# Patient Record
Sex: FEMALE | Race: WHITE | NOT HISPANIC OR LATINO | URBAN - METROPOLITAN AREA
[De-identification: names, ages, dates, MRNs, and addresses within clinical notes are randomized per-mention and may not be internally consistent; named-entity substitution may affect disease eponyms.]

---

## 2017-04-03 ENCOUNTER — OFFICE (AMBULATORY)
Dept: URBAN - METROPOLITAN AREA CLINIC 64 | Facility: CLINIC | Age: 57
End: 2017-04-03

## 2017-04-03 VITALS
WEIGHT: 195 LBS | HEIGHT: 66 IN | DIASTOLIC BLOOD PRESSURE: 91 MMHG | HEART RATE: 98 BPM | SYSTOLIC BLOOD PRESSURE: 152 MMHG

## 2017-04-03 DIAGNOSIS — R19.7 DIARRHEA, UNSPECIFIED: ICD-10-CM

## 2017-04-03 DIAGNOSIS — R15.2 FECAL URGENCY: ICD-10-CM

## 2017-04-03 PROCEDURE — 99213 OFFICE O/P EST LOW 20 MIN: CPT | Performed by: INTERNAL MEDICINE

## 2017-04-03 RX ORDER — COLESEVELAM HYDROCHLORIDE 625 MG/1
TABLET, FILM COATED ORAL
Qty: 180 | Refills: 4 | Status: ACTIVE

## 2017-09-21 ENCOUNTER — OFFICE (AMBULATORY)
Dept: URBAN - METROPOLITAN AREA CLINIC 64 | Facility: CLINIC | Age: 57
End: 2017-09-21

## 2017-09-21 VITALS
HEIGHT: 66 IN | WEIGHT: 194 LBS | SYSTOLIC BLOOD PRESSURE: 178 MMHG | HEART RATE: 84 BPM | DIASTOLIC BLOOD PRESSURE: 107 MMHG

## 2017-09-21 DIAGNOSIS — R15.2 FECAL URGENCY: ICD-10-CM

## 2017-09-21 DIAGNOSIS — R19.7 DIARRHEA, UNSPECIFIED: ICD-10-CM

## 2017-09-21 PROCEDURE — 99213 OFFICE O/P EST LOW 20 MIN: CPT | Performed by: INTERNAL MEDICINE

## 2017-09-21 RX ORDER — COLESEVELAM HYDROCHLORIDE 625 MG/1
TABLET, FILM COATED ORAL
Qty: 180 | Refills: 4 | Status: ACTIVE

## 2018-01-10 ENCOUNTER — HOSPITAL ENCOUNTER (OUTPATIENT)
Dept: GENERAL RADIOLOGY | Facility: HOSPITAL | Age: 58
Discharge: HOME | End: 2018-01-10

## 2018-09-07 ENCOUNTER — OFFICE (AMBULATORY)
Dept: URBAN - METROPOLITAN AREA CLINIC 64 | Facility: CLINIC | Age: 58
End: 2018-09-07

## 2018-09-07 VITALS
HEART RATE: 91 BPM | HEIGHT: 66 IN | DIASTOLIC BLOOD PRESSURE: 104 MMHG | SYSTOLIC BLOOD PRESSURE: 158 MMHG | WEIGHT: 186 LBS

## 2018-09-07 DIAGNOSIS — R19.7 DIARRHEA, UNSPECIFIED: ICD-10-CM

## 2018-09-07 DIAGNOSIS — R15.2 FECAL URGENCY: ICD-10-CM

## 2018-09-07 PROCEDURE — 99213 OFFICE O/P EST LOW 20 MIN: CPT | Performed by: INTERNAL MEDICINE

## 2018-09-07 RX ORDER — OMEPRAZOLE 20 MG/1
20 CAPSULE, DELAYED RELEASE ORAL
Qty: 30 | Refills: 11 | Status: ACTIVE
Start: 2018-09-07

## 2018-10-01 ENCOUNTER — CONVERSION ENCOUNTER (OUTPATIENT)
Dept: FAMILY MEDICINE CLINIC | Facility: CLINIC | Age: 58
End: 2018-10-01

## 2018-10-05 ENCOUNTER — HOSPITAL ENCOUNTER (OUTPATIENT)
Dept: FAMILY MEDICINE CLINIC | Facility: CLINIC | Age: 58
Setting detail: SPECIMEN
Discharge: HOME | End: 2018-10-05
Attending: FAMILY MEDICINE | Admitting: FAMILY MEDICINE

## 2018-10-05 LAB
ALBUMIN SERPL-MCNC: 3.5 G/DL (ref 3.5–4.8)
ALBUMIN/GLOB SERPL: 1.1 {RATIO} (ref 1–1.7)
ALP SERPL-CCNC: 66 IU/L (ref 32–91)
ALT SERPL-CCNC: 23 IU/L (ref 14–54)
ANION GAP SERPL CALC-SCNC: 15.4 MMOL/L (ref 10–20)
AST SERPL-CCNC: 22 IU/L (ref 15–41)
BASOPHILS # BLD AUTO: 0 10*3/UL (ref 0–0.2)
BASOPHILS NFR BLD AUTO: 1 % (ref 0–2)
BILIRUB SERPL-MCNC: 1.5 MG/DL (ref 0.3–1.2)
BUN SERPL-MCNC: 11 MG/DL (ref 8–20)
BUN/CREAT SERPL: 15.7 (ref 5.4–26.2)
CALCIUM SERPL-MCNC: 9.1 MG/DL (ref 8.9–10.3)
CHLORIDE SERPL-SCNC: 102 MMOL/L (ref 101–111)
CHOLEST SERPL-MCNC: 253 MG/DL
CHOLEST/HDLC SERPL: 6.5 {RATIO}
CONV CO2: 24 MMOL/L (ref 22–32)
CONV LDL CHOLESTEROL DIRECT: 102 MG/DL (ref 0–100)
CONV TOTAL PROTEIN: 6.8 G/DL (ref 6.1–7.9)
CREAT UR-MCNC: 0.7 MG/DL (ref 0.4–1)
DIFFERENTIAL METHOD BLD: (no result)
EOSINOPHIL # BLD AUTO: 0.1 10*3/UL (ref 0–0.3)
EOSINOPHIL # BLD AUTO: 2 % (ref 0–3)
ERYTHROCYTE [DISTWIDTH] IN BLOOD BY AUTOMATED COUNT: 14.1 % (ref 11.5–14.5)
GLOBULIN UR ELPH-MCNC: 3.3 G/DL (ref 2.5–3.8)
GLUCOSE SERPL-MCNC: 196 MG/DL (ref 65–99)
HBA1C MFR BLD: 8.2 % (ref 0–5.6)
HCT VFR BLD AUTO: 41.4 % (ref 35–49)
HDLC SERPL-MCNC: 39 MG/DL
HGB BLD-MCNC: 13.8 G/DL (ref 12–15)
LDLC/HDLC SERPL: 2.6 {RATIO}
LIPID INTERPRETATION: ABNORMAL
LYMPHOCYTES # BLD AUTO: 1.5 10*3/UL (ref 0.8–4.8)
LYMPHOCYTES NFR BLD AUTO: 22 % (ref 18–42)
MCH RBC QN AUTO: 28.4 PG (ref 26–32)
MCHC RBC AUTO-ENTMCNC: 33.5 G/DL (ref 32–36)
MCV RBC AUTO: 85 FL (ref 80–94)
MONOCYTES # BLD AUTO: 0.6 10*3/UL (ref 0.1–1.3)
MONOCYTES NFR BLD AUTO: 8 % (ref 2–11)
NEUTROPHILS # BLD AUTO: 4.5 10*3/UL (ref 2.3–8.6)
NEUTROPHILS NFR BLD AUTO: 67 % (ref 50–75)
NRBC BLD AUTO-RTO: 0 /100{WBCS}
NRBC/RBC NFR BLD MANUAL: 0 10*3/UL
PLATELET # BLD AUTO: 223 10*3/UL (ref 150–450)
PMV BLD AUTO: 8.5 FL (ref 7.4–10.4)
POTASSIUM SERPL-SCNC: 3.4 MMOL/L (ref 3.6–5.1)
RBC # BLD AUTO: 4.87 10*6/UL (ref 4–5.4)
SODIUM SERPL-SCNC: 138 MMOL/L (ref 136–144)
TRIGL SERPL-MCNC: 607 MG/DL
TSH SERPL-ACNC: 1.16 UIU/ML (ref 0.34–5.6)
VLDLC SERPL CALC-MCNC: 111.6 MG/DL
WBC # BLD AUTO: 6.7 10*3/UL (ref 4.5–11.5)

## 2018-10-23 ENCOUNTER — CONVERSION ENCOUNTER (OUTPATIENT)
Dept: FAMILY MEDICINE CLINIC | Facility: CLINIC | Age: 58
End: 2018-10-23

## 2018-11-29 ENCOUNTER — CONVERSION ENCOUNTER (OUTPATIENT)
Dept: FAMILY MEDICINE CLINIC | Facility: CLINIC | Age: 58
End: 2018-11-29

## 2019-01-07 ENCOUNTER — HOSPITAL ENCOUNTER (OUTPATIENT)
Dept: GENERAL RADIOLOGY | Facility: HOSPITAL | Age: 59
Discharge: HOME | End: 2019-01-07
Attending: PREVENTIVE MEDICINE | Admitting: PREVENTIVE MEDICINE

## 2019-01-07 ENCOUNTER — CONVERSION ENCOUNTER (OUTPATIENT)
Dept: FAMILY MEDICINE CLINIC | Facility: CLINIC | Age: 59
End: 2019-01-07

## 2019-02-06 ENCOUNTER — CONVERSION ENCOUNTER (OUTPATIENT)
Dept: FAMILY MEDICINE CLINIC | Facility: CLINIC | Age: 59
End: 2019-02-06

## 2019-02-15 ENCOUNTER — CONVERSION ENCOUNTER (OUTPATIENT)
Dept: FAMILY MEDICINE CLINIC | Facility: CLINIC | Age: 59
End: 2019-02-15

## 2019-04-01 ENCOUNTER — CONVERSION ENCOUNTER (OUTPATIENT)
Dept: FAMILY MEDICINE CLINIC | Facility: CLINIC | Age: 59
End: 2019-04-01

## 2019-04-05 ENCOUNTER — CONVERSION ENCOUNTER (OUTPATIENT)
Dept: FAMILY MEDICINE CLINIC | Facility: CLINIC | Age: 59
End: 2019-04-05

## 2019-04-11 ENCOUNTER — CONVERSION ENCOUNTER (OUTPATIENT)
Dept: FAMILY MEDICINE CLINIC | Facility: CLINIC | Age: 59
End: 2019-04-11

## 2019-04-12 ENCOUNTER — HOSPITAL ENCOUNTER (OUTPATIENT)
Dept: LAB | Facility: HOSPITAL | Age: 59
Discharge: HOME | End: 2019-04-12
Attending: PREVENTIVE MEDICINE | Admitting: PREVENTIVE MEDICINE

## 2019-04-12 LAB
ALBUMIN SERPL-MCNC: 3.9 G/DL (ref 3.5–4.8)
ALBUMIN/GLOB SERPL: 1.1 {RATIO} (ref 1–1.7)
ALP SERPL-CCNC: 82 IU/L (ref 32–91)
ALT SERPL-CCNC: 22 IU/L (ref 14–54)
ANION GAP SERPL CALC-SCNC: 17.8 MMOL/L (ref 10–20)
AST SERPL-CCNC: 27 IU/L (ref 15–41)
BASOPHILS # BLD AUTO: 0.1 10*3/UL (ref 0–0.2)
BASOPHILS NFR BLD AUTO: 1 % (ref 0–2)
BILIRUB SERPL-MCNC: 1.5 MG/DL (ref 0.3–1.2)
BUN SERPL-MCNC: 10 MG/DL (ref 8–20)
BUN/CREAT SERPL: 12.5 (ref 5.4–26.2)
CALCIUM SERPL-MCNC: 9.6 MG/DL (ref 8.9–10.3)
CHLORIDE SERPL-SCNC: 102 MMOL/L (ref 101–111)
CHOLEST SERPL-MCNC: 267 MG/DL
CHOLEST/HDLC SERPL: 6.1 {RATIO}
CONV CO2: 23 MMOL/L (ref 22–32)
CONV LDL CHOLESTEROL DIRECT: 135 MG/DL (ref 0–100)
CONV TOTAL PROTEIN: 7.5 G/DL (ref 6.1–7.9)
CREAT UR-MCNC: 0.8 MG/DL (ref 0.4–1)
DIFFERENTIAL METHOD BLD: (no result)
EOSINOPHIL # BLD AUTO: 0.3 10*3/UL (ref 0–0.3)
EOSINOPHIL # BLD AUTO: 3 % (ref 0–3)
ERYTHROCYTE [DISTWIDTH] IN BLOOD BY AUTOMATED COUNT: 13.6 % (ref 11.5–14.5)
ERYTHROCYTE [SEDIMENTATION RATE] IN BLOOD BY WESTERGREN METHOD: 31 MM/HR (ref 0–30)
GLOBULIN UR ELPH-MCNC: 3.6 G/DL (ref 2.5–3.8)
GLUCOSE SERPL-MCNC: 167 MG/DL (ref 65–99)
HCT VFR BLD AUTO: 42.5 % (ref 35–49)
HDLC SERPL-MCNC: 44 MG/DL
HGB BLD-MCNC: 14.4 G/DL (ref 12–15)
LDLC/HDLC SERPL: 3.1 {RATIO}
LIPID INTERPRETATION: ABNORMAL
LYMPHOCYTES # BLD AUTO: 2 10*3/UL (ref 0.8–4.8)
LYMPHOCYTES NFR BLD AUTO: 23 % (ref 18–42)
MAGNESIUM SERPL-MCNC: 1.5 MG/DL (ref 1.8–2.5)
MCH RBC QN AUTO: 28.8 PG (ref 26–32)
MCHC RBC AUTO-ENTMCNC: 33.9 G/DL (ref 32–36)
MCV RBC AUTO: 84.9 FL (ref 80–94)
MONOCYTES # BLD AUTO: 0.6 10*3/UL (ref 0.1–1.3)
MONOCYTES NFR BLD AUTO: 7 % (ref 2–11)
NEUTROPHILS # BLD AUTO: 5.5 10*3/UL (ref 2.3–8.6)
NEUTROPHILS NFR BLD AUTO: 66 % (ref 50–75)
NRBC BLD AUTO-RTO: 0 /100{WBCS}
NRBC/RBC NFR BLD MANUAL: 0 10*3/UL
PLATELET # BLD AUTO: 288 10*3/UL (ref 150–450)
PMV BLD AUTO: 7.5 FL (ref 7.4–10.4)
POTASSIUM SERPL-SCNC: 3.8 MMOL/L (ref 3.6–5.1)
RBC # BLD AUTO: 5.01 10*6/UL (ref 4–5.4)
SODIUM SERPL-SCNC: 139 MMOL/L (ref 136–144)
TRIGL SERPL-MCNC: 453 MG/DL
VLDLC SERPL CALC-MCNC: 88.4 MG/DL
WBC # BLD AUTO: 8.5 10*3/UL (ref 4.5–11.5)

## 2019-06-03 ENCOUNTER — CONVERSION ENCOUNTER (OUTPATIENT)
Dept: OTHER | Facility: HOSPITAL | Age: 59
End: 2019-06-03

## 2019-06-04 VITALS
DIASTOLIC BLOOD PRESSURE: 96 MMHG | HEIGHT: 66 IN | OXYGEN SATURATION: 96 % | WEIGHT: 187.4 LBS | HEART RATE: 94 BPM | BODY MASS INDEX: 30.57 KG/M2 | HEIGHT: 66 IN | HEART RATE: 80 BPM | BODY MASS INDEX: 30.05 KG/M2 | BODY MASS INDEX: 29.21 KG/M2 | DIASTOLIC BLOOD PRESSURE: 101 MMHG | BODY MASS INDEX: 30.31 KG/M2 | WEIGHT: 189 LBS | OXYGEN SATURATION: 95 % | SYSTOLIC BLOOD PRESSURE: 199 MMHG | OXYGEN SATURATION: 96 % | HEART RATE: 88 BPM | OXYGEN SATURATION: 95 % | HEART RATE: 99 BPM | WEIGHT: 188.6 LBS | BODY MASS INDEX: 30.05 KG/M2 | DIASTOLIC BLOOD PRESSURE: 112 MMHG | SYSTOLIC BLOOD PRESSURE: 116 MMHG | WEIGHT: 181 LBS | HEART RATE: 97 BPM | SYSTOLIC BLOOD PRESSURE: 193 MMHG | DIASTOLIC BLOOD PRESSURE: 129 MMHG | HEIGHT: 66 IN | HEART RATE: 94 BPM | HEIGHT: 66 IN | HEIGHT: 66 IN | OXYGEN SATURATION: 100 % | SYSTOLIC BLOOD PRESSURE: 162 MMHG | OXYGEN SATURATION: 95 % | BODY MASS INDEX: 30.53 KG/M2 | WEIGHT: 187 LBS | DIASTOLIC BLOOD PRESSURE: 92 MMHG | OXYGEN SATURATION: 95 % | OXYGEN SATURATION: 98 % | HEART RATE: 90 BPM | BODY MASS INDEX: 29.89 KG/M2 | HEART RATE: 86 BPM | DIASTOLIC BLOOD PRESSURE: 117 MMHG | SYSTOLIC BLOOD PRESSURE: 160 MMHG | WEIGHT: 187 LBS | HEIGHT: 66 IN | SYSTOLIC BLOOD PRESSURE: 170 MMHG | BODY MASS INDEX: 30.25 KG/M2 | DIASTOLIC BLOOD PRESSURE: 99 MMHG | SYSTOLIC BLOOD PRESSURE: 164 MMHG | WEIGHT: 190 LBS | SYSTOLIC BLOOD PRESSURE: 136 MMHG | SYSTOLIC BLOOD PRESSURE: 167 MMHG | WEIGHT: 190.2 LBS | HEART RATE: 93 BPM | DIASTOLIC BLOOD PRESSURE: 102 MMHG | HEIGHT: 66 IN | WEIGHT: 186 LBS | DIASTOLIC BLOOD PRESSURE: 93 MMHG | BODY MASS INDEX: 30.37 KG/M2

## 2019-06-04 VITALS
HEIGHT: 66 IN | WEIGHT: 193.38 LBS | SYSTOLIC BLOOD PRESSURE: 197 MMHG | DIASTOLIC BLOOD PRESSURE: 124 MMHG | HEART RATE: 102 BPM | BODY MASS INDEX: 31.08 KG/M2

## 2019-06-06 NOTE — PROGRESS NOTES
Referring Provider:  Lizette Sigala MD  Primary Provider:  Zakiya RUSS MD MPH    CC:  peripheral neuropathy and neuropathy an initial evaluation.    History of Present Illness:  This is a 59 years old female who presents for neuropathy an initial evaluation.   New pt. referred by pcp, pt. having neuropathy both feet going up her legs currently taking gabapentin since 2004. First started. with a spot in left foot, Saw neurologst in 2007 was abnormal.     numbness associated with tingling and pain, relieved with gabapentin.  stepped on glass and did not know it, now looking at feet daily.    progressed up to mid calf over 15 years,    At onset there there was no diabetes.other test all normal.  Jjust recently found to have elevated BS,     Pt. has hx of leg cramps since age 13yrs will get them while at sleep and has to walk them out.   Pt. sleeps with heating pads and ice packs.  This helps with the leg cramps          Past Medical History:     Reviewed history from 02/06/2019 and no changes required:        Hypertension        Hyperlipidemia        Anxiety        Asthma        C O P D        Diabetes, Type 2        G E R D    Past Surgical History:     Reviewed history from 10/01/2018 and no changes required:        bilateral TKR        multiple surgeries on both knees        cholecystectomy        T&A        both ears        laser surgery on both eyes    Family History Summary:      Reviewed history Last on 02/06/2019 and no changes required:06/03/2019  Mother - Has Family History of Other Medical Problems - Dementia - Entered On: 1/7/2019    General Comments - FH:  aunt - dementia; dm; htn  does not know her birth parents history    Social History:     Reviewed history from 04/11/2019 and no changes required:        Alcohol Use: Y                 at [facility]                Smoking History:        Patient is a former smoker.        Risk Factors:     Smoked Tobacco Use:  Former smoker      Cigarettes:  Yes  Smokeless Tobacco Use:  Never  Passive smoke exposure:  no  Drug use:  no  HIV high-risk behavior:  no  Caffeine use:  0 drinks per day  Alcohol use:  yes     Drinks per day:  occ     Has patient --        Felt need to cut down:  no        Been annoyed by complaints:  no        Felt guilty about drinking:  no        Needed eye opener in the morning:  no     Counseled to quit/cut down alcohol use:  no  Exercise:  no  Seatbelt use:  100 %  Sun Exposure:  occasionally    Family History Risk Factors:     Family History of MI in females < 65 years old:  no     Family History of MI in males < 55 years old:  no    Previous Tobacco Use: Signed On 2019  Smoked Tobacco Use:  Former smoker     Cigarettes:  Yes  Smokeless Tobacco Use:  Never  Passive smoke exposure:  no  Drug use:  no  HIV high-risk behavior:  no  Caffeine use:  0 drinks per day    Previous Alcohol Use: Signed On 2019  Alcohol use:  yes     Drinks per day:  occ     Has patient --        Felt need to cut down:  no        Been annoyed by complaints:  no        Felt guilty about drinking:  no        Needed eye opener in the morning:  no     Counseled to quit/cut down alcohol use:  no  Exercise:  no  Seatbelt use:  100 %  Sun Exposure:  occasionally    Family History Risk Factors:     Family History of MI in females < 65 years old:  no     Family History of MI in males < 55 years old:  no      Vital Signs:    Patient Profile:    59 Years Old Female  Height:     66 inches  Weight:     193.38 pounds  BMI:        31.21     Pulse rate: 102 / minute  BP Sittin / 124  (left arm)    Cuff size:  regular      Problems: Active problems were reviewed with the patient during this visit.  Medications: Medications were reviewed with the patient during this visit.  Allergies: Allergies were reviewed with the patient during this visit.        Vitals Entered By: Odalys Dalton CMA (Ирина  3, 2019 9:20 AM)    Active Medications  (reviewed today):  AMARYL 4 MG ORAL TABLET (GLIMEPIRIDE) bid  TRIBENZOR 40-10-12.5 MG ORAL TABLET (OLMESARTAN-AMLODIPINE-HCTZ) take one tablet by mouth daily  SYMBICORT 160-4.5 MCG/ACT INHALATION AEROSOL (BUDESONIDE-FORMOTEROL FUMARATE) 2 puffs by mounth twice a day  PROVENTIL  (90 BASE) MCG/ACT INHALATION AEROSOL SOLUTION (ALBUTEROL SULFATE) as needed  WELCHOL 625 MG ORAL TABLET (COLESEVELAM HCL) take 1  tablet twice daily  PRAVASTATIN SODIUM 40 MG ORAL TABLET (PRAVASTATIN SODIUM) take one tablet daily  GABAPENTIN 800 MG ORAL TABLET (GABAPENTIN) take 1 tablet in the morning and two tablets in the evening  ZETIA 10 MG ORAL TABLET (EZETIMIBE) take one tablet daily  EPIPEN 2-MELLISSA 0.3 MG/0.3ML INJECTION SOLUTION AUTO-INJECTOR (EPINEPHRINE) use as advised  FREESTYLE LANCETS (LANCETS) TEST BS ONCE DAILY  FREESTYLE LITE TEST IN VITRO STRIP (GLUCOSE BLOOD) test bs twice daily  BLOOD GLUCOSE MONITOR SYSTEM W/DEVICE KIT (BLOOD GLUCOSE MONITORING SUPPL) moniter and supplies to check twice daily    Current Allergies (reviewed today):  * PCN (Critical)  * OXYCODONE (Critical)    Current Medications (including medications started today):   AMARYL 4 MG ORAL TABLET (GLIMEPIRIDE) bid  TRIBENZOR 40-10-12.5 MG ORAL TABLET (OLMESARTAN-AMLODIPINE-HCTZ) take one tablet by mouth daily  SYMBICORT 160-4.5 MCG/ACT INHALATION AEROSOL (BUDESONIDE-FORMOTEROL FUMARATE) 2 puffs by mounth twice a day  PROVENTIL  (90 BASE) MCG/ACT INHALATION AEROSOL SOLUTION (ALBUTEROL SULFATE) as needed  WELCHOL 625 MG ORAL TABLET (COLESEVELAM HCL) take 1  tablet twice daily  PRAVASTATIN SODIUM 40 MG ORAL TABLET (PRAVASTATIN SODIUM) take one tablet daily  GABAPENTIN 800 MG ORAL TABLET (GABAPENTIN) take 1 tablet in the morning and two tablets in the evening  ZETIA 10 MG ORAL TABLET (EZETIMIBE) take one tablet daily  EPIPEN 2-MELLISSA 0.3 MG/0.3ML INJECTION SOLUTION AUTO-INJECTOR (EPINEPHRINE) use as advised  FREESTYLE LANCETS (LANCETS) TEST BS ONCE  DAILY  FREESTYLE LITE TEST IN VITRO STRIP (GLUCOSE BLOOD) test bs twice daily  BLOOD GLUCOSE MONITOR SYSTEM W/DEVICE KIT (BLOOD GLUCOSE MONITORING SUPPL) moniter and supplies to check twice daily    Review of Systems   Neurologic: Denies Headache.   General: Complains of fatigue.   Eyes: Denies blurring.   Ears/Nose/Throat: Denies nasal congestion.   Cardiovascular: Complains of SOB on exertion.   Respiratory: Denies cough.   Gastrointestinal: Complains of heart burn.   Genitourinary: Complains of urinary frequency.   Musculoskeletal: Complains of back pain.   Skin: Denies rash.   Psychiatric: Complains of anxiety.   Endocrine: Denies cold intolerance.   Heme/Lymphatic: Denies abnormal bruising.   Allergic/Immunologic: Denies itchy eyes.       Physical Exam    General:      well developed, well nourished, in no acute distress  Eyes:      PERRLA/EOM intact;  conjunctiva and sclera clear  Nose:      no deformity, discharge,   Mouth:      no deformity or lesions  Lungs:      clear bilaterally to A  Heart:      regular rate and rhythm, S1, S2 without murmurs,  Msk:      no deformity or scoliosis noted with normal posture   Psych:      alert and cooperative; normal mood and affect; normal attention span and concentration      Blood Pressure:  Today's BP: 197/124 mm Hg    Labwork:   Most Recent Lab Results:   LDL: 135 mg/dL 04/12/2019  HbA1c: : 7.3 % 04/12/2019      Detailed Neurologic Exam    General Neurologic Exam:    Speech:     Speech is fluent.   Cognition:     Cognition is intact.   CN 2-12:     cn 2-12 normal  Cerebellar:     Finger to nose.  Tandem gait is normal.      Motor Exam:    Gait:     Gait is normal.    Strength:     Strength in the upper and lower extremities is normal.      Sensory Exam:    Vibratory Sensation:     position sense at toes fair  Light Touch:     decreased in feet    Reflex Exam:    Deep Tendon Reflexes:     trace left ankle reflex     Right Biceps:  1+     Left Biceps:  1+     Right  Knee:  0     Left Knee:  0     Right Ankle:  0      Impression & Recommendations:    Problem # 1:  Diabetic peripheral neuropathy (ICD-250.60) ([id number])  Assessment: Unchanged  15 year history of neuropaty, idiopathic in etiology  Now diabetic, discussed, control of blood sugar.  will obtain additional labs, defer emg as will not provide additional information  Orders:  SERUM PROTEIN ELECTROPHORESIS (SPE)      Medications Added to Medication List This Visit:  1)  Magnesium  .... 1 po qd  2)  B-12  .... 1 po qd  3)  Fish Oil  .... 1 po qd      Patient Instructions:  1)  Please schedule a follow-up appointment as needed.      ]      Electronically signed by Joseph F Seipel MD on 06/03/2019 at 10:10 AM  ________________________________________________________________________       Disclaimer: Converted Note message may not contain all data elements that existed in the legacy source system. Please see [employer] for the original note details.

## 2019-07-08 ENCOUNTER — OFFICE VISIT (OUTPATIENT)
Dept: FAMILY MEDICINE CLINIC | Facility: CLINIC | Age: 59
End: 2019-07-08

## 2019-07-08 VITALS
HEIGHT: 55 IN | BODY MASS INDEX: 45.36 KG/M2 | HEART RATE: 96 BPM | SYSTOLIC BLOOD PRESSURE: 172 MMHG | RESPIRATION RATE: 16 BRPM | DIASTOLIC BLOOD PRESSURE: 102 MMHG | OXYGEN SATURATION: 94 % | WEIGHT: 196 LBS | TEMPERATURE: 98.1 F

## 2019-07-08 DIAGNOSIS — K21.9 GASTROESOPHAGEAL REFLUX DISEASE, ESOPHAGITIS PRESENCE NOT SPECIFIED: ICD-10-CM

## 2019-07-08 DIAGNOSIS — I10 ESSENTIAL HYPERTENSION: ICD-10-CM

## 2019-07-08 DIAGNOSIS — F41.9 ANXIETY: Primary | ICD-10-CM

## 2019-07-08 DIAGNOSIS — Z11.4 SCREENING FOR HIV (HUMAN IMMUNODEFICIENCY VIRUS): ICD-10-CM

## 2019-07-08 DIAGNOSIS — J45.909 UNCOMPLICATED ASTHMA, UNSPECIFIED ASTHMA SEVERITY, UNSPECIFIED WHETHER PERSISTENT: ICD-10-CM

## 2019-07-08 DIAGNOSIS — E11.8 DISORDER ASSOCIATED WITH TYPE 2 DIABETES MELLITUS (HCC): ICD-10-CM

## 2019-07-08 DIAGNOSIS — Z00.01 ENCOUNTER FOR ANNUAL GENERAL MEDICAL EXAMINATION WITH ABNORMAL FINDINGS IN ADULT: ICD-10-CM

## 2019-07-08 DIAGNOSIS — E55.9 VITAMIN D DEFICIENCY: ICD-10-CM

## 2019-07-08 DIAGNOSIS — E78.5 HYPERLIPIDEMIA, UNSPECIFIED HYPERLIPIDEMIA TYPE: ICD-10-CM

## 2019-07-08 DIAGNOSIS — Z91.89 ENCOUNTER FOR HEPATITIS C VIRUS SCREENING TEST FOR HIGH RISK PATIENT: ICD-10-CM

## 2019-07-08 DIAGNOSIS — Z11.59 ENCOUNTER FOR HEPATITIS C VIRUS SCREENING TEST FOR HIGH RISK PATIENT: ICD-10-CM

## 2019-07-08 PROBLEM — J30.9 ALLERGIC RHINITIS: Status: ACTIVE | Noted: 2018-10-01

## 2019-07-08 PROBLEM — E11.9 TYPE 2 DIABETES MELLITUS WITHOUT COMPLICATIONS: Status: RESOLVED | Noted: 2019-06-03 | Resolved: 2019-07-08

## 2019-07-08 PROBLEM — E11.9 TYPE 2 DIABETES MELLITUS WITHOUT COMPLICATIONS (HCC): Status: ACTIVE | Noted: 2019-06-03

## 2019-07-08 PROBLEM — B35.1 ONYCHOMYCOSIS OF TOENAIL: Status: ACTIVE | Noted: 2018-10-01

## 2019-07-08 PROBLEM — R00.2 PALPITATIONS: Status: ACTIVE | Noted: 2018-11-29

## 2019-07-08 PROBLEM — E11.42 DIABETIC PERIPHERAL NEUROPATHY: Status: ACTIVE | Noted: 2019-02-06

## 2019-07-08 PROBLEM — R32 URINARY INCONTINENCE: Status: ACTIVE | Noted: 2018-10-23

## 2019-07-08 PROBLEM — J44.9 CHRONIC OBSTRUCTIVE PULMONARY DISEASE (HCC): Status: ACTIVE | Noted: 2019-06-03

## 2019-07-08 PROBLEM — E83.42 HYPOMAGNESEMIA: Status: ACTIVE | Noted: 2018-11-29

## 2019-07-08 PROCEDURE — 99214 OFFICE O/P EST MOD 30 MIN: CPT | Performed by: PREVENTIVE MEDICINE

## 2019-07-08 PROCEDURE — 99386 PREV VISIT NEW AGE 40-64: CPT | Performed by: PREVENTIVE MEDICINE

## 2019-07-08 RX ORDER — BUDESONIDE AND FORMOTEROL FUMARATE DIHYDRATE 160; 4.5 UG/1; UG/1
2 AEROSOL RESPIRATORY (INHALATION) 2 TIMES DAILY
COMMUNITY
Start: 2019-07-02 | End: 2020-04-13 | Stop reason: SDUPTHER

## 2019-07-08 RX ORDER — CYANOCOBALAMIN (VITAMIN B-12) 1000 MCG
1 TABLET ORAL DAILY
COMMUNITY
Start: 2019-06-03 | End: 2023-01-09

## 2019-07-08 RX ORDER — COLESEVELAM 180 1/1
1 TABLET ORAL 2 TIMES DAILY WITH MEALS
COMMUNITY
Start: 2019-05-04 | End: 2020-07-16 | Stop reason: SDUPTHER

## 2019-07-08 RX ORDER — EZETIMIBE 10 MG/1
1 TABLET ORAL EVERY 24 HOURS
COMMUNITY
Start: 2018-10-01 | End: 2019-09-30 | Stop reason: SDUPTHER

## 2019-07-08 RX ORDER — OLMESARTAN MEDOXOMIL / AMLODIPINE BESYLATE / HYDROCHLOROTHIAZIDE 40; 10; 12.5 MG/1; MG/1; MG/1
1 TABLET, FILM COATED ORAL DAILY
COMMUNITY
Start: 2019-05-28 | End: 2019-11-24 | Stop reason: SDUPTHER

## 2019-07-08 RX ORDER — LANCETS 28 GAUGE
EACH MISCELLANEOUS
COMMUNITY
Start: 2019-06-25

## 2019-07-08 RX ORDER — GLIMEPIRIDE 4 MG/1
1 TABLET ORAL 2 TIMES DAILY
COMMUNITY
Start: 2019-06-21 | End: 2019-09-19 | Stop reason: SDUPTHER

## 2019-07-08 RX ORDER — HYDRALAZINE HYDROCHLORIDE 25 MG/1
25 TABLET, FILM COATED ORAL 3 TIMES DAILY
Qty: 90 TABLET | Refills: 3 | Status: SHIPPED | OUTPATIENT
Start: 2019-07-08 | End: 2019-08-01

## 2019-07-08 RX ORDER — PRAVASTATIN SODIUM 40 MG
1 TABLET ORAL NIGHTLY
COMMUNITY
Start: 2018-10-01 | End: 2019-08-05

## 2019-07-08 RX ORDER — EPINEPHRINE 0.3 MG/.3ML
INJECTION SUBCUTANEOUS
COMMUNITY
Start: 2018-11-29 | End: 2020-04-13 | Stop reason: SDUPTHER

## 2019-07-08 RX ORDER — ALBUTEROL SULFATE 90 MCG
2 HFA AEROSOL WITH ADAPTER (GRAM) INHALATION AS NEEDED
COMMUNITY
Start: 2019-04-11 | End: 2020-04-13 | Stop reason: SDUPTHER

## 2019-07-08 RX ORDER — GABAPENTIN 800 MG/1
800 TABLET ORAL
COMMUNITY
Start: 2017-01-10 | End: 2019-09-13 | Stop reason: SDUPTHER

## 2019-07-08 RX ORDER — CALCIUM CARBONATE 300MG(750)
1 TABLET,CHEWABLE ORAL DAILY
COMMUNITY
Start: 2019-06-03 | End: 2023-01-09

## 2019-07-08 RX ORDER — DIPHENHYDRAMINE HYDROCHLORIDE 25 MG/1
CAPSULE, LIQUID FILLED ORAL
COMMUNITY
Start: 2018-11-29

## 2019-07-08 NOTE — PROGRESS NOTES
"Subjective   Pearl Elizalde is a 59 y.o. female presents for Wellness exam.  Needs colonoscopy, Pneumonia, Td and ShingRix.  Also having blurring vision and headaches from BP being over 200  Chief Complaint   Patient presents with   • Hypertension       Health Maintenance Due   Topic Date Due   • URINE MICROALBUMIN  1958   • ANNUAL PHYSICAL  05/11/1961   • PNEUMOCOCCAL VACCINE (19-64 MEDIUM RISK) (1 of 1 - PPSV23) 05/11/1977   • TDAP/TD VACCINES (1 - Tdap) 05/11/1977   • ZOSTER VACCINE (1 of 2) 05/11/2008   • DIABETIC EYE EXAM  07/08/2019   • COLONOSCOPY  07/08/2019       History of Present Illness     Vitals:    07/08/19 1545 07/08/19 1547   BP: 164/96 (!) 172/102   BP Location: Right arm Left arm   Patient Position: Sitting Sitting   Cuff Size: Large Adult Large Adult   Pulse: 96    Resp: 16    Temp: 98.1 °F (36.7 °C)    TempSrc: Oral    SpO2: 94%    Weight: 88.9 kg (196 lb)    Height: 140.3 cm (55.25\")        Current Outpatient Medications on File Prior to Visit   Medication Sig Dispense Refill   • Blood Glucose Monitoring Suppl (BLOOD GLUCOSE MONITOR SYSTEM) w/Device kit BLOOD GLUCOSE MONITOR SYSTEM w/Device KIT     • Cholecalciferol (VITAMIN D) 1000 units tablet Take 1,000 Units by mouth Daily.     • colesevelam (WELCHOL) 625 MG tablet Take 1 tablet by mouth Daily.     • Cyanocobalamin (B-12) 1000 MCG tablet Take 1 tablet by mouth Daily.     • EPINEPHrine (EPIPEN 2-MELLISSA) 0.3 MG/0.3ML solution auto-injector injection EPIPEN 2-MELLISSA 0.3 MG/0.3ML SOAJ     • ezetimibe (ZETIA) 10 MG tablet Take 1 tablet by mouth Daily.     • gabapentin (NEURONTIN) 800 MG tablet Take 800 mg by mouth. TAKE ONE TABLET IN THE AM AND 2 PM     • glimepiride (AMARYL) 4 MG tablet Take 1 tablet by mouth 2 (Two) Times a Day.     • glucose blood (FREESTYLE LITE) test strip FREESTYLE LITE TEST STRP     • Lancets (FREESTYLE) lancets      • Magnesium 400 MG tablet Take 1 tablet by mouth Daily.     • Olmesartan-amLODIPine-HCTZ 40-10-12.5 MG " tablet Take 1 tablet by mouth Daily.     • pravastatin (PRAVACHOL) 40 MG tablet Take 1 tablet by mouth Every Night.     • PROVENTIL  (90 Base) MCG/ACT inhaler Inhale 2 puffs As Needed.     • SYMBICORT 160-4.5 MCG/ACT inhaler Inhale 2 puffs 2 (Two) Times a Day.       No current facility-administered medications on file prior to visit.        The following portions of the patient's history were reviewed and updated as appropriate: allergies, current medications, past family history, past medical history, past social history, past surgical history and problem list.    Review of Systems   Constitutional: Negative.    HENT: Negative.  Negative for sinus pressure and sore throat.    Eyes: Negative.    Respiratory: Negative for cough.    Cardiovascular: Negative.    Endocrine: Negative.    Skin: Negative.    Allergic/Immunologic: Positive for environmental allergies.   Neurological: Negative.    Psychiatric/Behavioral: Negative.        Objective   Physical Exam   Constitutional: She is oriented to person, place, and time. She appears well-developed.   HENT:   Head: Normocephalic and atraumatic.   Eyes: Conjunctivae and EOM are normal. Pupils are equal, round, and reactive to light.   Neck: Normal range of motion.   Cardiovascular: Normal rate and regular rhythm.   Pulmonary/Chest: Effort normal and breath sounds normal.   Abdominal: Soft. Bowel sounds are normal.     Skin Integrity  -  Her right foot skin is not intact.  Her left foot skin is not intact..  Lymphadenopathy:     She has no cervical adenopathy.   Neurological: She is alert and oriented to person, place, and time.   Skin: Skin is warm and dry.   Psychiatric: She has a normal mood and affect.   Nursing note and vitals reviewed.    PHQ-9 Total Score: 0    Assessment/Plan   Pearl was seen today for hypertension.    Diagnoses and all orders for this visit:    Anxiety    Uncomplicated asthma, unspecified asthma severity, unspecified whether  persistent    Disorder associated with type 2 diabetes mellitus (CMS/HCC)  -     Comprehensive Metabolic Panel  -     Hemoglobin A1c  -     Microalbumin / Creatinine Urine Ratio - Urine, Clean Catch  -     Vitamin B12    Gastroesophageal reflux disease, esophagitis presence not specified  -     CBC Auto Differential  -     Magnesium    Hyperlipidemia, unspecified hyperlipidemia type  -     Lipid Panel    Essential hypertension    Vitamin D deficiency  -     Vitamin D 25 Hydroxy    Encounter for annual general medical examination with abnormal findings in adult    Screening for HIV (human immunodeficiency virus)  -     HIV-1 & HIV-2 Antibodies    Encounter for hepatitis C virus screening test for high risk patient  -     Hepatitis C Antibody    Other orders  -     hydrALAZINE (APRESOLINE) 25 MG tablet; Take 1 tablet by mouth 3 (Three) Times a Day.        Patient Instructions   aDVISE EYE EXAM DUE TO BLURRY VISION.   After one week, call 10 blood pressures over next 2 weeks.  Watch sodium, alcohol and weight.  Advise colonoscopy.    Get pneumovax, Td, and ShingRix. Twelve hour fast for labs within 5 days

## 2019-07-08 NOTE — PATIENT INSTRUCTIONS
aDVISE EYE EXAM DUE TO BLURRY VISION.   After one week, call 10 blood pressures over next 2 weeks.  Watch sodium, alcohol and weight.  Advise colonoscopy.    Get pneumovax, Td, and ShingRix. Twelve hour fast for labs within 5 days

## 2019-07-10 DIAGNOSIS — Z01.00 EYE EXAM, ROUTINE: ICD-10-CM

## 2019-07-10 DIAGNOSIS — E11.8 DISORDER ASSOCIATED WITH TYPE 2 DIABETES MELLITUS (HCC): Primary | ICD-10-CM

## 2019-07-17 ENCOUNTER — TELEPHONE (OUTPATIENT)
Dept: ENDOCRINOLOGY | Facility: CLINIC | Age: 59
End: 2019-07-17

## 2019-07-17 NOTE — TELEPHONE ENCOUNTER
LEFT A MESSAGE IN REGARDS TO THE NEW PATIENT REFERRAL FOR DR. ANDRE AND TO CALL OUR OFFICE TO SCHEDULE IT

## 2019-08-01 ENCOUNTER — TELEPHONE (OUTPATIENT)
Dept: FAMILY MEDICINE CLINIC | Facility: CLINIC | Age: 59
End: 2019-08-01

## 2019-08-01 ENCOUNTER — CLINICAL SUPPORT (OUTPATIENT)
Dept: FAMILY MEDICINE CLINIC | Facility: CLINIC | Age: 59
End: 2019-08-01

## 2019-08-01 DIAGNOSIS — E55.9 VITAMIN D DEFICIENCY: ICD-10-CM

## 2019-08-01 DIAGNOSIS — I10 ESSENTIAL HYPERTENSION: ICD-10-CM

## 2019-08-01 DIAGNOSIS — E78.5 HYPERLIPIDEMIA, UNSPECIFIED HYPERLIPIDEMIA TYPE: ICD-10-CM

## 2019-08-01 DIAGNOSIS — K21.9 GASTROESOPHAGEAL REFLUX DISEASE, ESOPHAGITIS PRESENCE NOT SPECIFIED: ICD-10-CM

## 2019-08-01 DIAGNOSIS — E11.8 DISORDER ASSOCIATED WITH TYPE 2 DIABETES MELLITUS (HCC): ICD-10-CM

## 2019-08-01 LAB
ALBUMIN SERPL-MCNC: 3.8 G/DL (ref 3.5–4.8)
ALBUMIN/GLOB SERPL: 1.4 G/DL (ref 1–1.7)
ALP SERPL-CCNC: 71 U/L (ref 32–91)
ALT SERPL W P-5'-P-CCNC: 24 U/L (ref 14–54)
ANION GAP SERPL CALCULATED.3IONS-SCNC: 15.1 MMOL/L (ref 5–15)
ARTICHOKE IGE QN: 105 MG/DL (ref 0–100)
AST SERPL-CCNC: 26 U/L (ref 15–41)
BASOPHILS # BLD AUTO: 0.1 10*3/MM3 (ref 0–0.2)
BASOPHILS NFR BLD AUTO: 1 % (ref 0–1.5)
BILIRUB SERPL-MCNC: 1.1 MG/DL (ref 0.3–1.2)
BUN BLD-MCNC: 12 MG/DL (ref 8–20)
BUN/CREAT SERPL: 15 (ref 5.4–26.2)
CALCIUM SPEC-SCNC: 9.3 MG/DL (ref 8.9–10.3)
CHLORIDE SERPL-SCNC: 104 MMOL/L (ref 101–111)
CHOLEST SERPL-MCNC: 203 MG/DL
CO2 SERPL-SCNC: 23 MMOL/L (ref 22–32)
CREAT BLD-MCNC: 0.8 MG/DL (ref 0.4–1)
DEPRECATED RDW RBC AUTO: 41.6 FL (ref 37–54)
EOSINOPHIL # BLD AUTO: 0.2 10*3/MM3 (ref 0–0.4)
EOSINOPHIL NFR BLD AUTO: 2.5 % (ref 0.3–6.2)
ERYTHROCYTE [DISTWIDTH] IN BLOOD BY AUTOMATED COUNT: 13.8 % (ref 12.3–15.4)
GFR SERPL CREATININE-BSD FRML MDRD: 73 ML/MIN/1.73
GLOBULIN UR ELPH-MCNC: 2.8 GM/DL (ref 2.5–3.8)
GLUCOSE BLD-MCNC: 131 MG/DL (ref 65–99)
HBA1C MFR BLD: 7.6 % (ref 3.5–5.6)
HCT VFR BLD AUTO: 40.9 % (ref 34–46.6)
HDLC SERPL QL: 4.83
HDLC SERPL-MCNC: 42 MG/DL
HGB BLD-MCNC: 13.8 G/DL (ref 12–15.9)
LDLC/HDLC SERPL: 1.82 {RATIO}
LYMPHOCYTES # BLD AUTO: 1.7 10*3/MM3 (ref 0.7–3.1)
LYMPHOCYTES NFR BLD AUTO: 17.5 % (ref 19.6–45.3)
MAGNESIUM SERPL-MCNC: 1.8 MG/DL (ref 1.8–2.5)
MCH RBC QN AUTO: 28.8 PG (ref 26.6–33)
MCHC RBC AUTO-ENTMCNC: 33.6 G/DL (ref 31.5–35.7)
MCV RBC AUTO: 85.5 FL (ref 79–97)
MONOCYTES # BLD AUTO: 0.6 10*3/MM3 (ref 0.1–0.9)
MONOCYTES NFR BLD AUTO: 6.6 % (ref 5–12)
NEUTROPHILS # BLD AUTO: 6.9 10*3/MM3 (ref 1.7–7)
NEUTROPHILS NFR BLD AUTO: 72.4 % (ref 42.7–76)
NRBC BLD AUTO-RTO: 0.1 /100 WBC (ref 0–0.2)
PLATELET # BLD AUTO: 282 10*3/MM3 (ref 140–450)
PMV BLD AUTO: 8.3 FL (ref 6–12)
POTASSIUM BLD-SCNC: 4.1 MMOL/L (ref 3.6–5.1)
PROT SERPL-MCNC: 6.6 G/DL (ref 6.1–7.9)
RBC # BLD AUTO: 4.79 10*6/MM3 (ref 3.77–5.28)
SODIUM BLD-SCNC: 138 MMOL/L (ref 136–144)
TRIGL SERPL-MCNC: 423 MG/DL
VIT B12 BLD-MCNC: 700 PG/ML (ref 180–914)
VLDLC SERPL-MCNC: 84.6 MG/DL
WBC NRBC COR # BLD: 9.6 10*3/MM3 (ref 3.4–10.8)

## 2019-08-01 PROCEDURE — 82306 VITAMIN D 25 HYDROXY: CPT | Performed by: PREVENTIVE MEDICINE

## 2019-08-01 PROCEDURE — 82607 VITAMIN B-12: CPT | Performed by: PREVENTIVE MEDICINE

## 2019-08-01 PROCEDURE — 83735 ASSAY OF MAGNESIUM: CPT | Performed by: PREVENTIVE MEDICINE

## 2019-08-01 PROCEDURE — 86803 HEPATITIS C AB TEST: CPT | Performed by: PREVENTIVE MEDICINE

## 2019-08-01 PROCEDURE — 85025 COMPLETE CBC W/AUTO DIFF WBC: CPT | Performed by: PREVENTIVE MEDICINE

## 2019-08-01 PROCEDURE — 80053 COMPREHEN METABOLIC PANEL: CPT | Performed by: PREVENTIVE MEDICINE

## 2019-08-01 PROCEDURE — 83036 HEMOGLOBIN GLYCOSYLATED A1C: CPT | Performed by: PREVENTIVE MEDICINE

## 2019-08-01 PROCEDURE — 36415 COLL VENOUS BLD VENIPUNCTURE: CPT | Performed by: PREVENTIVE MEDICINE

## 2019-08-01 PROCEDURE — 80061 LIPID PANEL: CPT | Performed by: PREVENTIVE MEDICINE

## 2019-08-01 PROCEDURE — G0432 EIA HIV-1/HIV-2 SCREEN: HCPCS | Performed by: PREVENTIVE MEDICINE

## 2019-08-01 RX ORDER — HYDRALAZINE HYDROCHLORIDE 25 MG/1
50 TABLET, FILM COATED ORAL 3 TIMES DAILY
Qty: 90 TABLET | Refills: 3 | Status: SHIPPED | OUTPATIENT
Start: 2019-08-01 | End: 2019-08-02

## 2019-08-01 NOTE — TELEPHONE ENCOUNTER
Let's increase Hydralazine to 50 mg three times daily and advise 10 more blood pressures over next 2 weeks.  Watch sodium, alcohol and weight.

## 2019-08-02 ENCOUNTER — OFFICE VISIT (OUTPATIENT)
Dept: FAMILY MEDICINE CLINIC | Facility: CLINIC | Age: 59
End: 2019-08-02

## 2019-08-02 VITALS
RESPIRATION RATE: 16 BRPM | WEIGHT: 197.4 LBS | HEIGHT: 65 IN | DIASTOLIC BLOOD PRESSURE: 83 MMHG | OXYGEN SATURATION: 97 % | TEMPERATURE: 98.1 F | BODY MASS INDEX: 32.89 KG/M2 | HEART RATE: 80 BPM | SYSTOLIC BLOOD PRESSURE: 136 MMHG

## 2019-08-02 DIAGNOSIS — I10 ESSENTIAL HYPERTENSION: Primary | ICD-10-CM

## 2019-08-02 LAB
25(OH)D3 SERPL-MCNC: 11.1 NG/ML (ref 30–100)
HCV AB SER DONR QL: NORMAL
HIV1+2 AB SER QL: NEGATIVE

## 2019-08-02 PROCEDURE — 99214 OFFICE O/P EST MOD 30 MIN: CPT | Performed by: PREVENTIVE MEDICINE

## 2019-08-02 RX ORDER — AMOXICILLIN 500 MG
1200 CAPSULE ORAL DAILY
COMMUNITY
End: 2022-06-08

## 2019-08-02 RX ORDER — HYDRALAZINE HYDROCHLORIDE 50 MG/1
50 TABLET, FILM COATED ORAL 3 TIMES DAILY
Qty: 180 TABLET | Refills: 3
Start: 2019-08-02 | End: 2019-08-30 | Stop reason: SDUPTHER

## 2019-08-02 NOTE — PROGRESS NOTES
"Subjective   Pearl Elizalde is a 59 y.o. female presents for   Chief Complaint   Patient presents with   • Hypertension     having trouble with new dose of meds   Palpitations,dizzy, with ankle swelling before started increased dose.    Health Maintenance Due   Topic Date Due   • URINE MICROALBUMIN  1958   • ANNUAL PHYSICAL  05/11/1961   • PNEUMOCOCCAL VACCINE (19-64 MEDIUM RISK) (1 of 1 - PPSV23) 05/11/1977   • TDAP/TD VACCINES (1 - Tdap) 05/11/1977   • ZOSTER VACCINE (1 of 2) 05/11/2008   • DIABETIC EYE EXAM  07/08/2019   • COLONOSCOPY  07/08/2019   • INFLUENZA VACCINE  08/01/2019       History of Present Illness     Vitals:    08/02/19 1035 08/02/19 1037   BP: 146/82 136/83   BP Location: Right arm Left arm   Patient Position: Sitting Sitting   Cuff Size: Adult Adult   Pulse: 80    Resp: 16    Temp: 98.1 °F (36.7 °C)    SpO2: 97%    Weight: 89.5 kg (197 lb 6.4 oz)    Height: 165.1 cm (65\")        Current Outpatient Medications on File Prior to Visit   Medication Sig Dispense Refill   • Blood Glucose Monitoring Suppl (BLOOD GLUCOSE MONITOR SYSTEM) w/Device kit BLOOD GLUCOSE MONITOR SYSTEM w/Device KIT     • Cholecalciferol (VITAMIN D) 1000 units tablet Take 1,000 Units by mouth Daily.     • colesevelam (WELCHOL) 625 MG tablet Take 1 tablet by mouth 2 (Two) Times a Day With Meals.     • Cyanocobalamin (B-12) 1000 MCG tablet Take 1 tablet by mouth Daily.     • EPINEPHrine (EPIPEN 2-MELLISSA) 0.3 MG/0.3ML solution auto-injector injection EPIPEN 2-MELLISSA 0.3 MG/0.3ML SOAJ     • ezetimibe (ZETIA) 10 MG tablet Take 1 tablet by mouth Daily.     • gabapentin (NEURONTIN) 800 MG tablet Take 800 mg by mouth. TAKE ONE TABLET IN THE AM AND 2 PM     • glimepiride (AMARYL) 4 MG tablet Take 1 tablet by mouth 2 (Two) Times a Day.     • glucose blood (FREESTYLE LITE) test strip FREESTYLE LITE TEST STRP     • Lancets (FREESTYLE) lancets      • Magnesium 400 MG tablet Take 1 tablet by mouth Daily.     • Olmesartan-amLODIPine-HCTZ " 40-10-12.5 MG tablet Take 1 tablet by mouth Daily.     • Omega-3 Fatty Acids (FISH OIL) 1200 MG capsule capsule Take 1,200 mg by mouth Daily.     • PROVENTIL  (90 Base) MCG/ACT inhaler Inhale 2 puffs As Needed.     • SYMBICORT 160-4.5 MCG/ACT inhaler Inhale 2 puffs 2 (Two) Times a Day.       No current facility-administered medications on file prior to visit.        The following portions of the patient's history were reviewed and updated as appropriate: allergies, current medications, past family history, past medical history, past social history, past surgical history and problem list.    Review of Systems   Constitutional: Negative.    HENT: Negative.  Negative for sinus pressure and sore throat.    Eyes: Negative.    Respiratory: Negative.  Negative for cough.    Cardiovascular: Positive for palpitations and leg swelling.   Gastrointestinal: Negative.    Endocrine: Negative.    Genitourinary: Negative.    Musculoskeletal: Negative.    Skin: Negative.    Allergic/Immunologic: Positive for environmental allergies.   Neurological: Positive for light-headedness.   Hematological: Negative.    Psychiatric/Behavioral: Negative.        Objective   Physical Exam   Constitutional: She is oriented to person, place, and time. She appears well-developed.   HENT:   Head: Normocephalic and atraumatic.   Eyes: Conjunctivae and EOM are normal. Pupils are equal, round, and reactive to light.   Neck: Normal range of motion.   Cardiovascular: Normal rate and regular rhythm.   Pulmonary/Chest: Effort normal and breath sounds normal.   Abdominal: Soft. Bowel sounds are normal.   Musculoskeletal: Normal range of motion.   Lymphadenopathy:     She has no cervical adenopathy.   Neurological: She is alert and oriented to person, place, and time.   Skin: Skin is warm and dry.   Psychiatric: She has a normal mood and affect.   Nursing note and vitals reviewed.    PHQ-9 Total Score:      Assessment/Plan   Pearl was seen today for  hypertension.    Diagnoses and all orders for this visit:    Essential hypertension    Other orders  -     hydrALAZINE (APRESOLINE) 50 MG tablet; Take 1 tablet by mouth 3 (Three) Times a Day. 1/2 tablet amand noon and whole tablet at night        Patient Instructions   1/2 table am and noon and whole tablet pm of 50 mg size.  Call 10 blood pressures over 2 weeks starting next week.

## 2019-08-02 NOTE — PATIENT INSTRUCTIONS
1/2 table am and noon and whole tablet pm of 50 mg size.  Call 10 blood pressures over 2 weeks starting next week.

## 2019-08-05 RX ORDER — PRAVASTATIN SODIUM 80 MG/1
80 TABLET ORAL NIGHTLY
Qty: 90 TABLET | Refills: 3 | Status: SHIPPED | OUTPATIENT
Start: 2019-08-05 | End: 2020-07-16 | Stop reason: SDUPTHER

## 2019-08-19 PROBLEM — Z78.0 POST-MENOPAUSAL: Status: ACTIVE | Noted: 2019-08-19

## 2019-08-30 ENCOUNTER — HOSPITAL ENCOUNTER (OUTPATIENT)
Dept: CARDIOLOGY | Facility: HOSPITAL | Age: 59
Discharge: HOME | End: 2019-08-30
Admitting: PREVENTIVE MEDICINE

## 2019-08-30 ENCOUNTER — OFFICE VISIT (OUTPATIENT)
Dept: FAMILY MEDICINE CLINIC | Facility: CLINIC | Age: 59
End: 2019-08-30

## 2019-08-30 VITALS
BODY MASS INDEX: 33.02 KG/M2 | DIASTOLIC BLOOD PRESSURE: 65 MMHG | TEMPERATURE: 98.2 F | OXYGEN SATURATION: 95 % | WEIGHT: 198.4 LBS | SYSTOLIC BLOOD PRESSURE: 114 MMHG | RESPIRATION RATE: 16 BRPM | HEART RATE: 93 BPM

## 2019-08-30 DIAGNOSIS — Z00.01 ENCOUNTER FOR ANNUAL GENERAL MEDICAL EXAMINATION WITH ABNORMAL FINDINGS IN ADULT: Primary | ICD-10-CM

## 2019-08-30 DIAGNOSIS — L03.818 CELLULITIS OF OTHER SPECIFIED SITE: ICD-10-CM

## 2019-08-30 DIAGNOSIS — R25.2 LEG CRAMPS: ICD-10-CM

## 2019-08-30 DIAGNOSIS — M79.604 LEG PAIN, DIFFUSE, RIGHT: ICD-10-CM

## 2019-08-30 DIAGNOSIS — Z12.31 BREAST CANCER SCREENING BY MAMMOGRAM: ICD-10-CM

## 2019-08-30 DIAGNOSIS — Z78.0 POSTMENOPAUSAL: ICD-10-CM

## 2019-08-30 DIAGNOSIS — Z12.11 SCREENING FOR COLON CANCER: ICD-10-CM

## 2019-08-30 PROBLEM — E66.9 OBESITY: Status: ACTIVE | Noted: 2019-08-30

## 2019-08-30 LAB
BH CV LOWER VASCULAR LEFT COMMON FEMORAL AUGMENT: NORMAL
BH CV LOWER VASCULAR LEFT COMMON FEMORAL COMPETENT: NORMAL
BH CV LOWER VASCULAR LEFT COMMON FEMORAL COMPRESS: NORMAL
BH CV LOWER VASCULAR LEFT COMMON FEMORAL PHASIC: NORMAL
BH CV LOWER VASCULAR LEFT COMMON FEMORAL SPONT: NORMAL
BH CV LOWER VASCULAR RIGHT COMMON FEMORAL AUGMENT: NORMAL
BH CV LOWER VASCULAR RIGHT COMMON FEMORAL COMPETENT: NORMAL
BH CV LOWER VASCULAR RIGHT COMMON FEMORAL COMPRESS: NORMAL
BH CV LOWER VASCULAR RIGHT COMMON FEMORAL PHASIC: NORMAL
BH CV LOWER VASCULAR RIGHT COMMON FEMORAL SPONT: NORMAL
BH CV LOWER VASCULAR RIGHT DISTAL FEMORAL COMPRESS: NORMAL
BH CV LOWER VASCULAR RIGHT GASTRONEMIUS COMPRESS: NORMAL
BH CV LOWER VASCULAR RIGHT GREATER SAPH AK COMPRESS: NORMAL
BH CV LOWER VASCULAR RIGHT GREATER SAPH BK COMPRESS: NORMAL
BH CV LOWER VASCULAR RIGHT LESSER SAPH COMPRESS: NORMAL
BH CV LOWER VASCULAR RIGHT MID FEMORAL AUGMENT: NORMAL
BH CV LOWER VASCULAR RIGHT MID FEMORAL COMPETENT: NORMAL
BH CV LOWER VASCULAR RIGHT MID FEMORAL COMPRESS: NORMAL
BH CV LOWER VASCULAR RIGHT MID FEMORAL PHASIC: NORMAL
BH CV LOWER VASCULAR RIGHT MID FEMORAL SPONT: NORMAL
BH CV LOWER VASCULAR RIGHT PERONEAL COMPRESS: NORMAL
BH CV LOWER VASCULAR RIGHT POPLITEAL AUGMENT: NORMAL
BH CV LOWER VASCULAR RIGHT POPLITEAL COMPETENT: NORMAL
BH CV LOWER VASCULAR RIGHT POPLITEAL COMPRESS: NORMAL
BH CV LOWER VASCULAR RIGHT POPLITEAL PHASIC: NORMAL
BH CV LOWER VASCULAR RIGHT POPLITEAL SPONT: NORMAL
BH CV LOWER VASCULAR RIGHT POSTERIOR TIBIAL COMPRESS: NORMAL
BH CV LOWER VASCULAR RIGHT PROXIMAL FEMORAL COMPRESS: NORMAL
BH CV LOWER VASCULAR RIGHT SAPHENOFEMORAL JUNCTION COMPRESS: NORMAL

## 2019-08-30 PROCEDURE — 93971 EXTREMITY STUDY: CPT

## 2019-08-30 PROCEDURE — 99214 OFFICE O/P EST MOD 30 MIN: CPT | Performed by: PREVENTIVE MEDICINE

## 2019-08-30 RX ORDER — CLINDAMYCIN HYDROCHLORIDE 300 MG/1
300 CAPSULE ORAL 3 TIMES DAILY
Qty: 20 CAPSULE | Refills: 0 | Status: SHIPPED | OUTPATIENT
Start: 2019-08-30 | End: 2019-09-06

## 2019-08-30 RX ORDER — HYDRALAZINE HYDROCHLORIDE 50 MG/1
50 TABLET, FILM COATED ORAL 3 TIMES DAILY
Qty: 90 TABLET | Refills: 3 | Status: SHIPPED | OUTPATIENT
Start: 2019-08-30 | End: 2020-02-21

## 2019-08-30 NOTE — PROGRESS NOTES
Subjective   Pearl Elizalde is a 59 y.o. female presents for   Chief Complaint   Patient presents with   • Edema     right leg and ankle swelling    • Muscle Pain   • Insomnia   • URI   Chills but no fever.  R lower leg red, tender swollen. Concern by patient for blood clot.  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  Tetanus not UtD  Not checking blood sugar.  BP improved.  No chest pain or SOA    Health Maintenance Due   Topic Date Due   • ANNUAL PHYSICAL  05/11/1961   • PNEUMOCOCCAL VACCINE (19-64 MEDIUM RISK) (1 of 1 - PPSV23) 05/11/1977   • TDAP/TD VACCINES (1 - Tdap) 05/11/1977   • ZOSTER VACCINE (1 of 2) 05/11/2008   • DIABETIC EYE EXAM  07/08/2019   • COLONOSCOPY  07/08/2019   • MAMMOGRAM  08/19/2019   • DXA SCAN  08/29/2019   • INFLUENZA VACCINE  08/01/2019       History of Present Illness     Vitals:    08/30/19 0822 08/30/19 0825   BP: 121/72 114/65   BP Location: Right arm Left arm   Patient Position: Sitting Sitting   Cuff Size: Large Adult Adult   Pulse: 93    Resp: 16    Temp: 98.2 °F (36.8 °C)    TempSrc: Oral    SpO2: 95%    Weight: 90 kg (198 lb 6.4 oz)        Current Outpatient Medications on File Prior to Visit   Medication Sig Dispense Refill   • Blood Glucose Monitoring Suppl (BLOOD GLUCOSE MONITOR SYSTEM) w/Device kit BLOOD GLUCOSE MONITOR SYSTEM w/Device KIT     • Cholecalciferol (VITAMIN D) 1000 units tablet Take 1,000 Units by mouth Daily.     • colesevelam (WELCHOL) 625 MG tablet Take 1 tablet by mouth 2 (Two) Times a Day With Meals.     • Cyanocobalamin (B-12) 1000 MCG tablet Take 1 tablet by mouth Daily.     • EPINEPHrine (EPIPEN 2-MELLISSA) 0.3 MG/0.3ML solution auto-injector injection EPIPEN 2-MELLISSA 0.3 MG/0.3ML SOAJ     • ezetimibe (ZETIA) 10 MG tablet Take 1 tablet by mouth Daily.     • gabapentin (NEURONTIN) 800 MG tablet Take 800 mg by mouth. TAKE ONE TABLET IN THE AM AND 2 PM     • glimepiride (AMARYL) 4 MG tablet Take 1 tablet by mouth 2 (Two) Times a Day.     • glucose blood (FREESTYLE LITE)  test strip FREESTYLE LITE TEST STRP     • hydrALAZINE (APRESOLINE) 50 MG tablet Take 1 tablet by mouth 3 (Three) Times a Day. 1/2 tablet amand noon and whole tablet at night 180 tablet 3   • Lancets (FREESTYLE) lancets      • Magnesium 400 MG tablet Take 1 tablet by mouth Daily.     • Olmesartan-amLODIPine-HCTZ 40-10-12.5 MG tablet Take 1 tablet by mouth Daily.     • Omega-3 Fatty Acids (FISH OIL) 1200 MG capsule capsule Take 1,200 mg by mouth Daily.     • pravastatin (PRAVACHOL) 80 MG tablet Take 1 tablet by mouth Every Night. 90 tablet 3   • PROVENTIL  (90 Base) MCG/ACT inhaler Inhale 2 puffs As Needed.     • SYMBICORT 160-4.5 MCG/ACT inhaler Inhale 2 puffs 2 (Two) Times a Day.       No current facility-administered medications on file prior to visit.        The following portions of the patient's history were reviewed and updated as appropriate: allergies, current medications, past family history, past medical history, past social history, past surgical history and problem list.    Review of Systems   Constitutional: Positive for chills.        Pain swelling redness with TIMA R lower leg .  Neg [place],=man's   HENT: Negative.  Negative for sinus pressure and sore throat.    Eyes: Negative.    Respiratory: Positive for cough.    Cardiovascular: Positive for leg swelling.   Gastrointestinal: Negative.    Endocrine: Positive for cold intolerance.   Genitourinary: Negative.    Musculoskeletal: Positive for myalgias.   Skin: Negative.    Allergic/Immunologic: Positive for environmental allergies.   Neurological: Negative.    Hematological: Negative.    Psychiatric/Behavioral: Negative.        Objective   Physical Exam   Constitutional: She is oriented to person, place, and time. She appears well-developed.   HENT:   Head: Normocephalic and atraumatic.   Eyes: Conjunctivae and EOM are normal. Pupils are equal, round, and reactive to light.   Neck: Normal range of motion.   Cardiovascular: Normal rate and regular  rhythm.   Pulmonary/Chest: Effort normal and breath sounds normal.   Abdominal: Soft. Bowel sounds are normal.   Musculoskeletal: Normal range of motion. She exhibits edema.   Lymphadenopathy:     She has no cervical adenopathy.   Neurological: She is alert and oriented to person, place, and time.   Skin: Skin is warm and dry. Rash noted.   Psychiatric: She has a normal mood and affect.   Nursing note and vitals reviewed.    PHQ-9 Total Score:      Assessment/Plan   Pearl was seen today for edema, muscle pain, insomnia and uri.    Diagnoses and all orders for this visit:    Encounter for annual general medical examination with abnormal findings in adult    Postmenopausal  -     DEXA Bone Density Axial; Future    Screening for colon cancer    Breast cancer screening by mammogram  -     Mammo Screening Digital Tomosynthesis Bilateral With CAD; Future        There are no Patient Instructions on file for this visit.

## 2019-08-30 NOTE — PATIENT INSTRUCTIONS
Venous doppler now at [place].  Elevate and warm soaks as much as possible.  Take antibiotic till gone.  ER if worsens.  Avoid standing.  Get Tdap at pharmacy today.  Off work till recheck 1 week. After one week advise 10 more blood pressures over 2 weeks.  Moniter blood sugar carefully

## 2019-09-06 ENCOUNTER — OFFICE VISIT (OUTPATIENT)
Dept: FAMILY MEDICINE CLINIC | Facility: CLINIC | Age: 59
End: 2019-09-06

## 2019-09-06 VITALS
SYSTOLIC BLOOD PRESSURE: 113 MMHG | TEMPERATURE: 98 F | RESPIRATION RATE: 16 BRPM | HEART RATE: 96 BPM | WEIGHT: 198.5 LBS | OXYGEN SATURATION: 98 % | BODY MASS INDEX: 33.03 KG/M2 | DIASTOLIC BLOOD PRESSURE: 71 MMHG

## 2019-09-06 DIAGNOSIS — L03.115 CELLULITIS OF RIGHT LOWER EXTREMITY: Primary | ICD-10-CM

## 2019-09-06 DIAGNOSIS — R20.2 PARESTHESIA: ICD-10-CM

## 2019-09-06 RX ORDER — AZITHROMYCIN 250 MG/1
TABLET, FILM COATED ORAL
Qty: 6 TABLET | Refills: 0 | Status: SHIPPED | OUTPATIENT
Start: 2019-09-06 | End: 2019-09-13

## 2019-09-09 ENCOUNTER — TELEPHONE (OUTPATIENT)
Dept: FAMILY MEDICINE CLINIC | Facility: CLINIC | Age: 59
End: 2019-09-09

## 2019-09-09 NOTE — TELEPHONE ENCOUNTER
PT CALLED AND FINISHED ABX TOMORROW. BUT LEG IS STILL HOT. STATES NOT SWOLLEN AND IT DOESN'T HURT BUT COMPARED TO THE LEFT LEG THE RIGHT LEG IS HOT. PLEASE ADVISE.

## 2019-09-10 NOTE — TELEPHONE ENCOUNTER
SPOKE WITH PT STATED UNDERSTOOD, ALSO WANTED TO KNOW IF IT WAS OK FOR HER TO WEAR COMPRESSION SOCKS? PLEASE ADVISE.

## 2019-09-13 ENCOUNTER — OFFICE VISIT (OUTPATIENT)
Dept: FAMILY MEDICINE CLINIC | Facility: CLINIC | Age: 59
End: 2019-09-13

## 2019-09-13 VITALS
BODY MASS INDEX: 32.78 KG/M2 | RESPIRATION RATE: 16 BRPM | DIASTOLIC BLOOD PRESSURE: 70 MMHG | TEMPERATURE: 98.3 F | WEIGHT: 197 LBS | OXYGEN SATURATION: 98 % | SYSTOLIC BLOOD PRESSURE: 112 MMHG | HEART RATE: 93 BPM

## 2019-09-13 DIAGNOSIS — Z00.01 ENCOUNTER FOR ANNUAL GENERAL MEDICAL EXAMINATION WITH ABNORMAL FINDINGS IN ADULT: Primary | ICD-10-CM

## 2019-09-13 DIAGNOSIS — Z78.0 POST-MENOPAUSAL: ICD-10-CM

## 2019-09-13 DIAGNOSIS — Z12.11 SCREENING FOR COLON CANCER: ICD-10-CM

## 2019-09-13 DIAGNOSIS — Z12.31 BREAST CANCER SCREENING BY MAMMOGRAM: ICD-10-CM

## 2019-09-13 PROCEDURE — 99396 PREV VISIT EST AGE 40-64: CPT | Performed by: PREVENTIVE MEDICINE

## 2019-09-13 PROCEDURE — 99213 OFFICE O/P EST LOW 20 MIN: CPT | Performed by: PREVENTIVE MEDICINE

## 2019-09-13 RX ORDER — GABAPENTIN 800 MG/1
800 TABLET ORAL 3 TIMES DAILY
Qty: 270 TABLET | Refills: 3 | Status: SHIPPED | OUTPATIENT
Start: 2019-09-13 | End: 2020-07-16 | Stop reason: SDUPTHER

## 2019-09-13 NOTE — PROGRESS NOTES
Subjective   Pearl Elizalde is a 59 y.o. female presents for   Chief Complaint   Patient presents with   • Annual Exam   • Cellulitis     right leg     Annual health check needs mammogram, colonoscopy and DXA.  Will get repeat fasting labs in 11/19.  Blood sugars doing well.  No fever, swelling RLE  Eye exam utd    Health Maintenance Due   Topic Date Due   • PNEUMOCOCCAL VACCINE (19-64 MEDIUM RISK) (1 of 1 - PPSV23) 05/11/1977   • ZOSTER VACCINE (1 of 2) 05/11/2008   • COLONOSCOPY  07/08/2019   • INFLUENZA VACCINE  08/01/2019   • MAMMOGRAM  08/19/2019   • DXA SCAN  08/29/2019       History of Present Illness     Vitals:    09/13/19 1329 09/13/19 1331   BP: 104/66 112/70   BP Location: Right arm Left arm   Patient Position: Sitting Sitting   Cuff Size: Large Adult Large Adult   Pulse: 93    Resp: 16    Temp: 98.3 °F (36.8 °C)    TempSrc: Oral    SpO2: 98%    Weight: 89.4 kg (197 lb)        Current Outpatient Medications on File Prior to Visit   Medication Sig Dispense Refill   • Blood Glucose Monitoring Suppl (BLOOD GLUCOSE MONITOR SYSTEM) w/Device kit BLOOD GLUCOSE MONITOR SYSTEM w/Device KIT     • Cholecalciferol (VITAMIN D) 1000 units tablet Take 1,000 Units by mouth Daily.     • colesevelam (WELCHOL) 625 MG tablet Take 1 tablet by mouth 2 (Two) Times a Day With Meals.     • Cyanocobalamin (B-12) 1000 MCG tablet Take 1 tablet by mouth Daily.     • EPINEPHrine (EPIPEN 2-MELLISSA) 0.3 MG/0.3ML solution auto-injector injection EPIPEN 2-MELLISSA 0.3 MG/0.3ML SOAJ     • ezetimibe (ZETIA) 10 MG tablet Take 1 tablet by mouth Daily.     • glimepiride (AMARYL) 4 MG tablet Take 1 tablet by mouth 2 (Two) Times a Day.     • glucose blood (FREESTYLE LITE) test strip FREESTYLE LITE TEST STRP     • hydrALAZINE (APRESOLINE) 50 MG tablet Take 1 tablet by mouth 3 (Three) Times a Day. 90 tablet 3   • Lancets (FREESTYLE) lancets      • Magnesium 400 MG tablet Take 1 tablet by mouth Daily.     • Olmesartan-amLODIPine-HCTZ 40-10-12.5 MG tablet  Take 1 tablet by mouth Daily.     • Omega-3 Fatty Acids (FISH OIL) 1200 MG capsule capsule Take 1,200 mg by mouth Daily.     • pravastatin (PRAVACHOL) 80 MG tablet Take 1 tablet by mouth Every Night. 90 tablet 3   • PROVENTIL  (90 Base) MCG/ACT inhaler Inhale 2 puffs As Needed.     • SYMBICORT 160-4.5 MCG/ACT inhaler Inhale 2 puffs 2 (Two) Times a Day.     • [DISCONTINUED] gabapentin (NEURONTIN) 800 MG tablet Take 800 mg by mouth. TAKE ONE TABLET IN THE AM AND 2 PM     • [DISCONTINUED] azithromycin (ZITHROMAX Z-MELLISSA) 250 MG tablet Take 2 tablets the first day, then 1 tablet daily for 4 days. 6 tablet 0     No current facility-administered medications on file prior to visit.        The following portions of the patient's history were reviewed and updated as appropriate: allergies, current medications, past family history, past medical history, past social history, past surgical history and problem list.    Review of Systems   Constitutional: Negative.    HENT: Negative.  Negative for sinus pressure and sore throat.    Eyes: Negative.    Respiratory: Negative.  Negative for cough.    Cardiovascular: Negative.    Gastrointestinal: Negative.    Endocrine: Negative.    Genitourinary: Negative.    Musculoskeletal: Negative.    Skin: Negative.    Allergic/Immunologic: Positive for environmental allergies.   Neurological: Negative.    Hematological: Negative.    Psychiatric/Behavioral: Negative.        Objective   Physical Exam   Constitutional: She is oriented to person, place, and time. She appears well-developed.   HENT:   Head: Normocephalic and atraumatic.   Eyes: Conjunctivae and EOM are normal. Pupils are equal, round, and reactive to light.   Neck: Normal range of motion.   Cardiovascular: Normal rate and regular rhythm.   Pulmonary/Chest: Effort normal and breath sounds normal.   Abdominal: Soft. Bowel sounds are normal.   Musculoskeletal: Normal range of motion.   Lymphadenopathy:     She has no cervical  adenopathy.   Neurological: She is alert and oriented to person, place, and time.   Skin: Skin is warm and dry.   Psychiatric: She has a normal mood and affect.   Nursing note and vitals reviewed.    PHQ-9 Total Score:      Assessment/Plan   Pearl was seen today for annual exam and cellulitis.    Diagnoses and all orders for this visit:    Encounter for annual general medical examination with abnormal findings in adult    Screening for colon cancer    Breast cancer screening by mammogram  -     Mammo Screening Digital Tomosynthesis Bilateral With CAD; Future    Post-menopausal  -     DEXA Bone Density Axial; Future    Other orders  -     gabapentin (NEURONTIN) 800 MG tablet; Take 1 tablet by mouth 3 (Three) Times a Day. TAKE ONE TABLET IN THE AM AND 2 PM        Patient Instructions   Need low dose flu, Pneumonia 13, and Shingrix  In 6 weeks.  Get labs in November

## 2019-09-19 RX ORDER — GLIMEPIRIDE 4 MG/1
TABLET ORAL
Qty: 180 TABLET | Refills: 4 | Status: SHIPPED | OUTPATIENT
Start: 2019-09-19 | End: 2019-09-30 | Stop reason: SDUPTHER

## 2019-09-30 ENCOUNTER — OFFICE VISIT (OUTPATIENT)
Dept: FAMILY MEDICINE CLINIC | Facility: CLINIC | Age: 59
End: 2019-09-30

## 2019-09-30 VITALS
WEIGHT: 197 LBS | BODY MASS INDEX: 32.78 KG/M2 | SYSTOLIC BLOOD PRESSURE: 123 MMHG | TEMPERATURE: 98.4 F | OXYGEN SATURATION: 98 % | HEART RATE: 90 BPM | DIASTOLIC BLOOD PRESSURE: 75 MMHG | RESPIRATION RATE: 16 BRPM

## 2019-09-30 DIAGNOSIS — L50.9 HIVES: Primary | ICD-10-CM

## 2019-09-30 DIAGNOSIS — M79.672 PAIN IN BOTH FEET: ICD-10-CM

## 2019-09-30 DIAGNOSIS — Z12.11 SCREENING FOR COLON CANCER: ICD-10-CM

## 2019-09-30 DIAGNOSIS — Z78.0 POST-MENOPAUSAL: ICD-10-CM

## 2019-09-30 DIAGNOSIS — D22.9 ATYPICAL MOLE: ICD-10-CM

## 2019-09-30 DIAGNOSIS — Z12.31 BREAST CANCER SCREENING BY MAMMOGRAM: ICD-10-CM

## 2019-09-30 DIAGNOSIS — M79.671 PAIN IN BOTH FEET: ICD-10-CM

## 2019-09-30 PROCEDURE — 99213 OFFICE O/P EST LOW 20 MIN: CPT | Performed by: PREVENTIVE MEDICINE

## 2019-09-30 RX ORDER — EZETIMIBE 10 MG/1
10 TABLET ORAL EVERY 24 HOURS
Qty: 90 TABLET | Refills: 3 | Status: SHIPPED | OUTPATIENT
Start: 2019-09-30 | End: 2020-07-16 | Stop reason: SDUPTHER

## 2019-09-30 RX ORDER — GLIMEPIRIDE 4 MG/1
4 TABLET ORAL 2 TIMES DAILY
Qty: 180 TABLET | Refills: 4 | Status: SHIPPED | OUTPATIENT
Start: 2019-09-30 | End: 2022-06-08

## 2019-09-30 NOTE — PROGRESS NOTES
Subjective   Pearl Elizalde is a 59 y.o. female presents for   Chief Complaint   Patient presents with   • Urticaria   iMPROVED OVER WEEKEND AND NEEDS SHOTS AND REFERRALS.    Health Maintenance Due   Topic Date Due   • PNEUMOCOCCAL VACCINE (19-64 MEDIUM RISK) (1 of 1 - PPSV23) 05/11/1977   • ZOSTER VACCINE (1 of 2) 05/11/2008   • COLONOSCOPY  07/08/2019   • INFLUENZA VACCINE  08/01/2019   • MAMMOGRAM  08/19/2019   • DXA SCAN  08/29/2019       History of Present Illness     Vitals:    09/30/19 0933 09/30/19 0936   BP: 128/75 123/75   BP Location: Right arm Left arm   Patient Position: Sitting Sitting   Cuff Size: Large Adult Large Adult   Pulse: 90    Resp: 16    Temp: 98.4 °F (36.9 °C)    TempSrc: Oral    SpO2: 98%    Weight: 89.4 kg (197 lb)        Current Outpatient Medications on File Prior to Visit   Medication Sig Dispense Refill   • Blood Glucose Monitoring Suppl (BLOOD GLUCOSE MONITOR SYSTEM) w/Device kit BLOOD GLUCOSE MONITOR SYSTEM w/Device KIT     • Cholecalciferol (VITAMIN D) 1000 units tablet Take 1,000 Units by mouth Daily.     • colesevelam (WELCHOL) 625 MG tablet Take 1 tablet by mouth 2 (Two) Times a Day With Meals.     • Cyanocobalamin (B-12) 1000 MCG tablet Take 1 tablet by mouth Daily.     • EPINEPHrine (EPIPEN 2-MELLISSA) 0.3 MG/0.3ML solution auto-injector injection EPIPEN 2-MELLISSA 0.3 MG/0.3ML SOAJ     • ezetimibe (ZETIA) 10 MG tablet Take 1 tablet by mouth Daily.     • gabapentin (NEURONTIN) 800 MG tablet Take 1 tablet by mouth 3 (Three) Times a Day. TAKE ONE TABLET IN THE AM AND 2  tablet 3   • glimepiride (AMARYL) 4 MG tablet TAKE 1 TABLET TWICE A  tablet 4   • glucose blood (FREESTYLE LITE) test strip FREESTYLE LITE TEST STRP     • hydrALAZINE (APRESOLINE) 50 MG tablet Take 1 tablet by mouth 3 (Three) Times a Day. 90 tablet 3   • Lancets (FREESTYLE) lancets      • Magnesium 400 MG tablet Take 1 tablet by mouth Daily.     • Olmesartan-amLODIPine-HCTZ 40-10-12.5 MG tablet Take 1 tablet  by mouth Daily.     • Omega-3 Fatty Acids (FISH OIL) 1200 MG capsule capsule Take 1,200 mg by mouth Daily.     • pravastatin (PRAVACHOL) 80 MG tablet Take 1 tablet by mouth Every Night. 90 tablet 3   • PROVENTIL  (90 Base) MCG/ACT inhaler Inhale 2 puffs As Needed.     • SYMBICORT 160-4.5 MCG/ACT inhaler Inhale 2 puffs 2 (Two) Times a Day.       No current facility-administered medications on file prior to visit.        The following portions of the patient's history were reviewed and updated as appropriate: allergies, current medications, past family history, past medical history, past social history, past surgical history and problem list.    Review of Systems   Constitutional: Negative.    HENT: Negative.  Negative for sinus pressure and sore throat.    Eyes: Negative.    Respiratory: Negative.  Negative for cough.    Cardiovascular: Negative.    Gastrointestinal: Negative.    Endocrine: Negative.    Genitourinary: Negative.    Musculoskeletal: Negative.    Skin: Positive for rash.   Allergic/Immunologic: Positive for environmental allergies.   Neurological: Negative.    Hematological: Negative.    Psychiatric/Behavioral: Negative.        Objective   Physical Exam   Constitutional: She is oriented to person, place, and time. She appears well-developed.   HENT:   Head: Normocephalic and atraumatic.   Eyes: Conjunctivae and EOM are normal. Pupils are equal, round, and reactive to light.   Neck: Normal range of motion.   Cardiovascular: Normal rate and regular rhythm.   Pulmonary/Chest: Effort normal.   Lymphadenopathy:     She has no cervical adenopathy.   Neurological: She is alert and oriented to person, place, and time.   Skin: Skin is warm and dry. Rash noted.   Psychiatric: She has a normal mood and affect.   Nursing note and vitals reviewed.    PHQ-9 Total Score:      Assessment/Plan   Pearl was seen today for urticaria.    Diagnoses and all orders for this visit:    Hives    Breast cancer screening  by mammogram    Post-menopausal    Screening for colon cancer    Atypical mole    Pain in both feet    Other orders  -     Cancel: Mammo Screening Digital Tomosynthesis Bilateral With CAD; Future  -     Cancel: DEXA Bone Density Axial; Future        Patient Instructions   cALL IF PROBLEMS WITH HIVES RETURN AND NOT IN TO SEE ALLERGIST.  aFTER RASH GONE ADVISE REG fLU, sHINGrIX AND pNEUMONIA 13

## 2019-09-30 NOTE — PATIENT INSTRUCTIONS
cALL IF PROBLEMS WITH HIVES RETURN AND NOT IN TO SEE ALLERGIST.  aFTER RASH GONE ADVISE REG fLU, sHINGrIX AND pNEUMONIA 13

## 2019-10-29 ENCOUNTER — HOSPITAL ENCOUNTER (OUTPATIENT)
Dept: BONE DENSITY | Facility: HOSPITAL | Age: 59
Discharge: HOME | End: 2019-10-29
Admitting: PREVENTIVE MEDICINE

## 2019-10-29 DIAGNOSIS — Z78.0 POST-MENOPAUSAL: ICD-10-CM

## 2019-10-29 PROCEDURE — 77080 DXA BONE DENSITY AXIAL: CPT

## 2019-11-25 RX ORDER — OLMESARTAN MEDOXOMIL / AMLODIPINE BESYLATE / HYDROCHLOROTHIAZIDE 40; 10; 12.5 MG/1; MG/1; MG/1
TABLET, FILM COATED ORAL
Qty: 90 TABLET | Refills: 4 | Status: SHIPPED | OUTPATIENT
Start: 2019-11-25 | End: 2021-02-04 | Stop reason: SDUPTHER

## 2019-12-31 ENCOUNTER — OFFICE VISIT (OUTPATIENT)
Dept: FAMILY MEDICINE CLINIC | Facility: CLINIC | Age: 59
End: 2019-12-31

## 2019-12-31 ENCOUNTER — HOSPITAL ENCOUNTER (OUTPATIENT)
Dept: GENERAL RADIOLOGY | Facility: HOSPITAL | Age: 59
Discharge: HOME | End: 2019-12-31
Admitting: PREVENTIVE MEDICINE

## 2019-12-31 ENCOUNTER — APPOINTMENT (OUTPATIENT)
Dept: LAB | Facility: HOSPITAL | Age: 59
End: 2019-12-31

## 2019-12-31 VITALS
DIASTOLIC BLOOD PRESSURE: 91 MMHG | TEMPERATURE: 100.2 F | BODY MASS INDEX: 31.75 KG/M2 | WEIGHT: 190.6 LBS | RESPIRATION RATE: 16 BRPM | HEART RATE: 97 BPM | SYSTOLIC BLOOD PRESSURE: 149 MMHG | HEIGHT: 65 IN | OXYGEN SATURATION: 97 %

## 2019-12-31 DIAGNOSIS — J06.9 UPPER RESPIRATORY TRACT INFECTION, UNSPECIFIED TYPE: Primary | ICD-10-CM

## 2019-12-31 DIAGNOSIS — Z12.11 SCREENING FOR COLON CANCER: ICD-10-CM

## 2019-12-31 DIAGNOSIS — E11.8 DISORDER ASSOCIATED WITH TYPE 2 DIABETES MELLITUS (HCC): ICD-10-CM

## 2019-12-31 LAB
B PERT DNA SPEC QL NAA+PROBE: NOT DETECTED
BASOPHILS # BLD AUTO: 0.07 10*3/MM3 (ref 0–0.2)
BASOPHILS NFR BLD AUTO: 1 % (ref 0–1.5)
C PNEUM DNA NPH QL NAA+NON-PROBE: NOT DETECTED
DEPRECATED RDW RBC AUTO: 37.2 FL (ref 37–54)
EOSINOPHIL # BLD AUTO: 0.29 10*3/MM3 (ref 0–0.4)
EOSINOPHIL NFR BLD AUTO: 4.2 % (ref 0.3–6.2)
ERYTHROCYTE [DISTWIDTH] IN BLOOD BY AUTOMATED COUNT: 12.3 % (ref 12.3–15.4)
EXPIRATION DATE: NORMAL
FLUAV AG NPH QL: NEGATIVE
FLUAV H1 2009 PAND RNA NPH QL NAA+PROBE: NOT DETECTED
FLUAV H1 HA GENE NPH QL NAA+PROBE: NOT DETECTED
FLUAV H3 RNA NPH QL NAA+PROBE: NOT DETECTED
FLUAV SUBTYP SPEC NAA+PROBE: NOT DETECTED
FLUBV AG NPH QL: NEGATIVE
FLUBV RNA ISLT QL NAA+PROBE: NOT DETECTED
HADV DNA SPEC NAA+PROBE: NOT DETECTED
HCOV 229E RNA SPEC QL NAA+PROBE: NOT DETECTED
HCOV HKU1 RNA SPEC QL NAA+PROBE: NOT DETECTED
HCOV NL63 RNA SPEC QL NAA+PROBE: NOT DETECTED
HCOV OC43 RNA SPEC QL NAA+PROBE: NOT DETECTED
HCT VFR BLD AUTO: 39.5 % (ref 34–46.6)
HGB BLD-MCNC: 13.5 G/DL (ref 12–15.9)
HMPV RNA NPH QL NAA+NON-PROBE: DETECTED
HPIV1 RNA SPEC QL NAA+PROBE: NOT DETECTED
HPIV2 RNA SPEC QL NAA+PROBE: NOT DETECTED
HPIV3 RNA NPH QL NAA+PROBE: NOT DETECTED
HPIV4 P GENE NPH QL NAA+PROBE: NOT DETECTED
IMM GRANULOCYTES # BLD AUTO: 0.03 10*3/MM3 (ref 0–0.05)
IMM GRANULOCYTES NFR BLD AUTO: 0.4 % (ref 0–0.5)
INTERNAL CONTROL: NORMAL
LYMPHOCYTES # BLD AUTO: 1.14 10*3/MM3 (ref 0.7–3.1)
LYMPHOCYTES NFR BLD AUTO: 16.7 % (ref 19.6–45.3)
Lab: NORMAL
M PNEUMO IGG SER IA-ACNC: NOT DETECTED
MCH RBC QN AUTO: 28.4 PG (ref 26.6–33)
MCHC RBC AUTO-ENTMCNC: 34.2 G/DL (ref 31.5–35.7)
MCV RBC AUTO: 83 FL (ref 79–97)
MONOCYTES # BLD AUTO: 0.69 10*3/MM3 (ref 0.1–0.9)
MONOCYTES NFR BLD AUTO: 10.1 % (ref 5–12)
NEUTROPHILS # BLD AUTO: 4.61 10*3/MM3 (ref 1.7–7)
NEUTROPHILS NFR BLD AUTO: 67.6 % (ref 42.7–76)
NRBC BLD AUTO-RTO: 0 /100 WBC (ref 0–0.2)
PLATELET # BLD AUTO: 247 10*3/MM3 (ref 140–450)
PMV BLD AUTO: 10 FL (ref 6–12)
RBC # BLD AUTO: 4.76 10*6/MM3 (ref 3.77–5.28)
RHINOVIRUS RNA SPEC NAA+PROBE: NOT DETECTED
RSV RNA NPH QL NAA+NON-PROBE: NOT DETECTED
WBC NRBC COR # BLD: 6.83 10*3/MM3 (ref 3.4–10.8)

## 2019-12-31 PROCEDURE — 36415 COLL VENOUS BLD VENIPUNCTURE: CPT | Performed by: PREVENTIVE MEDICINE

## 2019-12-31 PROCEDURE — 71046 X-RAY EXAM CHEST 2 VIEWS: CPT

## 2019-12-31 PROCEDURE — 0099U HC BIOFIRE FILMARRAY RESP PANEL 1: CPT | Performed by: PREVENTIVE MEDICINE

## 2019-12-31 PROCEDURE — 87804 INFLUENZA ASSAY W/OPTIC: CPT | Performed by: PREVENTIVE MEDICINE

## 2019-12-31 PROCEDURE — 99214 OFFICE O/P EST MOD 30 MIN: CPT | Performed by: PREVENTIVE MEDICINE

## 2019-12-31 PROCEDURE — 85025 COMPLETE CBC W/AUTO DIFF WBC: CPT | Performed by: PREVENTIVE MEDICINE

## 2019-12-31 RX ORDER — AZITHROMYCIN 250 MG/1
TABLET, FILM COATED ORAL
Qty: 6 TABLET | Refills: 0 | Status: SHIPPED | OUTPATIENT
Start: 2019-12-31 | End: 2020-01-30

## 2019-12-31 RX ORDER — BENZONATATE 100 MG/1
100 CAPSULE ORAL 3 TIMES DAILY PRN
Qty: 30 CAPSULE | Refills: 0 | Status: SHIPPED | OUTPATIENT
Start: 2019-12-31 | End: 2020-01-30

## 2019-12-31 RX ORDER — OSELTAMIVIR PHOSPHATE 75 MG/1
75 CAPSULE ORAL 2 TIMES DAILY
Qty: 10 CAPSULE | Refills: 0 | Status: SHIPPED | OUTPATIENT
Start: 2019-12-31 | End: 2020-01-30

## 2019-12-31 NOTE — PROGRESS NOTES
"Subjective   Pearl Elizalde is a 59 y.o. female presents for   Chief Complaint   Patient presents with   • URI   Exposed to flu at work.  Cough and chest pain with new fever this am.  Diarrhea X1 without blood    Health Maintenance Due   Topic Date Due   • PNEUMOCOCCAL VACCINE (19-64 MEDIUM RISK) (1 of 1 - PPSV23) 05/11/1977   • ZOSTER VACCINE (1 of 2) 05/11/2008   • COLONOSCOPY  07/08/2019   • INFLUENZA VACCINE  08/01/2019       History of Present Illness     Vitals:    12/31/19 0838 12/31/19 0841   BP: 150/92 149/91   BP Location: Right arm Left arm   Patient Position: Sitting Sitting   Cuff Size: Large Adult Large Adult   Pulse: 99 97   Resp: 16    Temp: 100.2 °F (37.9 °C)    TempSrc: Oral    SpO2: 97%    Weight: 86.5 kg (190 lb 9.6 oz)    Height: 165.1 cm (65\")      Body mass index is 31.72 kg/m².    Current Outpatient Medications on File Prior to Visit   Medication Sig Dispense Refill   • Blood Glucose Monitoring Suppl (BLOOD GLUCOSE MONITOR SYSTEM) w/Device kit BLOOD GLUCOSE MONITOR SYSTEM w/Device KIT     • Cholecalciferol (VITAMIN D) 1000 units tablet Take 1,000 Units by mouth Daily.     • colesevelam (WELCHOL) 625 MG tablet Take 1 tablet by mouth 2 (Two) Times a Day With Meals.     • Cyanocobalamin (B-12) 1000 MCG tablet Take 1 tablet by mouth Daily.     • EPINEPHrine (EPIPEN 2-MELLISSA) 0.3 MG/0.3ML solution auto-injector injection EPIPEN 2-MELLISSA 0.3 MG/0.3ML SOAJ     • ezetimibe (ZETIA) 10 MG tablet Take 1 tablet by mouth Daily. 90 tablet 3   • gabapentin (NEURONTIN) 800 MG tablet Take 1 tablet by mouth 3 (Three) Times a Day. TAKE ONE TABLET IN THE AM AND 2  tablet 3   • glimepiride (AMARYL) 4 MG tablet Take 1 tablet by mouth 2 (Two) Times a Day. 180 tablet 4   • glucose blood (FREESTYLE LITE) test strip FREESTYLE LITE TEST STRP     • hydrALAZINE (APRESOLINE) 50 MG tablet Take 1 tablet by mouth 3 (Three) Times a Day. 90 tablet 3   • Lancets (FREESTYLE) lancets      • Magnesium 400 MG tablet Take 1 tablet " by mouth Daily.     • Olmesartan-amLODIPine-HCTZ 40-10-12.5 MG tablet TAKE 1 TABLET DAILY 90 tablet 4   • Omega-3 Fatty Acids (FISH OIL) 1200 MG capsule capsule Take 1,200 mg by mouth Daily.     • pravastatin (PRAVACHOL) 80 MG tablet Take 1 tablet by mouth Every Night. 90 tablet 3   • PROVENTIL  (90 Base) MCG/ACT inhaler Inhale 2 puffs As Needed.     • SYMBICORT 160-4.5 MCG/ACT inhaler Inhale 2 puffs 2 (Two) Times a Day.       No current facility-administered medications on file prior to visit.        The following portions of the patient's history were reviewed and updated as appropriate: allergies, current medications, past family history, past medical history, past social history, past surgical history and problem list.    Review of Systems   Constitutional: Negative.    HENT: Positive for ear pain and sinus pressure. Negative for sore throat.    Eyes: Negative.    Respiratory: Positive for cough.    Cardiovascular: Negative.    Gastrointestinal: Positive for diarrhea.   Endocrine: Negative.    Genitourinary: Negative.    Musculoskeletal: Negative.    Skin: Negative.    Allergic/Immunologic: Positive for environmental allergies.   Neurological: Negative.    Hematological: Negative.    Psychiatric/Behavioral: Negative.        Objective   Physical Exam   Constitutional: She is oriented to person, place, and time. She appears well-developed.   HENT:   Head: Normocephalic and atraumatic.   Clear sinus drainage   Eyes: Pupils are equal, round, and reactive to light. Conjunctivae and EOM are normal.   Neck: Normal range of motion.   Cardiovascular: Normal rate and regular rhythm.   Pulmonary/Chest: Effort normal and breath sounds normal.   Abdominal: Soft. Bowel sounds are normal.   Musculoskeletal: Normal range of motion.   Lymphadenopathy:     She has no cervical adenopathy.   Neurological: She is alert and oriented to person, place, and time.   Skin: Skin is warm and dry.   Psychiatric: She has a normal mood  and affect.   Nursing note and vitals reviewed.    PHQ-9 Total Score:      Assessment/Plan   Pearl was seen today for uri.    Diagnoses and all orders for this visit:    Upper respiratory tract infection, unspecified type  -     CBC Auto Differential  -     XR Chest PA & Lateral  -     Respiratory Panel, PCR - Swab, Nasopharynx    Disorder associated with type 2 diabetes mellitus (CMS/HCC)/make sure moniter closely    Screening for colon cancer  -     Ambulatory Referral For Screening Colonoscopy    Other orders  -     oseltamivir (TAMIFLU) 75 MG capsule; Take 1 capsule by mouth 2 (Two) Times a Day.  -     benzonatate (TESSALON PERLES) 100 MG capsule; Take 1 capsule by mouth 3 (Three) Times a Day As Needed for Cough.  -     azithromycin (ZITHROMAX Z-MELLISSA) 250 MG tablet; Take 2 tablets the first day, then 1 tablet daily for 4 days.        Patient Instructions   Rest and fluids.  Er if breathing worsens.  Get xray and lab tests today

## 2020-01-02 ENCOUNTER — OFFICE VISIT (OUTPATIENT)
Dept: FAMILY MEDICINE CLINIC | Facility: CLINIC | Age: 60
End: 2020-01-02

## 2020-01-02 VITALS
OXYGEN SATURATION: 96 % | TEMPERATURE: 98.8 F | HEIGHT: 65 IN | DIASTOLIC BLOOD PRESSURE: 100 MMHG | BODY MASS INDEX: 31.32 KG/M2 | WEIGHT: 188 LBS | SYSTOLIC BLOOD PRESSURE: 161 MMHG | HEART RATE: 89 BPM | RESPIRATION RATE: 18 BRPM

## 2020-01-02 DIAGNOSIS — J06.9 VIRAL URI WITH COUGH: Primary | ICD-10-CM

## 2020-01-02 PROCEDURE — 99213 OFFICE O/P EST LOW 20 MIN: CPT | Performed by: PREVENTIVE MEDICINE

## 2020-01-02 NOTE — PROGRESS NOTES
"Subjective   Pearl Elizalde is a 59 y.o. female presents for   Chief Complaint   Patient presents with   • Vomiting   • Diarrhea   Has been out doing errands and feels coughing and running around raising BP.  No blood in vomit or diarrhea.  Cough is losening and is feeling better but has called off for weekend    Health Maintenance Due   Topic Date Due   • PNEUMOCOCCAL VACCINE (19-64 MEDIUM RISK) (1 of 1 - PPSV23) 05/11/1977   • ZOSTER VACCINE (1 of 2) 05/11/2008   • COLONOSCOPY  07/08/2019   • INFLUENZA VACCINE  08/01/2019       History of Present Illness     Vitals:    01/02/20 1422 01/02/20 1424 01/02/20 1524   BP: 159/91 161/100    BP Location: Right arm Left arm    Patient Position: Sitting Sitting    Cuff Size: Large Adult Large Adult    Pulse: 89     Resp: 18     Temp: 98.8 °F (37.1 °C)     TempSrc: Oral     SpO2: 93%  96%   Weight: 85.3 kg (188 lb)     Height: 165.1 cm (65\")       Body mass index is 31.28 kg/m².    Current Outpatient Medications on File Prior to Visit   Medication Sig Dispense Refill   • azithromycin (ZITHROMAX Z-MELLISSA) 250 MG tablet Take 2 tablets the first day, then 1 tablet daily for 4 days. 6 tablet 0   • benzonatate (TESSALON PERLES) 100 MG capsule Take 1 capsule by mouth 3 (Three) Times a Day As Needed for Cough. 30 capsule 0   • Blood Glucose Monitoring Suppl (BLOOD GLUCOSE MONITOR SYSTEM) w/Device kit BLOOD GLUCOSE MONITOR SYSTEM w/Device KIT     • Cholecalciferol (VITAMIN D) 1000 units tablet Take 1,000 Units by mouth Daily.     • colesevelam (WELCHOL) 625 MG tablet Take 1 tablet by mouth 2 (Two) Times a Day With Meals.     • Cyanocobalamin (B-12) 1000 MCG tablet Take 1 tablet by mouth Daily.     • EPINEPHrine (EPIPEN 2-MELLISSA) 0.3 MG/0.3ML solution auto-injector injection EPIPEN 2-MELLISSA 0.3 MG/0.3ML SOAJ     • ezetimibe (ZETIA) 10 MG tablet Take 1 tablet by mouth Daily. 90 tablet 3   • gabapentin (NEURONTIN) 800 MG tablet Take 1 tablet by mouth 3 (Three) Times a Day. TAKE ONE TABLET IN " THE AM AND 2  tablet 3   • glimepiride (AMARYL) 4 MG tablet Take 1 tablet by mouth 2 (Two) Times a Day. 180 tablet 4   • glucose blood (FREESTYLE LITE) test strip FREESTYLE LITE TEST STRP     • hydrALAZINE (APRESOLINE) 50 MG tablet Take 1 tablet by mouth 3 (Three) Times a Day. 90 tablet 3   • Lancets (FREESTYLE) lancets      • Magnesium 400 MG tablet Take 1 tablet by mouth Daily.     • Olmesartan-amLODIPine-HCTZ 40-10-12.5 MG tablet TAKE 1 TABLET DAILY 90 tablet 4   • Omega-3 Fatty Acids (FISH OIL) 1200 MG capsule capsule Take 1,200 mg by mouth Daily.     • oseltamivir (TAMIFLU) 75 MG capsule Take 1 capsule by mouth 2 (Two) Times a Day. 10 capsule 0   • pravastatin (PRAVACHOL) 80 MG tablet Take 1 tablet by mouth Every Night. 90 tablet 3   • PROVENTIL  (90 Base) MCG/ACT inhaler Inhale 2 puffs As Needed.     • SYMBICORT 160-4.5 MCG/ACT inhaler Inhale 2 puffs 2 (Two) Times a Day.       No current facility-administered medications on file prior to visit.        The following portions of the patient's history were reviewed and updated as appropriate: allergies, current medications, past family history, past medical history, past social history, past surgical history and problem list.    Review of Systems   Constitutional: Negative.    HENT: Positive for congestion. Negative for sinus pressure and sore throat.    Eyes: Negative.    Respiratory: Positive for cough and wheezing.    Cardiovascular: Negative.    Gastrointestinal: Positive for diarrhea and vomiting.   Endocrine: Negative.    Genitourinary: Negative.    Musculoskeletal: Negative.    Skin: Negative.    Allergic/Immunologic: Positive for environmental allergies.   Neurological: Negative.    Hematological: Negative.    Psychiatric/Behavioral: Negative.        Objective   Physical Exam   Constitutional: She is oriented to person, place, and time. She appears well-developed.   HENT:   Head: Normocephalic and atraumatic.   Eyes: Pupils are equal, round,  and reactive to light. Conjunctivae and EOM are normal.   Neck: Normal range of motion.   Cardiovascular: Normal rate and regular rhythm.   Pulmonary/Chest: Effort normal and breath sounds normal.   Abdominal: Soft. Bowel sounds are normal.   Musculoskeletal: Normal range of motion.   Lymphadenopathy:     She has no cervical adenopathy.   Neurological: She is alert and oriented to person, place, and time.   Skin: Skin is warm and dry.   Psychiatric: She has a normal mood and affect.   Nursing note and vitals reviewed.    PHQ-9 Total Score:      Assessment/Plan   Pearl was seen today for vomiting and diarrhea.    Diagnoses and all orders for this visit:    Viral URI with cough        Patient Instructions   Rest and fluids.  ER if worsens.  Consider stopping Z pack if diarrhea and vomiting persist.  Advise avoid spicey or greasy and limit dairy

## 2020-01-02 NOTE — PATIENT INSTRUCTIONS
Rest and fluids.  ER if worsens.  Consider stopping Z pack if diarrhea and vomiting persist.  Advise avoid spicey or greasy and limit dairy

## 2020-01-06 ENCOUNTER — OFFICE VISIT (OUTPATIENT)
Dept: FAMILY MEDICINE CLINIC | Facility: CLINIC | Age: 60
End: 2020-01-06

## 2020-01-06 VITALS
OXYGEN SATURATION: 97 % | WEIGHT: 188.4 LBS | HEART RATE: 109 BPM | RESPIRATION RATE: 20 BRPM | HEIGHT: 65 IN | SYSTOLIC BLOOD PRESSURE: 152 MMHG | DIASTOLIC BLOOD PRESSURE: 90 MMHG | TEMPERATURE: 98.6 F | BODY MASS INDEX: 31.39 KG/M2

## 2020-01-06 DIAGNOSIS — J06.9 VIRAL URI WITH COUGH: Primary | ICD-10-CM

## 2020-01-06 PROCEDURE — 99213 OFFICE O/P EST LOW 20 MIN: CPT | Performed by: PREVENTIVE MEDICINE

## 2020-01-06 RX ORDER — PREDNISONE 10 MG/1
10 TABLET ORAL DAILY
Qty: 5 TABLET | Refills: 0 | Status: SHIPPED | OUTPATIENT
Start: 2020-01-06 | End: 2020-01-30

## 2020-01-06 NOTE — PROGRESS NOTES
"Subjective   Pearl Elizalde is a 59 y.o. female presents for   Chief Complaint   Patient presents with   • URI   11 days Cough and finished Zpack yesterday. Wheezes at night.  No diarrhea or dysuria  Green sputum.  Postitive metapneumonia    Health Maintenance Due   Topic Date Due   • PNEUMOCOCCAL VACCINE (19-64 MEDIUM RISK) (1 of 1 - PPSV23) 05/11/1977   • ZOSTER VACCINE (1 of 2) 05/11/2008   • COLONOSCOPY  07/08/2019   • INFLUENZA VACCINE  08/01/2019       History of Present Illness     Vitals:    01/06/20 1242 01/06/20 1245   BP: 152/88 152/90   BP Location: Left arm Right arm   Patient Position: Sitting Sitting   Cuff Size: Adult Adult   Pulse: 109    Resp: 20    Temp: 98.6 °F (37 °C)    TempSrc: Oral    SpO2: 97%    Weight: 85.5 kg (188 lb 6.4 oz)    Height: 165.1 cm (65\")      Body mass index is 31.35 kg/m².    Current Outpatient Medications on File Prior to Visit   Medication Sig Dispense Refill   • benzonatate (TESSALON PERLES) 100 MG capsule Take 1 capsule by mouth 3 (Three) Times a Day As Needed for Cough. 30 capsule 0   • Blood Glucose Monitoring Suppl (BLOOD GLUCOSE MONITOR SYSTEM) w/Device kit BLOOD GLUCOSE MONITOR SYSTEM w/Device KIT     • Cholecalciferol (VITAMIN D) 1000 units tablet Take 1,000 Units by mouth Daily.     • colesevelam (WELCHOL) 625 MG tablet Take 1 tablet by mouth 2 (Two) Times a Day With Meals.     • Cyanocobalamin (B-12) 1000 MCG tablet Take 1 tablet by mouth Daily.     • EPINEPHrine (EPIPEN 2-MELLISSA) 0.3 MG/0.3ML solution auto-injector injection EPIPEN 2-MELLISSA 0.3 MG/0.3ML SOAJ     • ezetimibe (ZETIA) 10 MG tablet Take 1 tablet by mouth Daily. 90 tablet 3   • gabapentin (NEURONTIN) 800 MG tablet Take 1 tablet by mouth 3 (Three) Times a Day. TAKE ONE TABLET IN THE AM AND 2  tablet 3   • glimepiride (AMARYL) 4 MG tablet Take 1 tablet by mouth 2 (Two) Times a Day. 180 tablet 4   • glucose blood (FREESTYLE LITE) test strip FREESTYLE LITE TEST STRP     • hydrALAZINE (APRESOLINE) 50 MG " tablet Take 1 tablet by mouth 3 (Three) Times a Day. 90 tablet 3   • Lancets (FREESTYLE) lancets      • Magnesium 400 MG tablet Take 1 tablet by mouth Daily.     • Olmesartan-amLODIPine-HCTZ 40-10-12.5 MG tablet TAKE 1 TABLET DAILY 90 tablet 4   • Omega-3 Fatty Acids (FISH OIL) 1200 MG capsule capsule Take 1,200 mg by mouth Daily.     • pravastatin (PRAVACHOL) 80 MG tablet Take 1 tablet by mouth Every Night. 90 tablet 3   • PROVENTIL  (90 Base) MCG/ACT inhaler Inhale 2 puffs As Needed.     • SYMBICORT 160-4.5 MCG/ACT inhaler Inhale 2 puffs 2 (Two) Times a Day.     • azithromycin (ZITHROMAX Z-MELLISSA) 250 MG tablet Take 2 tablets the first day, then 1 tablet daily for 4 days. 6 tablet 0   • oseltamivir (TAMIFLU) 75 MG capsule Take 1 capsule by mouth 2 (Two) Times a Day. 10 capsule 0     No current facility-administered medications on file prior to visit.        The following portions of the patient's history were reviewed and updated as appropriate: allergies, current medications, past family history, past medical history, past social history, past surgical history and problem list.    Review of Systems   Constitutional: Negative.    HENT: Positive for congestion. Negative for sinus pressure and sore throat.    Eyes: Negative.    Respiratory: Positive for cough and shortness of breath.    Cardiovascular: Negative.    Gastrointestinal: Negative.    Endocrine: Negative.    Genitourinary: Negative.    Musculoskeletal: Negative.    Skin: Negative.    Allergic/Immunologic: Positive for environmental allergies.   Neurological: Negative.    Hematological: Negative.    Psychiatric/Behavioral: Negative.        Objective   Physical Exam   Constitutional: She is oriented to person, place, and time. She appears well-developed.   HENT:   Head: Normocephalic and atraumatic.   Nasal mucosa  erythema   Eyes: Pupils are equal, round, and reactive to light. Conjunctivae and EOM are normal.   Neck: Normal range of motion.    Cardiovascular: Normal rate and regular rhythm.   Pulmonary/Chest: Effort normal and breath sounds normal.   Abdominal: Soft. Bowel sounds are normal.   Musculoskeletal: Normal range of motion.   Lymphadenopathy:     She has no cervical adenopathy.   Neurological: She is alert and oriented to person, place, and time.   Skin: Skin is warm and dry.   Psychiatric: She has a normal mood and affect.   Nursing note and vitals reviewed.    PHQ-9 Total Score:      Assessment/Plan   Pearl was seen today for uri.    Diagnoses and all orders for this visit:    Viral URI with cough    Other orders  -     predniSONE (DELTASONE) 10 MG tablet; Take 1 tablet by mouth Daily.        There are no Patient Instructions on file for this visit.

## 2020-01-10 ENCOUNTER — TELEPHONE (OUTPATIENT)
Dept: FAMILY MEDICINE CLINIC | Facility: CLINIC | Age: 60
End: 2020-01-10

## 2020-01-10 NOTE — TELEPHONE ENCOUNTER
PT STATES HER BOSS TOLD HER NOT TO COME BACK TO WORK ON Thursday DUE TO HER STILL COUGHING. STATED SHE CAN COME BACK ON Sunday. WANTED TO KNOW IF YOU WOULD EXTEND HER SICK LETTER? STATES SHE IS FEELING MUCH BETTER. PLEASE ADVISE.

## 2020-01-15 ENCOUNTER — OFFICE VISIT (OUTPATIENT)
Dept: FAMILY MEDICINE CLINIC | Facility: CLINIC | Age: 60
End: 2020-01-15

## 2020-01-15 VITALS
HEIGHT: 65 IN | WEIGHT: 188.8 LBS | HEART RATE: 100 BPM | RESPIRATION RATE: 18 BRPM | SYSTOLIC BLOOD PRESSURE: 153 MMHG | BODY MASS INDEX: 31.46 KG/M2 | DIASTOLIC BLOOD PRESSURE: 82 MMHG | TEMPERATURE: 98.1 F | OXYGEN SATURATION: 95 %

## 2020-01-15 DIAGNOSIS — J45.909 UNCOMPLICATED ASTHMA, UNSPECIFIED ASTHMA SEVERITY, UNSPECIFIED WHETHER PERSISTENT: ICD-10-CM

## 2020-01-15 DIAGNOSIS — R19.7 DIARRHEA, UNSPECIFIED TYPE: ICD-10-CM

## 2020-01-15 DIAGNOSIS — J44.9 CHRONIC OBSTRUCTIVE PULMONARY DISEASE, UNSPECIFIED COPD TYPE (HCC): ICD-10-CM

## 2020-01-15 DIAGNOSIS — R11.2 NON-INTRACTABLE VOMITING WITH NAUSEA, UNSPECIFIED VOMITING TYPE: Primary | ICD-10-CM

## 2020-01-15 PROCEDURE — 99213 OFFICE O/P EST LOW 20 MIN: CPT | Performed by: NURSE PRACTITIONER

## 2020-01-15 RX ORDER — ONDANSETRON 4 MG/1
4 TABLET, FILM COATED ORAL EVERY 8 HOURS PRN
Qty: 20 TABLET | Refills: 1 | Status: SHIPPED | OUTPATIENT
Start: 2020-01-15 | End: 2021-06-07 | Stop reason: SDUPTHER

## 2020-01-15 NOTE — PATIENT INSTRUCTIONS
Initial SW/CM Assessment/Plan of Care Note     Baseline Assessment / Progress Note  81 year old year old admitted 5/31/2017 as Inpatient with a diagnosis of myleodysplastic syndrome, anemia.   Prior to admission patient was living with Other (Comment) and  residing at Rehabilitation/Skilled nursing/Long-term care facility.  Patient does  have a Power of  for Healthcare.  Document is not activated.  Agent is Marcie Lord (daughter #[phone]). Patient’s Primary Care Provider is Laurent Fernandez MD.     Case opened from MD consult for discharge planning received today.  Records reviewed and initial assessment completed.  Patient representative is Marcie Lord (daughter). Met with patient who was alert, oriented and appears own decision maker.  role explained and patient open to social work intervention.      Patient is a long-term care resident of [facility]. Patient ambulates with a wheelchair, and receives assistance with all ADLs and IADLs from facility staff.     Patient is enrolled in [facility].  is Codie Acuna (#[phone], fax #[phone]). CMU is [facility]. Left message for  alerting to admission and requesting call back.     During assessment, patient expressed numerous concerns with the care she receives at [facility]. Patient reports that he has missed multiple transfusion appointments due to facility mis-scheduling or failing to arrange transportation. Patient also states that RNs are not available when they are supposed to be (e.g. in the dining room). Patient is tearful during conversation, and regarding return to facility patient states \"I'm scared.\"     Per patient, she has discussed her concerns with several people at [facility], but has not yet raised these concerns with her Family [facility] . Offered to speak with Family [facility] CM regarding same; patient is agreeable. Patient is aware that change in  Resume symbicort twice daily and use proventil when wheezing  zofran as needed for nausea  BRAT diet as tolerated for diarrhea      [place] Diet  A bland diet consists of foods that are often soft and do not have a lot of fat, fiber, or extra seasonings. Foods without fat, fiber, or seasoning are easier for the body to digest. They are also less likely to irritate your mouth, throat, stomach, and other parts of your digestive system. A bland diet is sometimes called a BRAT diet.  What is my plan?  Your health care provider or food and nutrition specialist (dietitian) may recommend specific changes to your diet to prevent symptoms or to treat your symptoms. These changes may include:  · Eating small meals often.  · Cooking food until it is soft enough to chew easily.  · Chewing your food well.  · Drinking fluids slowly.  · Not eating foods that are very spicy, sour, or fatty.  · Not eating citrus fruits, such as oranges and grapefruit.  What do I need to know about this diet?  · Eat a variety of foods from the bland diet food list.  · Do not follow a bland diet longer than needed.  · Ask your health care provider whether you should take vitamins or supplements.  What foods can I eat?  Grains    Hot cereals, such as cream of wheat. Rice. Bread, crackers, or tortillas made from refined white flour.  Vegetables  Canned or cooked vegetables. Mashed or boiled potatoes.  Fruits    Bananas. Applesauce. Other types of cooked or canned fruit with the skin and seeds removed, such as canned peaches or pears.  Meats and other proteins    Scrambled eggs. Creamy peanut butter or other nut butters. Lean, well-cooked meats, such as chicken or fish. Tofu. Soups or broths.  Dairy  Low-fat dairy products, such as milk, cottage cheese, or yogurt.  Beverages    Water. Herbal tea. Apple juice.  Fats and oils  Mild salad dressings. Canola or olive oil.  Sweets and desserts  Pudding. Custard. Fruit gelatin. Ice cream.  The items listed above  long-term placement directly from hospital is unlikely.     Patient states that ideally, she would transfer to a facility closer to her daughter's home in [place]. Will discuss with Family Care  as able.     Discussed discharge planning options with patient.  Patient's plans to discharge to SNF (most likely return to [facility]).  Patient in agreement with discharge plan.  Anticipated transportation at discharge is EMS.      Medical History  Past Medical History:   Diagnosis Date   • Acute cholecystitis    • Acute on chronic congestive heart failure (CMS/[facility]) 5/16/2017   • Anemia     Received platelets/red cell mass in the past   • Anxiety    • Arthritis     DJD   • Blood clot associated with vein wall inflammation    • Chronic kidney disease    • Depression    • Diabetes mellitus (CMS/[facility])    • Essential (primary) hypertension    • Gastroesophageal reflux disease    • Myelodysplastic syndrome (CMS/[facility]) 2016   • Other and unspecified hyperlipidemia    • Pancreatitis    • Recurrent Clostridium difficile diarrhea 9/10/13    not current   • Urinary incontinence        Prior to Admission Status  Functional Status  Ambulation: Wheelchair  Bathing: Facility Staff  Dressing: Facility Staff  Toileting: Facility Staff  Meal Preparation: Facility Staff  Medication Preparation: Pharmacy Setup  Medication Administration: Staff Administered  Transportation: Wheelchair Van    Agency/Support  Type of Services Prior to Hospitalization: Nurse (specify),  (facility RN )  Support Systems: Children, /  Home Devices/Equipment: Hospital bed, Shower chair, Elevated toilet seat  Mobility Assist Devices: Wheelchair  Sensory Support Devices: Eyeglasses (reading glasses)    Current Status  PT Ambulation Tips:    PT Transfer Tips:    OT Bathing Tips:    OT Dressing Tips:    OT Toileting Tips:    OT Feeding Tips:    SLP Swallow/Feeding Tips:    SLP Comm/Cog Tips:    Current Mental  may not be a complete list of recommended foods and beverages. Contact a dietitian for more options.  What foods are not recommended?  Grains  Whole grain breads and cereals.  Vegetables  Raw vegetables.  Fruits  Raw fruits, especially citrus, berries, or dried fruits.  Dairy  Whole fat dairy foods.  Beverages  Caffeinated drinks. Alcohol.  Seasonings and condiments  Strongly flavored seasonings or condiments. Hot sauce. Salsa.  Other foods  Spicy foods. Fried foods. Sour foods, such as pickled or fermented foods. Foods with high sugar content. Foods high in fiber.  The items listed above may not be a complete list of foods and beverages to avoid. Contact a dietitian for more information.  Summary  · A bland diet consists of foods that are often soft and do not have a lot of fat, fiber, or extra seasonings.  · Foods without fat, fiber, or seasoning are easier for the body to digest.  · Check with your health care provider to see how long you should follow this diet plan. It is not meant to be followed for long periods.  This information is not intended to replace advice given to you by your health care provider. Make sure you discuss any questions you have with your health care provider.  Document Released: 04/10/2017 Document Revised: 01/16/2019 Document Reviewed: 01/16/2019  [employer] Interactive Patient Education © 2019 [employer]     Status: Cooperative, Quiet, Pleasant  Stressors: Health Status, Housing, Coping (concerned with care at facility )    Insurance  Primary: [facility] MEDICARE SOLUTION  Secondary: [facility]    Barriers to Discharge  Identified Barriers to Discharge/Transition Planning: Assessment/stabilization in progress    Progress Note  See above.    Plan  SW/CM - Recommendations for Discharge: SNF  PT - Recommendations for Discharge:    OT - Recommendations for Discharge:    SLP - Recommendations for Discharge:    Anticipate patient will need post-hospital services. Necessary services are available.  Anticipate patient can return to the environment from which patient entered the hospital.   Anticipate patient can provide self-care at discharge.    Refer to SW/CM Flowsheet for Goals and objective data.

## 2020-01-15 NOTE — PROGRESS NOTES
"Subjective   Pearl Elizalde is a 59 y.o. female presents for   Chief Complaint   Patient presents with   • Vomiting     not any better    • Cough       Health Maintenance Due   Topic Date Due   • PNEUMOCOCCAL VACCINE (19-64 MEDIUM RISK) (1 of 1 - PPSV23) 05/11/1977   • ZOSTER VACCINE (1 of 2) 05/11/2008   • COLONOSCOPY  07/08/2019   • INFLUENZA VACCINE  08/01/2019       History of Present Illness   Pt returned to office stating she is still not feeling better.  Pt reports one episode of vomiting yesterday at work with ongoing nausea and several episodes of diarrhea yesterday afternoon and once this am.  Pt denies abdominal pain, bloody stools or fever, but reports diarrhea and nause resumed this morning after eating a acosta egg and cheese sandwich for breakfast. Pt states she is still feeling congested, and woke herself wheezing last night.  Pt states she only uses her symbicort when she needs it and proventil when she is wheezing, but denies recently using either inhaler.    Vitals:    01/15/20 1101 01/15/20 1104   BP: 164/91 153/82   BP Location: Right arm Left arm   Patient Position: Sitting Sitting   Cuff Size: Adult Adult   Pulse: 101 100   Resp: 18    Temp: 98.1 °F (36.7 °C)    TempSrc: Oral    SpO2: 95%    Weight: 85.6 kg (188 lb 12.8 oz)    Height: 165.1 cm (65\")      Body mass index is 31.42 kg/m².    Current Outpatient Medications on File Prior to Visit   Medication Sig Dispense Refill   • Blood Glucose Monitoring Suppl (BLOOD GLUCOSE MONITOR SYSTEM) w/Device kit BLOOD GLUCOSE MONITOR SYSTEM w/Device KIT     • Cholecalciferol (VITAMIN D) 1000 units tablet Take 1,000 Units by mouth Daily.     • colesevelam (WELCHOL) 625 MG tablet Take 1 tablet by mouth 2 (Two) Times a Day With Meals.     • Cyanocobalamin (B-12) 1000 MCG tablet Take 1 tablet by mouth Daily.     • EPINEPHrine (EPIPEN 2-MELLISSA) 0.3 MG/0.3ML solution auto-injector injection EPIPEN 2-MELLISSA 0.3 MG/0.3ML SOAJ     • ezetimibe (ZETIA) 10 MG tablet Take " 1 tablet by mouth Daily. 90 tablet 3   • gabapentin (NEURONTIN) 800 MG tablet Take 1 tablet by mouth 3 (Three) Times a Day. TAKE ONE TABLET IN THE AM AND 2  tablet 3   • glimepiride (AMARYL) 4 MG tablet Take 1 tablet by mouth 2 (Two) Times a Day. 180 tablet 4   • glucose blood (FREESTYLE LITE) test strip FREESTYLE LITE TEST STRP     • hydrALAZINE (APRESOLINE) 50 MG tablet Take 1 tablet by mouth 3 (Three) Times a Day. 90 tablet 3   • Lancets (FREESTYLE) lancets      • Magnesium 400 MG tablet Take 1 tablet by mouth Daily.     • Olmesartan-amLODIPine-HCTZ 40-10-12.5 MG tablet TAKE 1 TABLET DAILY 90 tablet 4   • Omega-3 Fatty Acids (FISH OIL) 1200 MG capsule capsule Take 1,200 mg by mouth Daily.     • pravastatin (PRAVACHOL) 80 MG tablet Take 1 tablet by mouth Every Night. 90 tablet 3   • PROVENTIL  (90 Base) MCG/ACT inhaler Inhale 2 puffs As Needed.     • SYMBICORT 160-4.5 MCG/ACT inhaler Inhale 2 puffs 2 (Two) Times a Day.     • azithromycin (ZITHROMAX Z-MELLISSA) 250 MG tablet Take 2 tablets the first day, then 1 tablet daily for 4 days. 6 tablet 0   • benzonatate (TESSALON PERLES) 100 MG capsule Take 1 capsule by mouth 3 (Three) Times a Day As Needed for Cough. 30 capsule 0   • oseltamivir (TAMIFLU) 75 MG capsule Take 1 capsule by mouth 2 (Two) Times a Day. 10 capsule 0   • predniSONE (DELTASONE) 10 MG tablet Take 1 tablet by mouth Daily. 5 tablet 0     No current facility-administered medications on file prior to visit.        The following portions of the patient's history were reviewed and updated as appropriate: allergies, current medications, past family history, past medical history, past social history, past surgical history and problem list.    Review of Systems   Constitutional: Negative for chills and fever.   HENT: Negative for sinus pressure and sore throat.    Eyes: Negative for blurred vision.   Respiratory: Positive for cough, chest tightness and wheezing. Negative for shortness of breath.     Cardiovascular: Negative for chest pain.   Gastrointestinal: Positive for diarrhea, nausea and vomiting. Negative for abdominal pain.   Musculoskeletal: Negative for arthralgias and joint swelling.   Skin: Negative for color change.   Neurological: Negative for dizziness.   Psychiatric/Behavioral: Negative for behavioral problems.       Objective   Physical Exam   Constitutional: She is oriented to person, place, and time. She appears well-developed and well-nourished.   HENT:   Head: Normocephalic and atraumatic.   Right Ear: External ear normal.   Left Ear: External ear normal.   Nose: Nose normal.   Mouth/Throat: Oropharynx is clear and moist.   Eyes: Pupils are equal, round, and reactive to light. Conjunctivae and EOM are normal.   Neck: Normal range of motion.   Cardiovascular: Normal rate, regular rhythm, normal heart sounds and intact distal pulses.   Pulmonary/Chest: Effort normal and breath sounds normal.   Abdominal: Soft. Bowel sounds are increased. There is no tenderness.   Musculoskeletal: Normal range of motion.   Neurological: She is alert and oriented to person, place, and time.   Skin: Skin is warm and dry.   Psychiatric: She has a normal mood and affect. Her behavior is normal. Judgment normal.   Nursing note and vitals reviewed.    PHQ-9 Total Score:      Assessment/Plan   Pearl was seen today for vomiting and cough.    Diagnoses and all orders for this visit:    Non-intractable vomiting with nausea, unspecified vomiting type  -zofran prn  Ginger ale encouraged with bland diet    Diarrhea, unspecified type  -BRAT diet encouraged and pt educated on benefits with diarrhea    Uncomplicated asthma, unspecified asthma severity, unspecified whether persistent  -resume symbicort as directed.  Pt instructed on use and importance of using daily  -proventil for wheezing episodes  Chronic obstructive pulmonary disease, unspecified COPD type (CMS/[facility])    Other orders  -     ondansetron (ZOFRAN) 4 MG  tablet; Take 1 tablet by mouth Every 8 (Eight) Hours As Needed for Nausea or Vomiting.        Patient Instructions   Resume symbicort twice daily and use proventil when wheezing  zofran as needed for nausea  BRAT diet as tolerated for diarrhea      [place] Diet  A bland diet consists of foods that are often soft and do not have a lot of fat, fiber, or extra seasonings. Foods without fat, fiber, or seasoning are easier for the body to digest. They are also less likely to irritate your mouth, throat, stomach, and other parts of your digestive system. A bland diet is sometimes called a BRAT diet.  What is my plan?  Your health care provider or food and nutrition specialist (dietitian) may recommend specific changes to your diet to prevent symptoms or to treat your symptoms. These changes may include:  · Eating small meals often.  · Cooking food until it is soft enough to chew easily.  · Chewing your food well.  · Drinking fluids slowly.  · Not eating foods that are very spicy, sour, or fatty.  · Not eating citrus fruits, such as oranges and grapefruit.  What do I need to know about this diet?  · Eat a variety of foods from the bland diet food list.  · Do not follow a bland diet longer than needed.  · Ask your health care provider whether you should take vitamins or supplements.  What foods can I eat?  Grains    Hot cereals, such as cream of wheat. Rice. Bread, crackers, or tortillas made from refined white flour.  Vegetables  Canned or cooked vegetables. Mashed or boiled potatoes.  Fruits    Bananas. Applesauce. Other types of cooked or canned fruit with the skin and seeds removed, such as canned peaches or pears.  Meats and other proteins    Scrambled eggs. Creamy peanut butter or other nut butters. Lean, well-cooked meats, such as chicken or fish. Tofu. Soups or broths.  Dairy  Low-fat dairy products, such as milk, cottage cheese, or yogurt.  Beverages    Water. Herbal tea. Apple juice.  Fats and oils  Mild salad  dressings. Canola or olive oil.  Sweets and desserts  Pudding. Custard. Fruit gelatin. Ice cream.  The items listed above may not be a complete list of recommended foods and beverages. Contact a dietitian for more options.  What foods are not recommended?  Grains  Whole grain breads and cereals.  Vegetables  Raw vegetables.  Fruits  Raw fruits, especially citrus, berries, or dried fruits.  Dairy  Whole fat dairy foods.  Beverages  Caffeinated drinks. Alcohol.  Seasonings and condiments  Strongly flavored seasonings or condiments. Hot sauce. Salsa.  Other foods  Spicy foods. Fried foods. Sour foods, such as pickled or fermented foods. Foods with high sugar content. Foods high in fiber.  The items listed above may not be a complete list of foods and beverages to avoid. Contact a dietitian for more information.  Summary  · A bland diet consists of foods that are often soft and do not have a lot of fat, fiber, or extra seasonings.  · Foods without fat, fiber, or seasoning are easier for the body to digest.  · Check with your health care provider to see how long you should follow this diet plan. It is not meant to be followed for long periods.  This information is not intended to replace advice given to you by your health care provider. Make sure you discuss any questions you have with your health care provider.  Document Released: 04/10/2017 Document Revised: 01/16/2019 Document Reviewed: 01/16/2019  [employer] Interactive Patient Education © 2019 [employer]

## 2020-01-30 ENCOUNTER — OFFICE VISIT (OUTPATIENT)
Dept: PODIATRY | Facility: CLINIC | Age: 60
End: 2020-01-30

## 2020-01-30 VITALS
HEIGHT: 65 IN | BODY MASS INDEX: 31.65 KG/M2 | SYSTOLIC BLOOD PRESSURE: 157 MMHG | WEIGHT: 190 LBS | DIASTOLIC BLOOD PRESSURE: 94 MMHG | HEART RATE: 99 BPM

## 2020-01-30 DIAGNOSIS — G60.9 IDIOPATHIC PERIPHERAL NEUROPATHY: ICD-10-CM

## 2020-01-30 DIAGNOSIS — L60.3 ONYCHODYSTROPHY: Primary | ICD-10-CM

## 2020-01-30 DIAGNOSIS — M62.838 MUSCLE SPASMS OF BOTH LOWER EXTREMITIES: ICD-10-CM

## 2020-01-30 PROCEDURE — 99203 OFFICE O/P NEW LOW 30 MIN: CPT | Performed by: PODIATRIST

## 2020-01-30 RX ORDER — METHOCARBAMOL 500 MG/1
500 TABLET, FILM COATED ORAL 2 TIMES DAILY PRN
Qty: 30 TABLET | Refills: 0 | Status: SHIPPED | OUTPATIENT
Start: 2020-01-30 | End: 2020-06-08

## 2020-01-30 NOTE — PATIENT INSTRUCTIONS
Muscle Cramps and Spasms  Muscle cramps and spasms are when muscles tighten by themselves. They usually get better within minutes. Muscle cramps are painful. They are usually stronger and last longer than muscle spasms. Muscle spasms may or may not be painful. They can last a few seconds or much longer.  Cramps and spasms can affect any muscle, but they occur most often in the calf muscles of the leg. They are usually not caused by a serious problem. In many cases, the cause is not known. Some common causes include:  · Doing more physical work or exercise than your body is ready for.  · Using the muscles too much (overuse) by repeating certain movements too many times.  · Staying in a certain position for a long time.  · Playing a sport or doing an activity without preparing properly.  · Using bad form or technique while playing a sport or doing an activity.  · Not having enough water in your body (dehydration).  · Injury.  · Side effects of some medicines.  · Low levels of the salts and minerals in your blood (electrolytes), such as low potassium or calcium.  Follow these instructions at home:  Managing pain and stiffness         · Massage, stretch, and relax the muscle. Do this for many minutes at a time.  · If told, put heat on tight or tense muscles as often as told by your doctor. Use the heat source that your doctor recommends, such as a moist heat pack or a heating pad.  ? Place a towel between your skin and the heat source.  ? Leave the heat on for 20-30 minutes.  ? Remove the heat if your skin turns bright red. This is very important if you are not able to feel pain, heat, or cold. You may have a greater risk of getting burned.  · If told, put ice on the affected area. This may help if you are sore or have pain after a cramp or spasm.  ? Put ice in a plastic bag.  ? Place a towel between your skin and the bag.  ? Leave the ice on for 20 minutes, 2-3 times a day.  · Try taking hot showers or baths to help  relax tight muscles.  Eating and drinking  · Drink enough fluid to keep your pee (urine) pale yellow.  · Eat a healthy diet to help ensure that your muscles work well. This should include:  ? Fruits and vegetables.  ? Lean protein.  ? Whole grains.  ? Low-fat or nonfat dairy products.  General instructions  · If you are having cramps often, avoid intense exercise for several days.  · Take over-the-counter and prescription medicines only as told by your doctor.  · Watch for any changes in your symptoms.  · Keep all follow-up visits as told by your doctor. This is important.  Contact a doctor if:  · Your cramps or spasms get worse or happen more often.  · Your cramps or spasms do not get better with time.  Summary  · Muscle cramps and spasms are when muscles tighten by themselves. They usually get better within minutes.  · Cramps and spasms occur most often in the calf muscles of the leg.  · Massage, stretch, and relax the muscle. This may help the cramp or spasm go away.  · Drink enough fluid to keep your pee (urine) pale yellow.  This information is not intended to replace advice given to you by your health care provider. Make sure you discuss any questions you have with your health care provider.  Document Released: 11/30/2009 Document Revised: 05/13/2019 Document Reviewed: 05/13/2019  [employer] Interactive Patient Education © 2019 [employer]

## 2020-01-30 NOTE — PROGRESS NOTES
2020  Foot and Ankle Surgery - New Patient   Provider: Dr. Michele Roland DPM  Location: [facility]    Subjective:  Pearl Elizalde is a 59 y.o. female.     Chief Complaint   Patient presents with   • Left Foot - Follow-up   • Right Foot - Follow-up       HPI: Patient is a 59-year-old female that presents with issues involving both feet.  She complains of thickened and dystrophic nails.  She does not have any significant pain.  She states that she is worried about fungal infection.  She has not had any prominent discoloration and denies any recent changes.  She states that these issues have been a gradual progression.  She also complains of cramping that she has had for several years to her lower legs which are particularly worse at night.  She has not had any previous infections or discomfort involving her toenails.    Allergies   Allergen Reactions   • Oxycodone Hives and Nausea And Vomiting   • Penicillins Hives     As child. Unsure if can take keflex       Past Medical History:   Diagnosis Date   • Hyperlipidemia    • Hypertension    • Low back pain    • Obesity        Past Surgical History:   Procedure Laterality Date   • ADENOIDECTOMY     • CHOLECYSTECTOMY     • COLONOSCOPY     • ENDOSCOPY     • INNER EAR SURGERY     • REPLACEMENT TOTAL KNEE Left    • REPLACEMENT TOTAL KNEE Right    • TONSILLECTOMY         Family History   Adopted: Yes       Social History     Socioeconomic History   • Marital status:      Spouse name: Not on file   • Number of children: Not on file   • Years of education: Not on file   • Highest education level: Not on file   Tobacco Use   • Smoking status: Former Smoker     Types: Cigarettes     Last attempt to quit:      Years since quittin.1   • Smokeless tobacco: Never Used   Substance and Sexual Activity   • Alcohol use: No     Frequency: Never     Binge frequency: Never   • Drug use: No   • Sexual activity: Yes     Partners: Male     Birth  control/protection: None        Current Outpatient Medications on File Prior to Visit   Medication Sig Dispense Refill   • Blood Glucose Monitoring Suppl (BLOOD GLUCOSE MONITOR SYSTEM) w/Device kit BLOOD GLUCOSE MONITOR SYSTEM w/Device KIT     • Cholecalciferol (VITAMIN D) 1000 units tablet Take 1,000 Units by mouth Daily.     • colesevelam (WELCHOL) 625 MG tablet Take 1 tablet by mouth 2 (Two) Times a Day With Meals.     • Cyanocobalamin (B-12) 1000 MCG tablet Take 1 tablet by mouth Daily.     • EPINEPHrine (EPIPEN 2-MELLISSA) 0.3 MG/0.3ML solution auto-injector injection EPIPEN 2-MELLISSA 0.3 MG/0.3ML SOAJ     • ezetimibe (ZETIA) 10 MG tablet Take 1 tablet by mouth Daily. 90 tablet 3   • gabapentin (NEURONTIN) 800 MG tablet Take 1 tablet by mouth 3 (Three) Times a Day. TAKE ONE TABLET IN THE AM AND 2  tablet 3   • glimepiride (AMARYL) 4 MG tablet Take 1 tablet by mouth 2 (Two) Times a Day. 180 tablet 4   • glucose blood (FREESTYLE LITE) test strip FREESTYLE LITE TEST STRP     • hydrALAZINE (APRESOLINE) 50 MG tablet Take 1 tablet by mouth 3 (Three) Times a Day. 90 tablet 3   • Lancets (FREESTYLE) lancets      • Magnesium 400 MG tablet Take 1 tablet by mouth Daily.     • Olmesartan-amLODIPine-HCTZ 40-10-12.5 MG tablet TAKE 1 TABLET DAILY 90 tablet 4   • Omega-3 Fatty Acids (FISH OIL) 1200 MG capsule capsule Take 1,200 mg by mouth Daily.     • ondansetron (ZOFRAN) 4 MG tablet Take 1 tablet by mouth Every 8 (Eight) Hours As Needed for Nausea or Vomiting. 20 tablet 1   • pravastatin (PRAVACHOL) 80 MG tablet Take 1 tablet by mouth Every Night. 90 tablet 3   • PROVENTIL  (90 Base) MCG/ACT inhaler Inhale 2 puffs As Needed.     • SYMBICORT 160-4.5 MCG/ACT inhaler Inhale 2 puffs 2 (Two) Times a Day.     • [DISCONTINUED] azithromycin (ZITHROMAX Z-MELLISSA) 250 MG tablet Take 2 tablets the first day, then 1 tablet daily for 4 days. 6 tablet 0   • [DISCONTINUED] benzonatate (TESSALON PERLES) 100 MG capsule Take 1 capsule by  "mouth 3 (Three) Times a Day As Needed for Cough. 30 capsule 0   • [DISCONTINUED] oseltamivir (TAMIFLU) 75 MG capsule Take 1 capsule by mouth 2 (Two) Times a Day. 10 capsule 0   • [DISCONTINUED] predniSONE (DELTASONE) 10 MG tablet Take 1 tablet by mouth Daily. 5 tablet 0     No current facility-administered medications on file prior to visit.        Review of Systems:  General: Denies fever, chills, fatigue, and weakness.  Eyes: Denies vision loss, blurry vision, and excessive redness.  ENT: Denies hearing issues and difficulty swallowing.  Cardiovascular: Denies palpitations, chest pain, or syncopal episodes.  Respiratory: Denies shortness of breath, wheezing, and coughing.  GI: Denies abdominal pain, nausea, and vomiting.   : Denies frequency, hematuria, and urgency.  Musculoskeletal: Denies muscle cramps, joint pains, and stiffness.  Derm: + Toenail changes  Neuro: Denies headaches, numbness, loss of coordination, and tremors.  Psych: Denies anxiety and depression.  Endocrine: Denies temperature intolerance and changes in appetite.  Heme: Denies bleeding disorders or abnormal bruising.     Objective   /94 (BP Location: Left arm, Patient Position: Sitting, Cuff Size: Large Adult)   Pulse 99   Ht 165.1 cm (65\")   Wt 86.2 kg (190 lb)   LMP  (LMP Unknown)   BMI 31.62 kg/m²     Foot/Ankle Exam:       General:   Appearance: appears stated age and healthy    Orientation: AAOx3    Affect: appropriate    Gait: unimpaired      VASCULAR      Right Foot Vascularity   Normal vascular exam    Dorsalis pedis:  2+  Posterior tibial:  2+  Skin Temperature: warm    Edema Grading:  None  CFT:  < 3 seconds  Pedal Hair Growth:  Present  Varicosities: none       Left Foot Vascularity   Normal vascular exam    Dorsalis pedis:  2+  Posterior tibial:  2+  Skin Temperature: warm    Edema Grading:  None  CFT:  < 3 seconds  Pedal Hair Growth:  Present  Varicosities: none        NEUROLOGIC     Right Foot Neurologic   Light touch " sensation:  Diminished  Hot/Cold sensation: normal    Protective Sensation using Monroe-Astrid Monofilament:  Diminished  Achilles reflex:  2+     Left Foot Neurologic   Light touch sensation:  Diminished  Hot/cold sensation: normal    Protective Sensation using Monroe-Astrid Monofilament:  7  Achilles reflex:  2+     MUSCULOSKELETAL      Right Foot Musculoskeletal   Ecchymosis:  None  Tenderness: none    Arch:  Normal     Left Foot Musculoskeletal   Ecchymosis:  None  Tenderness: none    Arch:  Normal     MUSCLE STRENGTH     Right Foot Muscle Strength   Normal strength    Foot dorsiflexion:  5  Foot plantar flexion:  5  Foot inversion:  5  Foot eversion:  5     Left Foot Muscle Strength   Normal strength    Foot dorsiflexion:  5  Foot plantar flexion:  5  Foot inversion:  5  Foot eversion:  5     DERMATOLOGIC     Right Foot Dermatologic   Skin: skin intact    Nails: abnormally thick and longitudinal ridges       Left Foot Dermatologic   Skin: skin intact    Nails: abnormally thick and longitudinal ridges        Right Foot Additional Comments No significant pain with palpation.  No gross deformity or instability.  Mild incurvation involving the great toe nails.  No signs of infection or inflammation.      Assessment/Plan   Pearl was seen today for follow-up and follow-up.    Diagnoses and all orders for this visit:    Onychodystrophy    Muscle spasms of both lower extremities    Idiopathic peripheral neuropathy    Other orders  -     methocarbamol (ROBAXIN) 500 MG tablet; Take 1 tablet by mouth 2 (Two) Times a Day As Needed for Muscle Spasms.      Patient presents for primary complaint of nail changes.  She denies any pain or discoloration.  She feels that there has been gradual thickness and she is concerned about progressive fungal infection.  I did discuss that changes are likely expected and secondary to onychodystrophy and not necessarily fungal infection.  Given that she does not have any pain or  limitation, she has opted to monitor.  She also complains of longstanding muscle spasms involving both legs particularly at night.  She has tried stretching exercises with minimal improvement.  We did review possibility of a muscle relaxer for continued improvement.  I have prescribed methocarbamol for symptom management.  I would like her to continue the stretching exercises.  She would like to see me on an as-needed basis at this time.    No orders of the defined types were placed in this encounter.       Note is dictated utilizing voice recognition software. Unfortunately this leads to occasional typographical errors. I apologize in advance if the situation occurs. If questions occur please do not hesitate to call our office.

## 2020-02-18 ENCOUNTER — TELEPHONE (OUTPATIENT)
Dept: ENDOCRINOLOGY | Facility: CLINIC | Age: 60
End: 2020-02-18

## 2020-02-21 RX ORDER — HYDRALAZINE HYDROCHLORIDE 50 MG/1
TABLET, FILM COATED ORAL
Qty: 90 TABLET | Refills: 2 | Status: SHIPPED | OUTPATIENT
Start: 2020-02-21 | End: 2020-06-08 | Stop reason: SDUPTHER

## 2020-03-30 ENCOUNTER — E-VISIT (OUTPATIENT)
Dept: FAMILY MEDICINE CLINIC | Facility: CLINIC | Age: 60
End: 2020-03-30

## 2020-03-30 DIAGNOSIS — J30.1 SEASONAL ALLERGIC RHINITIS DUE TO POLLEN: Primary | ICD-10-CM

## 2020-03-30 PROCEDURE — 99421 OL DIG E/M SVC 5-10 MIN: CPT | Performed by: NURSE PRACTITIONER

## 2020-03-30 NOTE — PATIENT INSTRUCTIONS
[facility] allergy med to claritin if possible.  If not, continue zyrtec and add nasocort or flonase 2 sprays daily in each nostril.  Warm salt water gargles and sinus rinses twice daily.  Instructions attached for sinus rinse.   How to Perform a Sinus Rinse  A sinus rinse is a home treatment. It rinses your sinuses with a mixture of salt and water (saline solution). Sinuses are air-filled spaces in your skull behind the bones of your face and forehead. They open into your nasal cavity.  A sinus rinse can help to clear your nasal cavity. It can clear mucus, dirt, dust, or pollen.  You may do a sinus rinse when you have:  · A cold.  · A virus.  · Allergies.  · A sinus infection.  · A stuffy nose.  Talk with your doctor about whether a sinus rinse might help you.  What are the risks?  A sinus rinse is normally very safe and helpful. However, there are a few risks. These include:  · A burning feeling in the sinuses. This may happen if you do not make the saline solution as instructed. Be sure to follow all directions when making the saline solution.  · Nasal irritation.  · Infection from unclean water. This is rare, but possible.  Do not do a sinus rinse if you have had:  · Ear or nasal surgery.  · An ear infection.  · Blocked ears.  Supplies needed:  · Saline solution or powder.  · Distilled or germ-free (sterile) water may be needed to mix with saline powder.  ? You may use boiled and cooled tap water. Boil tap water for 5 minutes; cool until it is lukewarm. Use within 24 hours.  ? Do not use regular tap water to mix with the saline solution.  · Neti pot or nasal rinse bottle. This releases the saline solution into your nose and through your sinuses. You can buy neti pots and rinse bottles:  ? At your local pharmacy.  ? At a health food store.  ? Online.  How to perform a sinus rinse    1. Wash your hands with soap and water.  2. Wash your device using the directions that came with it.  3. Dry your device.  4. Use the  solution that comes with your device or one that is sold separately in stores. Follow the mixing directions on the package if you need to mix with sterile or distilled water.  5. Fill your device with the amount of saline solution stated in the device instructions.  6. Stand over a sink and tilt your head sideways over the sink.  7. Place the spout of the device in your upper nostril (the one closer to the ceiling).  8. Gently pour or squeeze the saline solution into your nasal cavity. The liquid should drain to your lower nostril if you are not too stuffed up (congested).  9. While rinsing, breathe through your open mouth.  10. Gently blow your nose to clear any mucus and rinse solution. Blowing too hard may cause ear pain.  11. Repeat in your other nostril.  12. Clean and rinse your device with clean water.  13. Air-dry your device.  Talk with your doctor or pharmacist if you have questions about how to do a sinus rinse.  Summary  · A sinus rinse is a home treatment. It rinses your sinuses with a mixture of salt and water (saline solution).  · A sinus rinse is normally very safe and helpful. Follow all instructions carefully.  · Talk with your doctor about whether a sinus rinse might help you.  This information is not intended to replace advice given to you by your health care provider. Make sure you discuss any questions you have with your health care provider.  Document Released: 07/15/2015 Document Revised: 10/15/2018 Document Reviewed: 10/15/2018  [employer] Interactive Patient Education © 2020 [employer].

## 2020-03-30 NOTE — PROGRESS NOTES
Pt reports cough, sore throat, sneezing and watery eyes x 2-3 days.  Pt has taken zyrtec and otc decongestants without relief of symptoms.  Pt denies fever at this time.  Pt advised to switch to claritin if possible and add flonase or nasocort 2 sprays daily and salt water gargles.

## 2020-03-31 ENCOUNTER — TELEMEDICINE (OUTPATIENT)
Dept: FAMILY MEDICINE CLINIC | Facility: CLINIC | Age: 60
End: 2020-03-31

## 2020-03-31 DIAGNOSIS — H44.003 INFECTION OF BOTH EYES: Primary | ICD-10-CM

## 2020-03-31 PROCEDURE — 99213 OFFICE O/P EST LOW 20 MIN: CPT | Performed by: PREVENTIVE MEDICINE

## 2020-03-31 RX ORDER — ERYTHROMYCIN 5 MG/G
OINTMENT OPHTHALMIC
Qty: 1 EACH | Refills: 0 | Status: SHIPPED | OUTPATIENT
Start: 2020-03-31 | End: 2020-06-08

## 2020-03-31 NOTE — PATIENT INSTRUCTIONS
Warm soaks twice daily before applying eye ointment to lower lid while looking in [place].  Advise she throw away all makeup that she has used around eyes over last week.  If increased pain or decreased vision advise calling eye doctor or calling us back.  If not improving in two days needs to call eye doctor and get examined.  Off work until has been using eye ointment for 4 doses.  Avoid being outside without sunglass protection.  Inform others she may have been contagious.  Launder pillows and bedding to not contaminate.

## 2020-03-31 NOTE — PROGRESS NOTES
Subjective   Pearl Elizalde is a 59 y.o. female presents for   Chief Complaint   Patient presents with   • Eye Problem       Health Maintenance Due   Topic Date Due   • PNEUMOCOCCAL VACCINE (19-64 MEDIUM RISK) (1 of 1 - PPSV23) 05/11/1977   • ZOSTER VACCINE (1 of 2) 05/11/2008   • COLONOSCOPY  07/08/2019   • INFLUENZA VACCINE  08/01/2019   • HEMOGLOBIN A1C  02/01/2020       Video visit estab;ished with patient using flashlight to shine on pupils.  History of 4 coworkers having pink eye at work and patient awoke with matting in eyes two days ago.  Has used three different types of eye drops and made things worse.  Not using contacts.  Eyes irritated by light.  Allergy to oxycodone and penicillin       There were no vitals filed for this visit.  There is no height or weight on file to calculate BMI.    Current Outpatient Medications on File Prior to Visit   Medication Sig Dispense Refill   • Blood Glucose Monitoring Suppl (BLOOD GLUCOSE MONITOR SYSTEM) w/Device kit BLOOD GLUCOSE MONITOR SYSTEM w/Device KIT     • Cholecalciferol (VITAMIN D) 1000 units tablet Take 1,000 Units by mouth Daily.     • colesevelam (WELCHOL) 625 MG tablet Take 1 tablet by mouth 2 (Two) Times a Day With Meals.     • Cyanocobalamin (B-12) 1000 MCG tablet Take 1 tablet by mouth Daily.     • EPINEPHrine (EPIPEN 2-MELLISSA) 0.3 MG/0.3ML solution auto-injector injection EPIPEN 2-MELLISSA 0.3 MG/0.3ML SOAJ     • ezetimibe (ZETIA) 10 MG tablet Take 1 tablet by mouth Daily. 90 tablet 3   • gabapentin (NEURONTIN) 800 MG tablet Take 1 tablet by mouth 3 (Three) Times a Day. TAKE ONE TABLET IN THE AM AND 2  tablet 3   • glimepiride (AMARYL) 4 MG tablet Take 1 tablet by mouth 2 (Two) Times a Day. 180 tablet 4   • glucose blood (FREESTYLE LITE) test strip FREESTYLE LITE TEST STRP     • hydrALAZINE (APRESOLINE) 50 MG tablet TAKE ONE TABLET BY MOUTH THREE TIMES A DAY 90 tablet 2   • Lancets (FREESTYLE) lancets      • Magnesium 400 MG tablet Take 1 tablet by  mouth Daily.     • methocarbamol (ROBAXIN) 500 MG tablet Take 1 tablet by mouth 2 (Two) Times a Day As Needed for Muscle Spasms. 30 tablet 0   • Olmesartan-amLODIPine-HCTZ 40-10-12.5 MG tablet TAKE 1 TABLET DAILY 90 tablet 4   • Omega-3 Fatty Acids (FISH OIL) 1200 MG capsule capsule Take 1,200 mg by mouth Daily.     • ondansetron (ZOFRAN) 4 MG tablet Take 1 tablet by mouth Every 8 (Eight) Hours As Needed for Nausea or Vomiting. 20 tablet 1   • pravastatin (PRAVACHOL) 80 MG tablet Take 1 tablet by mouth Every Night. 90 tablet 3   • PROVENTIL  (90 Base) MCG/ACT inhaler Inhale 2 puffs As Needed.     • SYMBICORT 160-4.5 MCG/ACT inhaler Inhale 2 puffs 2 (Two) Times a Day.       No current facility-administered medications on file prior to visit.        The following portions of the patient's history were reviewed and updated as appropriate: allergies, current medications, past family history, past medical history, past social history, past surgical history and problem list.    Review of Systems   Constitutional: Negative for activity change, chills, fatigue and fever.   Eyes: Positive for blurred vision, photophobia, discharge, redness and visual disturbance.       Objective   Physical Exam   Constitutional:   overweight   Eyes: Pupils are equal, round, and reactive to light. EOM are normal. Right eye exhibits discharge. Left eye exhibits discharge.   Globes soft and only minimally tender.  {hotophobia but KATHRIN   Skin: Rash noted. There is erythema.   Surrounding both eyes   Psychiatric: She has a normal mood and affect.     PHQ-9 Total Score:      Assessment/Plan   Pearl was seen today for eye problem.    Diagnoses and all orders for this visit:    Infection of both eyes        Patient Instructions   Warm soaks twice daily before applying eye ointment to lower lid while looking in [place].  Advise she throw away all makeup that she has used around eyes over last week.  If increased pain or decreased vision  advise calling eye doctor or calling us back.  If not improving in two days needs to call eye doctor and get examined.  Off work until has been using eye ointment for 4 doses.  Avoid being outside without sunglass protection.  Inform others she may have been contagious.  Launder pillows and bedding to not contaminate.

## 2020-04-01 ENCOUNTER — TELEPHONE (OUTPATIENT)
Dept: FAMILY MEDICINE CLINIC | Facility: CLINIC | Age: 60
End: 2020-04-01

## 2020-04-01 NOTE — TELEPHONE ENCOUNTER
Pt can return to work after 4 doses of antibiotic ointment.  At this time, a steroid is not recommended and I would encourage her to perform sinus rinses and take mucinex (guaifenisen) for her cough.  Call back for worsening.

## 2020-04-01 NOTE — TELEPHONE ENCOUNTER
Pt was seen via video visit yesterday for pink eye. She is calling this morning she has no temp, she has yellow out of her nose and coughing up yellow crud. She is requesting something be called in. She states a steroid was needed last time this happened.   She is also asking when she should go back to work.

## 2020-04-13 RX ORDER — BUDESONIDE AND FORMOTEROL FUMARATE DIHYDRATE 160; 4.5 UG/1; UG/1
2 AEROSOL RESPIRATORY (INHALATION) 2 TIMES DAILY
Qty: 3 INHALER | Refills: 3 | Status: SHIPPED | OUTPATIENT
Start: 2020-04-13 | End: 2020-07-16 | Stop reason: SDUPTHER

## 2020-04-13 RX ORDER — ALBUTEROL SULFATE 90 MCG
2 HFA AEROSOL WITH ADAPTER (GRAM) INHALATION EVERY 4 HOURS PRN
Qty: 3 INHALER | Refills: 3 | Status: SHIPPED | OUTPATIENT
Start: 2020-04-13 | End: 2020-12-17 | Stop reason: SDUPTHER

## 2020-04-13 RX ORDER — EPINEPHRINE 0.3 MG/.3ML
0.3 INJECTION SUBCUTANEOUS ONCE
Qty: 1 EACH | Refills: 1 | Status: SHIPPED | OUTPATIENT
Start: 2020-04-13 | End: 2020-04-13

## 2020-06-08 ENCOUNTER — OFFICE VISIT (OUTPATIENT)
Dept: FAMILY MEDICINE CLINIC | Facility: CLINIC | Age: 60
End: 2020-06-08

## 2020-06-08 VITALS
HEIGHT: 65 IN | BODY MASS INDEX: 32.06 KG/M2 | TEMPERATURE: 97.3 F | OXYGEN SATURATION: 93 % | RESPIRATION RATE: 16 BRPM | SYSTOLIC BLOOD PRESSURE: 117 MMHG | HEART RATE: 94 BPM | DIASTOLIC BLOOD PRESSURE: 77 MMHG | WEIGHT: 192.4 LBS

## 2020-06-08 DIAGNOSIS — H69.81 EUSTACHIAN TUBE DYSFUNCTION, RIGHT: Primary | ICD-10-CM

## 2020-06-08 DIAGNOSIS — E11.8 DISORDER ASSOCIATED WITH TYPE 2 DIABETES MELLITUS (HCC): ICD-10-CM

## 2020-06-08 DIAGNOSIS — I10 ESSENTIAL HYPERTENSION: ICD-10-CM

## 2020-06-08 DIAGNOSIS — E83.42 HYPOMAGNESEMIA: ICD-10-CM

## 2020-06-08 DIAGNOSIS — E55.9 VITAMIN D DEFICIENCY: ICD-10-CM

## 2020-06-08 DIAGNOSIS — E11.42 DIABETIC PERIPHERAL NEUROPATHY (HCC): ICD-10-CM

## 2020-06-08 DIAGNOSIS — E78.5 HYPERLIPIDEMIA, UNSPECIFIED HYPERLIPIDEMIA TYPE: ICD-10-CM

## 2020-06-08 DIAGNOSIS — J44.9 CHRONIC OBSTRUCTIVE PULMONARY DISEASE, UNSPECIFIED COPD TYPE (HCC): ICD-10-CM

## 2020-06-08 PROCEDURE — 99214 OFFICE O/P EST MOD 30 MIN: CPT | Performed by: NURSE PRACTITIONER

## 2020-06-08 RX ORDER — EPINEPHRINE 0.3 MG/.3ML
INJECTION SUBCUTANEOUS
COMMUNITY
Start: 2020-04-13 | End: 2021-04-13 | Stop reason: SDUPTHER

## 2020-06-08 RX ORDER — HYDRALAZINE HYDROCHLORIDE 50 MG/1
50 TABLET, FILM COATED ORAL 3 TIMES DAILY
Qty: 90 TABLET | Refills: 6 | Status: SHIPPED | OUTPATIENT
Start: 2020-06-08 | End: 2020-07-08

## 2020-06-08 NOTE — PROGRESS NOTES
"Subjective   Pearl Elizalde is a 60 y.o. female presents for   Chief Complaint   Patient presents with   • Leg Pain   • Balance Issues       Health Maintenance Due   Topic Date Due   • PNEUMOCOCCAL VACCINE (19-64 MEDIUM RISK) (1 of 1 - PPSV23) 05/11/1977   • ZOSTER VACCINE (1 of 2) 05/11/2008   • COLONOSCOPY  07/08/2019   • HEMOGLOBIN A1C  02/01/2020       History of Present Illness   Pt reports cramping from toes all the way up her body progressing over the past several weeks.  She states it is worse in her legs and is interfering with her sleep. She has tried heat pads and ice packs with no improvement.  She also reports feeling off balance, and states she feels very uneasy going up and down stairs. She denies dizziness.    Vitals:    06/08/20 0825 06/08/20 0827   BP: 120/76 117/77   BP Location: Right arm Left arm   Patient Position: Sitting Sitting   Cuff Size: Adult Adult   Pulse: 95 94   Resp: 16    Temp: 97.3 °F (36.3 °C)    TempSrc: Infrared    SpO2: 93%    Weight: 87.3 kg (192 lb 6.4 oz)    Height: 165.1 cm (65\")      Body mass index is 32.02 kg/m².    Current Outpatient Medications on File Prior to Visit   Medication Sig Dispense Refill   • Blood Glucose Monitoring Suppl (BLOOD GLUCOSE MONITOR SYSTEM) w/Device kit BLOOD GLUCOSE MONITOR SYSTEM w/Device KIT     • Cholecalciferol (VITAMIN D) 1000 units tablet Take 1,000 Units by mouth Daily.     • colesevelam (WELCHOL) 625 MG tablet Take 1 tablet by mouth 2 (Two) Times a Day With Meals.     • Cyanocobalamin (B-12) 1000 MCG tablet Take 1 tablet by mouth Daily.     • EPINEPHrine (EPIPEN) 0.3 MG/0.3ML solution auto-injector injection      • ezetimibe (ZETIA) 10 MG tablet Take 1 tablet by mouth Daily. 90 tablet 3   • gabapentin (NEURONTIN) 800 MG tablet Take 1 tablet by mouth 3 (Three) Times a Day. TAKE ONE TABLET IN THE AM AND 2  tablet 3   • glimepiride (AMARYL) 4 MG tablet Take 1 tablet by mouth 2 (Two) Times a Day. 180 tablet 4   • glucose blood " (FREESTYLE LITE) test strip FREESTYLE LITE TEST STRP     • Lancets (FREESTYLE) lancets      • Magnesium 400 MG tablet Take 1 tablet by mouth Daily.     • Olmesartan-amLODIPine-HCTZ 40-10-12.5 MG tablet TAKE 1 TABLET DAILY 90 tablet 4   • pravastatin (PRAVACHOL) 80 MG tablet Take 1 tablet by mouth Every Night. 90 tablet 3   • PROVENTIL  (90 Base) MCG/ACT inhaler Inhale 2 puffs Every 4 (Four) Hours As Needed for Wheezing or Shortness of Air. 3 inhaler 3   • SYMBICORT 160-4.5 MCG/ACT inhaler Inhale 2 puffs 2 (Two) Times a Day. 3 inhaler 3   • [DISCONTINUED] hydrALAZINE (APRESOLINE) 50 MG tablet TAKE ONE TABLET BY MOUTH THREE TIMES A DAY 90 tablet 2   • Omega-3 Fatty Acids (FISH OIL) 1200 MG capsule capsule Take 1,200 mg by mouth Daily.     • ondansetron (ZOFRAN) 4 MG tablet Take 1 tablet by mouth Every 8 (Eight) Hours As Needed for Nausea or Vomiting. 20 tablet 1   • [DISCONTINUED] erythromycin (ROMYCIN) 5 MG/GM ophthalmic ointment Apply to both lower lids am and pm 1 each 0   • [DISCONTINUED] methocarbamol (ROBAXIN) 500 MG tablet Take 1 tablet by mouth 2 (Two) Times a Day As Needed for Muscle Spasms. 30 tablet 0     No current facility-administered medications on file prior to visit.        The following portions of the patient's history were reviewed and updated as appropriate: allergies, current medications, past family history, past medical history, past social history, past surgical history and problem list.    Review of Systems   Constitutional: Negative for chills and fever.   HENT: Negative for sinus pressure and sore throat.    Eyes: Negative for blurred vision.   Respiratory: Positive for cough and shortness of breath.    Cardiovascular: Negative for chest pain.   Gastrointestinal: Negative for abdominal pain.   Endocrine: Negative.    Genitourinary: Negative.    Musculoskeletal: Negative for arthralgias and joint swelling.        Leg cramps   Skin: Negative for color change.   Allergic/Immunologic:  Negative.    Neurological: Negative for dizziness.   Psychiatric/Behavioral: Negative for behavioral problems.       Objective   Physical Exam   Constitutional: She is oriented to person, place, and time. She appears well-developed and well-nourished.   HENT:   Head: Normocephalic and atraumatic.   Right Ear: Hearing, external ear and ear canal normal. A middle ear effusion is present.   Left Ear: Hearing, tympanic membrane, external ear and ear canal normal.   Nose: Nose normal.   Mouth/Throat: Oropharynx is clear and moist.   Eyes: Pupils are equal, round, and reactive to light. EOM are normal.   Neck: Normal range of motion. Neck supple.   Cardiovascular: Normal rate, regular rhythm, normal heart sounds and intact distal pulses.   Pulmonary/Chest: Effort normal and breath sounds normal.   Abdominal: Soft. Bowel sounds are normal.   Musculoskeletal: Normal range of motion.   Neurological: She is alert and oriented to person, place, and time.   Skin: Skin is warm and dry.   Psychiatric: She has a normal mood and affect. Her behavior is normal. Judgment normal.   Nursing note and vitals reviewed.    PHQ-9 Total Score:      Assessment/Plan   Pearl was seen today for leg pain and balance issues.    Diagnoses and all orders for this visit:    Eustachian tube dysfunction, right  Comments:  continue daily zyrtec, add flonase daily    Hyperlipidemia, unspecified hyperlipidemia type  -     Comprehensive Metabolic Panel; Future  -     Lipid Panel; Future    Chronic obstructive pulmonary disease, unspecified COPD type (CMS/HCC)  -     Ambulatory Referral to Sleep Medicine; Future    Essential hypertension  -     CBC Auto Differential; Future    Vitamin D deficiency  -     Vitamin D 25 Hydroxy; Future    Diabetic peripheral neuropathy (CMS/[facility])  Comments:  taking gabapentin as directed    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        Patient Instructions   Eustachian Tube Dysfunction    Eustachian tube dysfunction refers to a condition in which a blockage develops in the narrow passage that connects the middle ear to the back of the nose (eustachian tube). The eustachian tube regulates air pressure in the middle ear by letting air move between the ear and nose. It also helps to drain fluid from the middle ear space.  Eustachian tube dysfunction can affect one or both ears. When the eustachian tube does not function properly, air pressure, fluid, or both can build up in the middle ear.  What are the causes?  This condition occurs when the eustachian tube becomes blocked or cannot open normally. Common causes of this condition include:  · Ear infections.  · Colds and other infections that affect the nose, mouth, and throat (upper respiratory tract).  · Allergies.  · Irritation from cigarette smoke.  · Irritation from stomach acid coming up into the esophagus (gastroesophageal reflux). The esophagus is the tube that carries food from the mouth to the stomach.  · Sudden changes in air pressure, such as from descending in an airplane or scuba diving.  · Abnormal growths in the nose or throat, such as:  ? Growths that line the nose (nasal polyps).  ? Abnormal growth of cells (tumors).  ? Enlarged tissue at the back of the throat (adenoids).  What increases the risk?  You are more likely to develop this condition if:  · You smoke.  · You are overweight.  · You are a child who has:  ? Certain birth defects of the mouth, such as cleft palate.  ? Large tonsils or adenoids.  What are the signs or symptoms?  Common symptoms of this condition include:  · A feeling of fullness in the ear.  · Ear pain.  · Clicking or popping  "noises in the ear.  · Ringing in the ear.  · Hearing loss.  · Loss of balance.  · Dizziness.  Symptoms may get worse when the air pressure around you changes, such as when you travel to an area of high elevation, fly on an airplane, or go scuba diving.  How is this diagnosed?  This condition may be diagnosed based on:  · Your symptoms.  · A physical exam of your ears, nose, and throat.  · Tests, such as those that measure:  ? The movement of your eardrum (tympanogram).  ? Your hearing (audiometry).  How is this treated?  Treatment depends on the cause and severity of your condition.  · In mild cases, you may relieve your symptoms by moving air into your ears. This is called \"popping the ears.\"  · In more severe cases, or if you have symptoms of fluid in your ears, treatment may include:  ? Medicines to relieve congestion (decongestants).  ? Medicines that treat allergies (antihistamines).  ? Nasal sprays or ear drops that contain medicines that reduce swelling (steroids).  ? A procedure to drain the fluid in your eardrum (myringotomy). In this procedure, a small tube is placed in the eardrum to:  § Drain the fluid.  § Restore the air in the middle ear space.  ? A procedure to insert a balloon device through the nose to inflate the opening of the eustachian tube (balloon dilation).  Follow these instructions at home:  Lifestyle  · Do not do any of the following until your health care provider approves:  ? Travel to high altitudes.  ? Fly in airplanes.  ? Work in a pressurized cabin or room.  ? Scuba dive.  · Do not use any products that contain nicotine or tobacco, such as cigarettes and e-cigarettes. If you need help quitting, ask your health care provider.  · Keep your ears dry. Wear fitted earplugs during showering and bathing. Dry your ears completely after.  General instructions  · Take over-the-counter and prescription medicines only as told by your health care provider.  · Use techniques to help pop your ears " as recommended by your health care provider. These may include:  ? Chewing gum.  ? Yawning.  ? Frequent, forceful swallowing.  ? Closing your mouth, holding your nose closed, and gently blowing as if you are trying to blow air out of your nose.  · Keep all follow-up visits as told by your health care provider. This is important.  Contact a health care provider if:  · Your symptoms do not go away after treatment.  · Your symptoms come back after treatment.  · You are unable to pop your ears.  · You have:  ? A fever.  ? Pain in your ear.  ? Pain in your head or neck.  ? Fluid draining from your ear.  · Your hearing suddenly changes.  · You become very dizzy.  · You lose your balance.  Summary  · Eustachian tube dysfunction refers to a condition in which a blockage develops in the eustachian tube.  · It can be caused by ear infections, allergies, inhaled irritants, or abnormal growths in the nose or throat.  · Symptoms include ear pain, hearing loss, or ringing in the ears.  · Mild cases are treated with maneuvers to unblock the ears, such as yawning or ear popping.  · Severe cases are treated with medicines. Surgery may also be done (rare).  This information is not intended to replace advice given to you by your health care provider. Make sure you discuss any questions you have with your health care provider.  Document Released: 01/13/2017 Document Revised: 04/09/2019 Document Reviewed: 04/09/2019  [employer] Patient Education © 2020 [employer]

## 2020-06-08 NOTE — PATIENT INSTRUCTIONS
Eustachian Tube Dysfunction    Eustachian tube dysfunction refers to a condition in which a blockage develops in the narrow passage that connects the middle ear to the back of the nose (eustachian tube). The eustachian tube regulates air pressure in the middle ear by letting air move between the ear and nose. It also helps to drain fluid from the middle ear space.  Eustachian tube dysfunction can affect one or both ears. When the eustachian tube does not function properly, air pressure, fluid, or both can build up in the middle ear.  What are the causes?  This condition occurs when the eustachian tube becomes blocked or cannot open normally. Common causes of this condition include:  · Ear infections.  · Colds and other infections that affect the nose, mouth, and throat (upper respiratory tract).  · Allergies.  · Irritation from cigarette smoke.  · Irritation from stomach acid coming up into the esophagus (gastroesophageal reflux). The esophagus is the tube that carries food from the mouth to the stomach.  · Sudden changes in air pressure, such as from descending in an airplane or scuba diving.  · Abnormal growths in the nose or throat, such as:  ? Growths that line the nose (nasal polyps).  ? Abnormal growth of cells (tumors).  ? Enlarged tissue at the back of the throat (adenoids).  What increases the risk?  You are more likely to develop this condition if:  · You smoke.  · You are overweight.  · You are a child who has:  ? Certain birth defects of the mouth, such as cleft palate.  ? Large tonsils or adenoids.  What are the signs or symptoms?  Common symptoms of this condition include:  · A feeling of fullness in the ear.  · Ear pain.  · Clicking or popping noises in the ear.  · Ringing in the ear.  · Hearing loss.  · Loss of balance.  · Dizziness.  Symptoms may get worse when the air pressure around you changes, such as when you travel to an area of high elevation, fly on an airplane, or go scuba diving.  How is  "this diagnosed?  This condition may be diagnosed based on:  · Your symptoms.  · A physical exam of your ears, nose, and throat.  · Tests, such as those that measure:  ? The movement of your eardrum (tympanogram).  ? Your hearing (audiometry).  How is this treated?  Treatment depends on the cause and severity of your condition.  · In mild cases, you may relieve your symptoms by moving air into your ears. This is called \"popping the ears.\"  · In more severe cases, or if you have symptoms of fluid in your ears, treatment may include:  ? Medicines to relieve congestion (decongestants).  ? Medicines that treat allergies (antihistamines).  ? Nasal sprays or ear drops that contain medicines that reduce swelling (steroids).  ? A procedure to drain the fluid in your eardrum (myringotomy). In this procedure, a small tube is placed in the eardrum to:  § Drain the fluid.  § Restore the air in the middle ear space.  ? A procedure to insert a balloon device through the nose to inflate the opening of the eustachian tube (balloon dilation).  Follow these instructions at home:  Lifestyle  · Do not do any of the following until your health care provider approves:  ? Travel to high altitudes.  ? Fly in airplanes.  ? Work in a pressurized cabin or room.  ? Scuba dive.  · Do not use any products that contain nicotine or tobacco, such as cigarettes and e-cigarettes. If you need help quitting, ask your health care provider.  · Keep your ears dry. Wear fitted earplugs during showering and bathing. Dry your ears completely after.  General instructions  · Take over-the-counter and prescription medicines only as told by your health care provider.  · Use techniques to help pop your ears as recommended by your health care provider. These may include:  ? Chewing gum.  ? Yawning.  ? Frequent, forceful swallowing.  ? Closing your mouth, holding your nose closed, and gently blowing as if you are trying to blow air out of your nose.  · Keep all " follow-up visits as told by your health care provider. This is important.  Contact a health care provider if:  · Your symptoms do not go away after treatment.  · Your symptoms come back after treatment.  · You are unable to pop your ears.  · You have:  ? A fever.  ? Pain in your ear.  ? Pain in your head or neck.  ? Fluid draining from your ear.  · Your hearing suddenly changes.  · You become very dizzy.  · You lose your balance.  Summary  · Eustachian tube dysfunction refers to a condition in which a blockage develops in the eustachian tube.  · It can be caused by ear infections, allergies, inhaled irritants, or abnormal growths in the nose or throat.  · Symptoms include ear pain, hearing loss, or ringing in the ears.  · Mild cases are treated with maneuvers to unblock the ears, such as yawning or ear popping.  · Severe cases are treated with medicines. Surgery may also be done (rare).  This information is not intended to replace advice given to you by your health care provider. Make sure you discuss any questions you have with your health care provider.  Document Released: 01/13/2017 Document Revised: 04/09/2019 Document Reviewed: 04/09/2019  [employer] Patient Education © 2020 [employer]

## 2020-07-16 ENCOUNTER — CLINICAL SUPPORT (OUTPATIENT)
Dept: FAMILY MEDICINE CLINIC | Facility: CLINIC | Age: 60
End: 2020-07-16

## 2020-07-16 DIAGNOSIS — E11.42 DIABETIC PERIPHERAL NEUROPATHY (HCC): ICD-10-CM

## 2020-07-16 DIAGNOSIS — E83.42 HYPOMAGNESEMIA: ICD-10-CM

## 2020-07-16 DIAGNOSIS — E55.9 VITAMIN D DEFICIENCY: ICD-10-CM

## 2020-07-16 DIAGNOSIS — I10 ESSENTIAL HYPERTENSION: ICD-10-CM

## 2020-07-16 DIAGNOSIS — E11.8 DISORDER ASSOCIATED WITH TYPE 2 DIABETES MELLITUS (HCC): ICD-10-CM

## 2020-07-16 DIAGNOSIS — E78.5 HYPERLIPIDEMIA, UNSPECIFIED HYPERLIPIDEMIA TYPE: ICD-10-CM

## 2020-07-16 LAB
25(OH)D3 SERPL-MCNC: 24 NG/ML (ref 30–100)
ALBUMIN SERPL-MCNC: 4.4 G/DL (ref 3.5–5.2)
ALBUMIN/GLOB SERPL: 1.5 G/DL
ALP SERPL-CCNC: 71 U/L (ref 39–117)
ALT SERPL W P-5'-P-CCNC: 22 U/L (ref 1–33)
ANION GAP SERPL CALCULATED.3IONS-SCNC: 12.2 MMOL/L (ref 5–15)
AST SERPL-CCNC: 27 U/L (ref 1–32)
BASOPHILS # BLD AUTO: 0.09 10*3/MM3 (ref 0–0.2)
BASOPHILS NFR BLD AUTO: 1.4 % (ref 0–1.5)
BILIRUB SERPL-MCNC: 0.9 MG/DL (ref 0–1.2)
BUN SERPL-MCNC: 14 MG/DL (ref 8–23)
BUN/CREAT SERPL: 16.9 (ref 7–25)
CALCIUM SPEC-SCNC: 9.7 MG/DL (ref 8.6–10.5)
CHLORIDE SERPL-SCNC: 102 MMOL/L (ref 98–107)
CHOLEST SERPL-MCNC: 185 MG/DL (ref 0–200)
CO2 SERPL-SCNC: 25.8 MMOL/L (ref 22–29)
CREAT SERPL-MCNC: 0.83 MG/DL (ref 0.57–1)
DEPRECATED RDW RBC AUTO: 39.8 FL (ref 37–54)
EOSINOPHIL # BLD AUTO: 0.19 10*3/MM3 (ref 0–0.4)
EOSINOPHIL NFR BLD AUTO: 3 % (ref 0.3–6.2)
ERYTHROCYTE [DISTWIDTH] IN BLOOD BY AUTOMATED COUNT: 13.2 % (ref 12.3–15.4)
GFR SERPL CREATININE-BSD FRML MDRD: 70 ML/MIN/1.73
GLOBULIN UR ELPH-MCNC: 2.9 GM/DL
GLUCOSE SERPL-MCNC: 176 MG/DL (ref 65–99)
HBA1C MFR BLD: 7.8 % (ref 3.5–5.6)
HCT VFR BLD AUTO: 39.2 % (ref 34–46.6)
HDLC SERPL-MCNC: 44 MG/DL (ref 40–60)
HGB BLD-MCNC: 13.4 G/DL (ref 12–15.9)
IMM GRANULOCYTES # BLD AUTO: 0.07 10*3/MM3 (ref 0–0.05)
IMM GRANULOCYTES NFR BLD AUTO: 1.1 % (ref 0–0.5)
LDLC SERPL CALC-MCNC: 97 MG/DL (ref 0–100)
LDLC/HDLC SERPL: 2.2 {RATIO}
LYMPHOCYTES # BLD AUTO: 1.58 10*3/MM3 (ref 0.7–3.1)
LYMPHOCYTES NFR BLD AUTO: 25 % (ref 19.6–45.3)
MAGNESIUM SERPL-MCNC: 1.9 MG/DL (ref 1.6–2.4)
MCH RBC QN AUTO: 28.7 PG (ref 26.6–33)
MCHC RBC AUTO-ENTMCNC: 34.2 G/DL (ref 31.5–35.7)
MCV RBC AUTO: 83.9 FL (ref 79–97)
MONOCYTES # BLD AUTO: 0.71 10*3/MM3 (ref 0.1–0.9)
MONOCYTES NFR BLD AUTO: 11.2 % (ref 5–12)
NEUTROPHILS NFR BLD AUTO: 3.69 10*3/MM3 (ref 1.7–7)
NEUTROPHILS NFR BLD AUTO: 58.3 % (ref 42.7–76)
NRBC BLD AUTO-RTO: 0 /100 WBC (ref 0–0.2)
PLATELET # BLD AUTO: 295 10*3/MM3 (ref 140–450)
PMV BLD AUTO: 10.4 FL (ref 6–12)
POTASSIUM SERPL-SCNC: 4.5 MMOL/L (ref 3.5–5.2)
PROT SERPL-MCNC: 7.3 G/DL (ref 6–8.5)
RBC # BLD AUTO: 4.67 10*6/MM3 (ref 3.77–5.28)
SODIUM SERPL-SCNC: 140 MMOL/L (ref 136–145)
TRIGL SERPL-MCNC: 220 MG/DL (ref 0–150)
VIT B12 BLD-MCNC: 735 PG/ML (ref 211–946)
VLDLC SERPL-MCNC: 44 MG/DL (ref 5–40)
WBC # BLD AUTO: 6.33 10*3/MM3 (ref 3.4–10.8)

## 2020-07-16 PROCEDURE — 83036 HEMOGLOBIN GLYCOSYLATED A1C: CPT | Performed by: NURSE PRACTITIONER

## 2020-07-16 PROCEDURE — 83735 ASSAY OF MAGNESIUM: CPT | Performed by: NURSE PRACTITIONER

## 2020-07-16 PROCEDURE — 80053 COMPREHEN METABOLIC PANEL: CPT | Performed by: NURSE PRACTITIONER

## 2020-07-16 PROCEDURE — 82306 VITAMIN D 25 HYDROXY: CPT | Performed by: NURSE PRACTITIONER

## 2020-07-16 PROCEDURE — 82607 VITAMIN B-12: CPT | Performed by: NURSE PRACTITIONER

## 2020-07-16 PROCEDURE — 80061 LIPID PANEL: CPT | Performed by: NURSE PRACTITIONER

## 2020-07-16 PROCEDURE — 85025 COMPLETE CBC W/AUTO DIFF WBC: CPT | Performed by: NURSE PRACTITIONER

## 2020-07-16 PROCEDURE — 36415 COLL VENOUS BLD VENIPUNCTURE: CPT | Performed by: PREVENTIVE MEDICINE

## 2020-07-16 RX ORDER — HYDRALAZINE HYDROCHLORIDE 50 MG/1
50 TABLET, FILM COATED ORAL 3 TIMES DAILY
Qty: 270 TABLET | Refills: 3 | Status: SHIPPED | OUTPATIENT
Start: 2020-07-16 | End: 2021-02-04 | Stop reason: SDUPTHER

## 2020-07-16 RX ORDER — GABAPENTIN 800 MG/1
800 TABLET ORAL 3 TIMES DAILY
Qty: 270 TABLET | Refills: 3 | Status: SHIPPED | OUTPATIENT
Start: 2020-07-16 | End: 2021-10-12 | Stop reason: SDUPTHER

## 2020-07-16 RX ORDER — HYDRALAZINE HYDROCHLORIDE 50 MG/1
50 TABLET, FILM COATED ORAL 3 TIMES DAILY
COMMUNITY
End: 2020-07-16 | Stop reason: SDUPTHER

## 2020-07-16 RX ORDER — EZETIMIBE 10 MG/1
10 TABLET ORAL EVERY 24 HOURS
Qty: 90 TABLET | Refills: 3 | Status: SHIPPED | OUTPATIENT
Start: 2020-07-16 | End: 2021-09-01

## 2020-07-16 RX ORDER — BUDESONIDE AND FORMOTEROL FUMARATE DIHYDRATE 160; 4.5 UG/1; UG/1
2 AEROSOL RESPIRATORY (INHALATION) 2 TIMES DAILY
Qty: 3 INHALER | Refills: 3 | Status: SHIPPED | OUTPATIENT
Start: 2020-07-16 | End: 2021-08-09 | Stop reason: SDUPTHER

## 2020-07-16 RX ORDER — PRAVASTATIN SODIUM 80 MG/1
80 TABLET ORAL NIGHTLY
Qty: 90 TABLET | Refills: 3 | Status: SHIPPED | OUTPATIENT
Start: 2020-07-16 | End: 2020-12-15 | Stop reason: SDUPTHER

## 2020-07-16 RX ORDER — COLESEVELAM 180 1/1
625 TABLET ORAL 2 TIMES DAILY WITH MEALS
Qty: 180 TABLET | Refills: 3 | Status: SHIPPED | OUTPATIENT
Start: 2020-07-16 | End: 2020-08-03 | Stop reason: SDUPTHER

## 2020-08-03 RX ORDER — COLESEVELAM 180 1/1
625 TABLET ORAL 2 TIMES DAILY WITH MEALS
Qty: 180 TABLET | Refills: 3 | Status: SHIPPED | OUTPATIENT
Start: 2020-08-03 | End: 2022-01-11 | Stop reason: SDUPTHER

## 2020-08-03 RX ORDER — COLESEVELAM 180 1/1
625 TABLET ORAL 2 TIMES DAILY WITH MEALS
Qty: 180 TABLET | Refills: 3 | Status: CANCELLED | OUTPATIENT
Start: 2020-08-03

## 2020-08-06 ENCOUNTER — RESULTS ENCOUNTER (OUTPATIENT)
Dept: SLEEP MEDICINE | Facility: HOSPITAL | Age: 60
End: 2020-08-06

## 2020-08-06 ENCOUNTER — OFFICE VISIT (OUTPATIENT)
Dept: SLEEP MEDICINE | Facility: HOSPITAL | Age: 60
End: 2020-08-06

## 2020-08-06 VITALS
DIASTOLIC BLOOD PRESSURE: 77 MMHG | BODY MASS INDEX: 31.32 KG/M2 | WEIGHT: 188 LBS | HEART RATE: 103 BPM | OXYGEN SATURATION: 95 % | SYSTOLIC BLOOD PRESSURE: 129 MMHG | HEIGHT: 65 IN

## 2020-08-06 DIAGNOSIS — G47.33 OBSTRUCTIVE SLEEP APNEA, ADULT: Primary | ICD-10-CM

## 2020-08-06 DIAGNOSIS — G47.33 OBSTRUCTIVE SLEEP APNEA, ADULT: ICD-10-CM

## 2020-08-06 DIAGNOSIS — Z01.818 PREOP EXAMINATION: ICD-10-CM

## 2020-08-06 PROCEDURE — G0463 HOSPITAL OUTPT CLINIC VISIT: HCPCS

## 2020-08-06 RX ORDER — DIAZEPAM 5 MG/1
TABLET ORAL
COMMUNITY
Start: 2020-07-14 | End: 2021-06-14

## 2020-08-06 NOTE — PROGRESS NOTES
SLEEP MEDICINE CONSULT      Patient Identification:     Name: Pearl Elizalde   Age: 60 y.o.   Gender: female   : 1960   MRN: [id number]     Date of consult: 2020    PCP/Referring Physician(s):     PCP: Lizette Sigala MD  Referring Provider: Linda Foy MD      Chief Complaint:   Obstructive sleep apnea.  Remain symptomatic.    History of Present Illness:   This is a very pleasant lady who has snored for a long time.  She has slept poorly..  She underwent a nocturnal polysomnogram more than 13 years ago.  She was prescribed a CPAP equipment with a full facemask.  She was unable to tolerate the mask and gave it up.  In the meantime she has picked up some weight.  She continued to snore.  Her sleep has become quite disturbed.  She has continued to wake up several times throughout the night.  She has felt tired and somnolent during the daytime.  She has dozed off during sedentary activities.  She is advised by her primary care to get herself evaluated for obstructive sleep apnea.  Therefore she is here to discuss her symptoms.  She wants to discuss also different options for masks.    Her bedtime nowadays is between 9 PM to 10 PM.  She dozes off within 20 minutes.  She likes to wake up at 6:30 AM to 7 AM to start her day she has woken up every 2 hours.  She finds it difficult sometimes to doze off again.  She has often woken up with a dry mouth at night or in the morning.  She denies any symptoms of choking.  She has woken up fighting for air.  She denies symptoms of choking at night or in the morning.  She has sometimes woken up at night with her heart racing.  She has occasionally woken up with nasal congestion.    She rarely has nightmares.  She has talked in her sleep.  She has never walked in her sleep.  She is a restless sleeper.  She has severe leg cramps at night.  She has bruxism.  She denies any symptoms of hypnopompic or hypnagogic hallucinations.  No symptoms of sleep  paralysis.    She has woken up in the morning tired and somewhat somnolent.  As the day progresses her sense of tiredness increases.  She has dozed off during sedentary activities.  Her [place] sleepiness score is 22/24.    Allergies, Medications, and Past History:   Allergies:    Allergies   Allergen Reactions   • Oxycodone Hives and Nausea And Vomiting   • Penicillins Hives     As child. Unsure if can take keflex     Current Medications:    Prior to Admission medications    Medication Sig Start Date End Date Taking? Authorizing Provider   Blood Glucose Monitoring Suppl (BLOOD GLUCOSE MONITOR SYSTEM) w/Device kit BLOOD GLUCOSE MONITOR SYSTEM w/Device KIT 11/29/18  Yes Robb Luke MD   Cholecalciferol (VITAMIN D) 1000 units tablet Take 1,000 Units by mouth Daily.   Yes Robb Luke MD   colesevelam (WELCHOL) 625 MG tablet Take 1 tablet by mouth 2 (Two) Times a Day With Meals. 8/3/20  Yes Lizette Sigala MD   Cyanocobalamin (B-12) 1000 MCG tablet Take 1 tablet by mouth Daily. 6/3/19  Yes Robb Luke MD   EPINEPHrine (EPIPEN) 0.3 MG/0.3ML solution auto-injector injection  4/13/20  Yes Robb Luke MD   ezetimibe (Zetia) 10 MG tablet Take 1 tablet by mouth Daily. 7/16/20  Yes Lizette Sigala MD   gabapentin (NEURONTIN) 800 MG tablet Take 1 tablet by mouth 3 (Three) Times a Day. TAKE ONE TABLET IN THE AM AND 2 PM 7/16/20  Yes Lizette Sigala MD   glimepiride (AMARYL) 4 MG tablet Take 1 tablet by mouth 2 (Two) Times a Day. 9/30/19  Yes Lizette Sigala MD   glucose blood (FREESTYLE LITE) test strip FREESTYLE LITE TEST STRP 11/7/18  Yes Robb Luke MD   hydrALAZINE (APRESOLINE) 50 MG tablet Take 1 tablet by mouth 3 (Three) Times a Day. 7/16/20  Yes Lizette Sigala MD   Lancets (FREESTYLE) lancets  6/25/19  Yes Robb Luke MD   Magnesium 400 MG tablet Take 1 tablet by mouth Daily. 6/3/19  Yes Robb Luke MD    Olmesartan-amLODIPine-HCTZ 40-10-12.5 MG tablet TAKE 1 TABLET DAILY 19  Yes Lizette Sigala MD   Omega-3 Fatty Acids (FISH OIL) 1200 MG capsule capsule Take 1,200 mg by mouth Daily.   Yes ProviderRobb MD   ondansetron (ZOFRAN) 4 MG tablet Take 1 tablet by mouth Every 8 (Eight) Hours As Needed for Nausea or Vomiting. 1/15/20  Yes Rosa Hooper APRN   pravastatin (PRAVACHOL) 80 MG tablet Take 1 tablet by mouth Every Night. 20  Yes Liztete Sigala MD   PROVENTIL  (90 Base) MCG/ACT inhaler Inhale 2 puffs Every 4 (Four) Hours As Needed for Wheezing or Shortness of Air. 20  Yes Lizette Sigala MD   SYMBICORT 160-4.5 MCG/ACT inhaler Inhale 2 puffs 2 (Two) Times a Day. 20  Yes Lizette Sigala MD   diazePAM (VALIUM) 5 MG tablet  20   ProviderRobb MD     Past Medical History:    Past Medical History:   Diagnosis Date   • Hyperlipidemia    • Hypertension    • Low back pain    • Obesity       Past Surgical History:    Past Surgical History:   Procedure Laterality Date   • ADENOIDECTOMY     • CHOLECYSTECTOMY     • COLONOSCOPY     • ENDOSCOPY     • INNER EAR SURGERY     • REPLACEMENT TOTAL KNEE Left    • REPLACEMENT TOTAL KNEE Right    • TONSILLECTOMY        Social History:    Social History     Tobacco Use   • Smoking status: Former Smoker     Types: Cigarettes     Last attempt to quit: 1985     Years since quittin.6   • Smokeless tobacco: Never Used   Substance Use Topics   • Alcohol use: No     Frequency: Never     Binge frequency: Never   • Drug use: No     Family Medical History:  Family History   Adopted: Yes         Review of Systems:   Constitutional:  Denies fever or chills and weight gain  Eyes:  Denies change in visual acuity   HENT:  she  nasal congestion, sore throat or post nasal drainage . She becomes more symptomatic during fall. She was tested for allergies in the past.  She took allergy immunization for 4 years. Not effective.  "Presently takes anti- histamine orally.  Respiratory:  Denies cough, shortness of breath . she has  dyspnea on exertion during asthma exacerbation.  Cardiovascular:  Denies chest pain or edema   GI:  Denies abdominal pain, nausea, vomiting, bloody stools or diarrhea   :  Denies dysuria or nocturia . She underwent bladder sling procedure.  Musculoskeletal:   She has symptoms of arthirits.  Integument:  Denies rash   Neurologic:  Denies headache, focal weakness or sensory changes. No history of stroke or seizures.   Endocrine:  Denies polyuria or polydipsia   Lymphatic:  Denies swollen glands   Psychiatric:  Denies depression, anxiety . mild claustrophobia      Physical Exam:     Vitals:    08/06/20 1100   BP: 129/77   Pulse: 103   SpO2: 95%   Weight: 85.3 kg (188 lb)   Height: 165.1 cm (65\")     HEENT: Head is normocephalic, atraumatic, normal distribution of hair. Pupils reactive to light. Ocular movements intact. No nasal congestion on sniff test. No deviated nasal septum. No micrognathia.  Throat: Mallapatti stage 4, tongue midline, mucosa moist.  Neck: no JVD, thyromegaly, mass, +midline trachea, Neck circumference 16 inches  Lungs: clear, no wheeze, rhonchi, crackles  Cardiovascular: S1, S2, RRR no murmurs, rubs or gallops  Abd: +BS's soft NT/ND, no CVA tenderness.    Ext: no edema, cyanosis, clubbing, pulses intact  Neuro: Awake alert, oriented to time place and person. Grossly intact to motor, sensory cerebral, and cerebellar (without focal deficit)  Psych: No overt mely, depression, psychosis or inappropriate behavior  Skin: No rash, cellulitis, or ulcerations.  No facial rash or erosions      Assessment/Plan:   Obstructive sleep apnea:  This is is a very pleasant lady who was diagnosed with obstructive sleep apnea more than a decade ago.  She could not continue therapy for inability to use her CPAP mask.  She has remained symptomatic.  She has felt tired and somnolent during the daytime her [place] " sleepiness score is 22/24.  She has severe sleep-related leg cramps.  She has history of bruxism.  Recommendation.  A split-night polysomnogram is recommended.  Allergic rhinitis:  She remains symptomatic.  Recommendation.  Aggressive therapy is recommended.  Obesity:  Based on BMI of 31.3.  Recommendation.  Weight loss is recommended.  Driving instructions. Patient is advised to avoid driving if tired or somnolent. To avoid all alcoholic beverages or medications causing somnolence.         Diagnosis Plan   1. Obstructive sleep apnea, adult  Polysomnography 4 or More Parameters With CPAP    COVID-19,[facility], NP SWAB IN [place] VIRAL TRANSPORT MEDIA 24-30 HR TAT - Swab, Nasopharynx   2. Preop examination  COVID-19,[facility], NP SWAB IN [place] VIRAL TRANSPORT MEDIA 24-30 HR TAT - Swab, Nasopharynx     No orders of the defined types were placed in this encounter.    Orders Placed This Encounter   Procedures   • COVID-19,[facility], NP SWAB IN [place] VIRAL TRANSPORT MEDIA 24-30 HR TAT - Swab, Nasopharynx     Standing Status:   Future     Standing Expiration Date:   8/6/2021     Order Specific Question:   Previously tested for COVID-19?     Answer:   No     Order Specific Question:   Employed in healthcare setting?     Answer:   No     Order Specific Question:   Symptomatic for COVID-19 as defined by CDC?     Answer:   No     Order Specific Question:   Hospitalized for COVID-19?     Answer:   No     Order Specific Question:   Admitted to ICU for COVID-19?     Answer:   No     Order Specific Question:   Resident in a congregate (group) care setting?     Answer:   No     Order Specific Question:   Pregnant?     Answer:   No   • Polysomnography 4 or More Parameters With CPAP     Sleep apnea diagnosed long time ago. Clautrophobic. Requires smallest mask possible.     Standing Status:   Future     Standing Expiration Date:   8/6/2021     Order Specific Question:   Split Night     Answer:   Yes     Order Specific Question:    AHI greater than or equal to     Answer:   5     Order Specific Question:   May take own meds     Answer:   Yes     Order Specific Question:   Details     Answer:   O2 Implementation per Protocol       This document has been electronically signed by:  Linda Foy MD  8/6/2020  14:21

## 2020-08-22 ENCOUNTER — LAB (OUTPATIENT)
Dept: LAB | Facility: HOSPITAL | Age: 60
End: 2020-08-22

## 2020-08-22 PROCEDURE — U0004 COV-19 TEST NON-CDC HGH THRU: HCPCS | Performed by: INTERNAL MEDICINE

## 2020-08-22 PROCEDURE — U0002 COVID-19 LAB TEST NON-CDC: HCPCS | Performed by: INTERNAL MEDICINE

## 2020-08-24 ENCOUNTER — HOSPITAL ENCOUNTER (OUTPATIENT)
Dept: SLEEP MEDICINE | Facility: HOSPITAL | Age: 60
Discharge: HOME | End: 2020-08-24

## 2020-08-24 DIAGNOSIS — G47.33 OBSTRUCTIVE SLEEP APNEA, ADULT: ICD-10-CM

## 2020-08-24 LAB
REF LAB TEST METHOD: NORMAL
SARS-COV-2 RNA RESP QL NAA+PROBE: NOT DETECTED

## 2020-12-15 RX ORDER — PRAVASTATIN SODIUM 80 MG/1
80 TABLET ORAL NIGHTLY
Qty: 90 TABLET | Refills: 3 | Status: SHIPPED | OUTPATIENT
Start: 2020-12-15 | End: 2021-05-10 | Stop reason: SDUPTHER

## 2020-12-15 NOTE — TELEPHONE ENCOUNTER
Caller: Pearl Elizalde    Relationship: Self    Best call back number: [phone]    Medication needed:   Requested Prescriptions     Pending Prescriptions Disp Refills   • pravastatin (PRAVACHOL) 80 MG tablet 90 tablet 3     Sig: Take 1 tablet by mouth Every Night.       When do you need the refill by: ASAP     What details did the patient provide when requesting the medication: PT IS OUT OF MEDICATION     Does the patient have less than a 3 day supply:  [x] Yes  [] No    What is the patient's preferred pharmacy: [facility]  [place], [place] [address] - [phone] [facility] [phone] [facility]

## 2020-12-17 ENCOUNTER — TELEPHONE (OUTPATIENT)
Dept: FAMILY MEDICINE CLINIC | Facility: CLINIC | Age: 60
End: 2020-12-17

## 2020-12-17 RX ORDER — ALBUTEROL SULFATE 90 MCG
2 HFA AEROSOL WITH ADAPTER (GRAM) INHALATION EVERY 4 HOURS PRN
Qty: 18 G | Status: CANCELLED | OUTPATIENT
Start: 2020-12-17

## 2020-12-17 RX ORDER — ALBUTEROL SULFATE 90 MCG
2 HFA AEROSOL WITH ADAPTER (GRAM) INHALATION EVERY 4 HOURS PRN
Qty: 3 G | Refills: 3 | Status: SHIPPED | OUTPATIENT
Start: 2020-12-17 | End: 2020-12-18

## 2020-12-18 RX ORDER — ALBUTEROL SULFATE 90 UG/1
2 AEROSOL, METERED RESPIRATORY (INHALATION) EVERY 4 HOURS PRN
Qty: 6.7 G | Refills: 6 | Status: SHIPPED | OUTPATIENT
Start: 2020-12-18 | End: 2020-12-22 | Stop reason: SDUPTHER

## 2020-12-18 RX ORDER — ALBUTEROL SULFATE 90 UG/1
2 AEROSOL, METERED RESPIRATORY (INHALATION) EVERY 4 HOURS PRN
COMMUNITY
End: 2020-12-18 | Stop reason: SDUPTHER

## 2020-12-22 RX ORDER — ALBUTEROL SULFATE 90 UG/1
2 AEROSOL, METERED RESPIRATORY (INHALATION) EVERY 4 HOURS PRN
Qty: 6.7 G | Refills: 6 | Status: SHIPPED | OUTPATIENT
Start: 2020-12-22 | End: 2021-01-20 | Stop reason: SINTOL

## 2020-12-22 NOTE — TELEPHONE ENCOUNTER
Caller: Pearl Elizalde    Relationship: Self    Best call back number: [phone]    Medication needed:   Requested Prescriptions     Pending Prescriptions Disp Refills   • albuterol sulfate  (90 Base) MCG/ACT inhaler 6.7 g 6     Sig: Inhale 2 puffs Every 4 (Four) Hours As Needed for Wheezing or Shortness of Air.       When do you need the refill by: ASAP    What details did the patient provide when requesting the medication: PATIENT HAD PREVIOUS SCRIP SENT TO [facility] AND WHEN SHE WENT TO PICK IT UP IT WAS OVER $200 SO SHE CANCELLED IT AND IS ASKING THAT IT BE SENT TO [facility]    Does the patient have less than a 3 day supply:  [x] Yes  [] No    What is the patient's preferred pharmacy: [facility] - [address] [phone] [facility] [phone]

## 2020-12-30 ENCOUNTER — TELEPHONE (OUTPATIENT)
Dept: FAMILY MEDICINE CLINIC | Facility: CLINIC | Age: 60
End: 2020-12-30

## 2020-12-30 DIAGNOSIS — M25.569 ACUTE KNEE PAIN, UNSPECIFIED LATERALITY: Primary | ICD-10-CM

## 2020-12-30 NOTE — TELEPHONE ENCOUNTER
HE REPLACED BOTH KNEES AND SHE FELL LAST MONTH ON ONE OF THEM. SHE WANTS HIM TO CHECK IT OUT. CAN  YOU PLEASE PUT THE REFERRAL IN SO I CAN GET IT APPROVED

## 2020-12-30 NOTE — TELEPHONE ENCOUNTER
PATIENT IS CALLING NEEDING A ORTHO REFERRAL FOR THE DOCTOR WHO DID HER KNEE SURGERY     [facility]  [phone]              PLEASE CONTACT PATIENT @[phone]

## 2021-01-06 ENCOUNTER — TELEPHONE (OUTPATIENT)
Dept: FAMILY MEDICINE CLINIC | Facility: CLINIC | Age: 61
End: 2021-01-06

## 2021-01-06 NOTE — TELEPHONE ENCOUNTER
If not keeping meds down, should go to ER for hydration or come in to see NP today to see if Antiemetic is all that she needs.  She should be off work and quarantining and may need repeat Covid test as well

## 2021-01-06 NOTE — TELEPHONE ENCOUNTER
Patient verbalized understanding, does not want an appointment, but will go to [facility] again to get another Covid test.

## 2021-01-06 NOTE — TELEPHONE ENCOUNTER
Caller: Pearl Elizalde    Relationship to patient: Self    Best call back number: [phone]    Patient is needing: patient called in stating she was exposed to covid and went and got tested on 12/31/20. Got results back on 1/3/21 and they were negative. Started feeling bad on Monday. Patient does symptoms of covid and can taste and smell certain things. Patient would like advice on what she can do or if there is something that can be called in for her. Patient stated that she in a lot of pain and has been taking ibuprofen 800 mg. Please call advice. Hasnt been able to any of her medications due to them coming right back up. Please call advice

## 2021-01-11 ENCOUNTER — TELEPHONE (OUTPATIENT)
Dept: FAMILY MEDICINE CLINIC | Facility: CLINIC | Age: 61
End: 2021-01-11

## 2021-01-11 RX ORDER — AZITHROMYCIN 250 MG/1
TABLET, FILM COATED ORAL
Qty: 6 TABLET | Refills: 0 | Status: SHIPPED | OUTPATIENT
Start: 2021-01-11 | End: 2021-02-04

## 2021-01-11 NOTE — TELEPHONE ENCOUNTER
Caller: Pearl Elizalde    Relationship to patient: Self    Best call back number: [phone]    Concerns or Questions if Applicable: PATIENT IS STILL FEELING BAD AND WAS RETESTED FOR COVID-19 ON 1/6/2021 AND IT'S STILL POSITIVE. PATIENT IS WANTING TO KNOW IS THERE ANYTHING THAT CAN BE PRESCRIBE TO HELP HER GET OVER THE SYMPTOMS. PLEASE CALL BACK AND ADVISE.

## 2021-01-11 NOTE — TELEPHONE ENCOUNTER
She can try antibiotic is she is having yellow sputum or there is experimental monoclonal treatmnent that would require her going in for infusion at ER.

## 2021-01-13 ENCOUNTER — TELEPHONE (OUTPATIENT)
Dept: FAMILY MEDICINE CLINIC | Facility: CLINIC | Age: 61
End: 2021-01-13

## 2021-01-13 NOTE — TELEPHONE ENCOUNTER
PATIENT CALLED WITH UPDATE. SHE STATES SHE IS FEELING 100% BETTER.     SHE WANTS TO KNOW IF SHE NEEDS TO GET TESTED AGAIN FOR COVID.IF SHE DOES AND SHE TESTS POSITIVE SHE CANNOT RETURN BACK TO WORK.  SHE WILL CHECK WITH EMPLOYER IF SHE NEEDS A NEGATIVE TEST BEFORE RETURNING BACK TO WORK.    SHE IS REQUESTING A MED REFILL   azithromycin (Zithromax Z-Jeremie) 250 MG tablet    SHE IS ALSO REQUESTING A WORK LETTER FOR THE DAYS FROM  12/29/2020 - AND HER EMPLOYER WANTS HER TO COME BACK TO WORK ON 1/19/2021      [facility], [place] - [address] - [phone]  - [phone]   [phone]    CALL BACK NUMBER [phone]

## 2021-01-13 NOTE — TELEPHONE ENCOUNTER
I will give her a work note but if feeling better have no reason to keep off till 1/19/2021-  She may test positive for weeks or months but should be free of being contagious 3 days after no fever after fever reducing meds stopped.  The Zpack lasts for 10 days so refill would not be necessary.  She may have a note from 12/29/2020 until free of fever-if work wants to keep her off or get negative test that is their perogative.

## 2021-01-20 ENCOUNTER — TELEPHONE (OUTPATIENT)
Dept: FAMILY MEDICINE CLINIC | Facility: CLINIC | Age: 61
End: 2021-01-20

## 2021-01-20 RX ORDER — ALBUTEROL SULFATE 90 UG/1
2 AEROSOL, METERED RESPIRATORY (INHALATION) EVERY 4 HOURS PRN
Qty: 3 G | Refills: 3 | Status: SHIPPED | OUTPATIENT
Start: 2021-01-20 | End: 2022-05-11

## 2021-01-20 NOTE — TELEPHONE ENCOUNTER
Pt called and asked that you call in her Proventil HFA for NAME BRAND ONLY.  She said that when she most recently picked up the script, It was the generic version and it set her off- she could not breath- she said it was worse that having a asthma attack. She states she has been on this medication for years nad has never had a problem, but its always been the Proventil- not generic. Please send in to [facility] as it is very expensive at [facility]

## 2021-01-22 ENCOUNTER — TELEPHONE (OUTPATIENT)
Dept: FAMILY MEDICINE CLINIC | Facility: CLINIC | Age: 61
End: 2021-01-22

## 2021-01-25 DIAGNOSIS — J45.909 UNCOMPLICATED ASTHMA, UNSPECIFIED ASTHMA SEVERITY, UNSPECIFIED WHETHER PERSISTENT: Primary | ICD-10-CM

## 2021-01-25 DIAGNOSIS — J44.9 CHRONIC OBSTRUCTIVE PULMONARY DISEASE, UNSPECIFIED COPD TYPE (HCC): ICD-10-CM

## 2021-01-25 NOTE — TELEPHONE ENCOUNTER
She is willing to see pulmonary gave her dr draws number I need to get it approved through [facility] also

## 2021-01-26 PROBLEM — N81.2 UTEROVAGINAL PROLAPSE, INCOMPLETE: Status: ACTIVE | Noted: 2017-04-09

## 2021-01-26 PROBLEM — N81.6 RECTOCELE: Status: ACTIVE | Noted: 2017-04-09

## 2021-01-26 PROBLEM — N39.3 FEMALE STRESS INCONTINENCE: Status: ACTIVE | Noted: 2017-04-09

## 2021-01-26 PROBLEM — R35.1 NOCTURIA: Status: ACTIVE | Noted: 2017-03-30

## 2021-02-04 ENCOUNTER — OFFICE VISIT (OUTPATIENT)
Dept: FAMILY MEDICINE CLINIC | Facility: CLINIC | Age: 61
End: 2021-02-04

## 2021-02-04 ENCOUNTER — TELEPHONE (OUTPATIENT)
Dept: FAMILY MEDICINE CLINIC | Facility: CLINIC | Age: 61
End: 2021-02-04

## 2021-02-04 VITALS
OXYGEN SATURATION: 95 % | TEMPERATURE: 99.6 F | HEART RATE: 104 BPM | DIASTOLIC BLOOD PRESSURE: 82 MMHG | WEIGHT: 195.8 LBS | HEIGHT: 65 IN | SYSTOLIC BLOOD PRESSURE: 129 MMHG | BODY MASS INDEX: 32.62 KG/M2

## 2021-02-04 DIAGNOSIS — E11.65 TYPE 2 DIABETES MELLITUS WITH HYPERGLYCEMIA, WITHOUT LONG-TERM CURRENT USE OF INSULIN (HCC): ICD-10-CM

## 2021-02-04 DIAGNOSIS — G89.29 CHRONIC LEFT-SIDED LOW BACK PAIN WITHOUT SCIATICA: ICD-10-CM

## 2021-02-04 DIAGNOSIS — Z12.31 ENCOUNTER FOR SCREENING MAMMOGRAM FOR MALIGNANT NEOPLASM OF BREAST: ICD-10-CM

## 2021-02-04 DIAGNOSIS — I10 ESSENTIAL HYPERTENSION: ICD-10-CM

## 2021-02-04 DIAGNOSIS — Z12.11 SCREEN FOR COLON CANCER: ICD-10-CM

## 2021-02-04 DIAGNOSIS — M54.50 CHRONIC LEFT-SIDED LOW BACK PAIN WITHOUT SCIATICA: ICD-10-CM

## 2021-02-04 DIAGNOSIS — Z00.01 ENCOUNTER FOR GENERAL ADULT MEDICAL EXAMINATION WITH ABNORMAL FINDINGS: Primary | ICD-10-CM

## 2021-02-04 DIAGNOSIS — K21.9 GASTROESOPHAGEAL REFLUX DISEASE, UNSPECIFIED WHETHER ESOPHAGITIS PRESENT: ICD-10-CM

## 2021-02-04 PROCEDURE — 99396 PREV VISIT EST AGE 40-64: CPT | Performed by: NURSE PRACTITIONER

## 2021-02-04 RX ORDER — OLMESARTAN MEDOXOMIL / AMLODIPINE BESYLATE / HYDROCHLOROTHIAZIDE 40; 10; 12.5 MG/1; MG/1; MG/1
1 TABLET, FILM COATED ORAL DAILY
Qty: 90 TABLET | Refills: 1 | Status: SHIPPED | OUTPATIENT
Start: 2021-02-04 | End: 2021-02-16

## 2021-02-04 RX ORDER — IBUPROFEN 800 MG/1
800 TABLET ORAL EVERY 8 HOURS PRN
Qty: 60 TABLET | Refills: 1 | Status: SHIPPED | OUTPATIENT
Start: 2021-02-04 | End: 2021-03-06

## 2021-02-04 RX ORDER — HYDRALAZINE HYDROCHLORIDE 50 MG/1
50 TABLET, FILM COATED ORAL 3 TIMES DAILY
Qty: 270 TABLET | Refills: 3 | Status: SHIPPED | OUTPATIENT
Start: 2021-02-04 | End: 2022-01-11 | Stop reason: SDUPTHER

## 2021-02-04 NOTE — TELEPHONE ENCOUNTER
Caller: Pearl Elizalde    Relationship: Self    Best call back number: [phone]    What orders are you requesting (i.e. lab or imaging): [facility]IPS      Additional notes: PATIENT CALLED STATING THAT DR RODRIGUEZ OFFICE DOES NOT HAVE A REFERRAL FROM DR CHENEY YET. PLEASE FAX REFERRAL -[phone]. PATIENT HAS APPOINTMENT WITH DR RODRIGUEZ Friday 2/12 AT 230PM

## 2021-02-04 NOTE — TELEPHONE ENCOUNTER
I HAD DONE A REFERRAL FOR THE KNEE PAIN AND SENT IT OVER BUT WILL HARD FAX IT DONT KNOW MY HAVE TO DO ANOTHER ONE SINCE IT IS FOR THE HIP  NOW.

## 2021-02-04 NOTE — PATIENT INSTRUCTIONS
Yoga with Darlene     Acute Back Pain, Adult  Acute back pain is sudden and usually short-lived. It is often caused by an injury to the muscles and tissues in the back. The injury may result from:  · A muscle or ligament getting overstretched or torn (strained). Ligaments are tissues that connect bones to each other. Lifting something improperly can cause a back strain.  · Wear and tear (degeneration) of the spinal disks. Spinal disks are circular tissue that provides cushioning between the bones of the spine (vertebrae).  · Twisting motions, such as while playing sports or doing yard work.  · A hit to the back.  · Arthritis.  You may have a physical exam, lab tests, and imaging tests to find the cause of your pain. Acute back pain usually goes away with rest and home care.  Follow these instructions at home:  Managing pain, stiffness, and swelling  · Take over-the-counter and prescription medicines only as told by your health care provider.  · Your health care provider may recommend applying ice during the first 24-48 hours after your pain starts. To do this:  ? Put ice in a plastic bag.  ? Place a towel between your skin and the bag.  ? Leave the ice on for 20 minutes, 2-3 times a day.  · If directed, apply heat to the affected area as often as told by your health care provider. Use the heat source that your health care provider recommends, such as a moist heat pack or a heating pad.  ? Place a towel between your skin and the heat source.  ? Leave the heat on for 20-30 minutes.  ? Remove the heat if your skin turns bright red. This is especially important if you are unable to feel pain, heat, or cold. You have a greater risk of getting burned.  Activity    · Do not stay in bed. Staying in bed for more than 1-2 days can delay your recovery.  · Sit up and stand up straight. Avoid leaning forward when you sit, or hunching over when you stand.  ? If you work at a desk, sit close to it so you do not need to lean over.  "Keep your chin tucked in. Keep your neck drawn back, and keep your elbows bent at a right angle. Your arms should look like the letter \"L.\"  ? Sit high and close to the steering wheel when you drive. Add lower back (lumbar) support to your car seat, if needed.  · Take short walks on even surfaces as soon as you are able. Try to increase the length of time you walk each day.  · Do not sit, drive, or  one place for more than 30 minutes at a time. Sitting or standing for long periods of time can put stress on your back.  · Do not drive or use heavy machinery while taking prescription pain medicine.  · Use proper lifting techniques. When you bend and lift, use positions that put less stress on your back:  ? Bend your knees.  ? Keep the load close to your body.  ? Avoid twisting.  · Exercise regularly as told by your health care provider. Exercising helps your back heal faster and helps prevent back injuries by keeping muscles strong and flexible.  · Work with a physical therapist to make a safe exercise program, as recommended by your health care provider. Do any exercises as told by your physical therapist.  Lifestyle  · Maintain a healthy weight. Extra weight puts stress on your back and makes it difficult to have good posture.  · Avoid activities or situations that make you feel anxious or stressed. Stress and anxiety increase muscle tension and can make back pain worse. Learn ways to manage anxiety and stress, such as through exercise.  General instructions  · Sleep on a firm mattress in a comfortable position. Try lying on your side with your knees slightly bent. If you lie on your back, put a pillow under your knees.  · Follow your treatment plan as told by your health care provider. This may include:  ? Cognitive or behavioral therapy.  ? Acupuncture or massage therapy.  ? Meditation or yoga.  Contact a health care provider if:  · You have pain that is not relieved with rest or medicine.  · You have " increasing pain going down into your legs or buttocks.  · Your pain does not improve after 2 weeks.  · You have pain at night.  · You lose weight without trying.  · You have a fever or chills.  Get help right away if:  · You develop new bowel or bladder control problems.  · You have unusual weakness or numbness in your arms or legs.  · You develop nausea or vomiting.  · You develop abdominal pain.  · You feel faint.  Summary  · Acute back pain is sudden and usually short-lived.  · Use proper lifting techniques. When you bend and lift, use positions that put less stress on your back.  · Take over-the-counter and prescription medicines and apply heat or ice as directed by your health care provider.  This information is not intended to replace advice given to you by your health care provider. Make sure you discuss any questions you have with your health care provider.  Document Revised: 04/07/2020 Document Reviewed: 08/01/2018  Elsevier Patient Education © 2020 [employer]

## 2021-02-04 NOTE — PROGRESS NOTES
"Subjective   Pearl Elizalde is a 60 y.o. female presents for   Chief Complaint   Patient presents with   • Hip Pain     left hip, leg and knee pain and numbness   • Annual Exam       Health Maintenance Due   Topic Date Due   • COLONOSCOPY  1960   • Pneumococcal Vaccine 0-64 (1 of 1 - PPSV23) 05/11/1964   • ZOSTER VACCINE (1 of 2) 05/11/2008   • DIABETIC FOOT EXAM  07/08/2020   • URINE MICROALBUMIN  07/08/2020   • DIABETIC EYE EXAM  08/08/2020   • MAMMOGRAM  09/18/2020   • HEMOGLOBIN A1C  01/16/2021       History of Present Illness   Pt present for annual exam and reports intermittent left hip pain.  She states she fell x 2 (once in late November, and December) and landed on her left knee. She states she has had intermittent left knee pain, and left hip pain occurs when knee flares up.  She has an appointment with ortho next week for evaluation of her knee.  She also reports recently ill with Covid-19 in January and has been frequently burping since that time.  She denies other problems at this time.   Vitals:    02/04/21 1002   BP: 129/82   BP Location: Left arm   Patient Position: Sitting   Cuff Size: Adult   Pulse: 104   Temp: 99.6 °F (37.6 °C)   SpO2: 95%   Weight: 88.8 kg (195 lb 12.8 oz)   Height: 165.1 cm (65\")     Body mass index is 32.58 kg/m².    Current Outpatient Medications on File Prior to Visit   Medication Sig Dispense Refill   • albuterol sulfate  (90 Base) MCG/ACT inhaler Inhale 2 puffs Every 4 (Four) Hours As Needed for Wheezing. Can't tolerate generic-needs Proventil 3 g 3   • Blood Glucose Monitoring Suppl (BLOOD GLUCOSE MONITOR SYSTEM) w/Device kit BLOOD GLUCOSE MONITOR SYSTEM w/Device KIT     • Cholecalciferol (VITAMIN D) 1000 units tablet Take 1,000 Units by mouth Daily.     • colesevelam (WELCHOL) 625 MG tablet Take 1 tablet by mouth 2 (Two) Times a Day With Meals. 180 tablet 3   • Cyanocobalamin (B-12) 1000 MCG tablet Take 1 tablet by mouth Daily.     • EPINEPHrine (EPIPEN) " 0.3 MG/0.3ML solution auto-injector injection      • ezetimibe (Zetia) 10 MG tablet Take 1 tablet by mouth Daily. 90 tablet 3   • gabapentin (NEURONTIN) 800 MG tablet Take 1 tablet by mouth 3 (Three) Times a Day. TAKE ONE TABLET IN THE AM AND 2  tablet 3   • glimepiride (AMARYL) 4 MG tablet Take 1 tablet by mouth 2 (Two) Times a Day. 180 tablet 4   • glucose blood (FREESTYLE LITE) test strip FREESTYLE LITE TEST STRP     • Lancets (FREESTYLE) lancets      • Magnesium 400 MG tablet Take 1 tablet by mouth Daily.     • ondansetron (ZOFRAN) 4 MG tablet Take 1 tablet by mouth Every 8 (Eight) Hours As Needed for Nausea or Vomiting. 20 tablet 1   • pravastatin (PRAVACHOL) 80 MG tablet Take 1 tablet by mouth Every Night. 90 tablet 3   • SYMBICORT 160-4.5 MCG/ACT inhaler Inhale 2 puffs 2 (Two) Times a Day. 3 inhaler 3   • [DISCONTINUED] hydrALAZINE (APRESOLINE) 50 MG tablet Take 1 tablet by mouth 3 (Three) Times a Day. 270 tablet 3   • [DISCONTINUED] Olmesartan-amLODIPine-HCTZ 40-10-12.5 MG tablet TAKE 1 TABLET DAILY 90 tablet 4   • diazePAM (VALIUM) 5 MG tablet      • Omega-3 Fatty Acids (FISH OIL) 1200 MG capsule capsule Take 1,200 mg by mouth Daily.     • [DISCONTINUED] azithromycin (Zithromax Z-Jeremie) 250 MG tablet Take 2 tablets the first day, then 1 tablet daily for 4 days. 6 tablet 0     No current facility-administered medications on file prior to visit.        The following portions of the patient's history were reviewed and updated as appropriate: allergies, current medications, past family history, past medical history, past social history, past surgical history, and problem list.    Review of Systems   Constitutional: Negative for chills and fever.   HENT: Negative for sinus pressure and sore throat.    Eyes: Negative for blurred vision.   Respiratory: Negative for cough and shortness of breath.    Cardiovascular: Negative for chest pain.   Gastrointestinal: Negative for abdominal pain.   Endocrine: Negative.     Genitourinary: Negative.    Musculoskeletal: Positive for arthralgias (left knee pain) and back pain. Negative for joint swelling.   Skin: Negative for color change.   Allergic/Immunologic: Negative.    Neurological: Negative for dizziness.   Hematological: Negative.    Psychiatric/Behavioral: Negative for behavioral problems.       Objective   Physical Exam  Vitals signs and nursing note reviewed.   Constitutional:       Appearance: Normal appearance. She is well-developed.   HENT:      Head: Normocephalic and atraumatic.      Right Ear: Tympanic membrane, ear canal and external ear normal.      Left Ear: Tympanic membrane, ear canal and external ear normal.      Nose: Nose normal.   Eyes:      Extraocular Movements: Extraocular movements intact.      Conjunctiva/sclera: Conjunctivae normal.      Pupils: Pupils are equal, round, and reactive to light.   Neck:      Musculoskeletal: Normal range of motion and neck supple.   Cardiovascular:      Rate and Rhythm: Normal rate and regular rhythm.      Pulses: Normal pulses.      Heart sounds: Normal heart sounds.   Pulmonary:      Effort: Pulmonary effort is normal.      Breath sounds: Normal breath sounds.   Abdominal:      General: Bowel sounds are normal.      Palpations: Abdomen is soft.   Genitourinary:     Vagina: Normal.   Musculoskeletal: Normal range of motion.         General: Tenderness (left SI joint) present. No swelling.      Right lower leg: No edema.      Left lower leg: No edema.   Skin:     General: Skin is warm and dry.   Neurological:      General: No focal deficit present.      Mental Status: She is alert and oriented to person, place, and time. Mental status is at baseline.   Psychiatric:         Mood and Affect: Mood normal.         Behavior: Behavior normal.         Thought Content: Thought content normal.         Judgment: Judgment normal.       PHQ-9 Total Score: 0    Assessment/Plan   Diagnoses and all orders for this visit:    1. Encounter  for general adult medical examination with abnormal findings (Primary)  Comments:  pt not fasting today-will schedule labs when able to return fasting or have done at the hospital when she has xray done    2. Chronic left-sided low back pain without sciatica  Comments:  pt counseled on fall prevention, daily exercise and stretching for low back pain.  encouraged yoga   Orders:  -     XR Spine Lumbar Complete 4+VW; Future  -     ibuprofen (ADVIL,MOTRIN) 800 MG tablet; Take 1 tablet by mouth Every 8 (Eight) Hours As Needed for Mild Pain  for up to 30 days.  Dispense: 60 tablet; Refill: 1    3. Essential hypertension  -     Olmesartan-amLODIPine-HCTZ 40-10-12.5 MG tablet; Take 1 tablet by mouth Daily for 90 days.  Dispense: 90 tablet; Refill: 1  -     hydrALAZINE (APRESOLINE) 50 MG tablet; Take 1 tablet by mouth 3 (Three) Times a Day.  Dispense: 270 tablet; Refill: 3    4. Encounter for screening mammogram for malignant neoplasm of breast  -     Mammo Screening Digital Tomosynthesis Bilateral With CAD; Future    5. Screen for colon cancer  -     Amb referral for Screening Colonoscopy    6. Type 2 diabetes mellitus with hyperglycemia, without long-term current use of insulin (CMS/[facility])  -     Ambulatory Referral for Diabetic Eye Exam-Ophthalmology    7. Gastroesophageal reflux disease, unspecified whether esophagitis present  Comments:  limit gas producing foods, caffeine and carbonation in diet, pepcid prn        Patient Instructions   Yoga with Darlene     Acute Back Pain, Adult  Acute back pain is sudden and usually short-lived. It is often caused by an injury to the muscles and tissues in the back. The injury may result from:  · A muscle or ligament getting overstretched or torn (strained). Ligaments are tissues that connect bones to each other. Lifting something improperly can cause a back strain.  · Wear and tear (degeneration) of the spinal disks. Spinal disks are circular tissue that provides cushioning between  "the bones of the spine (vertebrae).  · Twisting motions, such as while playing sports or doing yard work.  · A hit to the back.  · Arthritis.  You may have a physical exam, lab tests, and imaging tests to find the cause of your pain. Acute back pain usually goes away with rest and home care.  Follow these instructions at home:  Managing pain, stiffness, and swelling  · Take over-the-counter and prescription medicines only as told by your health care provider.  · Your health care provider may recommend applying ice during the first 24-48 hours after your pain starts. To do this:  ? Put ice in a plastic bag.  ? Place a towel between your skin and the bag.  ? Leave the ice on for 20 minutes, 2-3 times a day.  · If directed, apply heat to the affected area as often as told by your health care provider. Use the heat source that your health care provider recommends, such as a moist heat pack or a heating pad.  ? Place a towel between your skin and the heat source.  ? Leave the heat on for 20-30 minutes.  ? Remove the heat if your skin turns bright red. This is especially important if you are unable to feel pain, heat, or cold. You have a greater risk of getting burned.  Activity    · Do not stay in bed. Staying in bed for more than 1-2 days can delay your recovery.  · Sit up and stand up straight. Avoid leaning forward when you sit, or hunching over when you stand.  ? If you work at a desk, sit close to it so you do not need to lean over. Keep your chin tucked in. Keep your neck drawn back, and keep your elbows bent at a right angle. Your arms should look like the letter \"L.\"  ? Sit high and close to the steering wheel when you drive. Add lower back (lumbar) support to your car seat, if needed.  · Take short walks on even surfaces as soon as you are able. Try to increase the length of time you walk each day.  · Do not sit, drive, or  one place for more than 30 minutes at a time. Sitting or standing for long " periods of time can put stress on your back.  · Do not drive or use heavy machinery while taking prescription pain medicine.  · Use proper lifting techniques. When you bend and lift, use positions that put less stress on your back:  ? Bend your knees.  ? Keep the load close to your body.  ? Avoid twisting.  · Exercise regularly as told by your health care provider. Exercising helps your back heal faster and helps prevent back injuries by keeping muscles strong and flexible.  · Work with a physical therapist to make a safe exercise program, as recommended by your health care provider. Do any exercises as told by your physical therapist.  Lifestyle  · Maintain a healthy weight. Extra weight puts stress on your back and makes it difficult to have good posture.  · Avoid activities or situations that make you feel anxious or stressed. Stress and anxiety increase muscle tension and can make back pain worse. Learn ways to manage anxiety and stress, such as through exercise.  General instructions  · Sleep on a firm mattress in a comfortable position. Try lying on your side with your knees slightly bent. If you lie on your back, put a pillow under your knees.  · Follow your treatment plan as told by your health care provider. This may include:  ? Cognitive or behavioral therapy.  ? Acupuncture or massage therapy.  ? Meditation or yoga.  Contact a health care provider if:  · You have pain that is not relieved with rest or medicine.  · You have increasing pain going down into your legs or buttocks.  · Your pain does not improve after 2 weeks.  · You have pain at night.  · You lose weight without trying.  · You have a fever or chills.  Get help right away if:  · You develop new bowel or bladder control problems.  · You have unusual weakness or numbness in your arms or legs.  · You develop nausea or vomiting.  · You develop abdominal pain.  · You feel faint.  Summary  · Acute back pain is sudden and usually short-lived.  · Use  proper lifting techniques. When you bend and lift, use positions that put less stress on your back.  · Take over-the-counter and prescription medicines and apply heat or ice as directed by your health care provider.  This information is not intended to replace advice given to you by your health care provider. Make sure you discuss any questions you have with your health care provider.  Document Revised: 04/07/2020 Document Reviewed: 08/01/2018  Elsevier Patient Education © 2020 [employer]

## 2021-02-16 DIAGNOSIS — I10 ESSENTIAL HYPERTENSION: ICD-10-CM

## 2021-02-16 RX ORDER — OLMESARTAN MEDOXOMIL / AMLODIPINE BESYLATE / HYDROCHLOROTHIAZIDE 40; 10; 12.5 MG/1; MG/1; MG/1
TABLET, FILM COATED ORAL
Qty: 90 TABLET | Refills: 3 | Status: SHIPPED | OUTPATIENT
Start: 2021-02-16 | End: 2021-02-23 | Stop reason: SDUPTHER

## 2021-02-23 ENCOUNTER — TELEPHONE (OUTPATIENT)
Dept: FAMILY MEDICINE CLINIC | Facility: CLINIC | Age: 61
End: 2021-02-23

## 2021-02-23 DIAGNOSIS — K21.9 GASTROESOPHAGEAL REFLUX DISEASE, UNSPECIFIED WHETHER ESOPHAGITIS PRESENT: ICD-10-CM

## 2021-02-23 DIAGNOSIS — E55.9 VITAMIN D DEFICIENCY: ICD-10-CM

## 2021-02-23 DIAGNOSIS — E11.42 DIABETIC PERIPHERAL NEUROPATHY (HCC): ICD-10-CM

## 2021-02-23 DIAGNOSIS — E78.5 HYPERLIPIDEMIA, UNSPECIFIED HYPERLIPIDEMIA TYPE: Primary | ICD-10-CM

## 2021-02-23 DIAGNOSIS — I10 ESSENTIAL HYPERTENSION: ICD-10-CM

## 2021-02-23 RX ORDER — OLMESARTAN MEDOXOMIL / AMLODIPINE BESYLATE / HYDROCHLOROTHIAZIDE 40; 10; 12.5 MG/1; MG/1; MG/1
1 TABLET, FILM COATED ORAL DAILY
Qty: 90 TABLET | Refills: 3 | Status: SHIPPED | OUTPATIENT
Start: 2021-02-23 | End: 2022-11-22 | Stop reason: SDUPTHER

## 2021-02-23 NOTE — TELEPHONE ENCOUNTER
Caller: Pearl Elizalde    Relationship: Self    Best call back number: [phone]    Medication needed:   Requested Prescriptions     Pending Prescriptions Disp Refills   • Olmesartan-amLODIPine-HCTZ 40-10-12.5 MG tablet 90 tablet 3     Sig: Take 1 tablet by mouth Daily.       When do you need the refill by: ASAP    What details did the patient provide when requesting the medication: PRESCRIPTION WAS ORIGINALLY CALLED IN TO [facility]. THE TOTAL PRICE FOR PRESCRIPTION IS $180.00. PATIENT WOULD LIKE FOR THE DOCTOR TO CALL IT IN TO [facility] FOR  A TOTAL OF $60.00.    Does the patient have less than a 3 day supply:  [] Yes  [x] No    What is the patient's preferred pharmacy: [facility] - [address] [phone] [facility] [phone]

## 2021-02-23 NOTE — TELEPHONE ENCOUNTER
Patient called today and is asking about the orders in her chart for xray and labs. She came in at the beginning of the month for check up. She was scheduled with you, but we switched her to [facility] due to COVID dx in January. She said she was told to have labs and hip xray at hospital.   You had precharted and there are lab orders in chart not signed off on. Can you sign them? ALSO, the xray order put in by [facility] was for lumbar spine, and the patient says she thought it should be for hips. She has periodic pain from left hip all the way to her toes, and it causes her toes to go numb. The pain is not above her buttocks.

## 2021-02-26 ENCOUNTER — LAB (OUTPATIENT)
Dept: LAB | Facility: HOSPITAL | Age: 61
End: 2021-02-26

## 2021-02-26 ENCOUNTER — HOSPITAL ENCOUNTER (OUTPATIENT)
Dept: GENERAL RADIOLOGY | Facility: HOSPITAL | Age: 61
Discharge: HOME | End: 2021-02-26

## 2021-02-26 DIAGNOSIS — G89.29 CHRONIC LEFT-SIDED LOW BACK PAIN WITHOUT SCIATICA: ICD-10-CM

## 2021-02-26 DIAGNOSIS — M54.50 CHRONIC LEFT-SIDED LOW BACK PAIN WITHOUT SCIATICA: ICD-10-CM

## 2021-02-26 LAB
25(OH)D3 SERPL-MCNC: 29 NG/ML (ref 30–100)
ALBUMIN SERPL-MCNC: 4.3 G/DL (ref 3.5–5.2)
ALBUMIN/GLOB SERPL: 1.5 G/DL
ALP SERPL-CCNC: 85 U/L (ref 39–117)
ALT SERPL W P-5'-P-CCNC: 18 U/L (ref 1–33)
ANION GAP SERPL CALCULATED.3IONS-SCNC: 10.9 MMOL/L (ref 5–15)
AST SERPL-CCNC: 18 U/L (ref 1–32)
BASOPHILS # BLD AUTO: 0.11 10*3/MM3 (ref 0–0.2)
BASOPHILS NFR BLD AUTO: 1.4 % (ref 0–1.5)
BILIRUB SERPL-MCNC: 0.7 MG/DL (ref 0–1.2)
BUN SERPL-MCNC: 18 MG/DL (ref 8–23)
BUN/CREAT SERPL: 20.9 (ref 7–25)
CALCIUM SPEC-SCNC: 9.7 MG/DL (ref 8.6–10.5)
CHLORIDE SERPL-SCNC: 102 MMOL/L (ref 98–107)
CHOLEST SERPL-MCNC: 160 MG/DL (ref 0–200)
CO2 SERPL-SCNC: 26.1 MMOL/L (ref 22–29)
CREAT SERPL-MCNC: 0.86 MG/DL (ref 0.57–1)
DEPRECATED RDW RBC AUTO: 43.2 FL (ref 37–54)
EOSINOPHIL # BLD AUTO: 0.29 10*3/MM3 (ref 0–0.4)
EOSINOPHIL NFR BLD AUTO: 3.7 % (ref 0.3–6.2)
ERYTHROCYTE [DISTWIDTH] IN BLOOD BY AUTOMATED COUNT: 13.3 % (ref 12.3–15.4)
GFR SERPL CREATININE-BSD FRML MDRD: 67 ML/MIN/1.73
GLOBULIN UR ELPH-MCNC: 2.9 GM/DL
GLUCOSE SERPL-MCNC: 188 MG/DL (ref 65–99)
HBA1C MFR BLD: 8.1 % (ref 3.5–5.6)
HCT VFR BLD AUTO: 44.8 % (ref 34–46.6)
HDLC SERPL-MCNC: 41 MG/DL (ref 40–60)
HGB BLD-MCNC: 14.4 G/DL (ref 12–15.9)
IMM GRANULOCYTES # BLD AUTO: 0.03 10*3/MM3 (ref 0–0.05)
IMM GRANULOCYTES NFR BLD AUTO: 0.4 % (ref 0–0.5)
LDLC SERPL CALC-MCNC: 72 MG/DL (ref 0–100)
LDLC/HDLC SERPL: 1.47 {RATIO}
LYMPHOCYTES # BLD AUTO: 1.97 10*3/MM3 (ref 0.7–3.1)
LYMPHOCYTES NFR BLD AUTO: 24.8 % (ref 19.6–45.3)
MAGNESIUM SERPL-MCNC: 1.9 MG/DL (ref 1.6–2.4)
MCH RBC QN AUTO: 28.5 PG (ref 26.6–33)
MCHC RBC AUTO-ENTMCNC: 32.1 G/DL (ref 31.5–35.7)
MCV RBC AUTO: 88.5 FL (ref 79–97)
MONOCYTES # BLD AUTO: 0.78 10*3/MM3 (ref 0.1–0.9)
MONOCYTES NFR BLD AUTO: 9.8 % (ref 5–12)
NEUTROPHILS NFR BLD AUTO: 4.75 10*3/MM3 (ref 1.7–7)
NEUTROPHILS NFR BLD AUTO: 59.9 % (ref 42.7–76)
NRBC BLD AUTO-RTO: 0 /100 WBC (ref 0–0.2)
PLATELET # BLD AUTO: 288 10*3/MM3 (ref 140–450)
PMV BLD AUTO: 10.3 FL (ref 6–12)
POTASSIUM SERPL-SCNC: 4.4 MMOL/L (ref 3.5–5.2)
PROT SERPL-MCNC: 7.2 G/DL (ref 6–8.5)
RBC # BLD AUTO: 5.06 10*6/MM3 (ref 3.77–5.28)
SODIUM SERPL-SCNC: 139 MMOL/L (ref 136–145)
TRIGL SERPL-MCNC: 293 MG/DL (ref 0–150)
TSH SERPL DL<=0.05 MIU/L-ACNC: 0.96 UIU/ML (ref 0.27–4.2)
VLDLC SERPL-MCNC: 47 MG/DL (ref 5–40)
WBC # BLD AUTO: 7.93 10*3/MM3 (ref 3.4–10.8)

## 2021-02-26 PROCEDURE — 83735 ASSAY OF MAGNESIUM: CPT | Performed by: PREVENTIVE MEDICINE

## 2021-02-26 PROCEDURE — 82306 VITAMIN D 25 HYDROXY: CPT | Performed by: PREVENTIVE MEDICINE

## 2021-02-26 PROCEDURE — 80053 COMPREHEN METABOLIC PANEL: CPT | Performed by: PREVENTIVE MEDICINE

## 2021-02-26 PROCEDURE — 80061 LIPID PANEL: CPT | Performed by: PREVENTIVE MEDICINE

## 2021-02-26 PROCEDURE — 83036 HEMOGLOBIN GLYCOSYLATED A1C: CPT | Performed by: PREVENTIVE MEDICINE

## 2021-02-26 PROCEDURE — 84443 ASSAY THYROID STIM HORMONE: CPT | Performed by: PREVENTIVE MEDICINE

## 2021-02-26 PROCEDURE — 72110 X-RAY EXAM L-2 SPINE 4/>VWS: CPT

## 2021-02-26 PROCEDURE — 85025 COMPLETE CBC W/AUTO DIFF WBC: CPT | Performed by: PREVENTIVE MEDICINE

## 2021-03-01 ENCOUNTER — TELEPHONE (OUTPATIENT)
Dept: FAMILY MEDICINE CLINIC | Facility: CLINIC | Age: 61
End: 2021-03-01

## 2021-03-01 DIAGNOSIS — R87.619 ABNORMAL CERVICAL PAPANICOLAOU SMEAR, UNSPECIFIED ABNORMAL PAP FINDING: ICD-10-CM

## 2021-03-01 DIAGNOSIS — K21.9 GASTROESOPHAGEAL REFLUX DISEASE WITHOUT ESOPHAGITIS: Primary | ICD-10-CM

## 2021-03-01 NOTE — TELEPHONE ENCOUNTER
Hub to read\  Blood sugar was 188 with an A1c of 8.1.  This shows that the diabetes is out of control.  We can send a second medicine to the pharmacy to see if we can gain better control.  Make sure that she carries a suspect sugar source with her as she starts the new medicine.  Vitamin D was low so take over-the-counter daily or increase her dose.  Bad cholesterol was 72 and the goal is below 70 so increase her walking and decrease her saturated fats to see if we can control without changing medicines.     referrals approved for [facility], [facility]

## 2021-03-01 NOTE — TELEPHONE ENCOUNTER
PATIENT IS CALLING TO GET TWO REFERRALS    REFERRAL 1 IS FOR TOTAL WOMEN    REFERRAL 2 IS FOR GASTROENTEROLOGY    PATIENT IS ALSO REQUESTING A CALL FOR BLOODWORK AND XRAY RESULTS/IF ON [employer] SHE HAS FORGOTTEN THE PASSWORD AND IS REQUESTING A CALL BACK    CALLBACK NUMBER [phone]

## 2021-03-03 DIAGNOSIS — M54.50 CHRONIC LEFT-SIDED LOW BACK PAIN WITHOUT SCIATICA: Primary | ICD-10-CM

## 2021-03-03 DIAGNOSIS — G89.29 CHRONIC LEFT-SIDED LOW BACK PAIN WITHOUT SCIATICA: Primary | ICD-10-CM

## 2021-03-03 NOTE — TELEPHONE ENCOUNTER
I spoke to patient about the lab results. She voiced an understanding. I told her the [facility] referrals were approved for GYN and Gastro, she states she needs a new approval for her dermatologist, the last one is .     Rosa: I also spoke to patient about the results of her xray. She said she wants to to with the back brace please if you can order one for her.

## 2021-03-03 NOTE — TELEPHONE ENCOUNTER
I LEFT A VM WITH PATIENT TO CALL US BACK TO LET US KNOW WHERE TO SEND THIS BACK BRACE ORDER. I NEGLECTED TO ASK HER EARLIER.

## 2021-03-08 ENCOUNTER — OFFICE VISIT (OUTPATIENT)
Dept: FAMILY MEDICINE CLINIC | Facility: CLINIC | Age: 61
End: 2021-03-08

## 2021-03-08 VITALS
SYSTOLIC BLOOD PRESSURE: 131 MMHG | WEIGHT: 194 LBS | HEIGHT: 65 IN | BODY MASS INDEX: 32.32 KG/M2 | DIASTOLIC BLOOD PRESSURE: 83 MMHG | HEART RATE: 84 BPM | TEMPERATURE: 98.6 F

## 2021-03-08 DIAGNOSIS — G89.29 CHRONIC LEFT-SIDED LOW BACK PAIN WITHOUT SCIATICA: Primary | ICD-10-CM

## 2021-03-08 DIAGNOSIS — E11.65 TYPE 2 DIABETES MELLITUS WITH HYPERGLYCEMIA, WITHOUT LONG-TERM CURRENT USE OF INSULIN (HCC): ICD-10-CM

## 2021-03-08 DIAGNOSIS — M54.50 CHRONIC LEFT-SIDED LOW BACK PAIN WITHOUT SCIATICA: Primary | ICD-10-CM

## 2021-03-08 PROBLEM — N81.4 UTEROVAGINAL PROLAPSE: Status: ACTIVE | Noted: 2017-04-10

## 2021-03-08 PROCEDURE — 99213 OFFICE O/P EST LOW 20 MIN: CPT | Performed by: NURSE PRACTITIONER

## 2021-03-08 NOTE — PROGRESS NOTES
"Subjective   Pearl Elizalde is a 60 y.o. female presents for   Chief Complaint   Patient presents with   • Hypertension   • Hyperlipidemia   • Diabetes       Health Maintenance Due   Topic Date Due   • COLONOSCOPY  Never done   • Pneumococcal Vaccine 0-64 (1 of 1 - PPSV23) Never done   • COVID-19 Vaccine (1 of 2) Never done   • ZOSTER VACCINE (1 of 2) Never done   • DIABETIC FOOT EXAM  07/08/2020   • URINE MICROALBUMIN  07/08/2020   • DIABETIC EYE EXAM  08/08/2020       History of Present Illness   Pt present to review lab results and to follow up on back pain.  Pt states she is aware that her DM has been out of control due to stress and recent poor eating habits.  She states she will work on diet and exercise to improve by next appt.  Pt has appt today to get fitted for a back brace but had several questions about her spine xray.  Xray Images reviewed with pt and her questions were answered.  We discussed use of the back brace, walking, physical therapy and stretches.  Pt states she will try exercises at home and wear the brace and will discuss physical therapy in the future if needed.     Vitals:    03/08/21 0859   BP: 131/83   BP Location: Right arm   Patient Position: Sitting   Cuff Size: Adult   Pulse: 84   Temp: 98.6 °F (37 °C)   Weight: 88 kg (194 lb)   Height: 165.1 cm (65\")     Body mass index is 32.28 kg/m².    Current Outpatient Medications on File Prior to Visit   Medication Sig Dispense Refill   • albuterol sulfate  (90 Base) MCG/ACT inhaler Inhale 2 puffs Every 4 (Four) Hours As Needed for Wheezing. Can't tolerate generic-needs Proventil 3 g 3   • Blood Glucose Monitoring Suppl (BLOOD GLUCOSE MONITOR SYSTEM) w/Device kit BLOOD GLUCOSE MONITOR SYSTEM w/Device KIT     • Cholecalciferol (VITAMIN D) 1000 units tablet Take 1,000 Units by mouth Daily.     • colesevelam (WELCHOL) 625 MG tablet Take 1 tablet by mouth 2 (Two) Times a Day With Meals. 180 tablet 3   • Cyanocobalamin (B-12) 1000 MCG " tablet Take 1 tablet by mouth Daily.     • diazePAM (VALIUM) 5 MG tablet      • EPINEPHrine (EPIPEN) 0.3 MG/0.3ML solution auto-injector injection      • ezetimibe (Zetia) 10 MG tablet Take 1 tablet by mouth Daily. 90 tablet 3   • gabapentin (NEURONTIN) 800 MG tablet Take 1 tablet by mouth 3 (Three) Times a Day. TAKE ONE TABLET IN THE AM AND 2  tablet 3   • glimepiride (AMARYL) 4 MG tablet Take 1 tablet by mouth 2 (Two) Times a Day. 180 tablet 4   • glucose blood (FREESTYLE LITE) test strip FREESTYLE LITE TEST STRP     • hydrALAZINE (APRESOLINE) 50 MG tablet Take 1 tablet by mouth 3 (Three) Times a Day. 270 tablet 3   • Lancets (FREESTYLE) lancets      • Magnesium 400 MG tablet Take 1 tablet by mouth Daily.     • Olmesartan-amLODIPine-HCTZ 40-10-12.5 MG tablet Take 1 tablet by mouth Daily. 90 tablet 3   • Omega-3 Fatty Acids (FISH OIL) 1200 MG capsule capsule Take 1,200 mg by mouth Daily.     • ondansetron (ZOFRAN) 4 MG tablet Take 1 tablet by mouth Every 8 (Eight) Hours As Needed for Nausea or Vomiting. 20 tablet 1   • pravastatin (PRAVACHOL) 80 MG tablet Take 1 tablet by mouth Every Night. 90 tablet 3   • SITagliptin (Januvia) 100 MG tablet Take 1 tablet by mouth Daily. 90 tablet 3   • SYMBICORT 160-4.5 MCG/ACT inhaler Inhale 2 puffs 2 (Two) Times a Day. 3 inhaler 3     No current facility-administered medications on file prior to visit.       The following portions of the patient's history were reviewed and updated as appropriate: allergies, current medications, past family history, past medical history, past social history, past surgical history, and problem list.    Review of Systems   Constitutional: Negative for chills and fever.   HENT: Negative for sinus pressure and sore throat.    Eyes: Negative for blurred vision.   Respiratory: Negative for cough and shortness of breath.    Cardiovascular: Negative for chest pain.   Gastrointestinal: Negative for abdominal pain.   Endocrine: Negative.     Genitourinary: Negative.    Musculoskeletal: Positive for back pain. Negative for arthralgias and joint swelling.   Skin: Negative for color change.   Allergic/Immunologic: Negative.    Neurological: Negative for dizziness.   Hematological: Negative.    Psychiatric/Behavioral: Negative for behavioral problems.       Objective   Physical Exam  Vitals and nursing note reviewed.   Constitutional:       Appearance: Normal appearance. She is well-developed.   HENT:      Head: Normocephalic and atraumatic.      Right Ear: External ear normal.      Left Ear: External ear normal.      Nose: Nose normal.   Eyes:      Extraocular Movements: Extraocular movements intact.      Pupils: Pupils are equal, round, and reactive to light.   Cardiovascular:      Rate and Rhythm: Normal rate.   Pulmonary:      Effort: Pulmonary effort is normal.   Genitourinary:     Vagina: Normal.   Musculoskeletal:         General: Normal range of motion.      Cervical back: Normal range of motion and neck supple.   Skin:     General: Skin is warm and dry.   Neurological:      General: No focal deficit present.      Mental Status: She is alert and oriented to person, place, and time.   Psychiatric:         Mood and Affect: Mood normal.         Behavior: Behavior normal.         Thought Content: Thought content normal.         Judgment: Judgment normal.       PHQ-9 Total Score:      Assessment/Plan   Diagnoses and all orders for this visit:    1. Chronic left-sided low back pain without sciatica (Primary)  Comments:  exercises given, encouraged physical therapy-pt declined at this time. getting fitted for brace today, encouraged routine walking program    2. Type 2 diabetes mellitus with hyperglycemia, without long-term current use of insulin (CMS/[facility])  Comments:  encouraged dietary control and increase exercise as tolerated.         Patient Instructions   Spondylolysis Rehab  Ask your health care provider which exercises are safe for you. Do  exercises exactly as told by your health care provider and adjust them as directed. It is normal to feel mild stretching, pulling, tightness, or discomfort as you do these exercises. Stop right away if you feel sudden pain or your pain gets worse. Do not begin these exercises until told by your health care provider.  Stretching and range-of-motion exercises  These exercises warm up your muscles and joints and improve the movement and flexibility of your hips and your back. These exercises may also help to relieve pain, numbness, and tingling.  Single knee to chest    1. Lie on your back on a firm surface with both legs straight.  2. Bend one of your knees. Use your hands to move your knee up toward your chest until you feel a gentle stretch in your lower back and buttock.  ? Hold your leg in this position by holding on to the front of your knee.  ? Keep your other leg as straight as possible.  3. Hold for __________ seconds.  4. Slowly return to the starting position.  5. Repeat this exercise with your other leg.  Repeat __________ times. Complete this exercise __________ times a day.  Hamstring stretch, supine    1. Lie on your back (supine position).  2. Loop a belt or towel over the ball of your left / right foot. The ball of your foot is on the walking surface, right under your toes.  3. Straighten your left / right knee and slowly pull on the belt or towel to raise your leg. Raise your leg until you feel a gentle stretch behind your knee or thigh (hamstring).  ? Do not let your left / right knee bend while you do this.  ? Keep your other leg flat on the floor.  4. Hold this position for __________ seconds.  5. Slowly return your leg to the starting position.  6. Repeat this exercise with your other leg.  Repeat __________ times. Complete this exercise __________ times a day.  Strengthening exercises  These exercises build strength and endurance in your back. Endurance is the ability to use your muscles for a  long time, even after they get tired.  Pelvic tilt  This exercise strengthens the muscles that lie deep in the abdomen.  1. Lie on your back on a firm bed or the floor. Bend your knees and keep your feet flat.  2. Tense your abdominal muscles. Tip your pelvis up toward the ceiling and flatten your lower back into the floor.  ? To help with this exercise, you may place a small towel under your lower back and try to push your back into the towel.  3. Hold for __________ seconds.  4. Let your muscles relax completely before you repeat this exercise.  Repeat __________ times. Complete this exercise __________ times a day.  Abdominal crunch    1. Lie on your back on a firm surface. Bend your knees and keep your feet flat. Cross your arms over your chest.  2. Tuck your chin down toward your chest, without bending your neck.  3. Use your abdominal muscles to lift your upper body off the ground, straight up into the air.  ? Try to lift yourself until your shoulder blades are off the ground. You may need to work up to this.  ? Keep your lower back on the ground while you crunch upward.  ? Do not hold your breath.  4. Slowly lower yourself down. Keep your abdominal muscles tense until you are back to the starting position.  Repeat __________ times. Complete this exercise __________ times a day.  Alternating arm and leg raises    1. Get on your hands and knees on a firm surface. If you are on a hard floor, you may want to use padding, such as an exercise mat, to cushion your knees.  2. Line up your arms and legs. Your hands should be directly below your shoulders, and your knees should be directly below your hips.  3. Lift your left leg behind you. At the same time, raise your right arm and straighten it in front of you.  ? Do not lift your leg higher than your hip.  ? Do not lift your arm higher than your shoulder.  ? Keep your abdominal and back muscles tight.  ? Keep your hips facing the ground.  ? Do not arch your  back.  ? Keep your balance carefully, and do not hold your breath.  4. Hold for __________ seconds.  5. Slowly return to the starting position.  6. Repeat with your right leg and your left arm.  Repeat __________ times. Complete this exercise __________ times a day.  Posture and body mechanics  Good posture and healthy body mechanics can help to relieve stress in your body's tissues and joints. Body mechanics refers to the movements and positions of your body while you do your daily activities. Posture is part of body mechanics. Good posture means:  · Your spine is in its natural S-curve position (neutral).  · Your shoulders are pulled back slightly.  · Your head is not tipped forward.  Follow these guidelines to improve your posture and body mechanics in your everyday activities.  Standing    · When standing, keep your spine neutral and your feet about hip width apart. Keep a slight bend in your knees. Your ears, shoulders, and hips should line up.  · When you do a task in which you  one place for a long time, place one foot up on a stable object that is 2-4 inches (5-10 cm) high, such as a footstool. This helps keep your spine neutral.  Sitting    · When sitting, keep your spine neutral and keep your feet flat on the floor. Use a footrest, if necessary, and keep your thighs parallel to the floor. Avoid rounding your shoulders, and avoid tilting your head forward.  · When working at a desk or a computer, keep your desk at a height where your hands are slightly lower than your elbows. Slide your chair under your desk so you are close enough to maintain good posture.  · When working at a computer, place your monitor at a height where you are looking straight ahead and you do not have to tilt your head forward or downward to look at the screen.  Resting    · When lying down and resting, avoid positions that are most painful for you.  · If you have pain with activities such as sitting, bending, stooping, or  squatting (flexion-based activities), lie in a position in which your body does not bend very much. For example, avoid curling up on your side with your arms and knees near your chest (fetal position).  · If you have pain with activities such as standing for a long time or reaching with your arms (extension-based activities), lie with your spine in a neutral position and bend your knees slightly. Try the following positions:  ? Lying on your side with a pillow between your knees.  ? Lying on your back with a pillow under your knees.  Lifting    · When lifting objects, keep your feet at least shoulder width apart and tighten your abdominal muscles.  · Bend your knees and hips and keep your spine neutral. It is important to lift using the strength of your legs, not your back. Do not lock your knees straight out.  · Always ask for help to lift heavy or awkward objects.  This information is not intended to replace advice given to you by your health care provider. Make sure you discuss any questions you have with your health care provider.  Document Revised: 04/13/2020 Document Reviewed: 04/13/2020  Elsevier Patient Education © 2020 [employer]

## 2021-03-08 NOTE — PATIENT INSTRUCTIONS
Spondylolysis Rehab  Ask your health care provider which exercises are safe for you. Do exercises exactly as told by your health care provider and adjust them as directed. It is normal to feel mild stretching, pulling, tightness, or discomfort as you do these exercises. Stop right away if you feel sudden pain or your pain gets worse. Do not begin these exercises until told by your health care provider.  Stretching and range-of-motion exercises  These exercises warm up your muscles and joints and improve the movement and flexibility of your hips and your back. These exercises may also help to relieve pain, numbness, and tingling.  Single knee to chest    1. Lie on your back on a firm surface with both legs straight.  2. Bend one of your knees. Use your hands to move your knee up toward your chest until you feel a gentle stretch in your lower back and buttock.  ? Hold your leg in this position by holding on to the front of your knee.  ? Keep your other leg as straight as possible.  3. Hold for __________ seconds.  4. Slowly return to the starting position.  5. Repeat this exercise with your other leg.  Repeat __________ times. Complete this exercise __________ times a day.  Hamstring stretch, supine    1. Lie on your back (supine position).  2. Loop a belt or towel over the ball of your left / right foot. The ball of your foot is on the walking surface, right under your toes.  3. Straighten your left / right knee and slowly pull on the belt or towel to raise your leg. Raise your leg until you feel a gentle stretch behind your knee or thigh (hamstring).  ? Do not let your left / right knee bend while you do this.  ? Keep your other leg flat on the floor.  4. Hold this position for __________ seconds.  5. Slowly return your leg to the starting position.  6. Repeat this exercise with your other leg.  Repeat __________ times. Complete this exercise __________ times a day.  Strengthening exercises  These exercises build  strength and endurance in your back. Endurance is the ability to use your muscles for a long time, even after they get tired.  Pelvic tilt  This exercise strengthens the muscles that lie deep in the abdomen.  1. Lie on your back on a firm bed or the floor. Bend your knees and keep your feet flat.  2. Tense your abdominal muscles. Tip your pelvis up toward the ceiling and flatten your lower back into the floor.  ? To help with this exercise, you may place a small towel under your lower back and try to push your back into the towel.  3. Hold for __________ seconds.  4. Let your muscles relax completely before you repeat this exercise.  Repeat __________ times. Complete this exercise __________ times a day.  Abdominal crunch    1. Lie on your back on a firm surface. Bend your knees and keep your feet flat. Cross your arms over your chest.  2. Tuck your chin down toward your chest, without bending your neck.  3. Use your abdominal muscles to lift your upper body off the ground, straight up into the air.  ? Try to lift yourself until your shoulder blades are off the ground. You may need to work up to this.  ? Keep your lower back on the ground while you crunch upward.  ? Do not hold your breath.  4. Slowly lower yourself down. Keep your abdominal muscles tense until you are back to the starting position.  Repeat __________ times. Complete this exercise __________ times a day.  Alternating arm and leg raises    1. Get on your hands and knees on a firm surface. If you are on a hard floor, you may want to use padding, such as an exercise mat, to cushion your knees.  2. Line up your arms and legs. Your hands should be directly below your shoulders, and your knees should be directly below your hips.  3. Lift your left leg behind you. At the same time, raise your right arm and straighten it in front of you.  ? Do not lift your leg higher than your hip.  ? Do not lift your arm higher than your shoulder.  ? Keep your abdominal  and back muscles tight.  ? Keep your hips facing the ground.  ? Do not arch your back.  ? Keep your balance carefully, and do not hold your breath.  4. Hold for __________ seconds.  5. Slowly return to the starting position.  6. Repeat with your right leg and your left arm.  Repeat __________ times. Complete this exercise __________ times a day.  Posture and body mechanics  Good posture and healthy body mechanics can help to relieve stress in your body's tissues and joints. Body mechanics refers to the movements and positions of your body while you do your daily activities. Posture is part of body mechanics. Good posture means:  · Your spine is in its natural S-curve position (neutral).  · Your shoulders are pulled back slightly.  · Your head is not tipped forward.  Follow these guidelines to improve your posture and body mechanics in your everyday activities.  Standing    · When standing, keep your spine neutral and your feet about hip width apart. Keep a slight bend in your knees. Your ears, shoulders, and hips should line up.  · When you do a task in which you  one place for a long time, place one foot up on a stable object that is 2-4 inches (5-10 cm) high, such as a footstool. This helps keep your spine neutral.  Sitting    · When sitting, keep your spine neutral and keep your feet flat on the floor. Use a footrest, if necessary, and keep your thighs parallel to the floor. Avoid rounding your shoulders, and avoid tilting your head forward.  · When working at a desk or a computer, keep your desk at a height where your hands are slightly lower than your elbows. Slide your chair under your desk so you are close enough to maintain good posture.  · When working at a computer, place your monitor at a height where you are looking straight ahead and you do not have to tilt your head forward or downward to look at the screen.  Resting    · When lying down and resting, avoid positions that are most painful for  you.  · If you have pain with activities such as sitting, bending, stooping, or squatting (flexion-based activities), lie in a position in which your body does not bend very much. For example, avoid curling up on your side with your arms and knees near your chest (fetal position).  · If you have pain with activities such as standing for a long time or reaching with your arms (extension-based activities), lie with your spine in a neutral position and bend your knees slightly. Try the following positions:  ? Lying on your side with a pillow between your knees.  ? Lying on your back with a pillow under your knees.  Lifting    · When lifting objects, keep your feet at least shoulder width apart and tighten your abdominal muscles.  · Bend your knees and hips and keep your spine neutral. It is important to lift using the strength of your legs, not your back. Do not lock your knees straight out.  · Always ask for help to lift heavy or awkward objects.  This information is not intended to replace advice given to you by your health care provider. Make sure you discuss any questions you have with your health care provider.  Document Revised: 04/13/2020 Document Reviewed: 04/13/2020  Elsevier Patient Education © 2020 [employer]

## 2021-03-22 ENCOUNTER — TELEPHONE (OUTPATIENT)
Dept: FAMILY MEDICINE CLINIC | Facility: CLINIC | Age: 61
End: 2021-03-22

## 2021-03-22 DIAGNOSIS — Z12.11 SCREENING FOR COLON CANCER: Primary | ICD-10-CM

## 2021-03-22 NOTE — TELEPHONE ENCOUNTER
Order placed.  Please call patient and make sure has grren folder and can send papers in yellow envelope

## 2021-03-22 NOTE — TELEPHONE ENCOUNTER
Caller: Pearl Elizalde    Relationship: Self    Best call back number: [phone]    What orders are you requesting (i.e. lab or imaging): colonoscopy     In what timeframe would the patient need to come in: asap     Where will you receive your lab/imaging services: [address] [facility] [address] [place]  Phone number [phone]     Additional notes: asap

## 2021-03-23 NOTE — TELEPHONE ENCOUNTER
HUB TO READ    Sent in request this AM. Order placed.  Please call patient and make sure has grren folder and can send papers in yellow envelope

## 2021-03-24 ENCOUNTER — TELEPHONE (OUTPATIENT)
Dept: ENDOCRINOLOGY | Facility: CLINIC | Age: 61
End: 2021-03-24

## 2021-03-24 ENCOUNTER — TELEPHONE (OUTPATIENT)
Dept: FAMILY MEDICINE CLINIC | Facility: CLINIC | Age: 61
End: 2021-03-24

## 2021-03-24 NOTE — TELEPHONE ENCOUNTER
PATIENT STATES SHE HAS AN APPOINTMENT IN June WITH DR ANDRE BUT WOULD LIKE TO GET IN SOONER AND WANTS TO KNOW IF A REFERRAL AN BE PLACED.    PLEASE ADVISE: [phone]

## 2021-03-24 NOTE — TELEPHONE ENCOUNTER
Call from Dr. Lund office to see if we can move pts NP appt up or add to cx list. Appt has been cx or rescheduled several times. Advised that I can put her on cx list and offered to schedule pt for CDM in the mean time if she feels that will be okay. Pt scheduled for CDM   4/20/2021 2:00 PM Katrin Hannah, MARY Lund office will call pt with appt details. Paperwoork mailed.

## 2021-03-24 NOTE — TELEPHONE ENCOUNTER
HUB to READ    Left message    I called Dr. Leavitt office and they set up an appointment for patient with diabetic educator on April 20 at 1:30. They will mail her paperwork to fill out and bring with her. Bring Insurance cards, med list and 2 weeks of Blood sugars. The address is [address] [place] [address].

## 2021-03-24 NOTE — TELEPHONE ENCOUNTER
PATIENT NOTIFIED. THE PATIENT WOULD LIKE TO KNOW IF A REFERRAL HAS BEEN DONE. THE PATIENT STATES THAT SHE HAS TO HAVE A REFERRAL WITH [facility].    PATIENT IS REQUESTING A CALLBACK: [phone]

## 2021-04-06 DIAGNOSIS — E11.65 UNCONTROLLED TYPE 2 DIABETES MELLITUS WITH HYPERGLYCEMIA (HCC): Primary | ICD-10-CM

## 2021-04-13 ENCOUNTER — TELEPHONE (OUTPATIENT)
Dept: FAMILY MEDICINE CLINIC | Facility: CLINIC | Age: 61
End: 2021-04-13

## 2021-04-13 RX ORDER — EPINEPHRINE 0.3 MG/.3ML
0.3 INJECTION SUBCUTANEOUS ONCE
Qty: 1 EACH | Refills: 1 | Status: SHIPPED | OUTPATIENT
Start: 2021-04-13 | End: 2021-04-13

## 2021-04-13 RX ORDER — BLOOD-GLUCOSE METER
1 KIT MISCELLANEOUS 2 TIMES DAILY
Qty: 100 EACH | Refills: 6 | Status: SHIPPED | OUTPATIENT
Start: 2021-04-13

## 2021-04-13 NOTE — TELEPHONE ENCOUNTER
Caller: Pearl Elizalde    Relationship: Self    Best call back number: [phone]    What is the medical concern/diagnosis: SKIN LESIONS    What specialty or service is being requested: DERMATOLOGY    What is the provider, practice or medical service name: DR [facility] IN [place]    What is the office location: [place]    What is the office phone number: ?    Any additional details: PATIENT WAS SEEN AT THIS OFFICE PREVIOUSLY

## 2021-04-13 NOTE — TELEPHONE ENCOUNTER
She has been calling Dr Mohamud office on [address]. They only wanted to do a sleep study not see her for asthma.     [facility] out to [facility] for an appt. I gave her the number and she will call to schedule.

## 2021-04-13 NOTE — TELEPHONE ENCOUNTER
Caller: Pearl Elizalde    Relationship: Self    Best call back [phone]    What is the medical concern/diagnosis: GET INHALERS RENEWED    What specialty or service is being requested: PULMONOLGIST    What is the provider, practice or medical service name: DR CECILIA MICHAEL    What is the office location: ?    What is the office phone number: ?    Any additional details: PATIENT WAS REFERRED TO DR MICHAEL ON 1/25/21. REFERRAL STILL SHOWS PENDING IN CHART. PLEASE ADVISE. PATIENT HAS NOT SEEN A PULMONARY DOCTOR.

## 2021-04-13 NOTE — TELEPHONE ENCOUNTER
Caller: Pearl Elizalde    Relationship: Self    Best call back number: [phone]    Medication needed:   Requested Prescriptions     Pending Prescriptions Disp Refills   • EPINEPHrine (EPIPEN) 0.3 MG/0.3ML solution auto-injector injection 1 each    • glucose blood (FREESTYLE LITE) test strip       Sig: Use as instructed       When do you need the refill by: ASAP    What additional details did the patient provide when requesting the medication: PATIENT STATED HER EPI PEN IS OUT OF DATE AND PATIENT DOESN'T HAVE ANY TEST STRIPS    Does the patient have less than a 3 day supply:  [x] Yes  [] No    What is the patient's [facility] [address] - [phone] [facility] [phone]

## 2021-04-20 ENCOUNTER — OFFICE VISIT (OUTPATIENT)
Dept: ENDOCRINOLOGY | Facility: CLINIC | Age: 61
End: 2021-04-20

## 2021-04-20 DIAGNOSIS — E11.65 UNCONTROLLED TYPE 2 DIABETES MELLITUS WITH HYPERGLYCEMIA (HCC): ICD-10-CM

## 2021-04-20 PROCEDURE — G0108 DIAB MANAGE TRN  PER INDIV: HCPCS | Performed by: DIETITIAN, REGISTERED

## 2021-05-10 ENCOUNTER — OFFICE VISIT (OUTPATIENT)
Dept: FAMILY MEDICINE CLINIC | Facility: CLINIC | Age: 61
End: 2021-05-10

## 2021-05-10 VITALS
HEART RATE: 93 BPM | WEIGHT: 192.4 LBS | DIASTOLIC BLOOD PRESSURE: 64 MMHG | BODY MASS INDEX: 32.06 KG/M2 | TEMPERATURE: 98.1 F | SYSTOLIC BLOOD PRESSURE: 101 MMHG | HEIGHT: 65 IN | OXYGEN SATURATION: 94 %

## 2021-05-10 DIAGNOSIS — E78.5 HYPERLIPIDEMIA, UNSPECIFIED HYPERLIPIDEMIA TYPE: ICD-10-CM

## 2021-05-10 DIAGNOSIS — Z20.822 EXPOSURE TO COVID-19 VIRUS: ICD-10-CM

## 2021-05-10 DIAGNOSIS — E11.65 TYPE 2 DIABETES MELLITUS WITH HYPERGLYCEMIA, WITHOUT LONG-TERM CURRENT USE OF INSULIN (HCC): Primary | ICD-10-CM

## 2021-05-10 DIAGNOSIS — E55.9 VITAMIN D DEFICIENCY: ICD-10-CM

## 2021-05-10 DIAGNOSIS — I10 ESSENTIAL HYPERTENSION: ICD-10-CM

## 2021-05-10 PROCEDURE — 99214 OFFICE O/P EST MOD 30 MIN: CPT | Performed by: NURSE PRACTITIONER

## 2021-05-10 RX ORDER — PRAVASTATIN SODIUM 80 MG/1
80 TABLET ORAL NIGHTLY
Qty: 90 TABLET | Refills: 3 | Status: CANCELLED | OUTPATIENT
Start: 2021-05-10

## 2021-05-10 RX ORDER — MULTIPLE VITAMINS W/ MINERALS TAB 9MG-400MCG
1 TAB ORAL DAILY
COMMUNITY

## 2021-05-10 RX ORDER — PRAVASTATIN SODIUM 80 MG/1
80 TABLET ORAL NIGHTLY
Qty: 90 TABLET | Refills: 3 | Status: SHIPPED | OUTPATIENT
Start: 2021-05-10 | End: 2022-01-11 | Stop reason: SDUPTHER

## 2021-05-10 RX ORDER — EPINEPHRINE 0.3 MG/.3ML
INJECTION SUBCUTANEOUS
COMMUNITY
Start: 2021-04-13

## 2021-05-10 NOTE — PROGRESS NOTES
"Subjective   Pearl Elizalde is a 60 y.o. female presents for   Chief Complaint   Patient presents with   • Diabetes     3 month follow up   • Hypertension   • Hyperlipidemia       Health Maintenance Due   Topic Date Due   • COLORECTAL CANCER SCREENING  Never done   • Pneumococcal Vaccine 0-64 (1 of 1 - PPSV23) Never done   • COVID-19 Vaccine (1) Never done   • ZOSTER VACCINE (1 of 2) Never done   • URINE MICROALBUMIN  07/08/2020       History of Present Illness   Pt present for 3 month follow up DM, htn, and hyperlipidemia.  She states her back pain has improved since using back brace.  She denies any new problems at this time but requests to have covid antibodies checked.  She was sick with covid 19 in December and is also considering getting the covid vaccine.      Vitals:    05/10/21 0819   BP: 101/64   BP Location: Right arm   Patient Position: Sitting   Cuff Size: Adult   Pulse: 93   Temp: 98.1 °F (36.7 °C)   SpO2: 94%   Weight: 87.3 kg (192 lb 6.4 oz)   Height: 165.1 cm (65\")     Body mass index is 32.02 kg/m².    Current Outpatient Medications on File Prior to Visit   Medication Sig Dispense Refill   • albuterol sulfate  (90 Base) MCG/ACT inhaler Inhale 2 puffs Every 4 (Four) Hours As Needed for Wheezing. Can't tolerate generic-needs Proventil 3 g 3   • Blood Glucose Monitoring Suppl (BLOOD GLUCOSE MONITOR SYSTEM) w/Device kit BLOOD GLUCOSE MONITOR SYSTEM w/Device KIT     • Cholecalciferol (VITAMIN D) 1000 units tablet Take 1,000 Units by mouth Daily.     • colesevelam (WELCHOL) 625 MG tablet Take 1 tablet by mouth 2 (Two) Times a Day With Meals. 180 tablet 3   • Cyanocobalamin (B-12) 1000 MCG tablet Take 1 tablet by mouth Daily.     • ezetimibe (Zetia) 10 MG tablet Take 1 tablet by mouth Daily. 90 tablet 3   • gabapentin (NEURONTIN) 800 MG tablet Take 1 tablet by mouth 3 (Three) Times a Day. TAKE ONE TABLET IN THE AM AND 2  tablet 3   • glimepiride (AMARYL) 4 MG tablet Take 1 tablet by mouth 2 " (Two) Times a Day. 180 tablet 4   • glucose blood (FREESTYLE LITE) test strip 1 each by Other route 2 (two) times a day. Use as instructed 100 each 6   • hydrALAZINE (APRESOLINE) 50 MG tablet Take 1 tablet by mouth 3 (Three) Times a Day. 270 tablet 3   • Lancets (FREESTYLE) lancets      • Magnesium 400 MG tablet Take 1 tablet by mouth Daily.     • multivitamin with minerals (CENTRUM SILVER PO) Take 1 tablet by mouth Daily.     • Olmesartan-amLODIPine-HCTZ 40-10-12.5 MG tablet Take 1 tablet by mouth Daily. 90 tablet 3   • SITagliptin (Januvia) 100 MG tablet Take 1 tablet by mouth Daily. 90 tablet 3   • SYMBICORT 160-4.5 MCG/ACT inhaler Inhale 2 puffs 2 (Two) Times a Day. 3 inhaler 3   • [DISCONTINUED] pravastatin (PRAVACHOL) 80 MG tablet Take 1 tablet by mouth Every Night. 90 tablet 3   • diazePAM (VALIUM) 5 MG tablet      • EPINEPHrine (EPIPEN) 0.3 MG/0.3ML solution auto-injector injection      • Omega-3 Fatty Acids (FISH OIL) 1200 MG capsule capsule Take 1,200 mg by mouth Daily.     • ondansetron (ZOFRAN) 4 MG tablet Take 1 tablet by mouth Every 8 (Eight) Hours As Needed for Nausea or Vomiting. 20 tablet 1     No current facility-administered medications on file prior to visit.       The following portions of the patient's history were reviewed and updated as appropriate: allergies, current medications, past family history, past medical history, past social history, past surgical history, and problem list.    Review of Systems   Constitutional: Negative for chills and fever.   HENT: Negative for sinus pressure and sore throat.    Eyes: Negative for blurred vision.   Respiratory: Negative for cough and shortness of breath.    Cardiovascular: Negative for chest pain.   Gastrointestinal: Negative for abdominal pain.   Endocrine: Negative.    Genitourinary: Negative.    Musculoskeletal: Positive for back pain. Negative for arthralgias and joint swelling.   Skin: Negative for color change.   Allergic/Immunologic:  Negative.    Neurological: Negative for dizziness.   Hematological: Negative.    Psychiatric/Behavioral: Negative for behavioral problems.       Objective   Physical Exam  Vitals and nursing note reviewed.   Constitutional:       Appearance: Normal appearance. She is well-developed.   HENT:      Head: Normocephalic and atraumatic.      Right Ear: Tympanic membrane, ear canal and external ear normal.      Left Ear: Tympanic membrane, ear canal and external ear normal.      Nose: Nose normal.   Eyes:      Extraocular Movements: Extraocular movements intact.      Conjunctiva/sclera: Conjunctivae normal.      Pupils: Pupils are equal, round, and reactive to light.   Cardiovascular:      Rate and Rhythm: Normal rate and regular rhythm.      Pulses: Normal pulses.           Dorsalis pedis pulses are 2+ on the right side and 2+ on the left side.        Posterior tibial pulses are 2+ on the right side and 2+ on the left side.      Heart sounds: Normal heart sounds.   Pulmonary:      Effort: Pulmonary effort is normal.      Breath sounds: Normal breath sounds.   Abdominal:      General: Bowel sounds are normal.      Palpations: Abdomen is soft.   Genitourinary:     Vagina: Normal.   Musculoskeletal:         General: Normal range of motion.      Cervical back: Normal range of motion and neck supple.      Right foot: Normal range of motion.      Left foot: Normal range of motion.   Feet:      Right foot:      Protective Sensation: 10 sites tested. 3 sites sensed.      Skin integrity: Callus present.      Toenail Condition: Right toenails are abnormally thick.      Left foot:      Protective Sensation: 10 sites tested. 3 sites sensed.      Skin integrity: Callus present.      Toenail Condition: Left toenails are abnormally thick.   Skin:     General: Skin is warm and dry.   Neurological:      General: No focal deficit present.      Mental Status: She is alert and oriented to person, place, and time. Mental status is at  baseline.   Psychiatric:         Mood and Affect: Mood normal.         Behavior: Behavior normal.         Thought Content: Thought content normal.         Judgment: Judgment normal.       PHQ-9 Total Score:      Assessment/Plan   Diagnoses and all orders for this visit:    1. Type 2 diabetes mellitus with hyperglycemia, without long-term current use of insulin (CMS/[facility]) (Primary)  -     CBC Auto Differential; Future  -     Comprehensive Metabolic Panel; Future  -     Hemoglobin A1c; Future  -     TSH; Future  -     Vitamin B12; Future  -     Microalbumin / Creatinine Urine Ratio - Urine, Clean Catch; Future    2. Essential hypertension    3. Vitamin D deficiency  -     Vitamin D 25 Hydroxy; Future    4. Hyperlipidemia, unspecified hyperlipidemia type  -     Lipid Panel; Future  -     pravastatin (PRAVACHOL) 80 MG tablet; Take 1 tablet by mouth Every Night.  Dispense: 90 tablet; Refill: 3    5. Exposure to COVID-19 virus  Comments:  counseled on risks/benefits of covid vaccine as well now that she is over 90 days out from illness  Orders:  -     SARS-CoV-2 Antibodies ([facility]); Future        There are no Patient Instructions on file for this visit.

## 2021-05-17 ENCOUNTER — OFFICE VISIT (OUTPATIENT)
Dept: PULMONOLOGY | Facility: HOSPITAL | Age: 61
End: 2021-05-17

## 2021-05-17 VITALS
BODY MASS INDEX: 31.99 KG/M2 | HEIGHT: 65 IN | HEART RATE: 94 BPM | OXYGEN SATURATION: 93 % | WEIGHT: 192 LBS | DIASTOLIC BLOOD PRESSURE: 76 MMHG | TEMPERATURE: 97.7 F | RESPIRATION RATE: 15 BRPM | SYSTOLIC BLOOD PRESSURE: 118 MMHG

## 2021-05-17 DIAGNOSIS — J45.909 ASTHMA, UNSPECIFIED ASTHMA SEVERITY, UNSPECIFIED WHETHER COMPLICATED, UNSPECIFIED WHETHER PERSISTENT: Primary | ICD-10-CM

## 2021-05-17 PROCEDURE — G0463 HOSPITAL OUTPT CLINIC VISIT: HCPCS

## 2021-05-17 NOTE — PROGRESS NOTES
PULMONARY/ CRITICAL CARE/ SLEEP MEDICINE OUTPATIENT CONSULT/ FOLLOW UP NOTE        Patient Name:  Pearl Elizalde    :  1960    Medical Record:  [id number]    PRIMARY CARE PHYSICIAN     Lizette Sigala MD    REASON FOR CONSULTATION    Pearl Elizalde is a 61 y.o. female who is referred for consultation for asthma  REVIEW OF SYSTEMS    Constitutional:  Denies fever or chills   Eyes:  Denies change in visual acuity   HENT:  Denies nasal congestion or sore throat   Respiratory:  Denies cough or shortness of breath   Cardiovascular:  Denies chest pain or edema   GI:  Denies abdominal pain, nausea, vomiting, bloody stools or diarrhea   :  Denies dysuria   Musculoskeletal:  Denies back pain or joint pain   Integument:  Denies rash   Neurologic:  Denies headache, focal weakness or sensory changes   Endocrine:  Denies polyuria or polydipsia   Lymphatic:  Denies swollen glands   Psychiatric:  Denies depression or anxiety     MEDICAL HISTORY    Past Medical History:   Diagnosis Date   • Asthma 6/3/2019   • Body mass index (BMI) of 30.0-30.9 in adult 2019   • Chronic obstructive pulmonary disease (CMS/formerly [facility]) 6/3/2019   • Diabetic peripheral neuropathy (CMS/formerly [facility]) 2019   • Gastroesophageal reflux disease 6/3/2019   • Hyperlipidemia    • Hypertension    • Low back pain    • Obesity         SURGICAL HISTORY    Past Surgical History:   Procedure Laterality Date   • ADENOIDECTOMY     • CHOLECYSTECTOMY     • COLONOSCOPY     • ENDOSCOPY     • INNER EAR SURGERY     • REPLACEMENT TOTAL KNEE Left    • REPLACEMENT TOTAL KNEE Right    • TONSILLECTOMY          FAMILY HISTORY    Family History   Adopted: Yes       SOCIAL HISTORY    Social History     Tobacco Use   • Smoking status: Former Smoker     Packs/day: 0.20     Years: 0.50     Pack years: 0.10     Types: Cigarettes     Quit date:      Years since quittin.3   • Smokeless tobacco: Never Used   Substance Use Topics   • Alcohol use: Yes     Comment:  Occassional        ALLERGIES    Allergies   Allergen Reactions   • Albuterol Sulfate [Albuterol] Shortness Of Breath     Generic inhaler caused problem   • Oxycodone Hives and Nausea And Vomiting   • Penicillins Hives     As child. Unsure if can take keflex         MEDICATIONS    Current Outpatient Medications on File Prior to Visit   Medication Sig Dispense Refill   • albuterol sulfate  (90 Base) MCG/ACT inhaler Inhale 2 puffs Every 4 (Four) Hours As Needed for Wheezing. Can't tolerate generic-needs Proventil 3 g 3   • Blood Glucose Monitoring Suppl (BLOOD GLUCOSE MONITOR SYSTEM) w/Device kit BLOOD GLUCOSE MONITOR SYSTEM w/Device KIT     • Cholecalciferol (VITAMIN D) 1000 units tablet Take 1,000 Units by mouth Daily.     • colesevelam (WELCHOL) 625 MG tablet Take 1 tablet by mouth 2 (Two) Times a Day With Meals. 180 tablet 3   • Cyanocobalamin (B-12) 1000 MCG tablet Take 1 tablet by mouth Daily.     • diazePAM (VALIUM) 5 MG tablet      • EPINEPHrine (EPIPEN) 0.3 MG/0.3ML solution auto-injector injection      • ezetimibe (Zetia) 10 MG tablet Take 1 tablet by mouth Daily. 90 tablet 3   • gabapentin (NEURONTIN) 800 MG tablet Take 1 tablet by mouth 3 (Three) Times a Day. TAKE ONE TABLET IN THE AM AND 2  tablet 3   • glimepiride (AMARYL) 4 MG tablet Take 1 tablet by mouth 2 (Two) Times a Day. 180 tablet 4   • glucose blood (FREESTYLE LITE) test strip 1 each by Other route 2 (two) times a day. Use as instructed 100 each 6   • hydrALAZINE (APRESOLINE) 50 MG tablet Take 1 tablet by mouth 3 (Three) Times a Day. 270 tablet 3   • Lancets (FREESTYLE) lancets      • Magnesium 400 MG tablet Take 1 tablet by mouth Daily.     • multivitamin with minerals (CENTRUM SILVER PO) Take 1 tablet by mouth Daily.     • Olmesartan-amLODIPine-HCTZ 40-10-12.5 MG tablet Take 1 tablet by mouth Daily. 90 tablet 3   • Omega-3 Fatty Acids (FISH OIL) 1200 MG capsule capsule Take 1,200 mg by mouth Daily.     • ondansetron (ZOFRAN) 4 MG  "tablet Take 1 tablet by mouth Every 8 (Eight) Hours As Needed for Nausea or Vomiting. 20 tablet 1   • pravastatin (PRAVACHOL) 80 MG tablet Take 1 tablet by mouth Every Night. 90 tablet 3   • SITagliptin (Januvia) 100 MG tablet Take 1 tablet by mouth Daily. 90 tablet 3   • SYMBICORT 160-4.5 MCG/ACT inhaler Inhale 2 puffs 2 (Two) Times a Day. 3 inhaler 3     No current facility-administered medications on file prior to visit.       PHYSICAL EXAM    Vitals:    05/17/21 0854   BP: 118/76   BP Location: Left arm   Patient Position: Sitting   Cuff Size: Adult   Pulse: 94   Resp: 15   Temp: 97.7 °F (36.5 °C)   TempSrc: Temporal   SpO2: 93%   Weight: 87.1 kg (192 lb)   Height: 165.1 cm (65\")        Constitutional:  Well developed, well nourished, no acute distress, non-toxic appearance   Eyes:  PERRL, conjunctiva normal   HENT:  Atraumatic, external ears normal, nose normal, oropharynx moist, no pharyngeal exudates. mallampatti   Neck- normal range of motion, no tenderness, supple   Respiratory:  No respiratory distress, normal breath sounds, no rales, no wheezing   Cardiovascular:  Normal rate, normal rhythm, no murmurs, no gallops, no rubs   GI:  Soft, nondistended, normal bowel sounds, nontender, no organomegaly, no mass, no rebound, no guarding   :  No costovertebral angle tenderness   Musculoskeletal:  No edema, no tenderness, no deformities. Back- no tenderness  Integument:  Well hydrated, no rash   Lymphatic:  No lymphadenopathy noted   Neurologic:  Alert & oriented x 3, CN 2-12 normal, normal motor function, normal sensory function, no focal deficits noted   Psychiatric:  Speech and behavior appropriate     XR Spine Lumbar Complete 4+VW    Result Date: 2/26/2021  Degenerative disc disease moderate in degree with mild to moderate levoconvex scoliosis and pars defects at L5 with grade 1 anterolisthesis L5 on S1.  Electronically Signed By-Marlyn Xiong MD On:2/26/2021 10:16 AM This report was finalized on " [id number] by  Marlyn Xiong MD.         ASSESSMENT & PLAN:      Asthma with seasonal allergies, uses Proventil as needed for seasonal symptoms  Obstructive sleep apnea unable to use the machine  Non-smoker  Lungs are clear on exam  Plan to use Combivent as a rescue inhaler      This document has been electronically signed by  Quan Mohamud MD  14:09 EDT

## 2021-05-17 NOTE — TELEPHONE ENCOUNTER
Caller: Pearl Elizalde    Relationship: Self    Best call back number:[phone]    Medication needed:   Requested Prescriptions     Pending Prescriptions Disp Refills   • Proventil  (90 Base) MCG/ACT inhaler       Sig: Inhale 2 puffs Every 4 (Four) Hours As Needed for Wheezing or Shortness of Air.       What is the patient's preferred pharmacy: [facility] [place] [facility] [place] [address] [phone] [facility] [phone] FX           
no

## 2021-05-24 ENCOUNTER — TELEPHONE (OUTPATIENT)
Dept: FAMILY MEDICINE CLINIC | Facility: CLINIC | Age: 61
End: 2021-05-24

## 2021-05-24 DIAGNOSIS — E11.65 TYPE 2 DIABETES MELLITUS WITH HYPERGLYCEMIA, WITHOUT LONG-TERM CURRENT USE OF INSULIN (HCC): Primary | ICD-10-CM

## 2021-05-24 NOTE — TELEPHONE ENCOUNTER
PATIENT CALLED STATING THAT SHE SPOKE WITH HER INSURANCE AND WAS TOLD THAT SHE COULD SWITCH PHARMACIES TO [facility] FOR A CHEAPER PRICE. SHE WOULD LIKE TO HAVE THE MEDICATION SENT THERE INSTEAD.    PLEASE ADVISE  [phone]     [facility] - [address] - [phone]  - [phone] [phone]

## 2021-05-24 NOTE — TELEPHONE ENCOUNTER
Caller: Pearl Elizalde    Relationship to patient: Self    Best call back number:[phone]    Patient is needing: PATIENT CALLED STATING THAT SHE WAS RECENTLY PRESCRIBED JANUVIA. SHE PICKED UP RX FROM PHARMACY LAST MONTH AND IT WAS AFFORDABLE. PATIENT STATES THAT SHE WENT TO LOCAL PHARMACY FOR REFILL AND IT WAS $400. PATIENT IS REQUESTING A CALL BACK FROM [facility] TO DISCUSS OTHER MEDICATION OPTIONS ASAP. PATIENT IS NOW OUT OF MEDICATION

## 2021-06-07 ENCOUNTER — OFFICE VISIT (OUTPATIENT)
Dept: FAMILY MEDICINE CLINIC | Facility: CLINIC | Age: 61
End: 2021-06-07

## 2021-06-07 VITALS
TEMPERATURE: 97.3 F | HEIGHT: 65 IN | OXYGEN SATURATION: 95 % | WEIGHT: 186.2 LBS | SYSTOLIC BLOOD PRESSURE: 169 MMHG | DIASTOLIC BLOOD PRESSURE: 92 MMHG | BODY MASS INDEX: 31.02 KG/M2 | HEART RATE: 90 BPM

## 2021-06-07 DIAGNOSIS — K59.1 FUNCTIONAL DIARRHEA: ICD-10-CM

## 2021-06-07 DIAGNOSIS — R11.2 NON-INTRACTABLE VOMITING WITH NAUSEA, UNSPECIFIED VOMITING TYPE: Primary | ICD-10-CM

## 2021-06-07 PROCEDURE — 99213 OFFICE O/P EST LOW 20 MIN: CPT | Performed by: NURSE PRACTITIONER

## 2021-06-07 RX ORDER — ONDANSETRON 4 MG/1
4 TABLET, FILM COATED ORAL EVERY 8 HOURS PRN
Qty: 20 TABLET | Refills: 1 | Status: SHIPPED | OUTPATIENT
Start: 2021-06-07 | End: 2021-06-07

## 2021-06-07 RX ORDER — DIPHENOXYLATE HYDROCHLORIDE AND ATROPINE SULFATE 2.5; .025 MG/1; MG/1
1 TABLET ORAL 4 TIMES DAILY PRN
Qty: 30 TABLET | Refills: 1 | Status: SHIPPED | OUTPATIENT
Start: 2021-06-07 | End: 2022-05-11

## 2021-06-07 RX ORDER — ONDANSETRON 4 MG/1
4 TABLET, ORALLY DISINTEGRATING ORAL EVERY 8 HOURS PRN
Qty: 20 TABLET | Refills: 1 | Status: SHIPPED | OUTPATIENT
Start: 2021-06-07

## 2021-06-07 NOTE — PATIENT INSTRUCTIONS
Nausea and Vomiting, Adult  Nausea is feeling sick to your stomach or feeling that you are about to throw up (vomit). Vomiting is when food in your stomach is thrown up and out of the mouth. Throwing up can make you feel weak. It can also make you lose too much water in your body (get dehydrated). If you lose too much water in your body, you may:  · Feel tired.  · Feel thirsty.  · Have a dry mouth.  · Have cracked lips.  · Go pee (urinate) less often.  Older adults and people with other diseases or a weak body defense system (immune system) are at higher risk for losing too much water in the body. If you feel sick to your stomach and you throw up, it is important to follow instructions from your doctor about how to take care of yourself.  Follow these instructions at home:  Watch your symptoms for any changes. Tell your doctor about them. Follow these instructions to care for yourself at home.  Eating and drinking         · Take an ORS (oral rehydration solution). This is a drink that is sold at pharmacies and stores.  · Drink clear fluids in small amounts as you are able, such as:  ? Water.  ? Ice chips.  ? Fruit juice that has water added (diluted fruit juice).  ? Low-calorie sports drinks.  · Eat bland, easy-to-digest foods in small amounts as you are able, such as:  ? Bananas.  ? Applesauce.  ? Rice.  ? Low-fat (lean) meats.  ? Toast.  ? Crackers.  · Avoid drinking fluids that have a lot of sugar or caffeine in them. This includes energy drinks, sports drinks, and soda.  · Avoid alcohol.  · Avoid spicy or fatty foods.  General instructions  · Take over-the-counter and prescription medicines only as told by your doctor.  · Drink enough fluid to keep your pee (urine) pale yellow.  · Wash your hands often with soap and water. If you cannot use soap and water, use hand .  · Make sure that all people in your home wash their hands well and often.  · Rest at home while you get better.  · Watch your condition  for any changes.  · Take slow and deep breaths when you feel sick to your stomach.  · Keep all follow-up visits as told by your doctor. This is important.  Contact a doctor if:  · Your symptoms get worse.  · You have new symptoms.  · You have a fever.  · You cannot drink fluids without throwing up.  · You feel sick to your stomach for more than 2 days.  · You feel light-headed or dizzy.  · You have a headache.  · You have muscle cramps.  · You have a rash.  · You have pain while peeing.  Get help right away if:  · You have pain in your chest, neck, arm, or jaw.  · You feel very weak or you pass out (faint).  · You throw up again and again.  · You have throw up that is bright red or looks like black coffee grounds.  · You have bloody or black poop (stools) or poop that looks like tar.  · You have a very bad headache, a stiff neck, or both.  · You have very bad pain, cramping, or bloating in your belly (abdomen).  · You have trouble breathing.  · You are breathing very quickly.  · Your heart is beating very quickly.  · Your skin feels cold and clammy.  · You feel confused.  · You have signs of losing too much water in your body, such as:  ? Dark pee, very little pee, or no pee.  ? Cracked lips.  ? Dry mouth.  ? Sunken eyes.  ? Sleepiness.  ? Weakness.  These symptoms may be an emergency. Do not wait to see if the symptoms will go away. Get medical help right away. Call your local emergency services (911 in the [place] Do not drive yourself to the hospital.  Summary  · Nausea is feeling sick to your stomach or feeling that you are about to throw up (vomit). Vomiting is when food in your stomach is thrown up and out of the mouth.  · Follow instructions from your doctor about eating and drinking to keep from losing too much water in your body.  · Take over-the-counter and prescription medicines only as told by your doctor.  · Contact your doctor if your symptoms get worse or you have new symptoms.  · Keep all follow-up  visits as told by your doctor. This is important.  This information is not intended to replace advice given to you by your health care provider. Make sure you discuss any questions you have with your health care provider.  Document Revised: 04/10/2020 Document Reviewed: 05/28/2019  [employer] Patient Education © 2021 [employer]    Diarrhea, Adult  Diarrhea is when you pass loose and watery poop (stool) often. Diarrhea can make you feel weak and cause you to lose water in your body (get dehydrated). Losing water in your body can cause you to:  · Feel tired and thirsty.  · Have a dry mouth.  · Go pee (urinate) less often.  Diarrhea often lasts 2-3 days. However, it can last longer if it is a sign of something more serious. It is important to treat your diarrhea as told by your doctor.  Follow these instructions at home:  Eating and drinking         Follow these instructions as told by your doctor:  · Take an ORS (oral rehydration solution). This is a drink that helps you replace fluids and minerals your body lost. It is sold at pharmacies and stores.  · Drink plenty of fluids, such as:  ? Water.  ? Ice chips.  ? Diluted fruit juice.  ? Low-calorie sports drinks.  ? Milk, if you want.  · Avoid drinking fluids that have a lot of sugar or caffeine in them.  · Eat bland, easy-to-digest foods in small amounts as you are able. These foods include:  ? Bananas.  ? Applesauce.  ? Rice.  ? Low-fat (lean) meats.  ? Toast.  ? Crackers.  · Avoid alcohol.  · Avoid spicy or fatty foods.    Medicines  · Take over-the-counter and prescription medicines only as told by your doctor.  · If you were prescribed an antibiotic medicine, take it as told by your doctor. Do not stop using the antibiotic even if you start to feel better.  General instructions    · Wash your hands often using soap and water. If soap and water are not available, use a hand . Others in your home should wash their hands as well. Hands should be  washed:  ? After using the toilet or changing a diaper.  ? Before preparing, cooking, or serving food.  ? While caring for a sick person.  ? While visiting someone in a hospital.  · Drink enough fluid to keep your pee (urine) pale yellow.  · Rest at home while you get better.  · Watch your condition for any changes.  · Take a warm bath to help with any burning or pain from having diarrhea.  · Keep all follow-up visits as told by your doctor. This is important.  Contact a doctor if:  · You have a fever.  · Your diarrhea gets worse.  · You have new symptoms.  · You cannot keep fluids down.  · You feel light-headed or dizzy.  · You have a headache.  · You have muscle cramps.  Get help right away if:  · You have chest pain.  · You feel very weak or you pass out (faint).  · You have bloody or black poop or poop that looks like tar.  · You have very bad pain, cramping, or bloating in your belly (abdomen).  · You have trouble breathing or you are breathing very quickly.  · Your heart is beating very quickly.  · Your skin feels cold and clammy.  · You feel confused.  · You have signs of losing too much water in your body, such as:  ? Dark pee, very little pee, or no pee.  ? Cracked lips.  ? Dry mouth.  ? Sunken eyes.  ? Sleepiness.  ? Weakness.  Summary  · Diarrhea is when you pass loose and watery poop (stool) often.  · Diarrhea can make you feel weak and cause you to lose water in your body (get dehydrated).  · Take an ORS (oral rehydration solution). This is a drink that is sold at pharmacies and stores.  · Eat bland, easy-to-digest foods in small amounts as you are able.  · Contact a doctor if your condition gets worse. Get help right away if you have signs that you have lost too much water in your body.  This information is not intended to replace advice given to you by your health care provider. Make sure you discuss any questions you have with your health care provider.  Document Revised: 05/24/2019 Document  Reviewed: 05/24/2019  [employer] Patient Education © 2021 [employer]

## 2021-06-07 NOTE — PROGRESS NOTES
"Subjective   Pearl Elizalde is a 61 y.o. female presents for   Chief Complaint   Patient presents with   • Vomiting     (since Friday)   • Fatigue     (since Friday)   • Diarrhea     (since Yesterday)       Health Maintenance Due   Topic Date Due   • COLORECTAL CANCER SCREENING  Never done   • Pneumococcal Vaccine 0-64 (1 of 1 - PPSV23) Never done   • COVID-19 Vaccine (1) Never done   • ZOSTER VACCINE (1 of 2) Never done   • URINE MICROALBUMIN  07/08/2020       History of Present Illness   Pt present with Vomiting x 3 days and diarrhea that started yesterday.  She reports a 12 lb weight loss since becoming ill. She has been unable to keep medications or food down. She denies fever, but states she felt bad 2 days prior to onset of symptoms.       Vitals:    06/07/21 1034 06/07/21 1035   BP: 134/88 169/92   BP Location: Left arm Right arm   Patient Position: Sitting Sitting   Cuff Size: Adult Adult   Pulse: 90    Temp: 97.3 °F (36.3 °C)    TempSrc: Oral    SpO2: 95%    Weight: 84.5 kg (186 lb 3.2 oz)    Height: 165.1 cm (65\")      Body mass index is 30.99 kg/m².    Current Outpatient Medications on File Prior to Visit   Medication Sig Dispense Refill   • albuterol sulfate  (90 Base) MCG/ACT inhaler Inhale 2 puffs Every 4 (Four) Hours As Needed for Wheezing. Can't tolerate generic-needs Proventil 3 g 3   • Blood Glucose Monitoring Suppl (BLOOD GLUCOSE MONITOR SYSTEM) w/Device kit BLOOD GLUCOSE MONITOR SYSTEM w/Device KIT     • Cholecalciferol (VITAMIN D) 1000 units tablet Take 1,000 Units by mouth Daily.     • colesevelam (WELCHOL) 625 MG tablet Take 1 tablet by mouth 2 (Two) Times a Day With Meals. 180 tablet 3   • Cyanocobalamin (B-12) 1000 MCG tablet Take 1 tablet by mouth Daily.     • diazePAM (VALIUM) 5 MG tablet      • EPINEPHrine (EPIPEN) 0.3 MG/0.3ML solution auto-injector injection      • ezetimibe (Zetia) 10 MG tablet Take 1 tablet by mouth Daily. 90 tablet 3   • gabapentin (NEURONTIN) 800 MG tablet " Take 1 tablet by mouth 3 (Three) Times a Day. TAKE ONE TABLET IN THE AM AND 2  tablet 3   • glimepiride (AMARYL) 4 MG tablet Take 1 tablet by mouth 2 (Two) Times a Day. 180 tablet 4   • glucose blood (FREESTYLE LITE) test strip 1 each by Other route 2 (two) times a day. Use as instructed 100 each 6   • hydrALAZINE (APRESOLINE) 50 MG tablet Take 1 tablet by mouth 3 (Three) Times a Day. 270 tablet 3   • ipratropium-albuterol (COMBIVENT RESPIMAT)  MCG/ACT inhaler Inhale 1 puff 4 (Four) Times a Day As Needed for Wheezing. 4 g 11   • Lancets (FREESTYLE) lancets      • Magnesium 400 MG tablet Take 1 tablet by mouth Daily.     • multivitamin with minerals (CENTRUM SILVER PO) Take 1 tablet by mouth Daily.     • Olmesartan-amLODIPine-HCTZ 40-10-12.5 MG tablet Take 1 tablet by mouth Daily. 90 tablet 3   • Omega-3 Fatty Acids (FISH OIL) 1200 MG capsule capsule Take 1,200 mg by mouth Daily.     • pravastatin (PRAVACHOL) 80 MG tablet Take 1 tablet by mouth Every Night. 90 tablet 3   • SITagliptin (Januvia) 100 MG tablet Take 1 tablet by mouth Daily. 90 tablet 3   • SYMBICORT 160-4.5 MCG/ACT inhaler Inhale 2 puffs 2 (Two) Times a Day. 3 inhaler 3   • [DISCONTINUED] ondansetron (ZOFRAN) 4 MG tablet Take 1 tablet by mouth Every 8 (Eight) Hours As Needed for Nausea or Vomiting. 20 tablet 1     No current facility-administered medications on file prior to visit.       The following portions of the patient's history were reviewed and updated as appropriate: allergies, current medications, past family history, past medical history, past social history, past surgical history, and problem list.    Review of Systems   Constitutional: Positive for fatigue. Negative for chills and fever.   HENT: Negative for sinus pressure and sore throat.    Eyes: Negative for blurred vision.   Respiratory: Negative for cough and shortness of breath.    Cardiovascular: Negative for chest pain.   Gastrointestinal: Positive for diarrhea, nausea  and vomiting. Negative for abdominal pain.   Endocrine: Negative.    Genitourinary: Negative.    Musculoskeletal: Negative for arthralgias and joint swelling.   Skin: Negative for color change.   Allergic/Immunologic: Negative.    Neurological: Negative for dizziness.   Psychiatric/Behavioral: Negative for behavioral problems.       Objective   Physical Exam  Vitals and nursing note reviewed.   Constitutional:       Appearance: Normal appearance. She is well-developed. She is ill-appearing.   HENT:      Head: Normocephalic and atraumatic.      Right Ear: External ear normal.      Left Ear: External ear normal.      Nose: Nose normal.   Eyes:      Extraocular Movements: Extraocular movements intact.      Conjunctiva/sclera: Conjunctivae normal.      Pupils: Pupils are equal, round, and reactive to light.   Cardiovascular:      Rate and Rhythm: Normal rate and regular rhythm.      Pulses: Normal pulses.      Heart sounds: Normal heart sounds.   Pulmonary:      Effort: Pulmonary effort is normal.      Breath sounds: Normal breath sounds.   Abdominal:      General: Bowel sounds are increased.      Palpations: Abdomen is soft.      Tenderness: There is no abdominal tenderness.   Genitourinary:     Vagina: Normal.   Musculoskeletal:         General: Normal range of motion.      Cervical back: Normal range of motion and neck supple.   Skin:     General: Skin is warm and dry.   Neurological:      General: No focal deficit present.      Mental Status: She is alert and oriented to person, place, and time.   Psychiatric:         Mood and Affect: Mood normal.         Behavior: Behavior normal.         Thought Content: Thought content normal.         Judgment: Judgment normal.       PHQ-9 Total Score:      Assessment/Plan   Diagnoses and all orders for this visit:    1. Non-intractable vomiting with nausea, unspecified vomiting type (Primary)  Comments:  call back if no improvement 24-48 hours.    Orders:  -     ondansetron ODT  (Zofran ODT) 4 MG disintegrating tablet; Place 1 tablet on the tongue Every 8 (Eight) Hours As Needed for Nausea or Vomiting.  Dispense: 20 tablet; Refill: 1    2. Functional diarrhea  Comments:  encouraged BRAT diet, continue to drink fluids.  call for worsening or no improvement.   Orders:  -     diphenoxylate-atropine (Lomotil) 2.5-0.025 MG per tablet; Take 1 tablet by mouth 4 (Four) Times a Day As Needed for Diarrhea.  Dispense: 30 tablet; Refill: 1    Other orders  -     Discontinue: ondansetron (Zofran) 4 MG tablet; Take 1 tablet by mouth Every 8 (Eight) Hours As Needed for Nausea or Vomiting.  Dispense: 20 tablet; Refill: 1        Patient Instructions   Nausea and Vomiting, Adult  Nausea is feeling sick to your stomach or feeling that you are about to throw up (vomit). Vomiting is when food in your stomach is thrown up and out of the mouth. Throwing up can make you feel weak. It can also make you lose too much water in your body (get dehydrated). If you lose too much water in your body, you may:  · Feel tired.  · Feel thirsty.  · Have a dry mouth.  · Have cracked lips.  · Go pee (urinate) less often.  Older adults and people with other diseases or a weak body defense system (immune system) are at higher risk for losing too much water in the body. If you feel sick to your stomach and you throw up, it is important to follow instructions from your doctor about how to take care of yourself.  Follow these instructions at home:  Watch your symptoms for any changes. Tell your doctor about them. Follow these instructions to care for yourself at home.  Eating and drinking         · Take an ORS (oral rehydration solution). This is a drink that is sold at pharmacies and stores.  · Drink clear fluids in small amounts as you are able, such as:  ? Water.  ? Ice chips.  ? Fruit juice that has water added (diluted fruit juice).  ? Low-calorie sports drinks.  · Eat bland, easy-to-digest foods in small amounts as you are able,  such as:  ? Bananas.  ? Applesauce.  ? Rice.  ? Low-fat (lean) meats.  ? Toast.  ? Crackers.  · Avoid drinking fluids that have a lot of sugar or caffeine in them. This includes energy drinks, sports drinks, and soda.  · Avoid alcohol.  · Avoid spicy or fatty foods.  General instructions  · Take over-the-counter and prescription medicines only as told by your doctor.  · Drink enough fluid to keep your pee (urine) pale yellow.  · Wash your hands often with soap and water. If you cannot use soap and water, use hand .  · Make sure that all people in your home wash their hands well and often.  · Rest at home while you get better.  · Watch your condition for any changes.  · Take slow and deep breaths when you feel sick to your stomach.  · Keep all follow-up visits as told by your doctor. This is important.  Contact a doctor if:  · Your symptoms get worse.  · You have new symptoms.  · You have a fever.  · You cannot drink fluids without throwing up.  · You feel sick to your stomach for more than 2 days.  · You feel light-headed or dizzy.  · You have a headache.  · You have muscle cramps.  · You have a rash.  · You have pain while peeing.  Get help right away if:  · You have pain in your chest, neck, arm, or jaw.  · You feel very weak or you pass out (faint).  · You throw up again and again.  · You have throw up that is bright red or looks like black coffee grounds.  · You have bloody or black poop (stools) or poop that looks like tar.  · You have a very bad headache, a stiff neck, or both.  · You have very bad pain, cramping, or bloating in your belly (abdomen).  · You have trouble breathing.  · You are breathing very quickly.  · Your heart is beating very quickly.  · Your skin feels cold and clammy.  · You feel confused.  · You have signs of losing too much water in your body, such as:  ? Dark pee, very little pee, or no pee.  ? Cracked lips.  ? Dry mouth.  ? Sunken eyes.  ? Sleepiness.  ? Weakness.  These  symptoms may be an emergency. Do not wait to see if the symptoms will go away. Get medical help right away. Call your local emergency services (911 in the [place] Do not drive yourself to the hospital.  Summary  · Nausea is feeling sick to your stomach or feeling that you are about to throw up (vomit). Vomiting is when food in your stomach is thrown up and out of the mouth.  · Follow instructions from your doctor about eating and drinking to keep from losing too much water in your body.  · Take over-the-counter and prescription medicines only as told by your doctor.  · Contact your doctor if your symptoms get worse or you have new symptoms.  · Keep all follow-up visits as told by your doctor. This is important.  This information is not intended to replace advice given to you by your health care provider. Make sure you discuss any questions you have with your health care provider.  Document Revised: 04/10/2020 Document Reviewed: 05/28/2019  [employer] Patient Education © 2021 [employer]    Diarrhea, Adult  Diarrhea is when you pass loose and watery poop (stool) often. Diarrhea can make you feel weak and cause you to lose water in your body (get dehydrated). Losing water in your body can cause you to:  · Feel tired and thirsty.  · Have a dry mouth.  · Go pee (urinate) less often.  Diarrhea often lasts 2-3 days. However, it can last longer if it is a sign of something more serious. It is important to treat your diarrhea as told by your doctor.  Follow these instructions at home:  Eating and drinking         Follow these instructions as told by your doctor:  · Take an ORS (oral rehydration solution). This is a drink that helps you replace fluids and minerals your body lost. It is sold at pharmacies and stores.  · Drink plenty of fluids, such as:  ? Water.  ? Ice chips.  ? Diluted fruit juice.  ? Low-calorie sports drinks.  ? Milk, if you want.  · Avoid drinking fluids that have a lot of sugar or caffeine in them.  · Eat  bland, easy-to-digest foods in small amounts as you are able. These foods include:  ? Bananas.  ? Applesauce.  ? Rice.  ? Low-fat (lean) meats.  ? Toast.  ? Crackers.  · Avoid alcohol.  · Avoid spicy or fatty foods.    Medicines  · Take over-the-counter and prescription medicines only as told by your doctor.  · If you were prescribed an antibiotic medicine, take it as told by your doctor. Do not stop using the antibiotic even if you start to feel better.  General instructions    · Wash your hands often using soap and water. If soap and water are not available, use a hand . Others in your home should wash their hands as well. Hands should be washed:  ? After using the toilet or changing a diaper.  ? Before preparing, cooking, or serving food.  ? While caring for a sick person.  ? While visiting someone in a hospital.  · Drink enough fluid to keep your pee (urine) pale yellow.  · Rest at home while you get better.  · Watch your condition for any changes.  · Take a warm bath to help with any burning or pain from having diarrhea.  · Keep all follow-up visits as told by your doctor. This is important.  Contact a doctor if:  · You have a fever.  · Your diarrhea gets worse.  · You have new symptoms.  · You cannot keep fluids down.  · You feel light-headed or dizzy.  · You have a headache.  · You have muscle cramps.  Get help right away if:  · You have chest pain.  · You feel very weak or you pass out (faint).  · You have bloody or black poop or poop that looks like tar.  · You have very bad pain, cramping, or bloating in your belly (abdomen).  · You have trouble breathing or you are breathing very quickly.  · Your heart is beating very quickly.  · Your skin feels cold and clammy.  · You feel confused.  · You have signs of losing too much water in your body, such as:  ? Dark pee, very little pee, or no pee.  ? Cracked lips.  ? Dry mouth.  ? Sunken eyes.  ? Sleepiness.  ? Weakness.  Summary  · Diarrhea is when you  pass loose and watery poop (stool) often.  · Diarrhea can make you feel weak and cause you to lose water in your body (get dehydrated).  · Take an ORS (oral rehydration solution). This is a drink that is sold at pharmacies and stores.  · Eat bland, easy-to-digest foods in small amounts as you are able.  · Contact a doctor if your condition gets worse. Get help right away if you have signs that you have lost too much water in your body.  This information is not intended to replace advice given to you by your health care provider. Make sure you discuss any questions you have with your health care provider.  Document Revised: 05/24/2019 Document Reviewed: 05/24/2019  [employer] Patient Education © 2021 [employer]

## 2021-06-08 DIAGNOSIS — Z20.822 EXPOSURE TO COVID-19 VIRUS: Primary | ICD-10-CM

## 2021-06-14 ENCOUNTER — CLINICAL SUPPORT (OUTPATIENT)
Dept: FAMILY MEDICINE CLINIC | Facility: CLINIC | Age: 61
End: 2021-06-14

## 2021-06-14 DIAGNOSIS — R25.2 MUSCLE CRAMPS: Primary | ICD-10-CM

## 2021-06-14 DIAGNOSIS — I10 ESSENTIAL HYPERTENSION: ICD-10-CM

## 2021-06-14 DIAGNOSIS — E11.8 DISORDER ASSOCIATED WITH TYPE 2 DIABETES MELLITUS (HCC): ICD-10-CM

## 2021-06-14 DIAGNOSIS — Z20.822 EXPOSURE TO COVID-19 VIRUS: ICD-10-CM

## 2021-06-14 DIAGNOSIS — E78.5 HYPERLIPIDEMIA, UNSPECIFIED HYPERLIPIDEMIA TYPE: ICD-10-CM

## 2021-06-14 DIAGNOSIS — E55.9 VITAMIN D DEFICIENCY: ICD-10-CM

## 2021-06-14 DIAGNOSIS — E11.65 TYPE 2 DIABETES MELLITUS WITH HYPERGLYCEMIA, WITHOUT LONG-TERM CURRENT USE OF INSULIN (HCC): ICD-10-CM

## 2021-06-14 LAB
25(OH)D3 SERPL-MCNC: 24 NG/ML
ALBUMIN SERPL-MCNC: 4.1 G/DL (ref 3.5–5.2)
ALBUMIN/GLOB SERPL: 1.4 G/DL
ALP SERPL-CCNC: 92 U/L (ref 39–117)
ALT SERPL W P-5'-P-CCNC: 22 U/L (ref 1–33)
ANION GAP SERPL CALCULATED.3IONS-SCNC: 10.2 MMOL/L (ref 5–15)
AST SERPL-CCNC: 20 U/L (ref 1–32)
BASOPHILS # BLD AUTO: 0.08 10*3/MM3 (ref 0–0.2)
BASOPHILS NFR BLD AUTO: 1.2 % (ref 0–1.5)
BILIRUB SERPL-MCNC: 0.9 MG/DL (ref 0–1.2)
BUN SERPL-MCNC: 22 MG/DL (ref 8–23)
BUN/CREAT SERPL: 25 (ref 7–25)
CALCIUM SPEC-SCNC: 9.5 MG/DL (ref 8.6–10.5)
CHLORIDE SERPL-SCNC: 102 MMOL/L (ref 98–107)
CHOLEST SERPL-MCNC: 151 MG/DL (ref 0–200)
CO2 SERPL-SCNC: 25.8 MMOL/L (ref 22–29)
CREAT SERPL-MCNC: 0.88 MG/DL (ref 0.57–1)
DEPRECATED RDW RBC AUTO: 40.2 FL (ref 37–54)
EOSINOPHIL # BLD AUTO: 0.22 10*3/MM3 (ref 0–0.4)
EOSINOPHIL NFR BLD AUTO: 3.2 % (ref 0.3–6.2)
ERYTHROCYTE [DISTWIDTH] IN BLOOD BY AUTOMATED COUNT: 13.1 % (ref 12.3–15.4)
GFR SERPL CREATININE-BSD FRML MDRD: 65 ML/MIN/1.73
GLOBULIN UR ELPH-MCNC: 2.9 GM/DL
GLUCOSE SERPL-MCNC: 171 MG/DL (ref 65–99)
HBA1C MFR BLD: 7.8 % (ref 3.5–5.6)
HCT VFR BLD AUTO: 38.5 % (ref 34–46.6)
HDLC SERPL-MCNC: 35 MG/DL (ref 40–60)
HGB BLD-MCNC: 13.1 G/DL (ref 12–15.9)
IMM GRANULOCYTES # BLD AUTO: 0.03 10*3/MM3 (ref 0–0.05)
IMM GRANULOCYTES NFR BLD AUTO: 0.4 % (ref 0–0.5)
LDLC SERPL CALC-MCNC: 51 MG/DL (ref 0–100)
LDLC/HDLC SERPL: 0.82 {RATIO}
LYMPHOCYTES # BLD AUTO: 1.51 10*3/MM3 (ref 0.7–3.1)
LYMPHOCYTES NFR BLD AUTO: 22 % (ref 19.6–45.3)
MCH RBC QN AUTO: 28.9 PG (ref 26.6–33)
MCHC RBC AUTO-ENTMCNC: 34 G/DL (ref 31.5–35.7)
MCV RBC AUTO: 84.8 FL (ref 79–97)
MONOCYTES # BLD AUTO: 0.66 10*3/MM3 (ref 0.1–0.9)
MONOCYTES NFR BLD AUTO: 9.6 % (ref 5–12)
NEUTROPHILS NFR BLD AUTO: 4.37 10*3/MM3 (ref 1.7–7)
NEUTROPHILS NFR BLD AUTO: 63.6 % (ref 42.7–76)
NRBC BLD AUTO-RTO: 0 /100 WBC (ref 0–0.2)
PLATELET # BLD AUTO: 259 10*3/MM3 (ref 140–450)
PMV BLD AUTO: 10.8 FL (ref 6–12)
POTASSIUM SERPL-SCNC: 4.4 MMOL/L (ref 3.5–5.2)
PROT SERPL-MCNC: 7 G/DL (ref 6–8.5)
RBC # BLD AUTO: 4.54 10*6/MM3 (ref 3.77–5.28)
SODIUM SERPL-SCNC: 138 MMOL/L (ref 136–145)
TRIGL SERPL-MCNC: 437 MG/DL (ref 0–150)
TSH SERPL DL<=0.05 MIU/L-ACNC: 0.92 UIU/ML (ref 0.27–4.2)
VIT B12 BLD-MCNC: 606 PG/ML (ref 211–946)
VLDLC SERPL-MCNC: 65 MG/DL (ref 5–40)
WBC # BLD AUTO: 6.87 10*3/MM3 (ref 3.4–10.8)

## 2021-06-14 PROCEDURE — 82306 VITAMIN D 25 HYDROXY: CPT | Performed by: NURSE PRACTITIONER

## 2021-06-14 PROCEDURE — 82570 ASSAY OF URINE CREATININE: CPT | Performed by: NURSE PRACTITIONER

## 2021-06-14 PROCEDURE — 82043 UR ALBUMIN QUANTITATIVE: CPT | Performed by: NURSE PRACTITIONER

## 2021-06-14 PROCEDURE — 83036 HEMOGLOBIN GLYCOSYLATED A1C: CPT | Performed by: NURSE PRACTITIONER

## 2021-06-14 PROCEDURE — 80061 LIPID PANEL: CPT | Performed by: NURSE PRACTITIONER

## 2021-06-14 PROCEDURE — 86769 SARS-COV-2 COVID-19 ANTIBODY: CPT | Performed by: NURSE PRACTITIONER

## 2021-06-14 PROCEDURE — 85025 COMPLETE CBC W/AUTO DIFF WBC: CPT | Performed by: NURSE PRACTITIONER

## 2021-06-14 PROCEDURE — 36415 COLL VENOUS BLD VENIPUNCTURE: CPT | Performed by: PREVENTIVE MEDICINE

## 2021-06-14 PROCEDURE — 82607 VITAMIN B-12: CPT | Performed by: NURSE PRACTITIONER

## 2021-06-14 PROCEDURE — 84443 ASSAY THYROID STIM HORMONE: CPT | Performed by: NURSE PRACTITIONER

## 2021-06-14 PROCEDURE — 80053 COMPREHEN METABOLIC PANEL: CPT | Performed by: NURSE PRACTITIONER

## 2021-06-14 RX ORDER — CYCLOBENZAPRINE HCL 10 MG
10 TABLET ORAL 3 TIMES DAILY PRN
Qty: 30 TABLET | Refills: 0 | Status: SHIPPED | OUTPATIENT
Start: 2021-06-14 | End: 2021-08-09

## 2021-06-14 NOTE — PROGRESS NOTES
Venipuncture Blood Specimen Collection  Venipuncture performed in left arm by Isadora Villarreal MA with good hemostasis. Patient tolerated the procedure well without complications.   06/14/21   GREGORY Christina MD

## 2021-06-15 ENCOUNTER — CLINICAL SUPPORT (OUTPATIENT)
Dept: FAMILY MEDICINE CLINIC | Facility: CLINIC | Age: 61
End: 2021-06-15

## 2021-06-15 LAB
ALBUMIN UR-MCNC: 1.6 MG/DL
CREAT UR-MCNC: 54.1 MG/DL
MICROALBUMIN/CREAT UR: 29.6 MG/G
SARS-COV-2 AB SERPL QL IA: POSITIVE

## 2021-07-28 ENCOUNTER — TELEPHONE (OUTPATIENT)
Dept: ENDOCRINOLOGY | Facility: CLINIC | Age: 61
End: 2021-07-28

## 2021-08-09 ENCOUNTER — OFFICE VISIT (OUTPATIENT)
Dept: FAMILY MEDICINE CLINIC | Facility: CLINIC | Age: 61
End: 2021-08-09

## 2021-08-09 VITALS
DIASTOLIC BLOOD PRESSURE: 72 MMHG | OXYGEN SATURATION: 93 % | SYSTOLIC BLOOD PRESSURE: 112 MMHG | TEMPERATURE: 97.7 F | BODY MASS INDEX: 33.15 KG/M2 | HEIGHT: 65 IN | HEART RATE: 87 BPM | WEIGHT: 199 LBS

## 2021-08-09 DIAGNOSIS — J45.909 UNCOMPLICATED ASTHMA, UNSPECIFIED ASTHMA SEVERITY, UNSPECIFIED WHETHER PERSISTENT: ICD-10-CM

## 2021-08-09 DIAGNOSIS — E11.42 DIABETIC PERIPHERAL NEUROPATHY (HCC): ICD-10-CM

## 2021-08-09 DIAGNOSIS — E55.9 VITAMIN D DEFICIENCY: ICD-10-CM

## 2021-08-09 DIAGNOSIS — I10 ESSENTIAL HYPERTENSION: ICD-10-CM

## 2021-08-09 DIAGNOSIS — E78.5 HYPERLIPIDEMIA, UNSPECIFIED HYPERLIPIDEMIA TYPE: ICD-10-CM

## 2021-08-09 DIAGNOSIS — G25.81 RESTLESS LEGS: ICD-10-CM

## 2021-08-09 DIAGNOSIS — E11.65 TYPE 2 DIABETES MELLITUS WITH HYPERGLYCEMIA, WITHOUT LONG-TERM CURRENT USE OF INSULIN (HCC): Primary | ICD-10-CM

## 2021-08-09 PROCEDURE — 99214 OFFICE O/P EST MOD 30 MIN: CPT | Performed by: NURSE PRACTITIONER

## 2021-08-09 RX ORDER — MIRTAZAPINE 15 MG/1
15 TABLET, FILM COATED ORAL NIGHTLY
Qty: 30 TABLET | Refills: 3 | Status: SHIPPED | OUTPATIENT
Start: 2021-08-09 | End: 2023-01-09

## 2021-08-09 RX ORDER — BUDESONIDE AND FORMOTEROL FUMARATE DIHYDRATE 160; 4.5 UG/1; UG/1
2 AEROSOL RESPIRATORY (INHALATION) 2 TIMES DAILY
Qty: 3 EACH | Refills: 3 | Status: SHIPPED | OUTPATIENT
Start: 2021-08-09 | End: 2022-05-11 | Stop reason: SDUPTHER

## 2021-08-09 NOTE — PROGRESS NOTES
"Subjective   Pearl Elizalde is a 61 y.o. female presents for   Chief Complaint   Patient presents with   • Diabetes     3 month fu   • Hypertension   • Hyperlipidemia       Health Maintenance Due   Topic Date Due   • COLORECTAL CANCER SCREENING  Never done   • Pneumococcal Vaccine 0-64 (1 of 2 - PPSV23) Never done   • COVID-19 Vaccine (1) Never done   • ZOSTER VACCINE (1 of 2) Never done       History of Present Illness   Pt present for follow up DM, HTN and Hyperlipidemia.  She also reports leg cramps that move up her body and are causing difficulty sleeping.  She has tried muscle relaxers in the past with no improvement.  She has appt with endocrinology in October and diabetes education classes.  She also requests refill of symbicort and states asthma symptoms are well controlled with current medication.     Vitals:    08/09/21 0803   BP: 112/72   BP Location: Right arm   Patient Position: Sitting   Cuff Size: Adult   Pulse: 87   Temp: 97.7 °F (36.5 °C)   SpO2: 93%   Weight: 90.3 kg (199 lb)   Height: 165.1 cm (65\")     Body mass index is 33.12 kg/m².    Current Outpatient Medications on File Prior to Visit   Medication Sig Dispense Refill   • Blood Glucose Monitoring Suppl (BLOOD GLUCOSE MONITOR SYSTEM) w/Device kit BLOOD GLUCOSE MONITOR SYSTEM w/Device KIT     • Cholecalciferol (VITAMIN D) 1000 units tablet Take 1,000 Units by mouth Daily.     • colesevelam (WELCHOL) 625 MG tablet Take 1 tablet by mouth 2 (Two) Times a Day With Meals. 180 tablet 3   • Cyanocobalamin (B-12) 1000 MCG tablet Take 1 tablet by mouth Daily.     • diphenoxylate-atropine (Lomotil) 2.5-0.025 MG per tablet Take 1 tablet by mouth 4 (Four) Times a Day As Needed for Diarrhea. 30 tablet 1   • EPINEPHrine (EPIPEN) 0.3 MG/0.3ML solution auto-injector injection      • ezetimibe (Zetia) 10 MG tablet Take 1 tablet by mouth Daily. 90 tablet 3   • gabapentin (NEURONTIN) 800 MG tablet Take 1 tablet by mouth 3 (Three) Times a Day. TAKE ONE TABLET IN " THE AM AND 2 PM (Patient taking differently: Take 800 mg by mouth 3 (Three) Times a Day. TAKE twoTABLET IN THE AM AND  PM) 270 tablet 3   • glucose blood (FREESTYLE LITE) test strip 1 each by Other route 2 (two) times a day. Use as instructed 100 each 6   • hydrALAZINE (APRESOLINE) 50 MG tablet Take 1 tablet by mouth 3 (Three) Times a Day. (Patient taking differently: Take 50 mg by mouth 2 (two) times a day.) 270 tablet 3   • ipratropium-albuterol (COMBIVENT RESPIMAT)  MCG/ACT inhaler Inhale 1 puff 4 (Four) Times a Day As Needed for Wheezing. 4 g 11   • Lancets (FREESTYLE) lancets      • Magnesium 400 MG tablet Take 1 tablet by mouth Daily.     • multivitamin with minerals (CENTRUM SILVER PO) Take 1 tablet by mouth Daily.     • ondansetron ODT (Zofran ODT) 4 MG disintegrating tablet Place 1 tablet on the tongue Every 8 (Eight) Hours As Needed for Nausea or Vomiting. 20 tablet 1   • pravastatin (PRAVACHOL) 80 MG tablet Take 1 tablet by mouth Every Night. 90 tablet 3   • SITagliptin (Januvia) 100 MG tablet Take 1 tablet by mouth Daily. 90 tablet 3   • [DISCONTINUED] SYMBICORT 160-4.5 MCG/ACT inhaler Inhale 2 puffs 2 (Two) Times a Day. 3 inhaler 3   • albuterol sulfate  (90 Base) MCG/ACT inhaler Inhale 2 puffs Every 4 (Four) Hours As Needed for Wheezing. Can't tolerate generic-needs Proventil 3 g 3   • glimepiride (AMARYL) 4 MG tablet Take 1 tablet by mouth 2 (Two) Times a Day. 180 tablet 4   • Olmesartan-amLODIPine-HCTZ 40-10-12.5 MG tablet Take 1 tablet by mouth Daily. 90 tablet 3   • Omega-3 Fatty Acids (FISH OIL) 1200 MG capsule capsule Take 1,200 mg by mouth Daily.     • [DISCONTINUED] cyclobenzaprine (FLEXERIL) 10 MG tablet Take 1 tablet by mouth 3 (Three) Times a Day As Needed for Muscle Spasms. 30 tablet 0     No current facility-administered medications on file prior to visit.       The following portions of the patient's history were reviewed and updated as appropriate: allergies, current  medications, past family history, past medical history, past social history, past surgical history, and problem list.    Review of Systems   Constitutional: Negative for chills and fever.   HENT: Negative for sinus pressure and sore throat.    Eyes: Negative for blurred vision.   Respiratory: Negative for cough and shortness of breath.    Cardiovascular: Negative for chest pain.   Gastrointestinal: Negative for abdominal pain.   Endocrine: Negative.    Genitourinary: Negative.    Musculoskeletal: Negative for arthralgias and joint swelling.   Skin: Negative for color change.   Allergic/Immunologic: Negative.    Neurological: Positive for numbness. Negative for dizziness.        Leg cramps   Hematological: Negative.    Psychiatric/Behavioral: Negative for behavioral problems.       Objective   Physical Exam  Vitals and nursing note reviewed.   Constitutional:       Appearance: Normal appearance. She is well-developed. She is obese.   HENT:      Head: Normocephalic and atraumatic.      Right Ear: Tympanic membrane, ear canal and external ear normal.      Left Ear: Tympanic membrane, ear canal and external ear normal.      Nose: Nose normal.   Eyes:      Extraocular Movements: Extraocular movements intact.      Conjunctiva/sclera: Conjunctivae normal.      Pupils: Pupils are equal, round, and reactive to light.   Cardiovascular:      Rate and Rhythm: Normal rate and regular rhythm.      Pulses: Normal pulses.      Heart sounds: Normal heart sounds.   Pulmonary:      Effort: Pulmonary effort is normal.      Breath sounds: Normal breath sounds.   Abdominal:      General: Bowel sounds are normal.      Palpations: Abdomen is soft.   Genitourinary:     Vagina: Normal.   Musculoskeletal:         General: Normal range of motion.      Cervical back: Normal range of motion and neck supple.   Skin:     General: Skin is warm and dry.   Neurological:      General: No focal deficit present.      Mental Status: She is alert and  oriented to person, place, and time.   Psychiatric:         Mood and Affect: Mood normal.         Behavior: Behavior normal.         Thought Content: Thought content normal.         Judgment: Judgment normal.       PHQ-9 Total Score:      Assessment/Plan   Diagnoses and all orders for this visit:    1. Type 2 diabetes mellitus with hyperglycemia, without long-term current use of insulin (CMS/[facility]) (Primary)  -     Comprehensive Metabolic Panel; Future  -     Hemoglobin A1c; Future  -     TSH; Future  -     Vitamin B12; Future    2. Vitamin D deficiency  -     Vitamin D 25 Hydroxy; Future    3. Hyperlipidemia, unspecified hyperlipidemia type  -     Lipid Panel; Future    4. Essential hypertension  -     CBC Auto Differential; Future    5. Diabetic peripheral neuropathy (CMS/[facility])    6. Uncomplicated asthma, unspecified asthma severity, unspecified whether persistent    7. Restless legs  Comments:  remeron prescribed and pt instructed on use    Other orders  -     Symbicort 160-4.5 MCG/ACT inhaler; Inhale 2 puffs 2 (Two) Times a Day.  Dispense: 3 each; Refill: 3  -     mirtazapine (Remeron) 15 MG tablet; Take 1 tablet by mouth Every Night for 30 days.  Dispense: 30 tablet; Refill: 3        There are no Patient Instructions on file for this visit.

## 2021-09-01 RX ORDER — EZETIMIBE 10 MG/1
TABLET ORAL
Qty: 90 TABLET | Refills: 3 | Status: SHIPPED | OUTPATIENT
Start: 2021-09-01 | End: 2022-01-11 | Stop reason: SDUPTHER

## 2021-10-07 ENCOUNTER — TELEPHONE (OUTPATIENT)
Dept: FAMILY MEDICINE CLINIC | Facility: CLINIC | Age: 61
End: 2021-10-07

## 2021-10-07 NOTE — TELEPHONE ENCOUNTER
Order in chart was good until 2/24/22. I called patient and informed and also faxed to [facility]  I told her if they needed anything else they would have this and are able to reach us about additional info

## 2021-10-12 ENCOUNTER — CLINICAL SUPPORT (OUTPATIENT)
Dept: FAMILY MEDICINE CLINIC | Facility: CLINIC | Age: 61
End: 2021-10-12

## 2021-10-12 DIAGNOSIS — I10 ESSENTIAL HYPERTENSION: ICD-10-CM

## 2021-10-12 DIAGNOSIS — E55.9 VITAMIN D DEFICIENCY: ICD-10-CM

## 2021-10-12 DIAGNOSIS — E78.5 HYPERLIPIDEMIA, UNSPECIFIED HYPERLIPIDEMIA TYPE: ICD-10-CM

## 2021-10-12 DIAGNOSIS — E11.65 TYPE 2 DIABETES MELLITUS WITH HYPERGLYCEMIA, WITHOUT LONG-TERM CURRENT USE OF INSULIN (HCC): ICD-10-CM

## 2021-10-12 LAB
25(OH)D3 SERPL-MCNC: 27 NG/ML
ALBUMIN SERPL-MCNC: 4.5 G/DL (ref 3.5–5.2)
ALBUMIN/GLOB SERPL: 1.6 G/DL
ALP SERPL-CCNC: 87 U/L (ref 39–117)
ALT SERPL W P-5'-P-CCNC: 23 U/L (ref 1–33)
ANION GAP SERPL CALCULATED.3IONS-SCNC: 13 MMOL/L (ref 5–15)
AST SERPL-CCNC: 24 U/L (ref 1–32)
BASOPHILS # BLD AUTO: 0.05 10*3/MM3 (ref 0–0.2)
BASOPHILS NFR BLD AUTO: 0.8 % (ref 0–1.5)
BILIRUB SERPL-MCNC: 0.8 MG/DL (ref 0–1.2)
BUN SERPL-MCNC: 25 MG/DL (ref 8–23)
BUN/CREAT SERPL: 26 (ref 7–25)
CALCIUM SPEC-SCNC: 9.5 MG/DL (ref 8.6–10.5)
CHLORIDE SERPL-SCNC: 101 MMOL/L (ref 98–107)
CHOLEST SERPL-MCNC: 161 MG/DL (ref 0–200)
CO2 SERPL-SCNC: 24 MMOL/L (ref 22–29)
CREAT SERPL-MCNC: 0.96 MG/DL (ref 0.57–1)
DEPRECATED RDW RBC AUTO: 42.6 FL (ref 37–54)
EOSINOPHIL # BLD AUTO: 0.18 10*3/MM3 (ref 0–0.4)
EOSINOPHIL NFR BLD AUTO: 3 % (ref 0.3–6.2)
ERYTHROCYTE [DISTWIDTH] IN BLOOD BY AUTOMATED COUNT: 13.1 % (ref 12.3–15.4)
GFR SERPL CREATININE-BSD FRML MDRD: 59 ML/MIN/1.73
GLOBULIN UR ELPH-MCNC: 2.9 GM/DL
GLUCOSE SERPL-MCNC: 213 MG/DL (ref 65–99)
HCT VFR BLD AUTO: 41.6 % (ref 34–46.6)
HDLC SERPL-MCNC: 36 MG/DL (ref 40–60)
HGB BLD-MCNC: 13.1 G/DL (ref 12–15.9)
IMM GRANULOCYTES # BLD AUTO: 0.03 10*3/MM3 (ref 0–0.05)
IMM GRANULOCYTES NFR BLD AUTO: 0.5 % (ref 0–0.5)
LDLC SERPL CALC-MCNC: 65 MG/DL (ref 0–100)
LDLC/HDLC SERPL: 1.32 {RATIO}
LYMPHOCYTES # BLD AUTO: 1.07 10*3/MM3 (ref 0.7–3.1)
LYMPHOCYTES NFR BLD AUTO: 17.6 % (ref 19.6–45.3)
MCH RBC QN AUTO: 27.8 PG (ref 26.6–33)
MCHC RBC AUTO-ENTMCNC: 31.5 G/DL (ref 31.5–35.7)
MCV RBC AUTO: 88.1 FL (ref 79–97)
MONOCYTES # BLD AUTO: 0.45 10*3/MM3 (ref 0.1–0.9)
MONOCYTES NFR BLD AUTO: 7.4 % (ref 5–12)
NEUTROPHILS NFR BLD AUTO: 4.31 10*3/MM3 (ref 1.7–7)
NEUTROPHILS NFR BLD AUTO: 70.7 % (ref 42.7–76)
NRBC BLD AUTO-RTO: 0 /100 WBC (ref 0–0.2)
PLATELET # BLD AUTO: 266 10*3/MM3 (ref 140–450)
PMV BLD AUTO: 10.7 FL (ref 6–12)
POTASSIUM SERPL-SCNC: 4.1 MMOL/L (ref 3.5–5.2)
PROT SERPL-MCNC: 7.4 G/DL (ref 6–8.5)
RBC # BLD AUTO: 4.72 10*6/MM3 (ref 3.77–5.28)
SODIUM SERPL-SCNC: 138 MMOL/L (ref 136–145)
TRIGL SERPL-MCNC: 388 MG/DL (ref 0–150)
TSH SERPL DL<=0.05 MIU/L-ACNC: 0.89 UIU/ML (ref 0.27–4.2)
VIT B12 BLD-MCNC: 729 PG/ML (ref 211–946)
VLDLC SERPL-MCNC: 60 MG/DL (ref 5–40)
WBC # BLD AUTO: 6.09 10*3/MM3 (ref 3.4–10.8)

## 2021-10-12 PROCEDURE — 84443 ASSAY THYROID STIM HORMONE: CPT | Performed by: NURSE PRACTITIONER

## 2021-10-12 PROCEDURE — 80053 COMPREHEN METABOLIC PANEL: CPT | Performed by: NURSE PRACTITIONER

## 2021-10-12 PROCEDURE — 82607 VITAMIN B-12: CPT | Performed by: NURSE PRACTITIONER

## 2021-10-12 PROCEDURE — 83036 HEMOGLOBIN GLYCOSYLATED A1C: CPT | Performed by: NURSE PRACTITIONER

## 2021-10-12 PROCEDURE — 85025 COMPLETE CBC W/AUTO DIFF WBC: CPT | Performed by: NURSE PRACTITIONER

## 2021-10-12 PROCEDURE — 80061 LIPID PANEL: CPT | Performed by: NURSE PRACTITIONER

## 2021-10-12 PROCEDURE — 82306 VITAMIN D 25 HYDROXY: CPT | Performed by: NURSE PRACTITIONER

## 2021-10-12 PROCEDURE — 36415 COLL VENOUS BLD VENIPUNCTURE: CPT | Performed by: PREVENTIVE MEDICINE

## 2021-10-12 RX ORDER — GABAPENTIN 800 MG/1
800 TABLET ORAL 3 TIMES DAILY
Qty: 270 TABLET | Refills: 3 | Status: SHIPPED | OUTPATIENT
Start: 2021-10-12 | End: 2022-01-11 | Stop reason: SDUPTHER

## 2021-10-12 NOTE — PROGRESS NOTES
Venipuncture Blood Specimen Collection  Venipuncture performed in left arm by Isadora Villarreal MA with good hemostasis. Patient tolerated the procedure well without complications.   10/12/21   GREGORY Christina MD

## 2021-10-12 NOTE — TELEPHONE ENCOUNTER
Med refill. And patient states that she has neuropathy in feet and gaapentin is not helping, feet and legs are numb.

## 2021-10-13 DIAGNOSIS — E55.9 VITAMIN D DEFICIENCY: Primary | ICD-10-CM

## 2021-10-13 LAB — HBA1C MFR BLD: 8 % (ref 3.5–5.6)

## 2021-10-13 RX ORDER — ERGOCALCIFEROL 1.25 MG/1
50000 CAPSULE ORAL
Qty: 5 CAPSULE | Refills: 6 | Status: SHIPPED | OUTPATIENT
Start: 2021-10-13 | End: 2022-06-08

## 2021-11-23 NOTE — TELEPHONE ENCOUNTER
HUB TO READ    Left message    Labs have been ordered             verbal instruction/patient instructed/teach back - (Patient repeats in own words)

## 2022-01-11 DIAGNOSIS — I10 ESSENTIAL HYPERTENSION: ICD-10-CM

## 2022-01-11 DIAGNOSIS — E78.5 HYPERLIPIDEMIA, UNSPECIFIED HYPERLIPIDEMIA TYPE: ICD-10-CM

## 2022-01-11 DIAGNOSIS — E11.65 TYPE 2 DIABETES MELLITUS WITH HYPERGLYCEMIA, WITHOUT LONG-TERM CURRENT USE OF INSULIN: ICD-10-CM

## 2022-01-11 RX ORDER — HYDRALAZINE HYDROCHLORIDE 50 MG/1
50 TABLET, FILM COATED ORAL 3 TIMES DAILY
Qty: 270 TABLET | Refills: 3 | Status: SHIPPED | OUTPATIENT
Start: 2022-01-11 | End: 2022-12-08

## 2022-01-11 RX ORDER — GABAPENTIN 800 MG/1
800 TABLET ORAL 3 TIMES DAILY
Qty: 270 TABLET | Refills: 3 | Status: SHIPPED | OUTPATIENT
Start: 2022-01-11 | End: 2022-11-18 | Stop reason: SDUPTHER

## 2022-01-11 RX ORDER — COLESEVELAM 180 1/1
625 TABLET ORAL 2 TIMES DAILY WITH MEALS
Qty: 180 TABLET | Refills: 3 | Status: SHIPPED | OUTPATIENT
Start: 2022-01-11 | End: 2022-05-11 | Stop reason: SDUPTHER

## 2022-01-11 RX ORDER — PRAVASTATIN SODIUM 80 MG/1
80 TABLET ORAL NIGHTLY
Qty: 90 TABLET | Refills: 3 | Status: SHIPPED | OUTPATIENT
Start: 2022-01-11 | End: 2023-01-17 | Stop reason: SDUPTHER

## 2022-01-11 RX ORDER — EZETIMIBE 10 MG/1
10 TABLET ORAL DAILY
Qty: 90 TABLET | Refills: 3 | Status: SHIPPED | OUTPATIENT
Start: 2022-01-11 | End: 2023-02-16 | Stop reason: SDUPTHER

## 2022-01-11 NOTE — TELEPHONE ENCOUNTER
}    Medication requested (name and dose):   gabapentin (NEURONTIN) 800 MG tablet  pravastatin (PRAVACHOL) 80 MG tablet  colesevelam (WELCHOL) 625 MG tablet  hydrALAZINE (APRESOLINE) 50 MG tablet  SITagliptin (Januvia) 100 MG tablet  ezetimibe (ZETIA) 10 MG tablet      Pharmacy where request should be sent: [facility] - [address] [phone] [facility] [phone] [facility]     Additional details provided by patient: PT IS NEEDING ALL MEDS RENEWED. SHE ALSO STATED SHE NEEDED THE GASTRO, ENDOCRINOLOGY AND OPTHOMOLOGY REFERRALS RENEWED       Best call back number: [phone]    Does the patient have less than a 3 day supply:  [] Yes  [x] No    Jessica Alcala  01/11/22, 08:11 EST

## 2022-01-13 ENCOUNTER — TELEPHONE (OUTPATIENT)
Dept: FAMILY MEDICINE CLINIC | Facility: CLINIC | Age: 62
End: 2022-01-13

## 2022-01-13 DIAGNOSIS — Z78.0 POST-MENOPAUSAL: Primary | ICD-10-CM

## 2022-01-13 NOTE — TELEPHONE ENCOUNTER
Caller: Pearl Elizalde    Relationship: Self    Best call back number: [phone]     What is the provider, practice or medical service name: [facility]     What is the office location: [facility], [address] [place] [address] [address], [place] [address]    What is the office phone number: [phone]    Any additional details: PATIENT STATES SHE SHE NEEDS ANOTHER REFERRAL BECAUSE HER CURRENT REFERRAL ENDS ON 02/24 AND SHE HAS A PROCEDURE ON 03/22

## 2022-03-22 ENCOUNTER — TRANSITIONAL CARE MANAGEMENT TELEPHONE ENCOUNTER (OUTPATIENT)
Dept: FAMILY MEDICINE CLINIC | Facility: CLINIC | Age: 62
End: 2022-03-22

## 2022-04-04 DIAGNOSIS — I10 ESSENTIAL HYPERTENSION: ICD-10-CM

## 2022-04-04 RX ORDER — OLMESARTAN MEDOXOMIL / AMLODIPINE BESYLATE / HYDROCHLOROTHIAZIDE 40; 10; 12.5 MG/1; MG/1; MG/1
TABLET, FILM COATED ORAL
Qty: 90 TABLET | Refills: 3 | OUTPATIENT
Start: 2022-04-04

## 2022-05-11 ENCOUNTER — OFFICE VISIT (OUTPATIENT)
Dept: FAMILY MEDICINE CLINIC | Facility: CLINIC | Age: 62
End: 2022-05-11

## 2022-05-11 ENCOUNTER — TELEPHONE (OUTPATIENT)
Dept: FAMILY MEDICINE CLINIC | Facility: CLINIC | Age: 62
End: 2022-05-11

## 2022-05-11 VITALS
OXYGEN SATURATION: 93 % | BODY MASS INDEX: 33.12 KG/M2 | WEIGHT: 199 LBS | SYSTOLIC BLOOD PRESSURE: 120 MMHG | DIASTOLIC BLOOD PRESSURE: 71 MMHG | HEART RATE: 93 BPM | TEMPERATURE: 97.2 F

## 2022-05-11 DIAGNOSIS — J45.909 UNCOMPLICATED ASTHMA, UNSPECIFIED ASTHMA SEVERITY, UNSPECIFIED WHETHER PERSISTENT: ICD-10-CM

## 2022-05-11 DIAGNOSIS — K59.1 FUNCTIONAL DIARRHEA: ICD-10-CM

## 2022-05-11 DIAGNOSIS — F43.29 STRESS AND ADJUSTMENT REACTION: ICD-10-CM

## 2022-05-11 DIAGNOSIS — M25.512 ACUTE PAIN OF LEFT SHOULDER: Primary | ICD-10-CM

## 2022-05-11 PROCEDURE — 99213 OFFICE O/P EST LOW 20 MIN: CPT | Performed by: NURSE PRACTITIONER

## 2022-05-11 RX ORDER — COLESEVELAM 180 1/1
625 TABLET ORAL 2 TIMES DAILY WITH MEALS
Qty: 180 TABLET | Refills: 3 | Status: SHIPPED | OUTPATIENT
Start: 2022-05-11 | End: 2022-05-16 | Stop reason: SDUPTHER

## 2022-05-11 RX ORDER — BUDESONIDE AND FORMOTEROL FUMARATE DIHYDRATE 160; 4.5 UG/1; UG/1
2 AEROSOL RESPIRATORY (INHALATION) 2 TIMES DAILY
Qty: 3 EACH | Refills: 3 | Status: SHIPPED | OUTPATIENT
Start: 2022-05-11 | End: 2022-05-16 | Stop reason: SDUPTHER

## 2022-05-11 RX ORDER — BUPROPION HYDROCHLORIDE 150 MG/1
150 TABLET ORAL DAILY
Qty: 30 TABLET | Refills: 6 | Status: SHIPPED | OUTPATIENT
Start: 2022-05-11 | End: 2023-01-09

## 2022-05-11 NOTE — TELEPHONE ENCOUNTER
I sent the referral during her appt.  I also gave a note to stay off through Friday.  If pain is still significant after resting today and tomorrow, I will keep her off until next week.

## 2022-05-11 NOTE — PROGRESS NOTES
Subjective   Pearl Elizalde is a 62 y.o. female presents for   Chief Complaint   Patient presents with   • Shoulder Pain       Health Maintenance Due   Topic Date Due   • COLORECTAL CANCER SCREENING  Never done   • Pneumococcal Vaccine 0-64 (1 - PCV) Never done   • ZOSTER VACCINE (1 of 2) Never done   • DXA SCAN  10/29/2021   • COVID-19 Vaccine (2 - Booster for Shelbie series) 11/25/2021   • ANNUAL PHYSICAL  02/05/2022   • DIABETIC EYE EXAM  03/22/2022   • HEMOGLOBIN A1C  04/12/2022   • DIABETIC FOOT EXAM  05/10/2022       History of Present Illness   Pt present with concerns for shoulder pain.  She states she was pushing herself up in bed last night and felt a pop in left shoulder.  She states she got up and iced it and pain is worse this morning.  She has been taking tylenol for pain and feels it has helped slightly.  She also reports increased stress this year- death of mother, stress with youngest child, and reports a lot of stress eating and has gained 20 lbs.  She requests something to help her lose weight.     Vitals:    05/11/22 1012   BP: 120/71   BP Location: Right arm   Patient Position: Sitting   Cuff Size: Adult   Pulse: 93   Temp: 97.2 °F (36.2 °C)   TempSrc: Temporal   SpO2: 93%   Weight: 90.3 kg (199 lb)     Body mass index is 33.12 kg/m².    Current Outpatient Medications on File Prior to Visit   Medication Sig Dispense Refill   • Blood Glucose Monitoring Suppl (BLOOD GLUCOSE MONITOR SYSTEM) w/Device kit BLOOD GLUCOSE MONITOR SYSTEM w/Device KIT     • Cetirizine HCl 10 MG capsule Take 1 dose by mouth.     • Cyanocobalamin (B-12) 1000 MCG tablet Take 1 tablet by mouth Daily.     • EPINEPHrine (EPIPEN) 0.3 MG/0.3ML solution auto-injector injection      • ezetimibe (ZETIA) 10 MG tablet Take 1 tablet by mouth Daily. 90 tablet 3   • gabapentin (NEURONTIN) 800 MG tablet Take 1 tablet by mouth 3 (Three) Times a Day. TAKE ONE TABLET IN THE AM AND 2  tablet 3   • glimepiride (AMARYL) 4 MG tablet Take  1 tablet by mouth 2 (Two) Times a Day. 180 tablet 4   • glucose blood (FREESTYLE LITE) test strip 1 each by Other route 2 (two) times a day. Use as instructed 100 each 6   • hydrALAZINE (APRESOLINE) 50 MG tablet Take 1 tablet by mouth 3 (Three) Times a Day. 270 tablet 3   • ipratropium-albuterol (COMBIVENT RESPIMAT)  MCG/ACT inhaler Inhale 1 puff 4 (Four) Times a Day As Needed for Wheezing. 4 g 11   • Lancets (FREESTYLE) lancets      • Magnesium 400 MG tablet Take 1 tablet by mouth Daily.     • multivitamin with minerals tablet tablet Take 1 tablet by mouth Daily.     • Olmesartan-amLODIPine-HCTZ 40-10-12.5 MG tablet Take 1 tablet by mouth Daily. 90 tablet 3   • Omega-3 Fatty Acids (FISH OIL) 1200 MG capsule capsule Take 1,200 mg by mouth Daily.     • ondansetron ODT (Zofran ODT) 4 MG disintegrating tablet Place 1 tablet on the tongue Every 8 (Eight) Hours As Needed for Nausea or Vomiting. 20 tablet 1   • pravastatin (PRAVACHOL) 80 MG tablet Take 1 tablet by mouth Every Night. 90 tablet 3   • SITagliptin (Januvia) 100 MG tablet Take 1 tablet by mouth Daily. 90 tablet 3   • vitamin D (ERGOCALCIFEROL) 1.25 MG (43041 UT) capsule capsule Take 1 capsule by mouth Every 7 (Seven) Days. 5 capsule 6   • [DISCONTINUED] colesevelam (WELCHOL) 625 MG tablet Take 1 tablet by mouth 2 (Two) Times a Day With Meals. 180 tablet 3   • [DISCONTINUED] Symbicort 160-4.5 MCG/ACT inhaler Inhale 2 puffs 2 (Two) Times a Day. 3 each 3   • mirtazapine (Remeron) 15 MG tablet Take 1 tablet by mouth Every Night for 30 days. 30 tablet 3   • [DISCONTINUED] albuterol sulfate  (90 Base) MCG/ACT inhaler Inhale 2 puffs Every 4 (Four) Hours As Needed for Wheezing. Can't tolerate generic-needs Proventil 3 g 3   • [DISCONTINUED] diphenoxylate-atropine (Lomotil) 2.5-0.025 MG per tablet Take 1 tablet by mouth 4 (Four) Times a Day As Needed for Diarrhea. 30 tablet 1     No current facility-administered medications on file prior to visit.        The following portions of the patient's history were reviewed and updated as appropriate: allergies, current medications, past family history, past medical history, past social history, past surgical history, and problem list.    Review of Systems   Constitutional: Positive for unexpected weight gain. Negative for chills and fever.   HENT: Negative for sinus pressure and sore throat.    Eyes: Negative for blurred vision.   Respiratory: Negative for cough and shortness of breath.    Cardiovascular: Negative for chest pain.   Gastrointestinal: Negative for abdominal pain.   Endocrine: Negative.    Genitourinary: Negative.    Musculoskeletal: Negative for arthralgias and joint swelling.        Left shoulder pain   Skin: Negative for color change.   Allergic/Immunologic: Negative.    Neurological: Negative for dizziness.   Hematological: Negative.    Psychiatric/Behavioral: Positive for stress. Negative for behavioral problems.       Objective   Physical Exam  Vitals and nursing note reviewed.   Constitutional:       Appearance: Normal appearance. She is well-developed. She is obese.   HENT:      Head: Normocephalic and atraumatic.      Right Ear: External ear normal.      Left Ear: External ear normal.      Nose: Nose normal.   Eyes:      Extraocular Movements: Extraocular movements intact.      Conjunctiva/sclera: Conjunctivae normal.      Pupils: Pupils are equal, round, and reactive to light.   Cardiovascular:      Rate and Rhythm: Normal rate and regular rhythm.      Heart sounds: Normal heart sounds.   Pulmonary:      Effort: Pulmonary effort is normal.      Breath sounds: Normal breath sounds.   Abdominal:      General: Bowel sounds are normal.      Palpations: Abdomen is soft.   Genitourinary:     Vagina: Normal.   Musculoskeletal:         General: Normal range of motion.      Cervical back: Normal range of motion and neck supple.      Comments: Left shoulder tender to palpation.  ROM limited in all fields  due to pain.  Unable to perform empty can, lift off or drop arm due to limited ROM   Skin:     General: Skin is warm and dry.   Neurological:      General: No focal deficit present.      Mental Status: She is alert and oriented to person, place, and time.   Psychiatric:         Mood and Affect: Mood normal.         Behavior: Behavior normal.         Judgment: Judgment normal.       PHQ-9 Total Score:      Assessment & Plan   Diagnoses and all orders for this visit:    1. Acute pain of left shoulder (Primary)  -     XR Shoulder 2+ View Left; Future  -     Ambulatory Referral to Orthopedic Surgery  -     XR Shoulder 2+ View Left    2. Uncomplicated asthma, unspecified asthma severity, unspecified whether persistent  -     Symbicort 160-4.5 MCG/ACT inhaler; Inhale 2 puffs 2 (Two) Times a Day.  Dispense: 3 each; Refill: 3    3. Functional diarrhea  -     colesevelam (WELCHOL) 625 MG tablet; Take 1 tablet by mouth 2 (Two) Times a Day With Meals.  Dispense: 180 tablet; Refill: 3    4. Stress and adjustment reaction  Comments:  wellbutrin ordered to help with mood and potential benefit of weight loss.     Other orders  -     buPROPion XL (Wellbutrin XL) 150 MG 24 hr tablet; Take 1 tablet by mouth Daily.  Dispense: 30 tablet; Refill: 6        There are no Patient Instructions on file for this visit.

## 2022-05-11 NOTE — TELEPHONE ENCOUNTER
Patient would like referral to Ortho and asking if she can return to work or if she should stay off?

## 2022-05-16 DIAGNOSIS — J45.909 UNCOMPLICATED ASTHMA, UNSPECIFIED ASTHMA SEVERITY, UNSPECIFIED WHETHER PERSISTENT: ICD-10-CM

## 2022-05-16 DIAGNOSIS — K59.1 FUNCTIONAL DIARRHEA: ICD-10-CM

## 2022-05-16 RX ORDER — COLESEVELAM 180 1/1
625 TABLET ORAL 2 TIMES DAILY WITH MEALS
Qty: 180 TABLET | Refills: 3 | Status: SHIPPED | OUTPATIENT
Start: 2022-05-16 | End: 2022-11-18 | Stop reason: SDUPTHER

## 2022-05-16 RX ORDER — BUDESONIDE AND FORMOTEROL FUMARATE DIHYDRATE 160; 4.5 UG/1; UG/1
2 AEROSOL RESPIRATORY (INHALATION) 2 TIMES DAILY
Qty: 3 EACH | Refills: 3 | Status: SHIPPED | OUTPATIENT
Start: 2022-05-16

## 2022-05-16 NOTE — TELEPHONE ENCOUNTER
Caller: PATT NELSON    Relationship: SELF    Best call back number: [phone]    Requested Prescriptions:   Requested Prescriptions     Pending Prescriptions Disp Refills   • Symbicort 160-4.5 MCG/ACT inhaler 3 each 3     Sig: Inhale 2 puffs 2 (Two) Times a Day.   • colesevelam (WELCHOL) 625 MG tablet 180 tablet 3     Sig: Take 1 tablet by mouth 2 (Two) Times a Day With Meals.        Pharmacy where request should be sent: [facility] [address] [phone] [facility] [phone]      Additional details provided by patient: PATIENT IS RUNNING LOW    Does the patient have less than a 3 day supply:  [x] Yes  [] No    Connor Hoff Rep   05/16/22 13:27 EDT

## 2022-05-20 PROBLEM — Z96.1 ARTIFICIAL LENS PRESENT: Status: ACTIVE | Noted: 2019-08-08

## 2022-05-20 PROBLEM — N60.91 ATYPICAL LOBULAR HYPERPLASIA (ALH) OF RIGHT BREAST: Status: ACTIVE | Noted: 2022-01-10

## 2022-05-21 ENCOUNTER — APPOINTMENT (OUTPATIENT)
Dept: GENERAL RADIOLOGY | Facility: HOSPITAL | Age: 62
End: 2022-05-21

## 2022-05-21 ENCOUNTER — HOSPITAL ENCOUNTER (EMERGENCY)
Facility: HOSPITAL | Age: 62
Discharge: HOME | End: 2022-05-21
Attending: EMERGENCY MEDICINE | Admitting: EMERGENCY MEDICINE

## 2022-05-21 VITALS
HEART RATE: 78 BPM | TEMPERATURE: 97.9 F | DIASTOLIC BLOOD PRESSURE: 85 MMHG | SYSTOLIC BLOOD PRESSURE: 141 MMHG | HEIGHT: 66 IN | RESPIRATION RATE: 15 BRPM | OXYGEN SATURATION: 98 % | WEIGHT: 197.97 LBS | BODY MASS INDEX: 31.82 KG/M2

## 2022-05-21 DIAGNOSIS — S89.92XA LEG INJURY, LEFT, INITIAL ENCOUNTER: ICD-10-CM

## 2022-05-21 DIAGNOSIS — M25.572 ACUTE LEFT ANKLE PAIN: ICD-10-CM

## 2022-05-21 DIAGNOSIS — M79.605 LEG PAIN, LATERAL, LEFT: Primary | ICD-10-CM

## 2022-05-21 LAB
ALBUMIN SERPL-MCNC: 4.6 G/DL (ref 3.5–5.2)
ALBUMIN/GLOB SERPL: 1.4 G/DL
ALP SERPL-CCNC: 93 U/L (ref 39–117)
ALT SERPL W P-5'-P-CCNC: 25 U/L (ref 1–33)
ANION GAP SERPL CALCULATED.3IONS-SCNC: 14 MMOL/L (ref 5–15)
APTT PPP: 24.1 SECONDS (ref 61–76.5)
AST SERPL-CCNC: 24 U/L (ref 1–32)
BASOPHILS # BLD AUTO: 0 10*3/MM3 (ref 0–0.2)
BASOPHILS NFR BLD AUTO: 0.6 % (ref 0–1.5)
BILIRUB SERPL-MCNC: 0.7 MG/DL (ref 0–1.2)
BUN SERPL-MCNC: 20 MG/DL (ref 8–23)
BUN/CREAT SERPL: 23.5 (ref 7–25)
CALCIUM SPEC-SCNC: 10.4 MG/DL (ref 8.6–10.5)
CHLORIDE SERPL-SCNC: 103 MMOL/L (ref 98–107)
CO2 SERPL-SCNC: 23 MMOL/L (ref 22–29)
CREAT SERPL-MCNC: 0.85 MG/DL (ref 0.57–1)
D DIMER PPP FEU-MCNC: 0.7 MG/L (FEU) (ref 0–0.59)
DEPRECATED RDW RBC AUTO: 42.4 FL (ref 37–54)
EGFRCR SERPLBLD CKD-EPI 2021: 76.6 ML/MIN/1.73
EOSINOPHIL # BLD AUTO: 0.2 10*3/MM3 (ref 0–0.4)
EOSINOPHIL NFR BLD AUTO: 2.8 % (ref 0.3–6.2)
ERYTHROCYTE [DISTWIDTH] IN BLOOD BY AUTOMATED COUNT: 14.2 % (ref 12.3–15.4)
GLOBULIN UR ELPH-MCNC: 3.2 GM/DL
GLUCOSE SERPL-MCNC: 151 MG/DL (ref 65–99)
HCT VFR BLD AUTO: 39.8 % (ref 34–46.6)
HGB BLD-MCNC: 13.3 G/DL (ref 12–15.9)
INR PPP: 0.95 (ref 0.93–1.1)
LYMPHOCYTES # BLD AUTO: 1.6 10*3/MM3 (ref 0.7–3.1)
LYMPHOCYTES NFR BLD AUTO: 23.8 % (ref 19.6–45.3)
MCH RBC QN AUTO: 28.3 PG (ref 26.6–33)
MCHC RBC AUTO-ENTMCNC: 33.4 G/DL (ref 31.5–35.7)
MCV RBC AUTO: 84.8 FL (ref 79–97)
MONOCYTES # BLD AUTO: 0.5 10*3/MM3 (ref 0.1–0.9)
MONOCYTES NFR BLD AUTO: 7.3 % (ref 5–12)
NEUTROPHILS NFR BLD AUTO: 4.4 10*3/MM3 (ref 1.7–7)
NEUTROPHILS NFR BLD AUTO: 65.5 % (ref 42.7–76)
NRBC BLD AUTO-RTO: 0.1 /100 WBC (ref 0–0.2)
PLATELET # BLD AUTO: 259 10*3/MM3 (ref 140–450)
PMV BLD AUTO: 7.3 FL (ref 6–12)
POTASSIUM SERPL-SCNC: 3.8 MMOL/L (ref 3.5–5.2)
PROT SERPL-MCNC: 7.8 G/DL (ref 6–8.5)
PROTHROMBIN TIME: 9.8 SECONDS (ref 9.6–11.7)
RBC # BLD AUTO: 4.7 10*6/MM3 (ref 3.77–5.28)
SODIUM SERPL-SCNC: 140 MMOL/L (ref 136–145)
WBC NRBC COR # BLD: 6.8 10*3/MM3 (ref 3.4–10.8)

## 2022-05-21 PROCEDURE — 73610 X-RAY EXAM OF ANKLE: CPT

## 2022-05-21 PROCEDURE — 73590 X-RAY EXAM OF LOWER LEG: CPT

## 2022-05-21 PROCEDURE — 36415 COLL VENOUS BLD VENIPUNCTURE: CPT

## 2022-05-21 PROCEDURE — 25010000002 ENOXAPARIN PER 10 MG

## 2022-05-21 PROCEDURE — 99282 EMERGENCY DEPT VISIT SF MDM: CPT

## 2022-05-21 PROCEDURE — 85610 PROTHROMBIN TIME: CPT | Performed by: EMERGENCY MEDICINE

## 2022-05-21 PROCEDURE — 96372 THER/PROPH/DIAG INJ SC/IM: CPT

## 2022-05-21 PROCEDURE — 80053 COMPREHEN METABOLIC PANEL: CPT

## 2022-05-21 PROCEDURE — 85379 FIBRIN DEGRADATION QUANT: CPT

## 2022-05-21 PROCEDURE — 85025 COMPLETE CBC W/AUTO DIFF WBC: CPT

## 2022-05-21 PROCEDURE — 85730 THROMBOPLASTIN TIME PARTIAL: CPT

## 2022-05-21 PROCEDURE — 73630 X-RAY EXAM OF FOOT: CPT

## 2022-05-21 RX ORDER — ENOXAPARIN SODIUM 100 MG/ML
1 INJECTION SUBCUTANEOUS ONCE
Status: COMPLETED | OUTPATIENT
Start: 2022-05-21 | End: 2022-05-21

## 2022-05-21 RX ORDER — ENOXAPARIN SODIUM 100 MG/ML
1 INJECTION SUBCUTANEOUS EVERY 12 HOURS
Qty: 9 ML | Refills: 0 | Status: SHIPPED | OUTPATIENT
Start: 2022-05-21 | End: 2022-05-21 | Stop reason: SDUPTHER

## 2022-05-21 RX ORDER — ENOXAPARIN SODIUM 100 MG/ML
1 INJECTION SUBCUTANEOUS EVERY 12 HOURS
Qty: 9 ML | Refills: 0 | Status: SHIPPED | OUTPATIENT
Start: 2022-05-21 | End: 2022-05-26

## 2022-05-21 RX ADMIN — ENOXAPARIN SODIUM 90 MG: 100 INJECTION SUBCUTANEOUS at 18:48

## 2022-05-21 NOTE — ED NOTES
Pt. Hit left calf on back of truck hitch 1 week ago or longer. C/o swelling/tightness and pain in left leg. No bruising. Some petechia noted to ankle.

## 2022-05-21 NOTE — ED PROVIDER NOTES
Subjective   Chief Complaint: Ankle injury    Context: Patient is a pleasant 62-year-old obese  female who presents today with complaints of left ankle injury for 1 week.  Patient states she was walking around her car when she ran into an attached trailer hitch, hitting it with her left shin and causing her left ankle to twist.  She states she has a history of neuropathy and initially the injury did not hurt too bad however she has realized increasing pain and swelling over the past week.  She states that she has been keeping leg elevated, ice and resting.  Last night she states she went out after work and when she got home, noticed increased swelling of her left foot.  She states if she is not on her foot she has no pain but if she is standing her pain is 8/10 and feels like a throb.  She reports allergies to penicillin and oxycodone.  She states that she has a history of diabetes.    Duration: 1 week    Timing: Waxes and wanes    Severity: 0/10 rest, 8/10 standing    Associated: None identified          Review of Systems   Constitutional: Negative for fatigue and fever.   HENT: Negative for congestion, rhinorrhea and sore throat.    Eyes: Negative.    Respiratory: Negative for cough, chest tightness and shortness of breath.    Cardiovascular: Negative for chest pain and palpitations.   Gastrointestinal: Negative for abdominal pain, constipation, diarrhea, nausea and vomiting.   Endocrine: Negative.    Genitourinary: Negative for dysuria and pelvic pain.   Musculoskeletal: Positive for arthralgias and joint swelling. Negative for myalgias.   Skin: Negative.    Allergic/Immunologic: Negative.    Neurological: Negative for dizziness, syncope, weakness and headaches.   Hematological: Negative for adenopathy.   Psychiatric/Behavioral: Negative for confusion. The patient is not nervous/anxious.    All other systems reviewed and are negative.      Past Medical History:   Diagnosis Date   • Asthma 6/3/2019   •  Body mass index (BMI) of 30.0-30.9 in adult 2019   • Chronic obstructive pulmonary disease (HCC) 6/3/2019   • Diabetic peripheral neuropathy (HCC) 2019   • Gastroesophageal reflux disease 6/3/2019   • Hyperlipidemia    • Hypertension    • Low back pain    • Obesity        Allergies   Allergen Reactions   • Albuterol Shortness Of Breath     Generic inhaler caused problem  Generic inhaler caused problem  (Pt denies this)   • Oxycodone Hives and Nausea And Vomiting     Other reaction(s): Nausea And Vomiting   • Penicillins Hives     As child. Unsure if can take keflex       Past Surgical History:   Procedure Laterality Date   • ADENOIDECTOMY     • CHOLECYSTECTOMY     • COLONOSCOPY     • ENDOSCOPY     • INNER EAR SURGERY     • REPLACEMENT TOTAL KNEE Left    • REPLACEMENT TOTAL KNEE Right    • TONSILLECTOMY         Family History   Adopted: Yes       Social History     Socioeconomic History   • Marital status:    Tobacco Use   • Smoking status: Former Smoker     Packs/day: 0.20     Years: 0.50     Pack years: 0.10     Types: Cigarettes     Quit date:      Years since quittin.4   • Smokeless tobacco: Never Used   Vaping Use   • Vaping Use: Never used   Substance and Sexual Activity   • Alcohol use: Yes     Comment: Occassional   • Drug use: No   • Sexual activity: Yes     Partners: Male     Birth control/protection: None           Objective   Physical Exam  Vitals and nursing note reviewed.   Constitutional:       General: She is not in acute distress.     Appearance: Normal appearance. She is obese. She is not ill-appearing.   HENT:      Head: Normocephalic and atraumatic.   Eyes:      Extraocular Movements: Extraocular movements intact.      Pupils: Pupils are equal, round, and reactive to light.   Cardiovascular:      Rate and Rhythm: Normal rate and regular rhythm.      Pulses: Normal pulses.           Dorsalis pedis pulses are 2+ on the right side and 2+ on the left side.        Posterior  "tibial pulses are 2+ on the right side and 2+ on the left side.      Heart sounds: Normal heart sounds. No murmur heard.  Pulmonary:      Effort: Pulmonary effort is normal. No respiratory distress.      Breath sounds: Normal breath sounds.   Abdominal:      General: Bowel sounds are normal.      Palpations: Abdomen is soft.      Tenderness: There is no abdominal tenderness.   Musculoskeletal:         General: Swelling, tenderness and signs of injury present. No deformity.      Cervical back: Normal range of motion and neck supple.      Right lower leg: Normal.      Left lower leg: Swelling, tenderness and bony tenderness present. No edema.      Right ankle: Normal.      Right Achilles Tendon: Normal.      Left ankle: Swelling present. Decreased range of motion. Normal pulse.      Right foot: Normal.      Left foot: Normal range of motion. Tenderness and bony tenderness present. No deformity. Normal pulse.        Legs:         Feet:    Feet:      Right foot:      Skin integrity: Skin integrity normal.   Skin:     General: Skin is warm and dry.   Neurological:      General: No focal deficit present.      Mental Status: She is alert and oriented to person, place, and time. Mental status is at baseline.   Psychiatric:         Mood and Affect: Mood normal.         Behavior: Behavior normal.         Procedures           ED Course      /85   Pulse 78   Temp 97.9 °F (36.6 °C) (Oral)   Resp 15   Ht 167.6 cm (66\")   Wt 89.8 kg (197 lb 15.6 oz)   LMP  (LMP Unknown)   SpO2 98%   BMI 31.95 kg/m²   Labs Reviewed   COMPREHENSIVE METABOLIC PANEL - Abnormal; Notable for the following components:       Result Value    Glucose 151 (*)     All other components within normal limits    Narrative:     GFR Normal >60  Chronic Kidney Disease <60  Kidney Failure <15     D-DIMER, QUANTITATIVE - Abnormal; Notable for the following components:    D-Dimer, Quantitative 0.70 (*)     All other components within normal limits    " Narrative:     Reference Range  --------------------------------------------------------------------     < 0.50   Negative Predictive Value  0.50-0.59   Indeterminate    >= 0.60   Probable VTE             A very low percentage of patients with DVT may yield D-Dimer results   below the cut-off of 0.50 mg/L FEU.  This is known to be more   prevalent in patients with distal DVT.             Results of this test should always be interpreted in conjunction with   the patient's medical history, clinical presentation and other   findings.  Clinical diagnosis should not be based on the result of   INNOVANCE D-Dimer alone.   APTT - Abnormal; Notable for the following components:    PTT 24.1 (*)     All other components within normal limits   CBC WITH AUTO DIFFERENTIAL - Normal   PROTIME-INR - Normal   CBC AND DIFFERENTIAL    Narrative:     The following orders were created for panel order CBC & Differential.  Procedure                               Abnormality         Status                     ---------                               -----------         ------                     CBC Auto Differential[[id number]]        Normal              Final result                 Please view results for these tests on the individual orders.     Medications   Enoxaparin Sodium (LOVENOX) syringe 90 mg (90 mg Subcutaneous Given 5/21/22 1848)     XR Tibia Fibula 2 View Left    Result Date: 5/21/2022   1. Negative for fracture. 2. Intact right knee prosthesis.  Electronically Signed By-Byron Eric MD On:5/21/2022 4:59 PM This report was finalized on [id number] by  Byron Eric MD.    XR Ankle 3+ View Left    Result Date: 5/21/2022  Negative for fracture.  Electronically Signed By-Byron Eric MD On:5/21/2022 4:59 PM This report was finalized on [id number] by  Byron Eric MD.    XR Foot 3+ View Left    Result Date: 5/21/2022   1. Negative for fracture. 2. Degenerative findings above.  Electronically Signed By-Byron Eric MD  On:5/21/2022 5:02 PM This report was finalized on [id number] by  Byron Eric MD.                                               MDM  Number of Diagnoses or Management Options  Acute left ankle pain  Leg injury, left, initial encounter  Leg pain, lateral, left  Diagnosis management comments: Patient was placed in a gown prior to exam.  She is alert, nontoxic-appearing and stable sitting on her bed alone in her room.  She states she drove herself here but was offered ibuprofen for pain.  She states she took Tylenol prior to ER presentation and declined at this time.  She denies nausea.  Strength decreased on left ankle.  Sensation decreased equal bilaterally and patient states history of neuropathy.  X-rays were obtained.    Chart Review: Patient has history of diabetes, neuropathy, anxiety, GERD, hyperlipidemia and hypertension.  It does not appear she has been hospitalized since 2015 or prior.    Comorbidities: Diabetes, neuropathy, hyperlipidemia  Differentials: Ankle fracture, ankle contusion, ankle sprain, tibia contusion.  Not all inclusive of differentials considered.   Discussion with Provider: Dr. Robledo  Radiology Interpretation: Images reviewed by me and interpreted by radiologist.  No sign of fracture or acute abnormality in tib/fib, ankle or foot.    Lab Interpretation: Dimer 0.70 warrants DVT rule out.  PT/INR within normal limits.    Appropriate PPE worn during exam.    I discussed patient with Dr. Robledo, who assessed patient as well.  He recommended DVT rule out.  Ultrasound no longer at hospital at this time of day.  Patient ordered outpatient Doppler for following morning.  She was given 1 dose Lovenox while in the ER and discharged with orders for Lovenox outpatient.  She is diabetic but was educated on Lovenox administration and abdomen.  She verbalizes understanding of instructions.  I discussed the findings with patient who voices understanding of discharge instructions, signs and symptoms  requiring return to the ED; discharged improved and stable condition with follow-up for reevaluation.  She verbalizes understanding of plan of care and is comfortable discharge at this time.  Patient states she is ready for discharge at this time.    Pt is aware that discharge does not mean that nothing is wrong but it indicates no emergency is present and they must continue care with follow-up as given below or physician of their choice       Amount and/or Complexity of Data Reviewed  Clinical lab tests: reviewed and ordered  Tests in the radiology section of CPT®: ordered and reviewed    Risk of Complications, Morbidity, and/or Mortality  Presenting problems: low  Diagnostic procedures: low  Management options: low    Patient Progress  Patient progress: stable      Final diagnoses:   Acute left ankle pain   Leg pain, lateral, left   Leg injury, left, initial encounter       ED Disposition  ED Disposition     ED Disposition   Discharge    Condition   Stable    Comment   --             Lizette Sigala MD  [address] [place] [address]  [phone]      As needed, If symptoms worsen         Medication List      New Prescriptions    Enoxaparin Sodium 80 MG/0.8ML solution prefilled syringe syringe  Commonly known as: LOVENOX  Inject 0.9 mL under the skin into the appropriate area as directed Every 12 (Twelve) Hours for 5 days.           Where to Get Your Medications      You can get these medications from any pharmacy    Bring a paper prescription for each of these medications  · Enoxaparin Sodium 80 MG/0.8ML solution prefilled syringe syringe          Catherine Lucia, MIKAEL  05/21/22 1855       Catherine Lucia, MIKAEL  05/21/22 3610

## 2022-05-21 NOTE — DISCHARGE INSTRUCTIONS
As discussed, follow-up for venous Doppler as ordered tomorrow here at the hospital.  If for some reason they cannot get you in, treat with Lovenox until you are cleared with ultrasound.  Only inject Lovenox in your abdominal area.  If it anytime you become dizzy, short of breath return to the emergency room.    Follow-up with your primary care provider for further management.    Return to the ER for any new or worsening symptoms.

## 2022-05-22 ENCOUNTER — HOSPITAL ENCOUNTER (OUTPATIENT)
Dept: CARDIOLOGY | Facility: HOSPITAL | Age: 62
Discharge: HOME | End: 2022-05-22

## 2022-05-22 DIAGNOSIS — M79.605 LEG PAIN, LATERAL, LEFT: ICD-10-CM

## 2022-05-22 LAB
BH CV LOWER VASCULAR LEFT COMMON FEMORAL AUGMENT: NORMAL
BH CV LOWER VASCULAR LEFT COMMON FEMORAL COMPETENT: NORMAL
BH CV LOWER VASCULAR LEFT COMMON FEMORAL COMPRESS: NORMAL
BH CV LOWER VASCULAR LEFT COMMON FEMORAL PHASIC: NORMAL
BH CV LOWER VASCULAR LEFT COMMON FEMORAL SPONT: NORMAL
BH CV LOWER VASCULAR LEFT DISTAL FEMORAL COMPRESS: NORMAL
BH CV LOWER VASCULAR LEFT GASTRONEMIUS COMPRESS: NORMAL
BH CV LOWER VASCULAR LEFT GREATER SAPH AK COMPRESS: NORMAL
BH CV LOWER VASCULAR LEFT GREATER SAPH BK COMPRESS: NORMAL
BH CV LOWER VASCULAR LEFT LESSER SAPH COMPRESS: NORMAL
BH CV LOWER VASCULAR LEFT MID FEMORAL AUGMENT: NORMAL
BH CV LOWER VASCULAR LEFT MID FEMORAL COMPETENT: NORMAL
BH CV LOWER VASCULAR LEFT MID FEMORAL COMPRESS: NORMAL
BH CV LOWER VASCULAR LEFT MID FEMORAL PHASIC: NORMAL
BH CV LOWER VASCULAR LEFT MID FEMORAL SPONT: NORMAL
BH CV LOWER VASCULAR LEFT PERONEAL COMPRESS: NORMAL
BH CV LOWER VASCULAR LEFT POPLITEAL AUGMENT: NORMAL
BH CV LOWER VASCULAR LEFT POPLITEAL COMPETENT: NORMAL
BH CV LOWER VASCULAR LEFT POPLITEAL COMPRESS: NORMAL
BH CV LOWER VASCULAR LEFT POPLITEAL PHASIC: NORMAL
BH CV LOWER VASCULAR LEFT POPLITEAL SPONT: NORMAL
BH CV LOWER VASCULAR LEFT POSTERIOR TIBIAL COMPRESS: NORMAL
BH CV LOWER VASCULAR LEFT PROXIMAL FEMORAL COMPRESS: NORMAL
BH CV LOWER VASCULAR LEFT SAPHENOFEMORAL JUNCTION COMPRESS: NORMAL
BH CV LOWER VASCULAR RIGHT COMMON FEMORAL AUGMENT: NORMAL
BH CV LOWER VASCULAR RIGHT COMMON FEMORAL COMPETENT: NORMAL
BH CV LOWER VASCULAR RIGHT COMMON FEMORAL COMPRESS: NORMAL
BH CV LOWER VASCULAR RIGHT COMMON FEMORAL PHASIC: NORMAL
BH CV LOWER VASCULAR RIGHT COMMON FEMORAL SPONT: NORMAL
MAXIMAL PREDICTED HEART RATE: 156 BPM
STRESS TARGET HR: 133 BPM

## 2022-05-22 PROCEDURE — 93971 EXTREMITY STUDY: CPT

## 2022-05-23 ENCOUNTER — TELEPHONE (OUTPATIENT)
Dept: FAMILY MEDICINE CLINIC | Facility: CLINIC | Age: 62
End: 2022-05-23

## 2022-05-23 NOTE — TELEPHONE ENCOUNTER
Please call patient at home and make sure she is doing well after her leg injury that caused her to go to [place] ER.  It looks as though the venous Doppler for blood clot was negative.  We will glad to check her if she is not improving and would consider physical therapy.

## 2022-05-25 NOTE — TELEPHONE ENCOUNTER
Spoke with patient she does not want to schedule an appointment.  She is back to work and finishing her medications and will follow up with us in the furture if needed.

## 2022-05-25 NOTE — TELEPHONE ENCOUNTER
PATIENT CALLED BACK STATING SHE IS DOING BETTER AND SEEMS TO BE HEALING. ADVISED IF SHE NEEDS AN APPT TO GIVE US A CALL BACK

## 2022-06-02 ENCOUNTER — TELEPHONE (OUTPATIENT)
Dept: FAMILY MEDICINE CLINIC | Facility: CLINIC | Age: 62
End: 2022-06-02

## 2022-06-02 NOTE — TELEPHONE ENCOUNTER
Caller: Pearl Elizalde    Relationship: Self    Best call back number: [phone]    What medication are you requesting: ibuprofen 800 mg ( PATIENT STATES ON MEDICATION BEFORE. NOT IN MEDICATION LIST)    What are your current symptoms: PAIN IN LEFT SHOULDER    How long have you been experiencing symptoms: A WHILE    Have you had these symptoms before:    [x] Yes  [] No    Have you been treated for these symptoms before:   [x] Yes  [] No    If a prescription is needed, what is your preferred pharmacy and phone number: [facility] [place] - [address] [place] [address] - [phone]  - [phone] FX

## 2022-06-02 NOTE — TELEPHONE ENCOUNTER
I have never seenher for left shoulder pain-so would need OV and exam before prescribing meds-Ibuprofen not good choice with diabetes

## 2022-06-06 ENCOUNTER — OFFICE VISIT (OUTPATIENT)
Dept: ORTHOPEDIC SURGERY | Facility: CLINIC | Age: 62
End: 2022-06-06

## 2022-06-06 VITALS
BODY MASS INDEX: 31.66 KG/M2 | WEIGHT: 197 LBS | SYSTOLIC BLOOD PRESSURE: 116 MMHG | HEART RATE: 90 BPM | HEIGHT: 66 IN | DIASTOLIC BLOOD PRESSURE: 73 MMHG

## 2022-06-06 DIAGNOSIS — M25.512 LEFT SHOULDER PAIN, UNSPECIFIED CHRONICITY: Primary | ICD-10-CM

## 2022-06-06 PROCEDURE — 99203 OFFICE O/P NEW LOW 30 MIN: CPT | Performed by: ORTHOPAEDIC SURGERY

## 2022-06-08 ENCOUNTER — OFFICE VISIT (OUTPATIENT)
Dept: FAMILY MEDICINE CLINIC | Facility: CLINIC | Age: 62
End: 2022-06-08

## 2022-06-08 VITALS
BODY MASS INDEX: 31.5 KG/M2 | OXYGEN SATURATION: 95 % | HEART RATE: 95 BPM | HEIGHT: 66 IN | TEMPERATURE: 100.9 F | DIASTOLIC BLOOD PRESSURE: 69 MMHG | WEIGHT: 196 LBS | SYSTOLIC BLOOD PRESSURE: 105 MMHG

## 2022-06-08 DIAGNOSIS — H26.9 CATARACT OF BOTH EYES, UNSPECIFIED CATARACT TYPE: ICD-10-CM

## 2022-06-08 DIAGNOSIS — M25.572 PAIN IN LEFT ANKLE AND JOINTS OF LEFT FOOT: Primary | ICD-10-CM

## 2022-06-08 DIAGNOSIS — E11.65 TYPE 2 DIABETES MELLITUS WITH HYPERGLYCEMIA, WITHOUT LONG-TERM CURRENT USE OF INSULIN: ICD-10-CM

## 2022-06-08 DIAGNOSIS — D22.9 MULTIPLE ATYPICAL SKIN MOLES: ICD-10-CM

## 2022-06-08 PROCEDURE — 99214 OFFICE O/P EST MOD 30 MIN: CPT | Performed by: NURSE PRACTITIONER

## 2022-06-08 RX ORDER — MELATONIN
1000 DAILY
COMMUNITY

## 2022-06-08 NOTE — PROGRESS NOTES
Subjective   Pearl Elizalde is a 62 y.o. female presents for   Chief Complaint   Patient presents with   • Leg Pain     Hit it on the [place] on back of truck about a month ago.   • Foot Pain     Left side - swollen -3 weeks - was seen in ED for it at [facility].    • Annual Exam     Due for annual exam       Health Maintenance Due   Topic Date Due   • COLORECTAL CANCER SCREENING  Never done   • Pneumococcal Vaccine 0-64 (1 - PCV) Never done   • ZOSTER VACCINE (1 of 2) Never done   • DXA SCAN  10/29/2021   • COVID-19 Vaccine (2 - Booster for Shelbie series) 11/25/2021   • ANNUAL PHYSICAL  02/05/2022   • DIABETIC EYE EXAM  03/22/2022   • HEMOGLOBIN A1C  04/12/2022   • DIABETIC FOOT EXAM  05/10/2022   • URINE MICROALBUMIN  06/14/2022       History of Present Illness   Pt present to follow up leg pain and ankle swelling.  She hit her leg on a trialer hitch and states pain and bruising continue to worsen.  She was evaluated in the ER and informed it was not fractured but is concerned for ongoing pain and swelling.  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  Discussed possibility of a small fracture that was not initially seen, or ongoing inflammation from the initial injury and working on her feet for long hours not allowing it to heal quickly.  She also requests something to help her manage her weight and blood sugars better.  She reports having a very stressful year and has gained weight due to poor eating habits.  She was prescribed wellbutrin initially and states that has helped her mood and manage stress better, but denies an impact on her weight.  She reports she experienced GI distress with metformin and was unable to continue taking it.  She is currently taking januvia but DM not well controlled. She would like more help with appetite control as well and would like to switch to trulicity to help with weight loss.      Vitals:    06/08/22 0850   BP: 105/69   BP Location: Right arm  "  Patient Position: Sitting   Cuff Size: Adult   Pulse: 95   Temp: (!) 100.9 °F (38.3 °C)   TempSrc: Temporal   SpO2: 95%   Weight: 88.9 kg (196 lb)   Height: 167.6 cm (66\")     Body mass index is 31.64 kg/m².    Current Outpatient Medications on File Prior to Visit   Medication Sig Dispense Refill   • Blood Glucose Monitoring Suppl (BLOOD GLUCOSE MONITOR SYSTEM) w/Device kit BLOOD GLUCOSE MONITOR SYSTEM w/Device KIT     • buPROPion XL (Wellbutrin XL) 150 MG 24 hr tablet Take 1 tablet by mouth Daily. 30 tablet 6   • Cetirizine HCl 10 MG capsule Take 1 dose by mouth.     • cholecalciferol (VITAMIN D3) 25 MCG (1000 UT) tablet Take 1,000 Units by mouth Daily.     • colesevelam (WELCHOL) 625 MG tablet Take 1 tablet by mouth 2 (Two) Times a Day With Meals. 180 tablet 3   • Cyanocobalamin (B-12) 1000 MCG tablet Take 1 tablet by mouth Daily.     • EPINEPHrine (EPIPEN) 0.3 MG/0.3ML solution auto-injector injection      • ezetimibe (ZETIA) 10 MG tablet Take 1 tablet by mouth Daily. 90 tablet 3   • gabapentin (NEURONTIN) 800 MG tablet Take 1 tablet by mouth 3 (Three) Times a Day. TAKE ONE TABLET IN THE AM AND 2  tablet 3   • glucose blood (FREESTYLE LITE) test strip 1 each by Other route 2 (two) times a day. Use as instructed 100 each 6   • hydrALAZINE (APRESOLINE) 50 MG tablet Take 1 tablet by mouth 3 (Three) Times a Day. 270 tablet 3   • ipratropium-albuterol (COMBIVENT RESPIMAT)  MCG/ACT inhaler Inhale 1 puff 4 (Four) Times a Day As Needed for Wheezing. 4 g 11   • Lancets (FREESTYLE) lancets      • Magnesium 400 MG tablet Take 1 tablet by mouth Daily.     • multivitamin with minerals tablet tablet Take 1 tablet by mouth Daily.     • Olmesartan-amLODIPine-HCTZ 40-10-12.5 MG tablet Take 1 tablet by mouth Daily. 90 tablet 3   • ondansetron ODT (Zofran ODT) 4 MG disintegrating tablet Place 1 tablet on the tongue Every 8 (Eight) Hours As Needed for Nausea or Vomiting. 20 tablet 1   • pravastatin (PRAVACHOL) 80 MG " tablet Take 1 tablet by mouth Every Night. 90 tablet 3   • Symbicort 160-4.5 MCG/ACT inhaler Inhale 2 puffs 2 (Two) Times a Day. 3 each 3   • mirtazapine (Remeron) 15 MG tablet Take 1 tablet by mouth Every Night for 30 days. 30 tablet 3     No current facility-administered medications on file prior to visit.       The following portions of the patient's history were reviewed and updated as appropriate: allergies, current medications, past family history, past medical history, past social history, past surgical history, and problem list.    Review of Systems   Constitutional: Positive for unexpected weight gain. Negative for chills and fever.   HENT: Negative for sinus pressure and sore throat.    Eyes: Negative for blurred vision.   Respiratory: Negative for cough and shortness of breath.    Cardiovascular: Negative for chest pain.   Gastrointestinal: Negative for abdominal pain.   Endocrine: Positive for polyphagia.   Musculoskeletal: Negative for arthralgias and joint swelling.        Left foot and ankle pain   Skin: Negative for color change.   Neurological: Negative for dizziness.   Psychiatric/Behavioral: Positive for stress. Negative for behavioral problems.       Objective   Physical Exam  Vitals and nursing note reviewed.   Constitutional:       Appearance: Normal appearance. She is well-developed.   HENT:      Head: Normocephalic and atraumatic.      Right Ear: External ear normal.      Left Ear: External ear normal.      Nose: Nose normal.   Eyes:      Extraocular Movements: Extraocular movements intact.      Pupils: Pupils are equal, round, and reactive to light.   Cardiovascular:      Rate and Rhythm: Normal rate and regular rhythm.      Heart sounds: Normal heart sounds.   Pulmonary:      Effort: Pulmonary effort is normal.      Breath sounds: Normal breath sounds.   Abdominal:      General: Bowel sounds are normal.      Palpations: Abdomen is soft.   Genitourinary:     Vagina: Normal.    Musculoskeletal:         General: Tenderness (lateral left ankle) present. Normal range of motion.      Cervical back: Normal range of motion and neck supple.      Left lower leg: Edema (nonpitting) present.   Skin:     General: Skin is warm and dry.   Neurological:      General: No focal deficit present.      Mental Status: She is alert and oriented to person, place, and time.   Psychiatric:         Mood and Affect: Mood normal.         Behavior: Behavior normal.         Judgment: Judgment normal.       PHQ-9 Total Score:      Assessment & Plan   Diagnoses and all orders for this visit:    1. Pain in left ankle and joints of left foot (Primary)  Comments:  encouraged her to wear compression stocking while ambulating for additional support and improvement in edema  Orders:  -     Cancel: XR Foot 3+ View Left; Future  -     Cancel: XR Ankle 3+ View Left; Future  -     Cancel: XR Ankle 3+ View Left; Future  -     Cancel: XR Foot 3+ View Left; Future  -     XR Ankle 3+ View Left; Future  -     XR Foot 3+ View Left; Future    2. Type 2 diabetes mellitus with hyperglycemia, without long-term current use of insulin (HCC)  Comments:  instructed to stop jardiance, continue to work on limiting carbs and increase exercise as tolerated.   Orders:  -     Ambulatory Referral to Endocrinology  -     Discontinue: Dulaglutide (Trulicity) 0.75 MG/0.5ML solution pen-injector; Inject 0.75 mg under the skin into the appropriate area as directed 1 (One) Time Per Week for 90 days.  Dispense: 6 mL; Refill: 0  -     Dulaglutide (Trulicity) 0.75 MG/0.5ML solution pen-injector; Inject 0.75 mg under the skin into the appropriate area as directed 1 (One) Time Per Week for 90 days.  Dispense: 6 mL; Refill: 0    3. Multiple atypical skin moles  -     Ambulatory Referral to Dermatology    4. Cataract of both eyes, unspecified cataract type  -     Ambulatory Referral to Ophthalmology        There are no Patient Instructions on file for this  visit.

## 2022-06-09 ENCOUNTER — TELEPHONE (OUTPATIENT)
Dept: FAMILY MEDICINE CLINIC | Facility: CLINIC | Age: 62
End: 2022-06-09

## 2022-06-09 DIAGNOSIS — E11.65 TYPE 2 DIABETES MELLITUS WITH HYPERGLYCEMIA, WITHOUT LONG-TERM CURRENT USE OF INSULIN: ICD-10-CM

## 2022-06-09 NOTE — TELEPHONE ENCOUNTER
Caller: Pearl Elizalde    Relationship: Self    Best call back number: [phone]    What specialty or service is being requested: X-RAY    What is the provider, practice or medical service name: [facility]    Any additional details: PATIENT STATES SHE NEEDS A REFERRAL FOR [facility] TO GET AN X-RAY OF HER FOOT. PATIENT STATES SHE DOES NOT WANT TO GO TO THE ER. PATIENT IS REQUESTING A CALL BACK TO DISCUSS.

## 2022-06-09 NOTE — TELEPHONE ENCOUNTER
Will her insurance cover the foot x-ray at urgent care?  Trulicity is not in the chart she did get that from endocrinology so she may want to call them to get them to send that to [facility].

## 2022-06-09 NOTE — TELEPHONE ENCOUNTER
No authorization required for xray of pt foot or ankle as ordered. Left Detailed message on patient VM and faxed orders to [facility].

## 2022-06-09 NOTE — TELEPHONE ENCOUNTER
THE PATIENT STATES THAT HER INSURANCE DOES NOT COVER HER FOOT X-RAY THROUGH [facility]. THE PATIENT IS GOING TO GO TO THE E.R. TOMORROW AFTERNOON INSTEAD TO HAVE THIS CHECKED.     THE PATIENT ALSO NEEDS HER PRESCRIPTION FOR TRULICITY SENT TO [facility] - [address] [phone] [facility] [phone] FX.

## 2022-06-10 ENCOUNTER — PATIENT ROUNDING (BHMG ONLY) (OUTPATIENT)
Dept: ORTHOPEDIC SURGERY | Facility: CLINIC | Age: 62
End: 2022-06-10

## 2022-06-10 NOTE — PROGRESS NOTES
A my chart message has been sent to the patient for PATIENT ROUNDING with [facility]

## 2022-06-13 ENCOUNTER — TELEPHONE (OUTPATIENT)
Dept: FAMILY MEDICINE CLINIC | Facility: CLINIC | Age: 62
End: 2022-06-13

## 2022-06-13 NOTE — TELEPHONE ENCOUNTER
Caller: Pearl Elizalde    Relationship: Self    Best call back number: [phone]*    What is the best time to reach you: ANYTIME    Who are you requesting to speak with (clinical staff, provider,  specific staff member): CLINICAL    What was the call regarding: PATIENT CALLING REQUESTING A CALLBACK WITH THE STATUS OF THE FOOT AND ANKLE XRAY TO BE PERFORMED AT [facility].    Do you require a callback: YES

## 2022-06-14 RX ORDER — DULAGLUTIDE 0.75 MG/.5ML
1 INJECTION, SOLUTION SUBCUTANEOUS WEEKLY
Qty: 6 ML | Refills: 0 | Status: SHIPPED | OUTPATIENT
Start: 2022-06-14 | End: 2022-08-19

## 2022-06-14 RX ORDER — DULAGLUTIDE 0.75 MG/.5ML
1 INJECTION, SOLUTION SUBCUTANEOUS WEEKLY
Qty: 6 ML | Refills: 0 | Status: SHIPPED | OUTPATIENT
Start: 2022-06-14 | End: 2022-06-14

## 2022-06-16 ENCOUNTER — HOSPITAL ENCOUNTER (OUTPATIENT)
Dept: GENERAL RADIOLOGY | Facility: HOSPITAL | Age: 62
Discharge: HOME | End: 2022-06-16

## 2022-06-16 DIAGNOSIS — M25.572 PAIN IN LEFT ANKLE AND JOINTS OF LEFT FOOT: ICD-10-CM

## 2022-06-16 DIAGNOSIS — S92.255A CLOSED NONDISPLACED FRACTURE OF NAVICULAR BONE OF LEFT FOOT, INITIAL ENCOUNTER: Primary | ICD-10-CM

## 2022-06-16 PROCEDURE — 73610 X-RAY EXAM OF ANKLE: CPT

## 2022-06-16 PROCEDURE — 73630 X-RAY EXAM OF FOOT: CPT

## 2022-06-22 ENCOUNTER — TELEPHONE (OUTPATIENT)
Dept: FAMILY MEDICINE CLINIC | Facility: CLINIC | Age: 62
End: 2022-06-22

## 2022-06-22 NOTE — TELEPHONE ENCOUNTER
elmer     Caller: Pearl Elizalde    Relationship to patient: Self    Best call back number: [phone]     Date of positive COVID19 test: 6/21/22        COVID19 symptoms: FEVER, BLISTERS IN MOUTH, VOMITING, BODY ACHES, DIARRHEA, FATIGUE     Additional information or concerns: PATIENT HAS TESTED POSITIVE FOR COVID AND IS WANTING TO KNOW IF THERE IS A MEDICATION THAT COULD CALLED IN TO HELP WITH HER SYMPTOMS     What is the patients [facility] [address] [phone] [facility] [phone] FX

## 2022-06-22 NOTE — TELEPHONE ENCOUNTER
Patient was called back and has been tested using an antibody test at a pharmacy.  She has temperature up to 102 and has been exposed to COVID through houseguest last weekend.  She does work in  so we advised that she not return to work until she has been free of fever for 3 days we will give her a note keeping her off until Saturday the 25th.  Please staff print the letter and put it in her MyChart and she will try to print from the MyChart she will call back if she is unable to do that.  We did offer her Paxil bid but since she has already had 1 vaccination and is on multiple medicines we did not feel as though that was a particularly good idea for her she states that she does have Zofran at home and if she runs out she will call us she was avoid spicy and greasy foods limit her dairy and take Zofran to help with the vomiting patient knows to mask and quarantine from her  but her  is also sick with the illness.  She will check her blood oxygen level and if it flows below 90 that she will call 911 and get to the emergency room she has been advised to call back if she has any other questions or concerns.

## 2022-06-23 ENCOUNTER — TELEPHONE (OUTPATIENT)
Dept: FAMILY MEDICINE CLINIC | Facility: CLINIC | Age: 62
End: 2022-06-23

## 2022-06-23 NOTE — TELEPHONE ENCOUNTER
Caller: Pearl Elizalde    Relationship: Self    Best call back number: [phone]    What medication are you requesting: SEE BELOW    What are your current symptoms: EXPERIENCING SEVERE DIARRHEA HAS LOST 20 POUNDS SINCE 6/19/22    How long have you been experiencing symptoms: SEVERAL DAYS    Have you had these symptoms before:    [] Yes  [x] No    Have you been treated for these symptoms before:   [] Yes  [x] No    If a prescription is needed, what is your preferred pharmacy and phone number:      Additional notes: PATIENT TESTED POSITIVE FOR COVID ON 6/20/22

## 2022-06-23 NOTE — TELEPHONE ENCOUNTER
Spoke with patient advised she got to the ER with that much weightloss and concerns for dehydration.  Patient states she will give it a couple hours if she is not better then she will go to the ER.     Advised she call ER prior and advise she is COVID positive for precautions.

## 2022-06-24 ENCOUNTER — TELEPHONE (OUTPATIENT)
Dept: FAMILY MEDICINE CLINIC | Facility: CLINIC | Age: 62
End: 2022-06-24

## 2022-06-24 ENCOUNTER — HOSPITAL ENCOUNTER (EMERGENCY)
Facility: HOSPITAL | Age: 62
Discharge: HOME | End: 2022-06-24
Attending: EMERGENCY MEDICINE | Admitting: EMERGENCY MEDICINE

## 2022-06-24 VITALS
HEIGHT: 66 IN | BODY MASS INDEX: 29.94 KG/M2 | WEIGHT: 186.29 LBS | OXYGEN SATURATION: 97 % | RESPIRATION RATE: 16 BRPM | TEMPERATURE: 97.8 F | SYSTOLIC BLOOD PRESSURE: 151 MMHG | DIASTOLIC BLOOD PRESSURE: 79 MMHG | HEART RATE: 76 BPM

## 2022-06-24 DIAGNOSIS — R19.7 VOMITING AND DIARRHEA: ICD-10-CM

## 2022-06-24 DIAGNOSIS — R53.1 WEAKNESS: ICD-10-CM

## 2022-06-24 DIAGNOSIS — N39.0 URINARY TRACT INFECTION WITHOUT HEMATURIA, SITE UNSPECIFIED: ICD-10-CM

## 2022-06-24 DIAGNOSIS — E86.0 DEHYDRATION: ICD-10-CM

## 2022-06-24 DIAGNOSIS — R11.10 VOMITING AND DIARRHEA: ICD-10-CM

## 2022-06-24 DIAGNOSIS — U07.1 COVID-19: Primary | ICD-10-CM

## 2022-06-24 LAB
ALBUMIN SERPL-MCNC: 3.7 G/DL (ref 3.5–5.2)
ALBUMIN/GLOB SERPL: 1 G/DL
ALP SERPL-CCNC: 113 U/L (ref 39–117)
ALT SERPL W P-5'-P-CCNC: 23 U/L (ref 1–33)
ANION GAP SERPL CALCULATED.3IONS-SCNC: 16 MMOL/L (ref 5–15)
AST SERPL-CCNC: 27 U/L (ref 1–32)
BACTERIA UR QL AUTO: ABNORMAL /HPF
BASOPHILS # BLD AUTO: 0.1 10*3/MM3 (ref 0–0.2)
BASOPHILS NFR BLD AUTO: 1.4 % (ref 0–1.5)
BILIRUB SERPL-MCNC: 0.8 MG/DL (ref 0–1.2)
BILIRUB UR QL STRIP: NEGATIVE
BUN SERPL-MCNC: 11 MG/DL (ref 8–23)
BUN/CREAT SERPL: 15.5 (ref 7–25)
CALCIUM SPEC-SCNC: 9.3 MG/DL (ref 8.6–10.5)
CHLORIDE SERPL-SCNC: 104 MMOL/L (ref 98–107)
CK SERPL-CCNC: 81 U/L (ref 20–180)
CLARITY UR: CLEAR
CO2 SERPL-SCNC: 20 MMOL/L (ref 22–29)
COLOR UR: YELLOW
CREAT SERPL-MCNC: 0.71 MG/DL (ref 0.57–1)
DEPRECATED RDW RBC AUTO: 40.7 FL (ref 37–54)
EGFRCR SERPLBLD CKD-EPI 2021: 95.1 ML/MIN/1.73
EOSINOPHIL # BLD AUTO: 0.1 10*3/MM3 (ref 0–0.4)
EOSINOPHIL NFR BLD AUTO: 1.8 % (ref 0.3–6.2)
ERYTHROCYTE [DISTWIDTH] IN BLOOD BY AUTOMATED COUNT: 14 % (ref 12.3–15.4)
GLOBULIN UR ELPH-MCNC: 3.8 GM/DL
GLUCOSE SERPL-MCNC: 200 MG/DL (ref 65–99)
GLUCOSE UR STRIP-MCNC: NEGATIVE MG/DL
HCT VFR BLD AUTO: 37.3 % (ref 34–46.6)
HGB BLD-MCNC: 12.5 G/DL (ref 12–15.9)
HGB UR QL STRIP.AUTO: NEGATIVE
HYALINE CASTS UR QL AUTO: ABNORMAL /LPF
KETONES UR QL STRIP: ABNORMAL
LEUKOCYTE ESTERASE UR QL STRIP.AUTO: ABNORMAL
LIPASE SERPL-CCNC: 46 U/L (ref 13–60)
LYMPHOCYTES # BLD AUTO: 1.4 10*3/MM3 (ref 0.7–3.1)
LYMPHOCYTES NFR BLD AUTO: 19.8 % (ref 19.6–45.3)
MAGNESIUM SERPL-MCNC: 2.1 MG/DL (ref 1.6–2.4)
MCH RBC QN AUTO: 27.9 PG (ref 26.6–33)
MCHC RBC AUTO-ENTMCNC: 33.7 G/DL (ref 31.5–35.7)
MCV RBC AUTO: 82.8 FL (ref 79–97)
MONOCYTES # BLD AUTO: 0.6 10*3/MM3 (ref 0.1–0.9)
MONOCYTES NFR BLD AUTO: 9.3 % (ref 5–12)
NEUTROPHILS NFR BLD AUTO: 4.7 10*3/MM3 (ref 1.7–7)
NEUTROPHILS NFR BLD AUTO: 67.7 % (ref 42.7–76)
NITRITE UR QL STRIP: POSITIVE
NRBC BLD AUTO-RTO: 0 /100 WBC (ref 0–0.2)
PH UR STRIP.AUTO: <=5 [PH] (ref 5–8)
PLATELET # BLD AUTO: 277 10*3/MM3 (ref 140–450)
PMV BLD AUTO: 7.4 FL (ref 6–12)
POTASSIUM SERPL-SCNC: 3.7 MMOL/L (ref 3.5–5.2)
PROT SERPL-MCNC: 7.5 G/DL (ref 6–8.5)
PROT UR QL STRIP: ABNORMAL
RBC # BLD AUTO: 4.5 10*6/MM3 (ref 3.77–5.28)
RBC # UR STRIP: ABNORMAL /HPF
REF LAB TEST METHOD: ABNORMAL
SODIUM SERPL-SCNC: 140 MMOL/L (ref 136–145)
SP GR UR STRIP: 1.02 (ref 1–1.03)
SQUAMOUS #/AREA URNS HPF: ABNORMAL /HPF
TSH SERPL DL<=0.05 MIU/L-ACNC: 0.94 UIU/ML (ref 0.27–4.2)
UROBILINOGEN UR QL STRIP: ABNORMAL
WBC # UR STRIP: ABNORMAL /HPF
WBC NRBC COR # BLD: 6.9 10*3/MM3 (ref 3.4–10.8)

## 2022-06-24 PROCEDURE — 85025 COMPLETE CBC W/AUTO DIFF WBC: CPT | Performed by: EMERGENCY MEDICINE

## 2022-06-24 PROCEDURE — 81001 URINALYSIS AUTO W/SCOPE: CPT | Performed by: EMERGENCY MEDICINE

## 2022-06-24 PROCEDURE — 87077 CULTURE AEROBIC IDENTIFY: CPT | Performed by: EMERGENCY MEDICINE

## 2022-06-24 PROCEDURE — 25010000002 CEFTRIAXONE PER 250 MG: Performed by: EMERGENCY MEDICINE

## 2022-06-24 PROCEDURE — 99283 EMERGENCY DEPT VISIT LOW MDM: CPT

## 2022-06-24 PROCEDURE — 87086 URINE CULTURE/COLONY COUNT: CPT | Performed by: EMERGENCY MEDICINE

## 2022-06-24 PROCEDURE — 82550 ASSAY OF CK (CPK): CPT | Performed by: EMERGENCY MEDICINE

## 2022-06-24 PROCEDURE — 83690 ASSAY OF LIPASE: CPT | Performed by: EMERGENCY MEDICINE

## 2022-06-24 PROCEDURE — 25010000002 ONDANSETRON PER 1 MG: Performed by: EMERGENCY MEDICINE

## 2022-06-24 PROCEDURE — 83735 ASSAY OF MAGNESIUM: CPT | Performed by: EMERGENCY MEDICINE

## 2022-06-24 PROCEDURE — 96365 THER/PROPH/DIAG IV INF INIT: CPT

## 2022-06-24 PROCEDURE — 87186 SC STD MICRODIL/AGAR DIL: CPT | Performed by: EMERGENCY MEDICINE

## 2022-06-24 PROCEDURE — 96375 TX/PRO/DX INJ NEW DRUG ADDON: CPT

## 2022-06-24 PROCEDURE — 84443 ASSAY THYROID STIM HORMONE: CPT | Performed by: EMERGENCY MEDICINE

## 2022-06-24 PROCEDURE — 80053 COMPREHEN METABOLIC PANEL: CPT | Performed by: EMERGENCY MEDICINE

## 2022-06-24 RX ORDER — SODIUM CHLORIDE 0.9 % (FLUSH) 0.9 %
10 SYRINGE (ML) INJECTION AS NEEDED
Status: DISCONTINUED | OUTPATIENT
Start: 2022-06-24 | End: 2022-06-24 | Stop reason: HOSPADM

## 2022-06-24 RX ORDER — ONDANSETRON 2 MG/ML
8 INJECTION INTRAMUSCULAR; INTRAVENOUS ONCE
Status: COMPLETED | OUTPATIENT
Start: 2022-06-24 | End: 2022-06-24

## 2022-06-24 RX ORDER — CEFDINIR 300 MG/1
300 CAPSULE ORAL 2 TIMES DAILY
Qty: 10 CAPSULE | Refills: 0 | Status: SHIPPED | OUTPATIENT
Start: 2022-06-24 | End: 2022-08-08

## 2022-06-24 RX ADMIN — CEFTRIAXONE 1 G: 1 INJECTION, POWDER, FOR SOLUTION INTRAMUSCULAR; INTRAVENOUS at 16:36

## 2022-06-24 RX ADMIN — ONDANSETRON 8 MG: 2 INJECTION INTRAMUSCULAR; INTRAVENOUS at 13:59

## 2022-06-24 RX ADMIN — SODIUM CHLORIDE, POTASSIUM CHLORIDE, SODIUM LACTATE AND CALCIUM CHLORIDE 1000 ML: 600; 310; 30; 20 INJECTION, SOLUTION INTRAVENOUS at 13:58

## 2022-06-24 NOTE — TELEPHONE ENCOUNTER
I referred her to podiatry same day I got the xray results.  I attempted to call twice with no answer to giver her results. Please f/u referral

## 2022-06-24 NOTE — TELEPHONE ENCOUNTER
Caller: Pearl Elizalde    Relationship: Self    Best call back number: [phone]    Equipment requested: A BOOT FOR HER LEFT FOOT    Reason for the request: PATIENT STATES THAT HER LEFT FOOT IS BROKEN AND THEY CONFIRMED THIS FROM HER XRAY. PATIENT STATES THAT ERIN TEXTED VIA [facility] THIS INFORMATION AND SHE NEEDS THIS REQUEST SENT IN AS SOON AS POSSIBLE. PATIENT HAS COVID CURRENTLY. PATIENT STATES THAT SHE HAS BEEN IN BED SINCE 6/19 AND IT HAS BEEN ELEVATED SINCE THEN.     Prescribing Provider: ERIN LANCE    Additional information or concerns: PLEASE ADVISE PATIENT

## 2022-06-25 ENCOUNTER — TELEPHONE (OUTPATIENT)
Dept: FAMILY MEDICINE CLINIC | Facility: CLINIC | Age: 62
End: 2022-06-25

## 2022-06-25 NOTE — TELEPHONE ENCOUNTER
Left VM advising her to call office if needed anything over the weekend and call office Monday for recheck.  We'll also give her off work note if she needs it and she was advised to call back on Urine culture if we have not contacted her.

## 2022-06-26 LAB — BACTERIA SPEC AEROBE CULT: ABNORMAL

## 2022-06-29 RX ORDER — SITAGLIPTIN 100 MG/1
TABLET, FILM COATED ORAL
COMMUNITY
Start: 2022-06-22 | End: 2022-09-12 | Stop reason: SDUPTHER

## 2022-06-30 ENCOUNTER — OFFICE VISIT (OUTPATIENT)
Dept: FAMILY MEDICINE CLINIC | Facility: CLINIC | Age: 62
End: 2022-06-30

## 2022-06-30 ENCOUNTER — OFFICE VISIT (OUTPATIENT)
Dept: PODIATRY | Facility: CLINIC | Age: 62
End: 2022-06-30

## 2022-06-30 VITALS
HEIGHT: 66 IN | TEMPERATURE: 97.8 F | OXYGEN SATURATION: 97 % | DIASTOLIC BLOOD PRESSURE: 89 MMHG | SYSTOLIC BLOOD PRESSURE: 141 MMHG | WEIGHT: 188.2 LBS | HEART RATE: 83 BPM | BODY MASS INDEX: 30.25 KG/M2

## 2022-06-30 VITALS
SYSTOLIC BLOOD PRESSURE: 133 MMHG | BODY MASS INDEX: 29.89 KG/M2 | DIASTOLIC BLOOD PRESSURE: 86 MMHG | WEIGHT: 186 LBS | HEART RATE: 83 BPM | HEIGHT: 66 IN

## 2022-06-30 DIAGNOSIS — R19.7 DIARRHEA, UNSPECIFIED TYPE: Primary | ICD-10-CM

## 2022-06-30 DIAGNOSIS — S92.255A CLOSED NONDISPLACED FRACTURE OF NAVICULAR BONE OF LEFT FOOT, INITIAL ENCOUNTER: ICD-10-CM

## 2022-06-30 DIAGNOSIS — M79.672 LEFT FOOT PAIN: Primary | ICD-10-CM

## 2022-06-30 PROCEDURE — 99213 OFFICE O/P EST LOW 20 MIN: CPT | Performed by: NURSE PRACTITIONER

## 2022-06-30 PROCEDURE — 99213 OFFICE O/P EST LOW 20 MIN: CPT | Performed by: PODIATRIST

## 2022-06-30 NOTE — PROGRESS NOTES
06/30/2022  Foot and Ankle Surgery - New Patient   Provider: Dr. Michele Roland DPM  Location: [facility]    Subjective:  Pearl Elizalde is a 62 y.o. female.     Chief Complaint   Patient presents with   • Left Foot - Pain   • Initial Evaluation     DENY Vitale md 6/8/2022       HPI:     The patient is a 62-year-old female who presents for fracture involving her left foot.     She reports that she hit her left foot against a trailer hitch on her car in 04/2022. She states that she walked on her foot for approximately 6 weeks without being aware that she had fractured her foot. She notes the presence of mild edema. The patient reports COVID exposures and she tested positive for COVID-19 10 days ago. She states that she went to the emergency department because she lost 17 pounds due to potential dehydration. She reports that she completed an x-ray there, which indicated that she had fractured her foot. She states that she has been in a boot for 1 week that has provided pain relief. The patient notes that she has Dr. Snow inserts at home.     Additionally, she reports that she is a  at [employer].     Allergies   Allergen Reactions   • Albuterol Shortness Of Breath     Generic inhaler caused problem  Generic inhaler caused problem  (Pt denies this)   • Oxycodone Hives and Nausea And Vomiting     Other reaction(s): Nausea And Vomiting   • Penicillin G Unknown (See Comments)     Patient states hives as a child and as an adult    • Oxycodone Hcl Unknown - High Severity   • Penicillins Hives     As child. Unsure if can take keflex       Past Medical History:   Diagnosis Date   • Asthma 6/3/2019   • Body mass index (BMI) of 30.0-30.9 in adult 4/11/2019   • Chronic obstructive pulmonary disease (HCC) 6/3/2019   • Diabetic peripheral neuropathy (HCC) 2/6/2019   • Gastroesophageal reflux disease 6/3/2019   • Hyperlipidemia    • Hypertension    • Low back pain    • Obesity        Past Surgical  History:   Procedure Laterality Date   • ADENOIDECTOMY     • CHOLECYSTECTOMY     • COLONOSCOPY     • ENDOSCOPY     • INNER EAR SURGERY     • REPLACEMENT TOTAL KNEE Left    • REPLACEMENT TOTAL KNEE Right    • TONSILLECTOMY         Family History   Adopted: Yes       Social History     Socioeconomic History   • Marital status:    Tobacco Use   • Smoking status: Former Smoker     Packs/day: 0.20     Years: 0.50     Pack years: 0.10     Types: Cigarettes     Quit date:      Years since quittin.5   • Smokeless tobacco: Never Used   Vaping Use   • Vaping Use: Never used   Substance and Sexual Activity   • Alcohol use: Yes     Comment: Occassional   • Drug use: No   • Sexual activity: Yes     Partners: Male     Birth control/protection: None        Current Outpatient Medications on File Prior to Visit   Medication Sig Dispense Refill   • Blood Glucose Monitoring Suppl (BLOOD GLUCOSE MONITOR SYSTEM) w/Device kit BLOOD GLUCOSE MONITOR SYSTEM w/Device KIT     • buPROPion XL (Wellbutrin XL) 150 MG 24 hr tablet Take 1 tablet by mouth Daily. 30 tablet 6   • cefdinir (OMNICEF) 300 MG capsule Take 1 capsule by mouth 2 (Two) Times a Day. 10 capsule 0   • Cetirizine HCl 10 MG capsule Take 1 dose by mouth.     • cholecalciferol (VITAMIN D3) 25 MCG (1000 UT) tablet Take 1,000 Units by mouth Daily.     • colesevelam (WELCHOL) 625 MG tablet Take 1 tablet by mouth 2 (Two) Times a Day With Meals. 180 tablet 3   • Cyanocobalamin (B-12) 1000 MCG tablet Take 1 tablet by mouth Daily.     • Dulaglutide (Trulicity) 0.75 MG/0.5ML solution pen-injector Inject 0.75 mg under the skin into the appropriate area as directed 1 (One) Time Per Week for 90 days. 6 mL 0   • EPINEPHrine (EPIPEN) 0.3 MG/0.3ML solution auto-injector injection      • ezetimibe (ZETIA) 10 MG tablet Take 1 tablet by mouth Daily. 90 tablet 3   • gabapentin (NEURONTIN) 800 MG tablet Take 1 tablet by mouth 3 (Three) Times a Day. TAKE ONE TABLET IN THE AM AND 2 PM  270 tablet 3   • glucose blood (FREESTYLE LITE) test strip 1 each by Other route 2 (two) times a day. Use as instructed 100 each 6   • hydrALAZINE (APRESOLINE) 50 MG tablet Take 1 tablet by mouth 3 (Three) Times a Day. 270 tablet 3   • ipratropium-albuterol (COMBIVENT RESPIMAT)  MCG/ACT inhaler Inhale 1 puff 4 (Four) Times a Day As Needed for Wheezing. 4 g 11   • Lancets (FREESTYLE) lancets      • Magnesium 400 MG tablet Take 1 tablet by mouth Daily.     • multivitamin with minerals tablet tablet Take 1 tablet by mouth Daily.     • Olmesartan-amLODIPine-HCTZ 40-10-12.5 MG tablet Take 1 tablet by mouth Daily. 90 tablet 3   • ondansetron ODT (Zofran ODT) 4 MG disintegrating tablet Place 1 tablet on the tongue Every 8 (Eight) Hours As Needed for Nausea or Vomiting. 20 tablet 1   • pravastatin (PRAVACHOL) 80 MG tablet Take 1 tablet by mouth Every Night. 90 tablet 3   • Symbicort 160-4.5 MCG/ACT inhaler Inhale 2 puffs 2 (Two) Times a Day. 3 each 3   • mirtazapine (Remeron) 15 MG tablet Take 1 tablet by mouth Every Night for 30 days. 30 tablet 3     No current facility-administered medications on file prior to visit.       Review of Systems:  General: Denies fever, chills, fatigue, and weakness.  Eyes: Denies vision loss, blurry vision, and excessive redness.  ENT: Denies hearing issues and difficulty swallowing.  Cardiovascular: Denies palpitations, chest pain, or syncopal episodes.  Respiratory: Denies shortness of breath, wheezing, and coughing.  GI: Denies abdominal pain, nausea, and vomiting.   : Denies frequency, hematuria, and urgency.  Musculoskeletal: Denies muscle cramps, joint pains, and stiffness.  Derm: Denies rash, open wounds, or suspicious lesions.  Neuro: Denies headaches, numbness, loss of coordination, and tremors.  Psych: Denies anxiety and depression.  Endocrine: Denies temperature intolerance and changes in appetite.  Heme: Denies bleeding disorders or abnormal bruising.     Objective   BP  "133/86   Pulse 83   Ht 167.6 cm (66\")   Wt 84.4 kg (186 lb)   LMP  (LMP Unknown)   BMI 30.02 kg/m²     Foot/Ankle Exam:       General:   Appearance: appears stated age and healthy    Orientation: AAOx3    Affect: appropriate      VASCULAR      Right Foot Vascularity   Normal vascular exam    Dorsalis pedis:  2+  Posterior tibial:  2+  Skin Temperature: warm    Edema Grading:  None  CFT:  < 3 seconds  Pedal Hair Growth:  Present  Varicosities: none       Left Foot Vascularity   Normal vascular exam    Dorsalis pedis:  2+  Posterior tibial:  2+  Skin Temperature: warm    Edema Grading:  None  CFT:  < 3 seconds  Pedal Hair Growth:  Present  Varicosities: none        NEUROLOGIC     Right Foot Neurologic   Light touch sensation:  Normal  Hot/Cold sensation: normal    Achilles reflex:  2+     Left Foot Neurologic   Light touch sensation:  Normal  Hot/cold sensation: normal    Achilles reflex:  2+     MUSCULOSKELETAL      Right Foot Musculoskeletal   Ecchymosis:  None  Arch:  Normal     Left Foot Musculoskeletal   Ecchymosis:  None  Arch:  Normal     MUSCLE STRENGTH     Right Foot Muscle Strength   Normal strength    Foot dorsiflexion:  5  Foot plantar flexion:  5  Foot inversion:  5  Foot eversion:  5     Left Foot Muscle Strength   Normal strength    Foot dorsiflexion:  5  Foot plantar flexion:  5  Foot inversion:  5  Foot eversion:  5     DERMATOLOGIC     Right Foot Dermatologic   Skin: skin intact    Nails comment:  Nails 1-5     Left Foot Dermatologic   Skin: skin intact    Nails comment:  Nails 1-5     TESTS     Right Foot Tests   Anterior drawer: negative    Varus tilt: negative       Left Foot Tests   Anterior drawer: negative    Varus tilt: negative        Left Foot Additional Comments: Mild discomfort involving the dorsal midfoot. No gross deformity or instability. No ecchymosis. Range of motion is somewhat limited secondary to guarding. No instability.      Assessment & Plan   Diagnoses and all orders for " this visit:    1. Left foot pain (Primary)    2. Closed nondisplaced fracture of navicular bone of left foot, initial encounter        Patient presents for fracture involving her left foot. Imaging was independently reviewed showing an obvious fracture. I have asked that she remain in the boot with any walking that she does; however, she is to avoid excessive walking and uneven terrain. I would like her to try gentle range of motion manual therapy exercises. We did review RICE therapy. I recommended that she try the power step inserts. She will follow-up in 3 weeks, and we will repeat an x-ray at that time. Greater than 30 minutes spent before, during, and after evaluation for patient care.    No orders of the defined types were placed in this encounter.       Note is dictated utilizing voice recognition software. Unfortunately this leads to occasional typographical errors. I apologize in advance if the situation occurs. If questions occur please do not hesitate to call our office.    Transcribed from ambient dictation for CHRIS Roland DPM by Sylwia Ruiz.  06/30/22   16:00 EDT    Patient verbalized consent to the visit recording.  I have personally performed the services described in this document as transcribed by the above individual, and it is both accurate and complete.  CHRIS Roland DPM  6/30/2022  19:08 EDT

## 2022-06-30 NOTE — PROGRESS NOTES
"Subjective   Pearl Elizalde is a 62 y.o. female presents for   Chief Complaint   Patient presents with   • Diarrhea     Covid 10 days ago       Health Maintenance Due   Topic Date Due   • COLORECTAL CANCER SCREENING  Never done   • Pneumococcal Vaccine 0-64 (1 - PCV) Never done   • ZOSTER VACCINE (1 of 2) Never done   • DXA SCAN  10/29/2021   • COVID-19 Vaccine (2 - Booster for Shelbie series) 11/25/2021   • ANNUAL PHYSICAL  02/05/2022   • DIABETIC EYE EXAM  03/22/2022   • HEMOGLOBIN A1C  04/12/2022   • DIABETIC FOOT EXAM  05/10/2022   • URINE MICROALBUMIN  06/14/2022       History of Present Illness   Pt present to follow up diarrhea.  She states she has had it x 10 days.  She was seen in ER and rehydrated.  She was prescribed cefdinir but did not take it due to concern for pcn allergy.  She is currently taking welchol with no improvement of sx.  She has not taken anything else for diarrhea.  She has been hydrating well.  She states anything she eats runs right through her. She denies fever.  She states she has taken her meds but they go right through.    Vitals:    06/30/22 1522   BP: 141/89   BP Location: Right arm   Patient Position: Sitting   Cuff Size: Adult   Pulse: 83   Temp: 97.8 °F (36.6 °C)   TempSrc: Temporal   SpO2: 97%   Weight: 85.4 kg (188 lb 3.2 oz)   Height: 167.6 cm (66\")     Body mass index is 30.38 kg/m².    Current Outpatient Medications on File Prior to Visit   Medication Sig Dispense Refill   • Blood Glucose Monitoring Suppl (BLOOD GLUCOSE MONITOR SYSTEM) w/Device kit BLOOD GLUCOSE MONITOR SYSTEM w/Device KIT     • buPROPion XL (Wellbutrin XL) 150 MG 24 hr tablet Take 1 tablet by mouth Daily. 30 tablet 6   • cefdinir (OMNICEF) 300 MG capsule Take 1 capsule by mouth 2 (Two) Times a Day. 10 capsule 0   • Cetirizine HCl 10 MG capsule Take 1 dose by mouth.     • cholecalciferol (VITAMIN D3) 25 MCG (1000 UT) tablet Take 1,000 Units by mouth Daily.     • colesevelam (WELCHOL) 625 MG tablet Take 1 " tablet by mouth 2 (Two) Times a Day With Meals. 180 tablet 3   • Cyanocobalamin (B-12) 1000 MCG tablet Take 1 tablet by mouth Daily.     • Dulaglutide (Trulicity) 0.75 MG/0.5ML solution pen-injector Inject 0.75 mg under the skin into the appropriate area as directed 1 (One) Time Per Week for 90 days. 6 mL 0   • EPINEPHrine (EPIPEN) 0.3 MG/0.3ML solution auto-injector injection      • ezetimibe (ZETIA) 10 MG tablet Take 1 tablet by mouth Daily. 90 tablet 3   • gabapentin (NEURONTIN) 800 MG tablet Take 1 tablet by mouth 3 (Three) Times a Day. TAKE ONE TABLET IN THE AM AND 2  tablet 3   • glucose blood (FREESTYLE LITE) test strip 1 each by Other route 2 (two) times a day. Use as instructed 100 each 6   • hydrALAZINE (APRESOLINE) 50 MG tablet Take 1 tablet by mouth 3 (Three) Times a Day. 270 tablet 3   • ipratropium-albuterol (COMBIVENT RESPIMAT)  MCG/ACT inhaler Inhale 1 puff 4 (Four) Times a Day As Needed for Wheezing. 4 g 11   • Januvia 100 MG tablet      • Lancets (FREESTYLE) lancets      • Magnesium 400 MG tablet Take 1 tablet by mouth Daily.     • mirtazapine (Remeron) 15 MG tablet Take 1 tablet by mouth Every Night for 30 days. 30 tablet 3   • multivitamin with minerals tablet tablet Take 1 tablet by mouth Daily.     • Olmesartan-amLODIPine-HCTZ 40-10-12.5 MG tablet Take 1 tablet by mouth Daily. 90 tablet 3   • ondansetron ODT (Zofran ODT) 4 MG disintegrating tablet Place 1 tablet on the tongue Every 8 (Eight) Hours As Needed for Nausea or Vomiting. 20 tablet 1   • pravastatin (PRAVACHOL) 80 MG tablet Take 1 tablet by mouth Every Night. 90 tablet 3   • Symbicort 160-4.5 MCG/ACT inhaler Inhale 2 puffs 2 (Two) Times a Day. 3 each 3     No current facility-administered medications on file prior to visit.       The following portions of the patient's history were reviewed and updated as appropriate: allergies, current medications, past family history, past medical history, past social history, past  surgical history, and problem list.    Review of Systems   Constitutional: Negative for chills and fever.   HENT: Negative for sinus pressure and sore throat.    Eyes: Negative for blurred vision.   Respiratory: Negative for cough and shortness of breath.    Cardiovascular: Negative for chest pain.   Gastrointestinal: Positive for diarrhea. Negative for abdominal pain.   Endocrine: Negative.    Genitourinary: Negative.    Musculoskeletal: Negative for arthralgias and joint swelling.   Skin: Negative for color change.   Allergic/Immunologic: Negative.    Neurological: Negative for dizziness.   Hematological: Negative.    Psychiatric/Behavioral: Negative for behavioral problems.       Objective   Physical Exam  Vitals and nursing note reviewed.   Constitutional:       Appearance: Normal appearance. She is well-developed.   HENT:      Head: Normocephalic and atraumatic.      Right Ear: External ear normal.      Left Ear: External ear normal.      Nose: Nose normal.   Eyes:      Extraocular Movements: Extraocular movements intact.      Pupils: Pupils are equal, round, and reactive to light.   Cardiovascular:      Rate and Rhythm: Normal rate and regular rhythm.      Heart sounds: Normal heart sounds.   Pulmonary:      Effort: Pulmonary effort is normal.      Breath sounds: Normal breath sounds.   Abdominal:      General: Bowel sounds are normal.      Palpations: Abdomen is soft.   Genitourinary:     Vagina: Normal.   Musculoskeletal:         General: Normal range of motion.      Cervical back: Normal range of motion and neck supple.   Skin:     General: Skin is warm and dry.   Neurological:      General: No focal deficit present.      Mental Status: She is alert and oriented to person, place, and time.   Psychiatric:         Mood and Affect: Mood normal.         Behavior: Behavior normal.         Judgment: Judgment normal.       PHQ-9 Total Score:      Assessment & Plan   Diagnoses and all orders for this visit:    1.  Diarrhea, unspecified type (Primary)  Comments:  instructed to add probiotic and imodium  Orders:  -     Clostridium Difficile EIA - Stool, Per Rectum; Future  -     Stool Culture (Reference Lab) - Stool, Per Rectum; Future  -     Ova & Parasite Examination - Stool, Per Rectum; Future  -     Fecal Lactoferrin Qual. - Stool, Per Rectum; Future        There are no Patient Instructions on file for this visit.

## 2022-07-01 ENCOUNTER — PATIENT ROUNDING (BHMG ONLY) (OUTPATIENT)
Dept: PODIATRY | Facility: CLINIC | Age: 62
End: 2022-07-01

## 2022-07-01 ENCOUNTER — TELEPHONE (OUTPATIENT)
Dept: FAMILY MEDICINE CLINIC | Facility: CLINIC | Age: 62
End: 2022-07-01

## 2022-07-01 NOTE — TELEPHONE ENCOUNTER
Caller: Pearl Elizalde    Relationship: Self    Best call back number: [phone]    Who are you requesting to speak with (clinical staff, provider,  specific staff member): MIKAEL LANCE    What was the call regarding: PATIENT STATES SHE HAD AN APPOINTMENT WITH MIKAEL LANCE YESTERDAY TO BE TREATED FOR DIARRHEA. SHE STATES SHE HAS BEEN BETTER FOR THE LAST 24 HOURS, AND REQUESTS A RETURN TO WORK FORM. IF IT CAN BE, SHE WOULD LIKE IT UPLOADED TO [employer], BUT CAN PICK IT UP IN THE OFFICE IF NOT. PLEASE CALL TO CONFIRM WHEN AVAILABLE.    Do you require a callback: YES        DELETE AFTER READING TO PATIENT: “ Thank you for sharing this information with me. I will send a message to the . Please allow up to 48 hours for the  to follow up on this request.”

## 2022-07-01 NOTE — PROGRESS NOTES
A My-Chart message has been sent to the patient [place] [facility] [place]

## 2022-07-25 ENCOUNTER — OFFICE VISIT (OUTPATIENT)
Dept: PODIATRY | Facility: CLINIC | Age: 62
End: 2022-07-25

## 2022-07-25 VITALS
WEIGHT: 179 LBS | HEIGHT: 66 IN | HEART RATE: 98 BPM | DIASTOLIC BLOOD PRESSURE: 69 MMHG | SYSTOLIC BLOOD PRESSURE: 102 MMHG | BODY MASS INDEX: 28.77 KG/M2

## 2022-07-25 DIAGNOSIS — M79.672 LEFT FOOT PAIN: Primary | ICD-10-CM

## 2022-07-25 DIAGNOSIS — L97.521 CHRONIC ULCER OF LEFT FOOT LIMITED TO BREAKDOWN OF SKIN: ICD-10-CM

## 2022-07-25 DIAGNOSIS — S92.255D CLOSED NONDISPLACED FRACTURE OF NAVICULAR BONE OF LEFT FOOT WITH ROUTINE HEALING, SUBSEQUENT ENCOUNTER: ICD-10-CM

## 2022-07-25 PROCEDURE — 99213 OFFICE O/P EST LOW 20 MIN: CPT | Performed by: PODIATRIST

## 2022-07-25 NOTE — PROGRESS NOTES
07/25/2022  Foot and Ankle Surgery - Established Patient/Follow-up  Provider: Dr. Michele Roland DPM  Location: [facility]    Subjective:  Pearl Elizalde is a 62 y.o. female.     Chief Complaint   Patient presents with   • Left Foot - Follow-up     Left Foot FX FU   • Follow-up     Last PCP visit with CHARO Arandabel 6/30/2022.       HPI:     The patient is a 62-year-old female who returns for follow-up regarding left foot fracture. She was last seen approximately 4 weeks ago.     She reports that she has been in the boot for 1 month. She states that she continues to experience soreness in the area. The patient notes that she is still working at [employer] and stands during the entire shift. She states that the developed wound started out as a blister and she has not been placing the blister bandage on the wound. She reports that she has neuropathy in her feet and she is unable to feel her shoes. She notes that she was diagnosed with COVID-19 4 weeks ago and she is unsure if she will be able to take time away from work at this time.     Additionally, the patient reports that she is expected to travel to [place] in 09/2022.       Allergies   Allergen Reactions   • Albuterol Shortness Of Breath     Generic inhaler caused problem  Generic inhaler caused problem  (Pt denies this)   • Oxycodone Hives and Nausea And Vomiting     Other reaction(s): Nausea And Vomiting   • Penicillin G Unknown (See Comments)     Patient states hives as a child and as an adult    • Oxycodone Hcl Unknown - High Severity   • Penicillins Hives     As child. Unsure if can take keflex       Current Outpatient Medications on File Prior to Visit   Medication Sig Dispense Refill   • Blood Glucose Monitoring Suppl (BLOOD GLUCOSE MONITOR SYSTEM) w/Device kit BLOOD GLUCOSE MONITOR SYSTEM w/Device KIT     • buPROPion XL (Wellbutrin XL) 150 MG 24 hr tablet Take 1 tablet by mouth Daily. 30 tablet 6   • cefdinir (OMNICEF) 300 MG capsule Take 1  capsule by mouth 2 (Two) Times a Day. 10 capsule 0   • Cetirizine HCl 10 MG capsule Take 1 dose by mouth.     • cholecalciferol (VITAMIN D3) 25 MCG (1000 UT) tablet Take 1,000 Units by mouth Daily.     • colesevelam (WELCHOL) 625 MG tablet Take 1 tablet by mouth 2 (Two) Times a Day With Meals. 180 tablet 3   • Cyanocobalamin (B-12) 1000 MCG tablet Take 1 tablet by mouth Daily.     • Dulaglutide (Trulicity) 0.75 MG/0.5ML solution pen-injector Inject 0.75 mg under the skin into the appropriate area as directed 1 (One) Time Per Week for 90 days. 6 mL 0   • EPINEPHrine (EPIPEN) 0.3 MG/0.3ML solution auto-injector injection      • ezetimibe (ZETIA) 10 MG tablet Take 1 tablet by mouth Daily. 90 tablet 3   • gabapentin (NEURONTIN) 800 MG tablet Take 1 tablet by mouth 3 (Three) Times a Day. TAKE ONE TABLET IN THE AM AND 2  tablet 3   • glucose blood (FREESTYLE LITE) test strip 1 each by Other route 2 (two) times a day. Use as instructed 100 each 6   • hydrALAZINE (APRESOLINE) 50 MG tablet Take 1 tablet by mouth 3 (Three) Times a Day. 270 tablet 3   • ipratropium-albuterol (COMBIVENT RESPIMAT)  MCG/ACT inhaler Inhale 1 puff 4 (Four) Times a Day As Needed for Wheezing. 4 g 11   • Januvia 100 MG tablet      • Lancets (FREESTYLE) lancets      • Magnesium 400 MG tablet Take 1 tablet by mouth Daily.     • multivitamin with minerals tablet tablet Take 1 tablet by mouth Daily.     • Olmesartan-amLODIPine-HCTZ 40-10-12.5 MG tablet Take 1 tablet by mouth Daily. 90 tablet 3   • ondansetron ODT (Zofran ODT) 4 MG disintegrating tablet Place 1 tablet on the tongue Every 8 (Eight) Hours As Needed for Nausea or Vomiting. 20 tablet 1   • pravastatin (PRAVACHOL) 80 MG tablet Take 1 tablet by mouth Every Night. 90 tablet 3   • Symbicort 160-4.5 MCG/ACT inhaler Inhale 2 puffs 2 (Two) Times a Day. 3 each 3   • mirtazapine (Remeron) 15 MG tablet Take 1 tablet by mouth Every Night for 30 days. 30 tablet 3     No current  "facility-administered medications on file prior to visit.       Objective   /69   Pulse 98   Ht 167.6 cm (66\")   Wt 81.2 kg (179 lb)   LMP  (LMP Unknown)   BMI 28.89 kg/m²     Foot/Ankle Exam:       General:   Appearance: appears stated age and healthy    Orientation: AAOx3    Affect: appropriate      VASCULAR      Right Foot Vascularity   Normal vascular exam    Dorsalis pedis:  2+  Posterior tibial:  2+  Skin Temperature: warm    Edema Grading:  None  CFT:  < 3 seconds  Pedal Hair Growth:  Present  Varicosities: none       Left Foot Vascularity   Normal vascular exam    Dorsalis pedis:  2+  Posterior tibial:  2+  Skin Temperature: warm    Edema Grading:  None  CFT:  < 3 seconds  Pedal Hair Growth:  Present  Varicosities: none        NEUROLOGIC     Right Foot Neurologic   Light touch sensation:  Normal  Hot/Cold sensation: normal    Achilles reflex:  2+     Left Foot Neurologic   Light touch sensation:  Normal  Hot/cold sensation: normal    Achilles reflex:  2+     MUSCULOSKELETAL      Right Foot Musculoskeletal   Ecchymosis:  None  Arch:  Normal     Left Foot Musculoskeletal   Ecchymosis:  None  Arch:  Normal     MUSCLE STRENGTH     Right Foot Muscle Strength   Normal strength    Foot dorsiflexion:  5  Foot plantar flexion:  5  Foot inversion:  5  Foot eversion:  5     Left Foot Muscle Strength   Normal strength    Foot dorsiflexion:  5  Foot plantar flexion:  5  Foot inversion:  5  Foot eversion:  5     DERMATOLOGIC     Right Foot Dermatologic   Skin: skin intact    Nails comment:  Nails 1-5     Left Foot Dermatologic   Skin: skin intact    Nails comment:  Nails 1-5     TESTS     Right Foot Tests   Anterior drawer: negative    Varus tilt: negative       Left Foot Tests   Anterior drawer: negative    Varus tilt: negative        Left Foot Additional Comments: Mild discomfort involving the dorsal midfoot. No gross deformity or instability. No ecchymosis. Range of motion is somewhat limited secondary to " guarding. No instability.    07/25/2022 Superficial ulceration noted to the fifth metatarsal head region. No surrounding erythema, drainage, or signs of infection. Wound measures approximately 0.5 cm in diameter.      Assessment & Plan   Diagnoses and all orders for this visit:    1. Left foot pain (Primary)    2. Closed nondisplaced fracture of navicular bone of left foot with routine healing, subsequent encounter  -     XR Foot 3+ View Left    3. Chronic ulcer of left foot limited to breakdown of skin (HCC)      Patient returns for follow-up regarding left foot fracture. Imaging was independently reviewed showing no obvious fracture involving the navicular.  I did review the imaging and further plans with the patient.  She does not have any significant pain involving the foot and the boot has actually started a new wound involving the lateral aspect of the fifth metatarsal head region. I would like her to discontinue wearing the boot. I recommended that she try an open-toed shoe and cover the wound with a Band-Aid. I also suggested that she decrease her overall activity with a week off work, if possible.  Patient feels that she may have to return to work and will not be able to take time off.  I have asked that she if she decides to proceed with transitioning back into a regular shoe to ensure that she monitors the wound closely. I have asked her to try to place the inserts into a tennis shoe. She will return to the clinic in 2 weeks for re-evaluation. Greater than 20 minutes spent before, during, and after evaluation for patient care    Orders Placed This Encounter   Procedures   • XR Foot 3+ View Left     Order Specific Question:   Reason for Exam:     Answer:   closed nondisplaced fx navicular  vroom 13  wb     Order Specific Question:   Does this patient have a diabetic monitoring/medication delivering device on?     Answer:   No     Order Specific Question:   Release to patient     Answer:   Immediate           Note is dictated utilizing voice recognition software. Unfortunately this leads to occasional typographical errors. I apologize in advance if the situation occurs. If questions occur please do not hesitate to call our office.    Transcribed from ambient dictation for CHRIS Roland DPM by Sylwia Ruiz.  07/25/22   12:24 EDT    Patient verbalized consent to the visit recording.  I have personally performed the services described in this document as transcribed by the above individual, and it is both accurate and complete.  CHRIS Roland DPM  7/26/2022  07:41 EDT

## 2022-07-27 ENCOUNTER — TELEPHONE (OUTPATIENT)
Dept: FAMILY MEDICINE CLINIC | Facility: CLINIC | Age: 62
End: 2022-07-27

## 2022-07-27 DIAGNOSIS — R19.7 DIARRHEA, UNSPECIFIED TYPE: Primary | ICD-10-CM

## 2022-07-27 NOTE — TELEPHONE ENCOUNTER
SPOKE TO PATIENT. LET HER KNOW THAT THE JAN[facility]ARSEN WAS COMPLETED AND APPROVED AND FAXED IN June- WILL RE-SEND AND GASTRO REQUEST FOR ORDER AND BEEN SENT TO PROVIDER.

## 2022-07-27 NOTE — TELEPHONE ENCOUNTER
PATIENT CALLED FOR REFERRAL FOR EYE DR TO GO TO [facility] IN [place].  SHE IS ALSO REQUESTING A REFERRAL FOR GASTRO TO [facility] ON [address].     THESE WERE REQUESTED IN June    CALL BACK NUMBER [phone]

## 2022-07-29 ENCOUNTER — HOSPITAL ENCOUNTER (EMERGENCY)
Facility: HOSPITAL | Age: 62
Discharge: HOME | End: 2022-07-29
Attending: EMERGENCY MEDICINE | Admitting: EMERGENCY MEDICINE

## 2022-07-29 ENCOUNTER — APPOINTMENT (OUTPATIENT)
Dept: GENERAL RADIOLOGY | Facility: HOSPITAL | Age: 62
End: 2022-07-29

## 2022-07-29 ENCOUNTER — TELEPHONE (OUTPATIENT)
Dept: FAMILY MEDICINE CLINIC | Facility: CLINIC | Age: 62
End: 2022-07-29

## 2022-07-29 VITALS
OXYGEN SATURATION: 98 % | RESPIRATION RATE: 16 BRPM | WEIGHT: 185 LBS | SYSTOLIC BLOOD PRESSURE: 119 MMHG | HEART RATE: 88 BPM | TEMPERATURE: 98.1 F | DIASTOLIC BLOOD PRESSURE: 74 MMHG | HEIGHT: 66 IN | BODY MASS INDEX: 29.73 KG/M2

## 2022-07-29 DIAGNOSIS — S40.011A CONTUSION OF RIGHT SHOULDER, INITIAL ENCOUNTER: ICD-10-CM
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
## 2022-07-29 PROCEDURE — 73030 X-RAY EXAM OF SHOULDER: CPT

## 2022-07-29 PROCEDURE — 73060 X-RAY EXAM OF HUMERUS: CPT

## 2022-07-29 PROCEDURE — 25010000002 KETOROLAC TROMETHAMINE PER 15 MG

## 2022-07-29 PROCEDURE — 96372 THER/PROPH/DIAG INJ SC/IM: CPT

## 2022-07-29 PROCEDURE — 99283 EMERGENCY DEPT VISIT LOW MDM: CPT

## 2022-07-29 RX ORDER — KETOROLAC TROMETHAMINE 30 MG/ML
30 INJECTION, SOLUTION INTRAMUSCULAR; INTRAVENOUS ONCE
Status: COMPLETED | OUTPATIENT
Start: 2022-07-29 | End: 2022-07-29

## 2022-07-29 RX ADMIN — KETOROLAC TROMETHAMINE 30 MG: 30 INJECTION, SOLUTION INTRAMUSCULAR; INTRAVENOUS at 14:28

## 2022-07-29 NOTE — TELEPHONE ENCOUNTER
Please call patient and make sure she is doing better from the shoulder injury that caused her to go to [facility] we will be glad to see her if she is still having difficulty and has not followed up with orthopedist

## 2022-08-02 ENCOUNTER — TELEPHONE (OUTPATIENT)
Dept: FAMILY MEDICINE CLINIC | Facility: CLINIC | Age: 62
End: 2022-08-02

## 2022-08-02 NOTE — TELEPHONE ENCOUNTER
Caller: Pearl Elizalde    Relationship: Self    Best call back number: [phone] (H)    What specialty or service is being requested: COLONOSCOPY    What is the provider, practice or medical service name: [email or web address]     What is the office location: [address] [place] [address]    What is the office phone number: [phone]    Any additional details: PATIENT STATES SHE NEEDS A REFERRAL FOR A COLONOSCOPY. SHE SAYS SHE CALLED FOR THIS A FEW DAYS AGO AND WAS REFERRED FOR THE WRONG THING.    SHE IS REQUESTING A CALL BACK WITH UPDATES.

## 2022-08-08 ENCOUNTER — OFFICE VISIT (OUTPATIENT)
Dept: PODIATRY | Facility: CLINIC | Age: 62
End: 2022-08-08

## 2022-08-08 VITALS — HEIGHT: 66 IN | RESPIRATION RATE: 20 BRPM | WEIGHT: 185 LBS | HEART RATE: 110 BPM | BODY MASS INDEX: 29.73 KG/M2

## 2022-08-08 DIAGNOSIS — M79.672 LEFT FOOT PAIN: Primary | ICD-10-CM

## 2022-08-08 DIAGNOSIS — L97.521 CHRONIC ULCER OF LEFT FOOT LIMITED TO BREAKDOWN OF SKIN: ICD-10-CM

## 2022-08-08 PROCEDURE — 99213 OFFICE O/P EST LOW 20 MIN: CPT | Performed by: PODIATRIST

## 2022-08-08 NOTE — PROGRESS NOTES
"08/08/2022  Foot and Ankle Surgery - Established Patient/Follow-up  Provider: Dr. Michele Roland DPM  Location: [facility]    Subjective:  Pearl Elizalde is a 62 y.o. female.     Chief Complaint   Patient presents with   • Left Foot - Fracture   • Follow-up     LOGAN Hooper arvin  6/30/2022       HPI:     The patient is a 62-year-old female who returns for follow-up regarding her left foot.     She reports that she continues to experience \"stiffness\" in her left foot; however, she notes that her foot is improving in a regular shoe with an insert. The patient states that she continues to apply ice to the area. She adds that she experiences mild edema at the end of the day.     Additionally, she reports a recent injury where she landed on her right shoulder. She states that she \"popped\" her left shoulder in 05/2022.     Allergies   Allergen Reactions   • Albuterol Shortness Of Breath     Generic inhaler caused problem  Generic inhaler caused problem  (Pt denies this)   • Oxycodone Hives and Nausea And Vomiting     Other reaction(s): Nausea And Vomiting   • Penicillin G Unknown (See Comments)     Patient states hives as a child and as an adult    • Oxycodone Hcl Unknown - High Severity   • Penicillins Hives     As child. Unsure if can take keflex       Current Outpatient Medications on File Prior to Visit   Medication Sig Dispense Refill   • Blood Glucose Monitoring Suppl (BLOOD GLUCOSE MONITOR SYSTEM) w/Device kit BLOOD GLUCOSE MONITOR SYSTEM w/Device KIT     • buPROPion XL (Wellbutrin XL) 150 MG 24 hr tablet Take 1 tablet by mouth Daily. 30 tablet 6   • Cetirizine HCl 10 MG capsule Take 1 dose by mouth.     • cholecalciferol (VITAMIN D3) 25 MCG (1000 UT) tablet Take 1,000 Units by mouth Daily.     • colesevelam (WELCHOL) 625 MG tablet Take 1 tablet by mouth 2 (Two) Times a Day With Meals. 180 tablet 3   • Cyanocobalamin (B-12) 1000 MCG tablet Take 1 tablet by mouth Daily.     • Dulaglutide (Trulicity) 0.75 " "MG/0.5ML solution pen-injector Inject 0.75 mg under the skin into the appropriate area as directed 1 (One) Time Per Week for 90 days. 6 mL 0   • EPINEPHrine (EPIPEN) 0.3 MG/0.3ML solution auto-injector injection      • ezetimibe (ZETIA) 10 MG tablet Take 1 tablet by mouth Daily. 90 tablet 3   • gabapentin (NEURONTIN) 800 MG tablet Take 1 tablet by mouth 3 (Three) Times a Day. TAKE ONE TABLET IN THE AM AND 2  tablet 3   • glucose blood (FREESTYLE LITE) test strip 1 each by Other route 2 (two) times a day. Use as instructed 100 each 6   • hydrALAZINE (APRESOLINE) 50 MG tablet Take 1 tablet by mouth 3 (Three) Times a Day. 270 tablet 3   • ipratropium-albuterol (COMBIVENT RESPIMAT)  MCG/ACT inhaler Inhale 1 puff 4 (Four) Times a Day As Needed for Wheezing. 4 g 11   • Januvia 100 MG tablet      • Lancets (FREESTYLE) lancets      • Magnesium 400 MG tablet Take 1 tablet by mouth Daily.     • multivitamin with minerals tablet tablet Take 1 tablet by mouth Daily.     • Olmesartan-amLODIPine-HCTZ 40-10-12.5 MG tablet Take 1 tablet by mouth Daily. 90 tablet 3   • pravastatin (PRAVACHOL) 80 MG tablet Take 1 tablet by mouth Every Night. 90 tablet 3   • Symbicort 160-4.5 MCG/ACT inhaler Inhale 2 puffs 2 (Two) Times a Day. 3 each 3   • mirtazapine (Remeron) 15 MG tablet Take 1 tablet by mouth Every Night for 30 days. 30 tablet 3   • ondansetron ODT (Zofran ODT) 4 MG disintegrating tablet Place 1 tablet on the tongue Every 8 (Eight) Hours As Needed for Nausea or Vomiting. 20 tablet 1     No current facility-administered medications on file prior to visit.       Objective   Pulse 110   Resp 20   Ht 167.6 cm (66\")   Wt 83.9 kg (185 lb)   LMP  (LMP Unknown)   BMI 29.86 kg/m²     Foot/Ankle Exam:       General:   Appearance: appears stated age and healthy    Orientation: AAOx3    Affect: appropriate      VASCULAR      Right Foot Vascularity   Normal vascular exam    Dorsalis pedis:  2+  Posterior tibial:  2+  Skin " Temperature: warm    Edema Grading:  None  CFT:  < 3 seconds  Pedal Hair Growth:  Present  Varicosities: none       Left Foot Vascularity   Normal vascular exam    Dorsalis pedis:  2+  Posterior tibial:  2+  Skin Temperature: warm    Edema Grading:  None  CFT:  < 3 seconds  Pedal Hair Growth:  Present  Varicosities: none        NEUROLOGIC     Right Foot Neurologic   Light touch sensation:  Normal  Hot/Cold sensation: normal    Achilles reflex:  2+     Left Foot Neurologic   Light touch sensation:  Normal  Hot/cold sensation: normal    Achilles reflex:  2+     MUSCULOSKELETAL      Right Foot Musculoskeletal   Ecchymosis:  None  Arch:  Normal     Left Foot Musculoskeletal   Ecchymosis:  None  Arch:  Normal     MUSCLE STRENGTH     Right Foot Muscle Strength   Normal strength    Foot dorsiflexion:  5  Foot plantar flexion:  5  Foot inversion:  5  Foot eversion:  5     Left Foot Muscle Strength   Normal strength    Foot dorsiflexion:  5  Foot plantar flexion:  5  Foot inversion:  5  Foot eversion:  5     DERMATOLOGIC     Right Foot Dermatologic   Skin: skin intact    Nails comment:  Nails 1-5     Left Foot Dermatologic   Skin: skin intact    Nails comment:  Nails 1-5     TESTS     Right Foot Tests   Anterior drawer: negative    Varus tilt: negative       Left Foot Tests   Anterior drawer: negative    Varus tilt: negative        Left Foot Additional Comments: Mild discomfort involving the dorsal midfoot. No gross deformity or instability. No ecchymosis. Range of motion is somewhat limited secondary to guarding. No instability.    07/25/2022 Superficial ulceration noted to the fifth metatarsal head region. No surrounding erythema, drainage, or signs of infection. Wound measures approximately 0.5 cm in diameter.    08/08/2022 No significant pain with palpation. Wound to the lateral aspect of the fifth metatarsal head is now healed. No other complicating features.      Assessment & Plan   Diagnoses and all orders for this  visit:    1. Left foot pain (Primary)    2. Chronic ulcer of left foot limited to breakdown of skin (HCC)         Patient returns for follow-up regarding her left foot. The wound is now healed. I have asked her to perform range of motion manual therapy exercises. I recommended that she try warm water soaks. I recommended that she discuss medical care with Dr. Jimenes if she continues to experience issues with her shoulders.  She will return to the clinic in 6 weeks for re-evaluation. Greater than 20 minutes spent before, during, and after evaluation for patient care.    No orders of the defined types were placed in this encounter.         Note is dictated utilizing voice recognition software. Unfortunately this leads to occasional typographical errors. I apologize in advance if the situation occurs. If questions occur please do not hesitate to call our office.    Transcribed from ambient dictation for CHRIS Roland DPM by Sylwia Ruiz.  08/08/22   10:51 EDT    Patient verbalized consent to the visit recording.  I have personally performed the services described in this document as transcribed by the above individual, and it is both accurate and complete.  CHRIS Roland DPM  8/9/2022  07:10 EDT

## 2022-08-19 DIAGNOSIS — E11.65 TYPE 2 DIABETES MELLITUS WITH HYPERGLYCEMIA, WITHOUT LONG-TERM CURRENT USE OF INSULIN: ICD-10-CM

## 2022-08-19 RX ORDER — DULAGLUTIDE 0.75 MG/.5ML
INJECTION, SOLUTION SUBCUTANEOUS
Qty: 6 ML | Refills: 3 | Status: SHIPPED | OUTPATIENT
Start: 2022-08-19 | End: 2022-09-12

## 2022-09-12 ENCOUNTER — TELEPHONE (OUTPATIENT)
Dept: PEDIATRICS | Facility: OTHER | Age: 62
End: 2022-09-12

## 2022-09-12 RX ORDER — SITAGLIPTIN 100 MG/1
100 TABLET, FILM COATED ORAL DAILY
Qty: 90 TABLET | Refills: 0 | Status: SHIPPED | OUTPATIENT
Start: 2022-09-12 | End: 2022-11-28

## 2022-09-12 NOTE — TELEPHONE ENCOUNTER
I believe she is asking for januvia due to concern she cannot keep trulicity refrigerated.    Prescription sent.  Januvia will be fine at room temperature.

## 2022-09-12 NOTE — TELEPHONE ENCOUNTER
Caller: Pearl Elizalde    Relationship: Self    Best call back number: [phone]    What medication are you requesting: Januvia 100 MG tablet    If a prescription is needed, what is your preferred pharmacy and phone number: [facility] - [address] [phone] [facility] [phone]      Additional notes:    PATIENT WILL BE LEAVING ON 10/2/22 FOR [place] AND WILL BE THERE FOR 3 WEEKS.  SHE WON'T HAVE ANY REFRIGERATION.  PLEASE ADVISE CARE INSTRUCTIONS.

## 2022-11-18 ENCOUNTER — TELEPHONE (OUTPATIENT)
Dept: FAMILY MEDICINE CLINIC | Facility: CLINIC | Age: 62
End: 2022-11-18

## 2022-11-18 DIAGNOSIS — K59.1 FUNCTIONAL DIARRHEA: ICD-10-CM

## 2022-11-18 DIAGNOSIS — M25.572 PAIN IN LEFT ANKLE AND JOINTS OF LEFT FOOT: Primary | ICD-10-CM

## 2022-11-18 RX ORDER — CELECOXIB 100 MG/1
100 CAPSULE ORAL 2 TIMES DAILY PRN
Qty: 60 CAPSULE | Refills: 3 | Status: SHIPPED | OUTPATIENT
Start: 2022-11-18 | End: 2023-01-09

## 2022-11-18 RX ORDER — GABAPENTIN 800 MG/1
800 TABLET ORAL 3 TIMES DAILY
Qty: 270 TABLET | Refills: 3 | Status: SHIPPED | OUTPATIENT
Start: 2022-11-18 | End: 2023-01-09

## 2022-11-18 RX ORDER — COLESEVELAM 180 1/1
625 TABLET ORAL 2 TIMES DAILY WITH MEALS
Qty: 180 TABLET | Refills: 3 | Status: SHIPPED | OUTPATIENT
Start: 2022-11-18

## 2022-11-18 NOTE — TELEPHONE ENCOUNTER
Caller: Pearl Elizalde    Relationship: Self    Best call back number: [phone]    Requested Prescriptions:   Requested Prescriptions     Pending Prescriptions Disp Refills   • gabapentin (NEURONTIN) 800 MG tablet 270 tablet 3     Sig: Take 1 tablet by mouth 3 (Three) Times a Day. TAKE ONE TABLET IN THE AM AND 2 PM   • colesevelam (WELCHOL) 625 MG tablet 180 tablet 3     Sig: Take 1 tablet by mouth 2 (Two) Times a Day With Meals.        Pharmacy where request should be sent: [facility] [address] [phone] [facility] [phone] FX     Additional details provided by patient: PATIENT HAS ONE WEEK WORTH REMAINING    Does the patient have less than a 3 day supply:  [] Yes  [x] No    Connor Gould Rep   11/18/22 12:12 EST

## 2022-11-18 NOTE — TELEPHONE ENCOUNTER
PT CALLING AGAIN ASKING FOR SOMETHING FOR PAIN, SAID SHE'S TAKING 3 TYLENOL EVERY 3/4 HOURS.  I GAVE PT MESSAGE ABOUT ASKING DR WHO ORIGINALLY PRESCRIBED MED TO HER, SHE SAID SHE WASN'T EVEN SURE IT HE WAS AROUND.   I TOLD HER THAT WE DID NOT PRESCRIBE NARCOTICS HERE AND WOULD MOST LIKELY SEND A REFERRAL FOR HER TO PAIN MANAGEMENT.  SHE WAS OK WITH THAT TOO

## 2022-11-18 NOTE — TELEPHONE ENCOUNTER
Caller: Pearl Elizalde    Relationship: Self    Best call back number: [phone]    What medication are you requesting: TYLENOL 3    What are your current symptoms: PAIN FROM LEFT HIP TO FOOT. PATIENT IS NEEDING A HIP REPLACEMENT.    How long have you been experiencing symptoms: October     Have you had these symptoms before:    [x] Yes  [] No    Have you been treated for these symptoms before:   [x] Yes  [] No    If a prescription is needed, what is your preferred pharmacy and phone number:  [id number] - [place], [place] - [address] - [phone]  - [phone]   [phone]    Additional notes: PATIENT IS EXPERIENCING A LOT OF PAIN, MAKING IT DIFFICULT TO FUNCTION AT HER PLACE OF EMPLOYMENT WHICH REQUIRES A LOT OF STANDING. PATIENT STATES REGULAR TYLENOL IS NOT RELIEVING THE PAIN. PATIENT IS TAKING 3 TYLENOL EVERY 8 HOURS.

## 2022-11-22 DIAGNOSIS — I10 ESSENTIAL HYPERTENSION: ICD-10-CM

## 2022-11-22 RX ORDER — OLMESARTAN MEDOXOMIL / AMLODIPINE BESYLATE / HYDROCHLOROTHIAZIDE 40; 10; 12.5 MG/1; MG/1; MG/1
1 TABLET, FILM COATED ORAL DAILY
Qty: 90 TABLET | Refills: 3 | Status: SHIPPED | OUTPATIENT
Start: 2022-11-22 | End: 2022-12-14 | Stop reason: SDUPTHER

## 2022-11-22 NOTE — TELEPHONE ENCOUNTER
Caller: Pearl Elizalde    Relationship: Self    Best call back number: [phone] (Home)    Requested Prescriptions:   Requested Prescriptions     Pending Prescriptions Disp Refills   • Olmesartan-amLODIPine-HCTZ 40-10-12.5 MG tablet 90 tablet 3     Sig: Take 1 tablet by mouth Daily.        Pharmacy where request should be sent: [facility] - [address] [phone] [facility] [phone]      Additional details provided by patient: PATIENT NEEDS REFILL SENT TO MAIL ORDER ASAP    Does the patient have less than a 3 day supply:  [x] Yes  [] No    Connor Davis Rep   11/22/22 10:22 EST

## 2022-11-28 RX ORDER — SITAGLIPTIN 100 MG/1
TABLET, FILM COATED ORAL
Qty: 90 TABLET | Refills: 0 | Status: SHIPPED | OUTPATIENT
Start: 2022-11-28 | End: 2023-02-16 | Stop reason: SDUPTHER

## 2022-12-07 ENCOUNTER — TELEPHONE (OUTPATIENT)
Dept: FAMILY MEDICINE CLINIC | Facility: CLINIC | Age: 62
End: 2022-12-07

## 2022-12-07 NOTE — TELEPHONE ENCOUNTER
Not sure what the patient is talking about. Referral and approval both scanned in and notes in referral states pain mngt has reached out to patietn to schedule and she said she has a lot going on right now. I left patient a detailed VM and told her to call back if she needed something else.

## 2022-12-07 NOTE — TELEPHONE ENCOUNTER
Caller: Pearl Elizalde    Relationship: Self    Best call back number:    [phone]     What is the best time to reach you: ANYTIME IF NOT ANSWER LEAVE VM    Who are you requesting to speak with (clinical staff, provider,  specific staff member): CLINICAL    What was the call regarding: PATIENT CALLING STATING THAT [facility] IS NEEDING THE REFERRAL SENT TO THEM TO BE ABLE TO APPROVE THE REFERRAL TO PAIN MANAGEMENT SHE STATED THAT  HASNT RECEIVED A REFERRAL EITHER    Do you require a callback: YES

## 2022-12-08 DIAGNOSIS — I10 ESSENTIAL HYPERTENSION: ICD-10-CM

## 2022-12-08 RX ORDER — HYDRALAZINE HYDROCHLORIDE 50 MG/1
TABLET, FILM COATED ORAL
Qty: 270 TABLET | Refills: 3 | Status: SHIPPED | OUTPATIENT
Start: 2022-12-08

## 2022-12-14 ENCOUNTER — TELEPHONE (OUTPATIENT)
Dept: FAMILY MEDICINE CLINIC | Facility: CLINIC | Age: 62
End: 2022-12-14

## 2022-12-14 DIAGNOSIS — I10 ESSENTIAL HYPERTENSION: ICD-10-CM

## 2022-12-14 RX ORDER — OLMESARTAN MEDOXOMIL / AMLODIPINE BESYLATE / HYDROCHLOROTHIAZIDE 40; 10; 12.5 MG/1; MG/1; MG/1
1 TABLET, FILM COATED ORAL DAILY
Qty: 90 TABLET | Refills: 3 | Status: SHIPPED | OUTPATIENT
Start: 2022-12-14 | End: 2022-12-22 | Stop reason: SDUPTHER

## 2022-12-14 NOTE — TELEPHONE ENCOUNTER
Caller: Pearl Elizalde    Relationship to patient: Self    Best call back number: [phone]    Patient is needing: PATIENT CALLED AND SAID THAT [facility] HAS NOT DELIVERED HER REFILL OF THIS,     Olmesartan-amLODIPine-HCTZ 40-10-12.5 MG tablet  1 tablet, Daily       SHE SAID [facility] SAID THEY SHIPPED IT IN November, BUT SHE NEVER RECEIVED IT, SHE SAID SHE HAD CHECKED WITH [facility] YET AND IS WANTING TO SEE IF THE OFFICE CAN FIND OUT WHAT HAPPENED

## 2022-12-22 DIAGNOSIS — I10 ESSENTIAL HYPERTENSION: ICD-10-CM

## 2022-12-22 RX ORDER — OLMESARTAN MEDOXOMIL / AMLODIPINE BESYLATE / HYDROCHLOROTHIAZIDE 40; 10; 12.5 MG/1; MG/1; MG/1
1 TABLET, FILM COATED ORAL DAILY
Qty: 90 TABLET | Refills: 0 | Status: SHIPPED | OUTPATIENT
Start: 2022-12-22 | End: 2023-01-06 | Stop reason: SDUPTHER

## 2022-12-22 NOTE — TELEPHONE ENCOUNTER
Caller: Pearl Elizalde    Relationship: Self    Best call back number: [phone]    Requested Prescriptions:   Requested Prescriptions     Pending Prescriptions Disp Refills   • Olmesartan-amLODIPine-HCTZ 40-10-12.5 MG tablet 90 tablet 3     Sig: Take 1 tablet by mouth Daily.        Pharmacy where request should be sent: [facility] [address] [phone] [facility] [phone] FX     Additional details provided by patient: PATIENT STILL HAS NOT RECEIVED THIS MEDICATION FROM [facility]. WHEN SHE CALLS THEY SAY PENDING, CAN ERIN PLEASE RE-SEND IT? SHE ONLY HAS TWO PILLS LEFT    Does the patient have less than a 3 day supply:  [x] Yes  [] No    Would you like a call back once the refill request has been completed: [x] Yes [] No    If the office needs to give you a call back, can they leave a voicemail: [x] Yes [] No    Connor Torres Rep   12/22/22 08:08 EST

## 2022-12-27 ENCOUNTER — TELEPHONE (OUTPATIENT)
Dept: FAMILY MEDICINE CLINIC | Facility: CLINIC | Age: 62
End: 2022-12-27
Payer: OTHER GOVERNMENT

## 2022-12-27 DIAGNOSIS — N63.21 MASS OF UPPER OUTER QUADRANT OF LEFT BREAST: Primary | ICD-10-CM

## 2022-12-27 DIAGNOSIS — F41.9 ANXIETY: Primary | ICD-10-CM

## 2022-12-27 RX ORDER — DIAZEPAM 2 MG/1
2 TABLET ORAL AS NEEDED
Qty: 2 TABLET | Refills: 0 | Status: SHIPPED | OUTPATIENT
Start: 2022-12-27 | End: 2023-01-09

## 2022-12-27 NOTE — TELEPHONE ENCOUNTER
Order entered.  I will send prescription and she will need to make sure she has a  since taking medication.

## 2022-12-27 NOTE — TELEPHONE ENCOUNTER
MAMMOGRAM  DONE AT [facility], TOTAL WOMAN REQUESTING MRI OF LEFT BREAST. ORDER HAS TO COME FROM PCP AND APPROVED THROUGH . SHE IS ALSO REQUESTING A VALUM FOR THE ANXIETY OF GETTING INTO THE MACHINE.

## 2023-01-02 DIAGNOSIS — E78.5 HYPERLIPIDEMIA, UNSPECIFIED HYPERLIPIDEMIA TYPE: ICD-10-CM

## 2023-01-03 RX ORDER — PRAVASTATIN SODIUM 80 MG/1
TABLET ORAL
Qty: 90 TABLET | Refills: 3 | OUTPATIENT
Start: 2023-01-03

## 2023-01-03 NOTE — TELEPHONE ENCOUNTER
Needs OV with fasting labs-looks like she is seeing Rosa now? May be able to refill if she reappoints-ok if seeing me-ask Rosa if she has switched to Rosa

## 2023-01-04 ENCOUNTER — TELEPHONE (OUTPATIENT)
Dept: FAMILY MEDICINE CLINIC | Facility: CLINIC | Age: 63
End: 2023-01-04
Payer: OTHER GOVERNMENT

## 2023-01-04 NOTE — TELEPHONE ENCOUNTER
Please call patient and make sure that the breast MRI request has been approved by [facility].  If she has not heard anything more about this let us put him the referral for breast MRI due to abnormal diagnostic mammogram and ultrasound.

## 2023-01-05 ENCOUNTER — TELEPHONE (OUTPATIENT)
Dept: FAMILY MEDICINE CLINIC | Facility: CLINIC | Age: 63
End: 2023-01-05
Payer: OTHER GOVERNMENT

## 2023-01-05 NOTE — TELEPHONE ENCOUNTER
Patient OBGYN ordered Diagnostic Mammo and US. The impression reads:  Impression:   1. No evidence of malignancy in the right breast.   2. Complex parenchymal pattern/heterogeneous ultrasound appearance of the fibroglandular tissue in the area of pain and lump in the left breast. Further evaluation with breast MRI may be beneficial to exclude an occult malignancy, given personal history   of atypical lobular hyperplasia.        Patient called here and asked for you to order MRI, we have to get it approved on [employer] - They are asking for additional info and ask:    ATTN PROVIDER: WHAT IS PATIENT'S LIFETIME RISK FOR BREAST CANCER BASED ON THE FAMILY HISTORY RISK ASSESSTMENT TOOL?

## 2023-01-05 NOTE — TELEPHONE ENCOUNTER
I need additional information to calculate that -  1. Hast the patient ever had a breast biopsy?  2. What was pt age at first menstrual period?  3. What was pt age at the birth of her first child?  4. How many first degree (mother, sisters, daughters) relatives have or have had breast cancer?

## 2023-01-06 DIAGNOSIS — I10 ESSENTIAL HYPERTENSION: ICD-10-CM

## 2023-01-06 NOTE — TELEPHONE ENCOUNTER
Caller: Pearl Elizalde    Relationship: Self    Best call back number: [phone]    Requested Prescriptions:   Requested Prescriptions     Pending Prescriptions Disp Refills   • Olmesartan-amLODIPine-HCTZ 40-10-12.5 MG tablet 90 tablet 0     Sig: Take 1 tablet by mouth Daily.        Pharmacy where request should be sent:    [email or web address] - [address] - [phone]  - [phone] [phone]    Additional details provided by patient: COMPLETELY OUT FOR FOUR DAYS, NEEDS PA.     Does the patient have less than a 3 day supply:  [x] Yes  [] No    Would you like a call back once the refill request has been completed: [x] Yes [] No    If the office needs to give you a call back, can they leave a voicemail: [] Yes [x] No    Connor Valadez Rep   01/06/23 15:50 EST

## 2023-01-09 ENCOUNTER — TELEPHONE (OUTPATIENT)
Dept: FAMILY MEDICINE CLINIC | Facility: CLINIC | Age: 63
End: 2023-01-09
Payer: OTHER GOVERNMENT

## 2023-01-09 ENCOUNTER — OFFICE VISIT (OUTPATIENT)
Dept: PAIN MEDICINE | Facility: CLINIC | Age: 63
End: 2023-01-09
Payer: OTHER GOVERNMENT

## 2023-01-09 ENCOUNTER — TELEPHONE (OUTPATIENT)
Dept: FAMILY MEDICINE CLINIC | Facility: CLINIC | Age: 63
End: 2023-01-09

## 2023-01-09 VITALS
SYSTOLIC BLOOD PRESSURE: 141 MMHG | DIASTOLIC BLOOD PRESSURE: 81 MMHG | RESPIRATION RATE: 16 BRPM | OXYGEN SATURATION: 96 % | HEART RATE: 84 BPM

## 2023-01-09 DIAGNOSIS — G89.4 CHRONIC PAIN SYNDROME: Primary | ICD-10-CM

## 2023-01-09 DIAGNOSIS — G25.81 RESTLESS LEG SYNDROME: ICD-10-CM

## 2023-01-09 DIAGNOSIS — M15.9 PRIMARY OSTEOARTHRITIS INVOLVING MULTIPLE JOINTS: ICD-10-CM

## 2023-01-09 PROCEDURE — 99204 OFFICE O/P NEW MOD 45 MIN: CPT | Performed by: STUDENT IN AN ORGANIZED HEALTH CARE EDUCATION/TRAINING PROGRAM

## 2023-01-09 RX ORDER — BACLOFEN 10 MG/1
10 TABLET ORAL NIGHTLY
Qty: 30 TABLET | Refills: 0 | Status: SHIPPED | OUTPATIENT
Start: 2023-01-09 | End: 2023-02-13 | Stop reason: SDUPTHER

## 2023-01-09 RX ORDER — OLMESARTAN MEDOXOMIL / AMLODIPINE BESYLATE / HYDROCHLOROTHIAZIDE 40; 10; 12.5 MG/1; MG/1; MG/1
1 TABLET, FILM COATED ORAL DAILY
Qty: 90 TABLET | Refills: 0 | Status: SHIPPED | OUTPATIENT
Start: 2023-01-09 | End: 2023-02-16 | Stop reason: SDUPTHER

## 2023-01-09 RX ORDER — CELECOXIB 200 MG/1
200 CAPSULE ORAL DAILY
Qty: 30 CAPSULE | Refills: 0 | Status: SHIPPED | OUTPATIENT
Start: 2023-01-09 | End: 2023-02-13 | Stop reason: SDUPTHER

## 2023-01-09 RX ORDER — PREGABALIN 150 MG/1
150 CAPSULE ORAL 2 TIMES DAILY
Qty: 60 CAPSULE | Refills: 0 | Status: SHIPPED | OUTPATIENT
Start: 2023-01-09 | End: 2023-02-13 | Stop reason: SDUPTHER

## 2023-01-09 NOTE — TELEPHONE ENCOUNTER
Caller: Pearl Elizalde    Relationship: Self    Best call back number: [phone]    What orders are you requesting (i.e. lab or imaging): REFERRAL TO DR ANDRE    In what timeframe would the patient need to come in: AS SOON AS POSSIBLE    Where will you receive your lab/imaging services: AT DR HOLLIS     Additional notes: PATIENT HAS AN APPT ON AUG 21, 2023.  BUT THE REFERRAL NEEDS TO BE SENT OVER ASAP SO HER APPT DOES NOT GET CANCELLED.

## 2023-01-09 NOTE — PROGRESS NOTES
CHIEF COMPLAINT  Chief Complaint   Patient presents with   • Pain     New patient referred for Pain in left ankle and joints of left foot No Narcotics        Primary Care  Rosa Hooper, MIKAEL    Subjective   Pearl Elizalde is a 62 y.o. female  who presents for chronic pain syndrome and osteoarthritis.  She is initially referred for left foot pain however she states that she has pain throughout her body multiple sites including her shoulders bilaterally as well as her left foot, left ankle, and low back.  She states that she has had pain off and on in many locations for many years.  She states that she has previously been taking celecoxib as needed which does provide her some level of pain relief.  She denies any significant radicular features or any symptoms suggestive of neuropathic pain.  He describes the pain primarily as a sharp, stabbing pain especially given and slightly improved with rest and has any red flag symptoms such as loss of bowel or bladder    History of Present Illness     Location: Bilateral shoulders, left ankle, left foot, low back  Onset: Many years ago  Duration: Progressively worsening  Timing: Constant throughout the day  Quality:, Stabbing  Severity: Today: 1       Last Week: 7       Worst: 10  Modifying Factors: The pain is worse with movement and physical activity and slightly improved with rest and anti-inflammatories  Functional Deficit: The pain makes it difficult for her to perform her activities of daily living as well as take care of her grandchild    Physical Therapy: no    Interval Update 01/09/2023:     The following portions of the patient's history were reviewed and updated as appropriate: allergies, current medications, past family history, past medical history, past social history, past surgical history and problem list.      Current Outpatient Medications:   •  Blood Glucose Monitoring Suppl (BLOOD GLUCOSE MONITOR SYSTEM) w/Device kit, BLOOD GLUCOSE MONITOR SYSTEM  w/Device KIT, Disp: , Rfl:   •  Cetirizine HCl 10 MG capsule, Take 1 dose by mouth., Disp: , Rfl:   •  cholecalciferol (VITAMIN D3) 25 MCG (1000 UT) tablet, Take 1,000 Units by mouth Daily., Disp: , Rfl:   •  colesevelam (WELCHOL) 625 MG tablet, Take 1 tablet by mouth 2 (Two) Times a Day With Meals., Disp: 180 tablet, Rfl: 3  •  EPINEPHrine (EPIPEN) 0.3 MG/0.3ML solution auto-injector injection, , Disp: , Rfl:   •  ezetimibe (ZETIA) 10 MG tablet, Take 1 tablet by mouth Daily., Disp: 90 tablet, Rfl: 3  •  glucose blood (FREESTYLE LITE) test strip, 1 each by Other route 2 (two) times a day. Use as instructed, Disp: 100 each, Rfl: 6  •  hydrALAZINE (APRESOLINE) 50 MG tablet, TAKE 1 TABLET THREE TIMES A DAY, Disp: 270 tablet, Rfl: 3  •  ipratropium-albuterol (COMBIVENT RESPIMAT)  MCG/ACT inhaler, Inhale 1 puff 4 (Four) Times a Day As Needed for Wheezing., Disp: 4 g, Rfl: 11  •  Januvia 100 MG tablet, TAKE 1 TABLET DAILY, Disp: 90 tablet, Rfl: 0  •  Lancets (FREESTYLE) lancets, , Disp: , Rfl:   •  multivitamin with minerals tablet tablet, Take 1 tablet by mouth Daily., Disp: , Rfl:   •  Olmesartan-amLODIPine-HCTZ 40-10-12.5 MG tablet, Take 1 tablet by mouth Daily., Disp: 90 tablet, Rfl: 0  •  ondansetron ODT (Zofran ODT) 4 MG disintegrating tablet, Place 1 tablet on the tongue Every 8 (Eight) Hours As Needed for Nausea or Vomiting., Disp: 20 tablet, Rfl: 1  •  pravastatin (PRAVACHOL) 80 MG tablet, Take 1 tablet by mouth Every Night., Disp: 90 tablet, Rfl: 3  •  Symbicort 160-4.5 MCG/ACT inhaler, Inhale 2 puffs 2 (Two) Times a Day., Disp: 3 each, Rfl: 3  •  baclofen (LIORESAL) 10 MG tablet, Take 1 tablet by mouth Every Night., Disp: 30 tablet, Rfl: 0  •  celecoxib (CeleBREX) 200 MG capsule, Take 1 capsule by mouth Daily., Disp: 30 capsule, Rfl: 0  •  pregabalin (LYRICA) 150 MG capsule, Take 1 capsule by mouth 2 (Two) Times a Day., Disp: 60 capsule, Rfl: 0    Review of Systems   Musculoskeletal: Positive for  arthralgias, back pain, gait problem, joint swelling and myalgias. Negative for neck pain and neck stiffness.   Neurological: Negative for weakness and numbness.       Vitals:    01/09/23 0918   BP: 141/81   Pulse: 84   Resp: 16   SpO2: 96%   PainSc: 0-No pain       Objective   Physical Exam  Vitals and nursing note reviewed.   Constitutional:       General: She is not in acute distress.     Appearance: Normal appearance. She is obese.   Musculoskeletal:         General: Tenderness present. No swelling or deformity.      Right shoulder: Tenderness present. No bony tenderness. Decreased range of motion.      Left shoulder: Tenderness present. No bony tenderness. Decreased range of motion.      Cervical back: Normal range of motion and neck supple. Tenderness present. No rigidity.      Left ankle: Tenderness present. Decreased range of motion.   Neurological:      Mental Status: She is alert.           Assessment & Plan   Problems Addressed this Visit    None  Visit Diagnoses     Chronic pain syndrome    -  Primary    Relevant Medications    pregabalin (LYRICA) 150 MG capsule    Primary osteoarthritis involving multiple joints        Restless leg syndrome          Diagnoses       Codes Comments    Chronic pain syndrome    -  Primary ICD-10-CM: G89.4  ICD-9-CM: 338.4     Primary osteoarthritis involving multiple joints     ICD-10-CM: M15.9  ICD-9-CM: 715.98     Restless leg syndrome     ICD-10-CM: G25.81  ICD-9-CM: 333.94           Plan:  1. She is previously taking celecoxib 100 mg twice daily as needed.  Discussed that celecoxib is better dosed as a daily medication.  I will have her start taking 200 mg daily every morning  2. We will also have her start baclofen 10 mg nightly for restless leg syndrome and leg cramps  3. We will switch her gabapentin 800 mg 3 times daily to Lyrica 150 mg twice daily  4. Also have her discontinue magnesium as well as vitamin B12 as her levels are relatively normal and they do not  appear to be helping her leg cramps and spasms any  --- Follow-up 1 month           INSPECT REPORT    As part of the patient's treatment plan, I may be prescribing controlled substances. The patient has been made aware of appropriate use of such medications, including potential risk of somnolence, limited ability to drive and/or work safely, and the potential for dependence or overdose. It has also bee made clear that these medications are for use by this patient only, without concomitant use of alcohol or other substances unless prescribed.     Patient has completed prescribing agreement detailing terms of continued prescribing of controlled substances, including monitoring ANNE-MARIE reports, urine drug screening, and pill counts if necessary. The patient is aware that inappropriate use will results in cessation of prescribing such medications.    INSPECT report has been reviewed and scanned into the patient's chart.    As the clinician, I personally reviewed the INSPECT from 1/6/2023.    History and physical exam exhibit continued safe and appropriate use of controlled substances.      EMR Dragon/Transcription disclaimer:   Much of this encounter note is an electronic transcription/translation of spoken language to printed text. The electronic translation of spoken language may permit erroneous, or at times, nonsensical words or phrases to be inadvertently transcribed; Although I have reviewed the note for such errors, some may still exist.

## 2023-01-09 NOTE — TELEPHONE ENCOUNTER
Caller: Pearl Elizalde    Relationship to patient: Self    Best call back number: [phone]    Patient is needing: PATIENT WANTING TO UPDATE THAT SHE A PAIN MANAGEMENT  APPOINTMENT AND HE TOOK HER OF GABAPENTIN AND THE B12.

## 2023-01-11 ENCOUNTER — PATIENT ROUNDING (BHMG ONLY) (OUTPATIENT)
Dept: PAIN MEDICINE | Facility: CLINIC | Age: 63
End: 2023-01-11
Payer: OTHER GOVERNMENT

## 2023-01-11 NOTE — PROGRESS NOTES
January 11, 2023    Hello, may I speak with Pearl Elizalde?    My name is Odalys      I am  with [facility]  [address]  [place] [address].    Before we get started may I verify your date of birth? 1960    I am calling to officially welcome you to our practice and ask about your recent visit. Is this a good time to talk? yes    Tell me about your visit with us. What things went well?  Very satisfied with Dr. Sevilla and staff       We're always looking for ways to make our patients' experiences even better. Do you have recommendations on ways we may improve?  no    Overall were you satisfied with your first visit to our practice? yes       I appreciate you taking the time to speak with me today. Is there anything else I can do for you? no      Thank you, and have a great day.

## 2023-01-12 NOTE — TELEPHONE ENCOUNTER
Patient called back to answer questions.   1. YES  2. 12 OR 13   3. 30  4. UNKNOWN FAMILY HISTORY.     SHE ALSO HAD A BREAST SURGERY MARCH 2022 TO HAVE LUMPS REMOVED FROM HER RIGHT BREAST    PATIENT CALLED RIGHT BACK. SHE STATES SHE JUST SPOKE TO HER OBGYN. THEY SHOW THAT THE MRI HAS BEEN APPROVED. I TOLD HER IT LOOKS LIKE [facility] STILL WANTING THIS INFO. SHE SAID SHE WILL CALL BACK IF WE NEED TO DO ANYTHING, BUT UNTIL THEN, OBGYN HAS IT APPROVED.

## 2023-01-12 NOTE — TELEPHONE ENCOUNTER
Current endo referral is good through June. Patient said she will call late June/early July for the new referral.

## 2023-01-17 ENCOUNTER — TELEPHONE (OUTPATIENT)
Dept: FAMILY MEDICINE CLINIC | Facility: CLINIC | Age: 63
End: 2023-01-17

## 2023-01-17 DIAGNOSIS — E78.5 HYPERLIPIDEMIA, UNSPECIFIED HYPERLIPIDEMIA TYPE: ICD-10-CM

## 2023-01-17 RX ORDER — PRAVASTATIN SODIUM 80 MG/1
80 TABLET ORAL NIGHTLY
Qty: 30 TABLET | Refills: 6 | Status: SHIPPED | OUTPATIENT
Start: 2023-01-17

## 2023-01-17 NOTE — TELEPHONE ENCOUNTER
Caller: Pearl Elizalde    Relationship: Self    Best call back number: [phone]    What is the best time to reach you: ANY     Who are you requesting to speak with (clinical staff, provider,  specific staff member): CLINICAL STAFF     What was the call regarding: MRI LEFT BREAST, BELIEVES SHE DOES HAVE BREAST CANCER THAT RUNS IN HER FAMILY. BEEN TRYING TO GET IT AUTHORIZED THROUGH .     Do you require a callback: NO

## 2023-01-24 ENCOUNTER — TELEPHONE (OUTPATIENT)
Dept: FAMILY MEDICINE CLINIC | Facility: CLINIC | Age: 63
End: 2023-01-24
Payer: OTHER GOVERNMENT

## 2023-01-24 NOTE — TELEPHONE ENCOUNTER
Need an order for:  US OF LEFT BREAST AND IF ANYTHING IS SEEN SHE NEEDS   GUIDED US OF LEFT BREAST W BIOPSY    ALL SENT TO:    [facility]    WHEN APPROVED BY /[facility]

## 2023-01-24 NOTE — TELEPHONE ENCOUNTER
Ultrasound of left breast has been done already.  When I review the report it reads that an MRI is indicated next. Please either recheck with pt or provider that completed the testing.

## 2023-01-25 NOTE — TELEPHONE ENCOUNTER
PATIENT DID HAVE THE US, AND THE MRI. THEY HAVE FOUND A MASS AND ARE REQUESTING US LEFT BREAST AND US GUIDED BIOPSY. I HAVE ALREADY SPOKEN TO THE PATIENT ABOUT THIS AND SHE IS CONTACTING HER OBGYN TO ASSURE THEY ARE NOT ORDERING AND GETTING THE APPROVAL WITH HER  INSURANCE AS IS PROTOCOL. IF FOR SOME REASON THEY ARE NOT ORDERING AND GETTING APPROVAL- SHE WILL CALL BACK.

## 2023-02-03 ENCOUNTER — TELEPHONE (OUTPATIENT)
Dept: FAMILY MEDICINE CLINIC | Facility: CLINIC | Age: 63
End: 2023-02-03
Payer: OTHER GOVERNMENT

## 2023-02-03 DIAGNOSIS — D05.12 DUCTAL CARCINOMA IN SITU (DCIS) OF LEFT BREAST: Primary | ICD-10-CM

## 2023-02-03 NOTE — TELEPHONE ENCOUNTER
Patient called was diagnosed with breast Cancer this week at [facility].    They have referred her to Dr. Vivian alan surgeon on [facility]  [phone]  They can not actually place the referral the referral needs to come from us.    Patient is requesting we do this immediately due to the fact she has an appointment this morning.        Due to insurance and [facility] referral must come from us.    Advised patient Rosa is off today, but I would call and see what we could do to help her get this taken care of I understand the urgency.

## 2023-02-13 ENCOUNTER — OFFICE VISIT (OUTPATIENT)
Dept: PAIN MEDICINE | Facility: CLINIC | Age: 63
End: 2023-02-13
Payer: OTHER GOVERNMENT

## 2023-02-13 VITALS
HEART RATE: 104 BPM | OXYGEN SATURATION: 97 % | RESPIRATION RATE: 16 BRPM | SYSTOLIC BLOOD PRESSURE: 116 MMHG | DIASTOLIC BLOOD PRESSURE: 66 MMHG

## 2023-02-13 DIAGNOSIS — G89.4 CHRONIC PAIN SYNDROME: ICD-10-CM

## 2023-02-13 PROCEDURE — 99214 OFFICE O/P EST MOD 30 MIN: CPT | Performed by: STUDENT IN AN ORGANIZED HEALTH CARE EDUCATION/TRAINING PROGRAM

## 2023-02-13 RX ORDER — PREGABALIN 150 MG/1
150 CAPSULE ORAL 2 TIMES DAILY
Qty: 60 CAPSULE | Refills: 1 | Status: SHIPPED | OUTPATIENT
Start: 2023-02-13

## 2023-02-13 RX ORDER — CELECOXIB 200 MG/1
200 CAPSULE ORAL DAILY
Qty: 30 CAPSULE | Refills: 0 | Status: SHIPPED | OUTPATIENT
Start: 2023-02-13

## 2023-02-13 RX ORDER — PREGABALIN 150 MG/1
150 CAPSULE ORAL 2 TIMES DAILY
Qty: 60 CAPSULE | Refills: 0 | Status: SHIPPED | OUTPATIENT
Start: 2023-02-13 | End: 2023-02-13 | Stop reason: SDUPTHER

## 2023-02-13 RX ORDER — BACLOFEN 10 MG/1
10 TABLET ORAL NIGHTLY
Qty: 30 TABLET | Refills: 0 | Status: SHIPPED | OUTPATIENT
Start: 2023-02-13 | End: 2023-02-13 | Stop reason: SDUPTHER

## 2023-02-13 RX ORDER — BACLOFEN 10 MG/1
10 TABLET ORAL NIGHTLY
Qty: 30 TABLET | Refills: 1 | Status: SHIPPED | OUTPATIENT
Start: 2023-02-13

## 2023-02-13 NOTE — PROGRESS NOTES
CHIEF COMPLAINT  Chief Complaint   Patient presents with   • Pain     1 month f/u  CC   pain throughout her body multiple sites including her shoulders bilaterally as well as her left foot, left ankle, and low back. No Narcotics        Primary Care  Rosa Hooper APRN Subjective   Pearl Elizalde is a 62 y.o. female  who presents for chronic pain syndrome and osteoarthritis.  She is initially referred for left foot pain however she states that she has pain throughout her body multiple sites including her shoulders bilaterally as well as her left foot, left ankle, and low back.  She states that she has had pain off and on in many locations for many years.  She states that she has previously been taking celecoxib as needed which does provide her some level of pain relief.  She denies any significant radicular features or any symptoms suggestive of neuropathic pain.  He describes the pain primarily as a sharp, stabbing pain especially given and slightly improved with rest and has any red flag symptoms such as loss of bowel or bladder    Pain  Associated symptoms include arthralgias, joint swelling and myalgias. Pertinent negatives include no neck pain, numbness or weakness.        Location: Bilateral shoulders, left ankle, left foot, low back  Onset: Many years ago  Duration: Progressively worsening  Timing: Constant throughout the day  Quality:, Stabbing  Severity: Today: 1       Last Week: 7       Worst: 10  Modifying Factors: The pain is worse with movement and physical activity and slightly improved with rest and anti-inflammatories  Functional Deficit: The pain makes it difficult for her to perform her activities of daily living as well as take care of her grandchild    Physical Therapy: no    Interval Update 02/13/2023: Doing much better with the transition to Lyrica and the addition of baclofen in the evenings.  Also takes intermittent celecoxib and doing very well.    The following portions of the  patient's history were reviewed and updated as appropriate: allergies, current medications, past family history, past medical history, past social history, past surgical history and problem list.      Current Outpatient Medications:   •  baclofen (LIORESAL) 10 MG tablet, Take 1 tablet by mouth Every Night., Disp: 30 tablet, Rfl: 1  •  Blood Glucose Monitoring Suppl (BLOOD GLUCOSE MONITOR SYSTEM) w/Device kit, BLOOD GLUCOSE MONITOR SYSTEM w/Device KIT, Disp: , Rfl:   •  celecoxib (CeleBREX) 200 MG capsule, Take 1 capsule by mouth Daily., Disp: 30 capsule, Rfl: 0  •  Cetirizine HCl 10 MG capsule, Take 1 dose by mouth., Disp: , Rfl:   •  cholecalciferol (VITAMIN D3) 25 MCG (1000 UT) tablet, Take 1,000 Units by mouth Daily., Disp: , Rfl:   •  colesevelam (WELCHOL) 625 MG tablet, Take 1 tablet by mouth 2 (Two) Times a Day With Meals., Disp: 180 tablet, Rfl: 3  •  EPINEPHrine (EPIPEN) 0.3 MG/0.3ML solution auto-injector injection, , Disp: , Rfl:   •  ezetimibe (ZETIA) 10 MG tablet, Take 1 tablet by mouth Daily., Disp: 90 tablet, Rfl: 3  •  glucose blood (FREESTYLE LITE) test strip, 1 each by Other route 2 (two) times a day. Use as instructed, Disp: 100 each, Rfl: 6  •  hydrALAZINE (APRESOLINE) 50 MG tablet, TAKE 1 TABLET THREE TIMES A DAY, Disp: 270 tablet, Rfl: 3  •  ipratropium-albuterol (COMBIVENT RESPIMAT)  MCG/ACT inhaler, Inhale 1 puff 4 (Four) Times a Day As Needed for Wheezing., Disp: 4 g, Rfl: 11  •  Januvia 100 MG tablet, TAKE 1 TABLET DAILY, Disp: 90 tablet, Rfl: 0  •  Lancets (FREESTYLE) lancets, , Disp: , Rfl:   •  multivitamin with minerals tablet tablet, Take 1 tablet by mouth Daily., Disp: , Rfl:   •  Olmesartan-amLODIPine-HCTZ 40-10-12.5 MG tablet, Take 1 tablet by mouth Daily., Disp: 90 tablet, Rfl: 0  •  ondansetron ODT (Zofran ODT) 4 MG disintegrating tablet, Place 1 tablet on the tongue Every 8 (Eight) Hours As Needed for Nausea or Vomiting., Disp: 20 tablet, Rfl: 1  •  pravastatin (PRAVACHOL)  80 MG tablet, Take 1 tablet by mouth Every Night., Disp: 30 tablet, Rfl: 6  •  pregabalin (LYRICA) 150 MG capsule, Take 1 capsule by mouth 2 (Two) Times a Day., Disp: 60 capsule, Rfl: 1  •  Symbicort 160-4.5 MCG/ACT inhaler, Inhale 2 puffs 2 (Two) Times a Day., Disp: 3 each, Rfl: 3    Review of Systems   Musculoskeletal: Positive for arthralgias, back pain, gait problem, joint swelling and myalgias. Negative for neck pain and neck stiffness.   Neurological: Negative for weakness and numbness.       Vitals:    02/13/23 0921   BP: 116/66   Pulse: 104   Resp: 16   SpO2: 97%   PainSc:   1       Objective   Physical Exam  Vitals and nursing note reviewed.   Constitutional:       General: She is not in acute distress.     Appearance: Normal appearance. She is obese.   Musculoskeletal:         General: Tenderness present. No swelling or deformity.      Right shoulder: Tenderness present. No bony tenderness. Decreased range of motion.      Left shoulder: Tenderness present. No bony tenderness. Decreased range of motion.      Cervical back: Normal range of motion and neck supple. Tenderness present. No rigidity.      Left ankle: Tenderness present. Decreased range of motion.   Neurological:      Mental Status: She is alert.           Assessment & Plan   Problems Addressed this Visit    None  Visit Diagnoses     Chronic pain syndrome        Relevant Medications    pregabalin (LYRICA) 150 MG capsule      Diagnoses       Codes Comments    Chronic pain syndrome     ICD-10-CM: G89.4  ICD-9-CM: 338.4           Plan:  1. Continue celecoxib 200 mg daily as needed  2. Getting very good benefit with the baclofen in the evenings  3. Continue Lyrica 150 mg twice daily    --- Follow-up 3 months           INSPECT REPORT    As part of the patient's treatment plan, I may be prescribing controlled substances. The patient has been made aware of appropriate use of such medications, including potential risk of somnolence, limited ability to  drive and/or work safely, and the potential for dependence or overdose. It has also bee made clear that these medications are for use by this patient only, without concomitant use of alcohol or other substances unless prescribed.     Patient has completed prescribing agreement detailing terms of continued prescribing of controlled substances, including monitoring ANNE-MARIE reports, urine drug screening, and pill counts if necessary. The patient is aware that inappropriate use will results in cessation of prescribing such medications.    INSPECT report has been reviewed and scanned into the patient's chart.    As the clinician, I personally reviewed the INSPECT from 2/10/2023.    History and physical exam exhibit continued safe and appropriate use of controlled substances.      EMR Dragon/Transcription disclaimer:   Much of this encounter note is an electronic transcription/translation of spoken language to printed text. The electronic translation of spoken language may permit erroneous, or at times, nonsensical words or phrases to be inadvertently transcribed; Although I have reviewed the note for such errors, some may still exist.

## 2023-02-16 ENCOUNTER — OFFICE VISIT (OUTPATIENT)
Dept: FAMILY MEDICINE CLINIC | Facility: CLINIC | Age: 63
End: 2023-02-16
Payer: OTHER GOVERNMENT

## 2023-02-16 VITALS
SYSTOLIC BLOOD PRESSURE: 120 MMHG | DIASTOLIC BLOOD PRESSURE: 78 MMHG | BODY MASS INDEX: 31.12 KG/M2 | OXYGEN SATURATION: 94 % | HEIGHT: 66 IN | WEIGHT: 193.6 LBS | HEART RATE: 92 BPM | TEMPERATURE: 97.3 F

## 2023-02-16 DIAGNOSIS — Z78.0 POST-MENOPAUSAL: ICD-10-CM

## 2023-02-16 DIAGNOSIS — E78.5 HYPERLIPIDEMIA, UNSPECIFIED HYPERLIPIDEMIA TYPE: ICD-10-CM

## 2023-02-16 DIAGNOSIS — I10 ESSENTIAL HYPERTENSION: ICD-10-CM

## 2023-02-16 DIAGNOSIS — E11.65 TYPE 2 DIABETES MELLITUS WITH HYPERGLYCEMIA, WITHOUT LONG-TERM CURRENT USE OF INSULIN: Primary | ICD-10-CM

## 2023-02-16 PROCEDURE — 99214 OFFICE O/P EST MOD 30 MIN: CPT | Performed by: NURSE PRACTITIONER

## 2023-02-16 RX ORDER — SITAGLIPTIN 100 MG/1
100 TABLET, FILM COATED ORAL DAILY
Qty: 90 TABLET | Refills: 1 | Status: SHIPPED | OUTPATIENT
Start: 2023-02-16

## 2023-02-16 RX ORDER — MAGNESIUM OXIDE 400 MG/1
400 TABLET ORAL DAILY
COMMUNITY

## 2023-02-16 RX ORDER — OLMESARTAN MEDOXOMIL / AMLODIPINE BESYLATE / HYDROCHLOROTHIAZIDE 40; 10; 12.5 MG/1; MG/1; MG/1
1 TABLET, FILM COATED ORAL DAILY
Qty: 90 TABLET | Refills: 1 | Status: SHIPPED | OUTPATIENT
Start: 2023-02-16

## 2023-02-16 RX ORDER — ASCORBIC ACID 500 MG
500 TABLET ORAL DAILY
COMMUNITY

## 2023-02-16 RX ORDER — CELECOXIB 200 MG/1
200 CAPSULE ORAL DAILY
Qty: 30 CAPSULE | Refills: 0 | Status: CANCELLED | OUTPATIENT
Start: 2023-02-16

## 2023-02-16 RX ORDER — EZETIMIBE 10 MG/1
10 TABLET ORAL DAILY
Qty: 90 TABLET | Refills: 3 | Status: SHIPPED | OUTPATIENT
Start: 2023-02-16

## 2023-02-16 RX ORDER — CALCIUM CARBONATE/VITAMIN D3 500-10/5ML
LIQUID (ML) ORAL
COMMUNITY

## 2023-02-16 NOTE — PROGRESS NOTES
"Subjective   Pearl Elizalde is a 62 y.o. female presents for   Chief Complaint   Patient presents with   • Med Refill     Patient is not fasting.  Patient decline's FLU shot.       Health Maintenance Due   Topic Date Due   • COLORECTAL CANCER SCREENING  Never done   • Pneumococcal Vaccine 0-64 (1 - PCV) Never done   • ZOSTER VACCINE (1 of 2) Never done   • DXA SCAN  10/29/2021   • COVID-19 Vaccine (2 - Booster for Shelbie series) 11/25/2021   • ANNUAL PHYSICAL  02/04/2022   • HEMOGLOBIN A1C  04/12/2022   • DIABETIC FOOT EXAM  05/10/2022   • URINE MICROALBUMIN  06/14/2022   • INFLUENZA VACCINE  Never done   • LIPID PANEL  10/12/2022       History of Present Illness   Pt present for follow up htn, hyperlipidemia.  She is taking medications as directed and denies problems or questions.  She has recently been dx with breast cancer and is having a left mastectomy and reconstruction as soon as it is scheduled.  She states she is currently doing well with diagnosis but was initially very anxious about it.  Patient encouraged to call for any worsening of anxiety or depression symptoms.  She also reports a fracture in her left shoulder after a fall at home.  Reviewed chart and noted that she had not had a DEXA in 4 years and patient encouraged to complete when able.  Vitals:    02/16/23 1006   BP: 120/78   BP Location: Right arm   Patient Position: Sitting   Cuff Size: Adult   Pulse: 92   Temp: 97.3 °F (36.3 °C)   TempSrc: Temporal   SpO2: 94%   Weight: 87.8 kg (193 lb 9.6 oz)   Height: 167.6 cm (66\")     Body mass index is 31.25 kg/m².    Current Outpatient Medications on File Prior to Visit   Medication Sig Dispense Refill   • ascorbic acid (VITAMIN C) 500 MG tablet Take 500 mg by mouth Daily.     • baclofen (LIORESAL) 10 MG tablet Take 1 tablet by mouth Every Night. 30 tablet 1   • Blood Glucose Monitoring Suppl (BLOOD GLUCOSE MONITOR SYSTEM) w/Device kit BLOOD GLUCOSE MONITOR SYSTEM w/Device KIT     • Cetirizine HCl 10 " MG capsule Take 1 dose by mouth.     • cholecalciferol (VITAMIN D3) 25 MCG (1000 UT) tablet Take 1,000 Units by mouth Daily.     • colesevelam (WELCHOL) 625 MG tablet Take 1 tablet by mouth 2 (Two) Times a Day With Meals. 180 tablet 3   • EPINEPHrine (EPIPEN) 0.3 MG/0.3ML solution auto-injector injection      • glucose blood (FREESTYLE LITE) test strip 1 each by Other route 2 (two) times a day. Use as instructed 100 each 6   • hydrALAZINE (APRESOLINE) 50 MG tablet TAKE 1 TABLET THREE TIMES A  tablet 3   • ipratropium-albuterol (COMBIVENT RESPIMAT)  MCG/ACT inhaler Inhale 1 puff 4 (Four) Times a Day As Needed for Wheezing. 4 g 11   • Lancets (FREESTYLE) lancets      • magnesium oxide (MAG-OX) 400 MG tablet Take 400 mg by mouth Daily.     • multivitamin with minerals tablet tablet Take 1 tablet by mouth Daily.     • ondansetron ODT (Zofran ODT) 4 MG disintegrating tablet Place 1 tablet on the tongue Every 8 (Eight) Hours As Needed for Nausea or Vomiting. 20 tablet 1   • pravastatin (PRAVACHOL) 80 MG tablet Take 1 tablet by mouth Every Night. 30 tablet 6   • pregabalin (LYRICA) 150 MG capsule Take 1 capsule by mouth 2 (Two) Times a Day. 60 capsule 1   • Symbicort 160-4.5 MCG/ACT inhaler Inhale 2 puffs 2 (Two) Times a Day. 3 each 3   • Zinc 30 MG capsule Take  by mouth.     • [DISCONTINUED] ezetimibe (ZETIA) 10 MG tablet Take 1 tablet by mouth Daily. 90 tablet 3   • [DISCONTINUED] Januvia 100 MG tablet TAKE 1 TABLET DAILY 90 tablet 0   • [DISCONTINUED] Olmesartan-amLODIPine-HCTZ 40-10-12.5 MG tablet Take 1 tablet by mouth Daily. 90 tablet 0   • celecoxib (CeleBREX) 200 MG capsule Take 1 capsule by mouth Daily. 30 capsule 0     No current facility-administered medications on file prior to visit.       The following portions of the patient's history were reviewed and updated as appropriate: allergies, current medications, past family history, past medical history, past social history, past surgical history,  and problem list.    Review of Systems   Constitutional: Negative for chills and fever.   HENT: Negative for sinus pressure and sore throat.    Eyes: Negative for blurred vision.   Respiratory: Negative for cough and shortness of breath.    Cardiovascular: Negative for chest pain.   Gastrointestinal: Negative for abdominal pain.   Musculoskeletal: Negative for arthralgias and joint swelling.   Skin: Negative for color change.   Neurological: Negative for dizziness.   Psychiatric/Behavioral: Negative for behavioral problems.       Objective   Physical Exam  Vitals and nursing note reviewed.   Constitutional:       Appearance: Normal appearance. She is well-developed.   HENT:      Head: Normocephalic and atraumatic.      Right Ear: External ear normal.      Left Ear: External ear normal.      Nose: Nose normal.   Eyes:      Extraocular Movements: Extraocular movements intact.      Pupils: Pupils are equal, round, and reactive to light.   Cardiovascular:      Rate and Rhythm: Normal rate and regular rhythm.      Heart sounds: Normal heart sounds.   Pulmonary:      Effort: Pulmonary effort is normal.      Breath sounds: Normal breath sounds.   Abdominal:      General: Bowel sounds are normal.      Palpations: Abdomen is soft.   Genitourinary:     Vagina: Normal.   Musculoskeletal:         General: Normal range of motion.      Cervical back: Normal range of motion and neck supple.   Skin:     General: Skin is warm and dry.   Neurological:      General: No focal deficit present.      Mental Status: She is alert and oriented to person, place, and time.   Psychiatric:         Mood and Affect: Mood normal.         Behavior: Behavior normal.         Judgment: Judgment normal.       PHQ-9 Total Score:      Assessment & Plan   Diagnoses and all orders for this visit:    1. Type 2 diabetes mellitus with hyperglycemia, without long-term current use of insulin (HCC) (Primary)  -     Comprehensive Metabolic Panel; Future  -      Hemoglobin A1c; Future  -     TSH; Future  -     Januvia 100 MG tablet; Take 1 tablet by mouth Daily.  Dispense: 90 tablet; Refill: 1    2. Post-menopausal  -     DEXA Bone Density Axial; Future    3. Hyperlipidemia, unspecified hyperlipidemia type  -     ezetimibe (ZETIA) 10 MG tablet; Take 1 tablet by mouth Daily.  Dispense: 90 tablet; Refill: 3  -     Lipid Panel; Future    4. Essential hypertension  -     CBC Auto Differential; Future  -     Olmesartan-amLODIPine-HCTZ 40-10-12.5 MG tablet; Take 1 tablet by mouth Daily.  Dispense: 90 tablet; Refill: 1        There are no Patient Instructions on file for this visit.

## 2023-04-13 DIAGNOSIS — E78.5 HYPERLIPIDEMIA, UNSPECIFIED HYPERLIPIDEMIA TYPE: ICD-10-CM

## 2023-04-13 RX ORDER — PRAVASTATIN SODIUM 80 MG/1
80 TABLET ORAL NIGHTLY
Qty: 30 TABLET | Refills: 6 | Status: SHIPPED | OUTPATIENT
Start: 2023-04-13

## 2023-04-13 NOTE — TELEPHONE ENCOUNTER
Caller: Pearl Elizalde    Relationship: Self    Best call back number: [phone]*    Requested Prescriptions:   Requested Prescriptions     Pending Prescriptions Disp Refills   • pravastatin (PRAVACHOL) 80 MG tablet 30 tablet 6     Sig: Take 1 tablet by mouth Every Night.        Pharmacy where request should be sent: [id number] - [place], [place] - [address] AT [facility] [address] [place] [address] - [phone] [facility] [phone]      Last office visit with prescribing clinician: 2/16/2023   Last telemedicine visit with prescribing clinician: 5/5/2023   Next office visit with prescribing clinician: 7/3/2023     Additional details provided by patient: PATIENT ONLY HAS 2 TABLETS REMAINING.    Does the patient have less than a 3 day supply:  [x] Yes  [] No    Would you like a call back once the refill request has been completed: [x] Yes [] No    If the office needs to give you a call back, can they leave a voicemail: [x] Yes [] No    Eulogio Luis   04/13/23 11:03 EDT

## 2023-05-08 ENCOUNTER — OFFICE VISIT (OUTPATIENT)
Dept: PAIN MEDICINE | Facility: CLINIC | Age: 63
End: 2023-05-08
Payer: OTHER GOVERNMENT

## 2023-05-08 VITALS
HEART RATE: 102 BPM | RESPIRATION RATE: 16 BRPM | OXYGEN SATURATION: 97 % | SYSTOLIC BLOOD PRESSURE: 121 MMHG | DIASTOLIC BLOOD PRESSURE: 63 MMHG

## 2023-05-08 DIAGNOSIS — M15.9 PRIMARY OSTEOARTHRITIS INVOLVING MULTIPLE JOINTS: Primary | ICD-10-CM

## 2023-05-08 DIAGNOSIS — G89.4 CHRONIC PAIN SYNDROME: ICD-10-CM

## 2023-05-08 DIAGNOSIS — G25.81 RESTLESS LEG SYNDROME: ICD-10-CM

## 2023-05-08 PROCEDURE — 99214 OFFICE O/P EST MOD 30 MIN: CPT | Performed by: STUDENT IN AN ORGANIZED HEALTH CARE EDUCATION/TRAINING PROGRAM

## 2023-05-08 RX ORDER — PREGABALIN 150 MG/1
150 CAPSULE ORAL 2 TIMES DAILY
Qty: 180 CAPSULE | Refills: 1 | Status: SHIPPED | OUTPATIENT
Start: 2023-05-08 | End: 2023-05-10 | Stop reason: SDUPTHER

## 2023-05-08 RX ORDER — BACLOFEN 10 MG/1
10 TABLET ORAL NIGHTLY
Qty: 90 TABLET | Refills: 1 | Status: SHIPPED | OUTPATIENT
Start: 2023-05-08 | End: 2023-05-10 | Stop reason: SDUPTHER

## 2023-05-08 RX ORDER — CELECOXIB 200 MG/1
200 CAPSULE ORAL DAILY
Qty: 30 CAPSULE | Refills: 0 | Status: SHIPPED | OUTPATIENT
Start: 2023-05-08 | End: 2023-05-10 | Stop reason: SDUPTHER

## 2023-05-08 NOTE — PROGRESS NOTES
CHIEF COMPLAINT  Chief Complaint   Patient presents with   • Pain     3 month f/u  CC  chronic pain syndrome and osteoarthritis No Narcotics        Primary Care  Rosa Hooper, APRN    Subjective   Pearl Elizalde is a 62 y.o. female  who presents for chronic pain syndrome and osteoarthritis.  She is initially referred for left foot pain however she states that she has pain throughout her body multiple sites including her shoulders bilaterally as well as her left foot, left ankle, and low back.  She states that she has had pain off and on in many locations for many years.  She states that she has previously been taking celecoxib as needed which does provide her some level of pain relief.  She denies any significant radicular features or any symptoms suggestive of neuropathic pain.  He describes the pain primarily as a sharp, stabbing pain especially given and slightly improved with rest and has any red flag symptoms such as loss of bowel or bladder    Pain  Associated symptoms include arthralgias, joint swelling and myalgias. Pertinent negatives include no neck pain, numbness or weakness.        Location: Bilateral shoulders, left ankle, left foot, low back  Onset: Many years ago  Duration: Progressively worsening  Timing: Constant throughout the day  Quality:, Stabbing  Severity: Today: 1       Last Week: 7       Worst: 10  Modifying Factors: The pain is worse with movement and physical activity and slightly improved with rest and anti-inflammatories  Functional Deficit: The pain makes it difficult for her to perform her activities of daily living as well as take care of her grandchild    Physical Therapy: no    Interval Update 05/08/2023: Continues to do very well with a combination of baclofen, Lyrica, and Celebrex.  Otherwise, no new changes    The following portions of the patient's history were reviewed and updated as appropriate: allergies, current medications, past family history, past medical history,  past social history, past surgical history and problem list.      Current Outpatient Medications:   •  ascorbic acid (VITAMIN C) 500 MG tablet, Take 1 tablet by mouth Daily., Disp: , Rfl:   •  baclofen (LIORESAL) 10 MG tablet, Take 1 tablet by mouth Every Night., Disp: 90 tablet, Rfl: 1  •  Blood Glucose Monitoring Suppl (BLOOD GLUCOSE MONITOR SYSTEM) w/Device kit, BLOOD GLUCOSE MONITOR SYSTEM w/Device KIT, Disp: , Rfl:   •  celecoxib (CeleBREX) 200 MG capsule, Take 1 capsule by mouth Daily., Disp: 30 capsule, Rfl: 0  •  Cetirizine HCl 10 MG capsule, Take 1 dose by mouth., Disp: , Rfl:   •  cholecalciferol (VITAMIN D3) 25 MCG (1000 UT) tablet, Take 1 tablet by mouth Daily., Disp: , Rfl:   •  colesevelam (WELCHOL) 625 MG tablet, Take 1 tablet by mouth 2 (Two) Times a Day With Meals., Disp: 180 tablet, Rfl: 3  •  EPINEPHrine (EPIPEN) 0.3 MG/0.3ML solution auto-injector injection, , Disp: , Rfl:   •  ezetimibe (ZETIA) 10 MG tablet, Take 1 tablet by mouth Daily., Disp: 90 tablet, Rfl: 3  •  glucose blood (FREESTYLE LITE) test strip, 1 each by Other route 2 (two) times a day. Use as instructed, Disp: 100 each, Rfl: 6  •  hydrALAZINE (APRESOLINE) 50 MG tablet, TAKE 1 TABLET THREE TIMES A DAY, Disp: 270 tablet, Rfl: 3  •  ipratropium-albuterol (COMBIVENT RESPIMAT)  MCG/ACT inhaler, Inhale 1 puff 4 (Four) Times a Day As Needed for Wheezing., Disp: 4 g, Rfl: 11  •  Januvia 100 MG tablet, Take 1 tablet by mouth Daily., Disp: 90 tablet, Rfl: 1  •  Lancets (FREESTYLE) lancets, , Disp: , Rfl:   •  magnesium oxide (MAG-OX) 400 MG tablet, Take 1 tablet by mouth Daily., Disp: , Rfl:   •  multivitamin with minerals tablet tablet, Take 1 tablet by mouth Daily., Disp: , Rfl:   •  Olmesartan-amLODIPine-HCTZ 40-10-12.5 MG tablet, Take 1 tablet by mouth Daily., Disp: 90 tablet, Rfl: 1  •  ondansetron ODT (Zofran ODT) 4 MG disintegrating tablet, Place 1 tablet on the tongue Every 8 (Eight) Hours As Needed for Nausea or Vomiting.,  Disp: 20 tablet, Rfl: 1  •  pravastatin (PRAVACHOL) 80 MG tablet, Take 1 tablet by mouth Every Night., Disp: 30 tablet, Rfl: 6  •  pregabalin (LYRICA) 150 MG capsule, Take 1 capsule by mouth 2 (Two) Times a Day., Disp: 180 capsule, Rfl: 1  •  Symbicort 160-4.5 MCG/ACT inhaler, Inhale 2 puffs 2 (Two) Times a Day., Disp: 3 each, Rfl: 3  •  Zinc 30 MG capsule, Take  by mouth., Disp: , Rfl:     Review of Systems   Musculoskeletal: Positive for arthralgias, back pain, gait problem, joint swelling and myalgias. Negative for neck pain and neck stiffness.   Neurological: Negative for weakness and numbness.       Vitals:    05/08/23 0907   BP: 121/63   Pulse: 102   Resp: 16   SpO2: 97%   PainSc: 0-No pain       Objective   Physical Exam  Vitals and nursing note reviewed.   Constitutional:       General: She is not in acute distress.     Appearance: Normal appearance. She is obese.   Musculoskeletal:         General: Tenderness present. No swelling or deformity.      Right shoulder: Tenderness present. No bony tenderness. Decreased range of motion.      Left shoulder: Tenderness present. No bony tenderness. Decreased range of motion.      Cervical back: Normal range of motion and neck supple. Tenderness present. No rigidity.      Left ankle: Tenderness present. Decreased range of motion.   Neurological:      Mental Status: She is alert.           Assessment & Plan   Problems Addressed this Visit    None  Visit Diagnoses     Primary osteoarthritis involving multiple joints    -  Primary    Chronic pain syndrome        Relevant Medications    pregabalin (LYRICA) 150 MG capsule    Restless leg syndrome          Diagnoses       Codes Comments    Primary osteoarthritis involving multiple joints    -  Primary ICD-10-CM: M15.9  ICD-9-CM: 715.98     Chronic pain syndrome     ICD-10-CM: G89.4  ICD-9-CM: 338.4     Restless leg syndrome     ICD-10-CM: G25.81  ICD-9-CM: 333.94           Plan:  1. Continue celecoxib 200 mg daily as  needed  2. Getting very good benefit with the baclofen in the evenings  3. Continue Lyrica 150 mg twice daily    --- Follow-up 6 months           INSPECT REPORT    As part of the patient's treatment plan, I may be prescribing controlled substances. The patient has been made aware of appropriate use of such medications, including potential risk of somnolence, limited ability to drive and/or work safely, and the potential for dependence or overdose. It has also bee made clear that these medications are for use by this patient only, without concomitant use of alcohol or other substances unless prescribed.     Patient has completed prescribing agreement detailing terms of continued prescribing of controlled substances, including monitoring ANNE-MARIE reports, urine drug screening, and pill counts if necessary. The patient is aware that inappropriate use will results in cessation of prescribing such medications.    INSPECT report has been reviewed and scanned into the patient's chart.    As the clinician, I personally reviewed the INSPECT from 5/5/2023.    History and physical exam exhibit continued safe and appropriate use of controlled substances.      EMR Dragon/Transcription disclaimer:   Much of this encounter note is an electronic transcription/translation of spoken language to printed text. The electronic translation of spoken language may permit erroneous, or at times, nonsensical words or phrases to be inadvertently transcribed; Although I have reviewed the note for such errors, some may still exist.

## 2023-05-10 DIAGNOSIS — G89.4 CHRONIC PAIN SYNDROME: ICD-10-CM

## 2023-05-10 RX ORDER — PREGABALIN 150 MG/1
150 CAPSULE ORAL 2 TIMES DAILY
Qty: 180 CAPSULE | Refills: 1 | Status: SHIPPED | OUTPATIENT
Start: 2023-05-10

## 2023-05-10 RX ORDER — BACLOFEN 10 MG/1
10 TABLET ORAL NIGHTLY
Qty: 90 TABLET | Refills: 1 | Status: SHIPPED | OUTPATIENT
Start: 2023-05-10

## 2023-05-10 RX ORDER — CELECOXIB 200 MG/1
200 CAPSULE ORAL DAILY
Qty: 90 CAPSULE | Refills: 1 | Status: SHIPPED | OUTPATIENT
Start: 2023-05-10

## 2023-06-05 ENCOUNTER — HOSPITAL ENCOUNTER (OUTPATIENT)
Dept: BONE DENSITY | Facility: HOSPITAL | Age: 63
Discharge: HOME | End: 2023-06-05
Admitting: NURSE PRACTITIONER
Payer: MEDICARE

## 2023-06-05 DIAGNOSIS — Z78.0 POST-MENOPAUSAL: ICD-10-CM

## 2023-06-05 PROCEDURE — 77080 DXA BONE DENSITY AXIAL: CPT

## 2023-06-08 ENCOUNTER — OFFICE VISIT (OUTPATIENT)
Dept: FAMILY MEDICINE CLINIC | Facility: CLINIC | Age: 63
End: 2023-06-08
Payer: MEDICARE

## 2023-06-08 ENCOUNTER — HOSPITAL ENCOUNTER (OUTPATIENT)
Dept: GENERAL RADIOLOGY | Facility: HOSPITAL | Age: 63
Discharge: HOME | End: 2023-06-08
Payer: OTHER GOVERNMENT

## 2023-06-08 VITALS
SYSTOLIC BLOOD PRESSURE: 110 MMHG | BODY MASS INDEX: 31.76 KG/M2 | TEMPERATURE: 97.3 F | HEIGHT: 66 IN | DIASTOLIC BLOOD PRESSURE: 71 MMHG | WEIGHT: 197.6 LBS | HEART RATE: 88 BPM | OXYGEN SATURATION: 92 %

## 2023-06-08 DIAGNOSIS — R22.42 LOCALIZED SWELLING OF LEFT FOOT: ICD-10-CM

## 2023-06-08 DIAGNOSIS — J45.909 ASTHMA, UNSPECIFIED ASTHMA SEVERITY, UNSPECIFIED WHETHER COMPLICATED, UNSPECIFIED WHETHER PERSISTENT: ICD-10-CM

## 2023-06-08 DIAGNOSIS — M25.552 LEFT HIP PAIN: ICD-10-CM

## 2023-06-08 DIAGNOSIS — M79.672 LEFT FOOT PAIN: Primary | ICD-10-CM

## 2023-06-08 DIAGNOSIS — M79.672 LEFT FOOT PAIN: ICD-10-CM

## 2023-06-08 DIAGNOSIS — I10 ESSENTIAL HYPERTENSION: ICD-10-CM

## 2023-06-08 PROCEDURE — 3074F SYST BP LT 130 MM HG: CPT | Performed by: NURSE PRACTITIONER

## 2023-06-08 PROCEDURE — 99214 OFFICE O/P EST MOD 30 MIN: CPT | Performed by: NURSE PRACTITIONER

## 2023-06-08 PROCEDURE — 73630 X-RAY EXAM OF FOOT: CPT

## 2023-06-08 PROCEDURE — 73502 X-RAY EXAM HIP UNI 2-3 VIEWS: CPT

## 2023-06-08 PROCEDURE — 3078F DIAST BP <80 MM HG: CPT | Performed by: NURSE PRACTITIONER

## 2023-06-08 RX ORDER — OLMESARTAN MEDOXOMIL / AMLODIPINE BESYLATE / HYDROCHLOROTHIAZIDE 40; 10; 12.5 MG/1; MG/1; MG/1
1 TABLET, FILM COATED ORAL DAILY
Qty: 90 TABLET | Refills: 1 | Status: SHIPPED | OUTPATIENT
Start: 2023-06-08

## 2023-06-08 NOTE — PATIENT INSTRUCTIONS
You are due for Shingrix vaccination series ( the newest shingles vaccine).  It is a two shot series spaced 2-6 months apart. Please get this vaccine series started at your earliest convenience at your local pharmacy to help avoid shingles outbreak. It is more effective than the old Zostavax vaccine and is recommended even if you have had the Zostavax vaccine in the past.  Once the Shingrix series is completed, it does not need to be repeated.   For more information, please look at the website below:  [facility] Shingrix Vaccine Information

## 2023-06-08 NOTE — PROGRESS NOTES
Subjective   Pearl Elizalde is a 63 y.o. female presents for   Chief Complaint   Patient presents with    Hip Pain     Left Side    Foot Pain     Left side       Health Maintenance Due   Topic Date Due    Pneumococcal Vaccine 65+ (1 - PCV) Never done    ZOSTER VACCINE (1 of 2) Never done    ANNUAL WELLNESS VISIT  Never done    COVID-19 Vaccine (2 - Booster for Shelbie series) 11/25/2021    HEMOGLOBIN A1C  04/12/2022    DIABETIC FOOT EXAM  05/10/2022    URINE MICROALBUMIN  06/14/2022    LIPID PANEL  10/12/2022       The patient states that she had left foot injury in 06/2022 resulting in a fracture.  She was treated with a boot but did not follow up with podiatry.  She believes that she had twisted her left foot this time when she was at work; however, she is uncertain if she had actually twisted it at work or somewhere else, so she has not informed her work regarding the injury. She mentions that she has not filed a Workers' Comp. She went and saw the foot and ankle surgeon, Michele Roland DPM.  She was given a boot in the emergency room at the time when she had COVID-19. She has not been wrapping her left foot; however, she has been using short and long compression socks on it that provides support on her left ankle. She notes that she does not need wrapping on her left foot. She would prefer to see a new podiatrist if xray is abnormal.     The patient states that her left hip has been bothering her all the time.  It is painful at times to ambulate after sitting for an extended period of time as well.  Different activities increase the pain at different times.     The patient needs the Olmesartan refilled.    The patient needs the Combivent refilled. She states that she has not had pneumonia since she had COVID-19. She has had COVID-19 three times.    The patient is not interested in obtaining a pneumococcal injection today, 06/08/2023.    Vitals:    06/08/23 1316   BP: 110/71   BP Location: Right arm   Patient  "Position: Sitting   Cuff Size: Adult   Pulse: 88   Temp: 97.3 °F (36.3 °C)   TempSrc: Temporal   SpO2: 92%   Weight: 89.6 kg (197 lb 9.6 oz)   Height: 167.6 cm (66\")     Body mass index is 31.89 kg/m².    Current Outpatient Medications on File Prior to Visit   Medication Sig Dispense Refill    ascorbic acid (VITAMIN C) 500 MG tablet Take 1 tablet by mouth Daily.      baclofen (LIORESAL) 10 MG tablet Take 1 tablet by mouth Every Night. 90 tablet 1    Blood Glucose Monitoring Suppl (BLOOD GLUCOSE MONITOR SYSTEM) w/Device kit BLOOD GLUCOSE MONITOR SYSTEM w/Device KIT      celecoxib (CeleBREX) 200 MG capsule Take 1 capsule by mouth Daily. 90 capsule 1    Cetirizine HCl 10 MG capsule Take 1 dose by mouth.      cholecalciferol (VITAMIN D3) 25 MCG (1000 UT) tablet Take 1 tablet by mouth Daily.      colesevelam (WELCHOL) 625 MG tablet Take 1 tablet by mouth 2 (Two) Times a Day With Meals. 180 tablet 3    EPINEPHrine (EPIPEN) 0.3 MG/0.3ML solution auto-injector injection       ezetimibe (ZETIA) 10 MG tablet Take 1 tablet by mouth Daily. 90 tablet 3    glucose blood (FREESTYLE LITE) test strip 1 each by Other route 2 (two) times a day. Use as instructed 100 each 6    hydrALAZINE (APRESOLINE) 50 MG tablet TAKE 1 TABLET THREE TIMES A  tablet 3    Januvia 100 MG tablet Take 1 tablet by mouth Daily. 90 tablet 1    Lancets (FREESTYLE) lancets       magnesium oxide (MAG-OX) 400 MG tablet Take 1 tablet by mouth Daily.      multivitamin with minerals tablet tablet Take 1 tablet by mouth Daily.      ondansetron ODT (Zofran ODT) 4 MG disintegrating tablet Place 1 tablet on the tongue Every 8 (Eight) Hours As Needed for Nausea or Vomiting. 20 tablet 1    pravastatin (PRAVACHOL) 80 MG tablet Take 1 tablet by mouth Every Night. 30 tablet 6    pregabalin (LYRICA) 150 MG capsule Take 1 capsule by mouth 2 (Two) Times a Day. 180 capsule 1    Symbicort 160-4.5 MCG/ACT inhaler Inhale 2 puffs 2 (Two) Times a Day. 3 each 3    Zinc 30 MG " capsule Take  by mouth.       No current facility-administered medications on file prior to visit.       The following portions of the patient's history were reviewed and updated as appropriate: allergies, current medications, past family history, past medical history, past social history, past surgical history, and problem list.    Review of Systems   Musculoskeletal:  Positive for arthralgias (left hip pain).        Left foot pain and swelling     Objective   Physical Exam  Vitals and nursing note reviewed.   Constitutional:       Appearance: Normal appearance. She is well-developed.   HENT:      Head: Normocephalic and atraumatic.      Right Ear: External ear normal.      Left Ear: External ear normal.      Nose: Nose normal.   Eyes:      Extraocular Movements: Extraocular movements intact.      Pupils: Pupils are equal, round, and reactive to light.   Cardiovascular:      Rate and Rhythm: Normal rate and regular rhythm.      Heart sounds: Normal heart sounds.   Pulmonary:      Effort: Pulmonary effort is normal.      Breath sounds: Normal breath sounds.   Abdominal:      General: Bowel sounds are normal.      Palpations: Abdomen is soft.   Genitourinary:     Vagina: Normal.   Musculoskeletal:         General: Normal range of motion.      Cervical back: Normal range of motion and neck supple.      Left hip: No bony tenderness.        Legs:    Skin:     General: Skin is warm and dry.   Neurological:      General: No focal deficit present.      Mental Status: She is alert and oriented to person, place, and time.   Psychiatric:         Mood and Affect: Mood normal.         Behavior: Behavior normal.         Judgment: Judgment normal.   PHQ-9 Total Score:      Assessment & Plan   Diagnoses and all orders for this visit:    1. Left foot pain (Primary)  -     XR Foot 3+ View Left; Future    2. Essential hypertension  -     Olmesartan-amLODIPine-HCTZ 40-10-12.5 MG tablet; Take 1 tablet by mouth Daily.  Dispense: 90  tablet; Refill: 1    3. Asthma, unspecified asthma severity, unspecified whether complicated, unspecified whether persistent  -     Discontinue: ipratropium-albuterol (COMBIVENT RESPIMAT)  MCG/ACT inhaler; Inhale 1 puff 4 (Four) Times a Day As Needed for Wheezing.  Dispense: 4 g; Refill: 11    4. Localized swelling of left foot  -     XR Foot 3+ View Left; Future    5. Left hip pain  -     XR Hip With or Without Pelvis 2 - 3 View Left; Future      Left foot pain  - The patient will be ordered an x-ray of her left foot at [place].  - The patient is advised that she might need an ongoing follow-up since she has refractured her left foot.  - The patient will be referred to Podiatry, Dr. Jeremías Hannon, depending on the left foot x-ray.    Hypertension   - The patient will be sent a prescription order refill of Olmesartan.    Left hip pain  - The patient will be ordered an x-ray of her left hip foot at [facility].  - The patient is advised the possibility of being referred to Orthopedics depending on the left hip x-ray.     Asthma  - The patient will be sent a prescription order refill of Combivent.    Health maintenance  - The patient is advised to follow up in 06/2023 for her regular follow-up.    Patient Instructions   You are due for Shingrix vaccination series ( the newest shingles vaccine).  It is a two shot series spaced 2-6 months apart. Please get this vaccine series started at your earliest convenience at your local pharmacy to help avoid shingles outbreak. It is more effective than the old Zostavax vaccine and is recommended even if you have had the Zostavax vaccine in the past.  Once the Shingrix series is completed, it does not need to be repeated.   For more information, please look at the website below:  [facility] Shingrix Vaccine Information        Transcribed from ambient dictation for MIKAEL Steele by Kaden Howard.  06/08/23   17:53 EDT    Patient or patient representative verbalized consent to the  visit recording.  I have personally performed the services described in this document as transcribed by the above individual, and it is both accurate and complete.

## 2023-06-08 NOTE — TELEPHONE ENCOUNTER
Script resent after speaking to patient she does not have an allergy to albuterol and has been on this same inhaler for a long time.    [facility] but needed a new script sent.

## 2023-06-09 ENCOUNTER — TELEPHONE (OUTPATIENT)
Dept: FAMILY MEDICINE CLINIC | Facility: CLINIC | Age: 63
End: 2023-06-09
Payer: OTHER GOVERNMENT

## 2023-06-09 NOTE — TELEPHONE ENCOUNTER
Caller: Pearl Elizalde    Relationship: Self    Best call back number: [phone]    Caller requesting test results: PATIENT     What test was performed: X-RAYS    When was the test performed: 06/08/23    Where was the test performed: OFFICE    Additional notes: PATIENT CALLED AND REQUESTED CALLBACK WITH X-RAY RESULTS

## 2023-06-12 ENCOUNTER — TELEPHONE (OUTPATIENT)
Dept: FAMILY MEDICINE CLINIC | Facility: CLINIC | Age: 63
End: 2023-06-12
Payer: OTHER GOVERNMENT

## 2023-06-12 DIAGNOSIS — M25.552 LEFT HIP PAIN: ICD-10-CM

## 2023-06-12 DIAGNOSIS — M79.672 LEFT FOOT PAIN: Primary | ICD-10-CM

## 2023-06-12 RX ORDER — EPINEPHRINE 0.3 MG/.3ML
0.3 INJECTION SUBCUTANEOUS ONCE
Qty: 1 EACH | Refills: 0 | Status: SHIPPED | OUTPATIENT
Start: 2023-06-12 | End: 2023-06-12

## 2023-06-12 RX ORDER — ALBUTEROL SULFATE 90 UG/1
2 AEROSOL, METERED RESPIRATORY (INHALATION) EVERY 4 HOURS PRN
Qty: 18 G | Refills: 3 | Status: SHIPPED | OUTPATIENT
Start: 2023-06-12

## 2023-06-12 NOTE — TELEPHONE ENCOUNTER
Incoming Refill Request      Medication requested (name and dose): EPINEPHrine (EPIPEN) 0.3 MG/0.3ML solution auto-injector injection    Pharmacy where request should be sent: [facility] - [address] - [phone]  - [phone] [phone]    Additional details provided by patient: NONE    Best call back number: [phone]    Does the patient have less than a 3 day supply:  [x] Yes  [] No    Connor Killian Rep  06/12/23, 13:57 EDT

## 2023-06-12 NOTE — TELEPHONE ENCOUNTER
Caller: UzielIsela luPearl C    Relationship: Self    Best call back number: [phone]    What medications are you currently taking:   Current Outpatient Medications on File Prior to Visit   Medication Sig Dispense Refill   • ascorbic acid (VITAMIN C) 500 MG tablet Take 1 tablet by mouth Daily.     • baclofen (LIORESAL) 10 MG tablet Take 1 tablet by mouth Every Night. 90 tablet 1   • Blood Glucose Monitoring Suppl (BLOOD GLUCOSE MONITOR SYSTEM) w/Device kit BLOOD GLUCOSE MONITOR SYSTEM w/Device KIT     • celecoxib (CeleBREX) 200 MG capsule Take 1 capsule by mouth Daily. 90 capsule 1   • Cetirizine HCl 10 MG capsule Take 1 dose by mouth.     • cholecalciferol (VITAMIN D3) 25 MCG (1000 UT) tablet Take 1 tablet by mouth Daily.     • colesevelam (WELCHOL) 625 MG tablet Take 1 tablet by mouth 2 (Two) Times a Day With Meals. 180 tablet 3   • EPINEPHrine (EPIPEN) 0.3 MG/0.3ML solution auto-injector injection      • ezetimibe (ZETIA) 10 MG tablet Take 1 tablet by mouth Daily. 90 tablet 3   • glucose blood (FREESTYLE LITE) test strip 1 each by Other route 2 (two) times a day. Use as instructed 100 each 6   • hydrALAZINE (APRESOLINE) 50 MG tablet TAKE 1 TABLET THREE TIMES A  tablet 3   • ipratropium-albuterol (COMBIVENT RESPIMAT)  MCG/ACT inhaler Inhale 1 puff 4 (Four) Times a Day As Needed for Wheezing. 4 g 11   • Januvia 100 MG tablet Take 1 tablet by mouth Daily. 90 tablet 1   • Lancets (FREESTYLE) lancets      • magnesium oxide (MAG-OX) 400 MG tablet Take 1 tablet by mouth Daily.     • multivitamin with minerals tablet tablet Take 1 tablet by mouth Daily.     • Olmesartan-amLODIPine-HCTZ 40-10-12.5 MG tablet Take 1 tablet by mouth Daily. 90 tablet 1   • ondansetron ODT (Zofran ODT) 4 MG disintegrating tablet Place 1 tablet on the tongue Every 8 (Eight) Hours As Needed for Nausea or Vomiting. 20 tablet 1   • pravastatin (PRAVACHOL) 80 MG tablet Take 1 tablet by mouth Every Night. 30 tablet 6   • pregabalin  (LYRICA) 150 MG capsule Take 1 capsule by mouth 2 (Two) Times a Day. 180 capsule 1   • Symbicort 160-4.5 MCG/ACT inhaler Inhale 2 puffs 2 (Two) Times a Day. 3 each 3   • Zinc 30 MG capsule Take  by mouth.       No current facility-administered medications on file prior to visit.          When did you start taking these medications: N/A    Which medication are you concerned about: CONQUEST     Who prescribed you this medication: ERIN LANCE     What are your concerns: PATIENT SAID SHE DID NOT DO WELL ON THIS MEDICATION AND WOULD LIKE TO GO BACK ON ALBUTEROL     SHE IS WANTING A NEW PRESCRIPTION SENT TO THIS PHARMACY    [facility] - [address] - [phone] [facility] [phone]   [phone]    How long have you had these concerns: N/A        DELETE AFTER READING TO PATIENT: “Thank you for sharing this information with me. I will send a message to the clinical team. Please allow 48 hours for the clinical staff to follow up on this request.  If your symptoms worsen, please seek emergent medical attention.”

## 2023-06-12 NOTE — TELEPHONE ENCOUNTER
Caller: Pearl Elizalde    Relationship: Self    Best call back number: [phone]    What is the medical concern/diagnosis: HIP, FOOT PAIN    What specialty or service is being requested: ORTHOPEDIC DOCTOR    What is the provider, practice or medical service name: ANY DOCTOR OTHER THAN DR. TOWNSEND IN [place]     What is the office location: N/A    What is the office phone number: N/A    Any additional details: PATIENT CALLED AND SAID THAT ERIN MARGOTHJOY HAD TOLD HER ONCE THEY RECEIVED HER X-RAYS BACK THEY WOULD REFER HER TO A SPECIALIST FOR HER ISSUES, SHE IS WANTING TO SEE IF SHE JUST NEEDS AN ORTHOPEDIC DOCTOR OR TWO SEPARATE SPECIALISTS    REQUESTED CALLBACK    SHE DOES NOT WANT TO SEE DR. TOWNSEND IN [place]

## 2023-06-12 NOTE — TELEPHONE ENCOUNTER
Please notify patient that I referred her to Dr. Hannon for podiatry and give her the number to call for an appointment and I referred to Dr. Copeland for Ortho for her hip

## 2023-06-14 DIAGNOSIS — M25.552 LEFT HIP PAIN: Primary | ICD-10-CM

## 2023-06-14 RX ORDER — EPINEPHRINE 0.3 MG/.3ML
0.3 INJECTION SUBCUTANEOUS ONCE
Qty: 1 EACH | Refills: 0 | Status: SHIPPED | OUTPATIENT
Start: 2023-06-14 | End: 2023-06-14

## 2023-06-14 RX ORDER — EPINEPHRINE 0.3 MG/.3ML
0.3 INJECTION SUBCUTANEOUS ONCE
COMMUNITY
Start: 2023-06-12 | End: 2023-06-14 | Stop reason: SDUPTHER

## 2023-06-14 NOTE — TELEPHONE ENCOUNTER
Rx Refill Note  Requested Prescriptions     Pending Prescriptions Disp Refills    EPINEPHrine (EPIPEN) 0.3 MG/0.3ML solution auto-injector injection 1 each 0     Sig: Inject 0.3 mL under the skin into the appropriate area as directed 1 (One) Time for 1 dose.      Last office visit with prescribing clinician: 6/8/2023   Last telemedicine visit with prescribing clinician: Visit date not found   Next office visit with prescribing clinician: 7/3/2023                         Would you like a call back once the refill request has been completed: [] Yes [] No    If the office needs to give you a call back, can they leave a voicemail: [] Yes [] No    Gala Elizondo MA  06/14/23, 14:19 EDT

## 2023-06-14 NOTE — TELEPHONE ENCOUNTER
Caller: Pearl Elizalde    Relationship: Self    Best call back number:   [phone]    What is the medical concern/diagnosis: NEED LEFT HIP REPLACEMENT    What specialty or service is being requested: ORTHO    What is the provider, practice or medical service name:   Francis Vogel MD  Address: [address] [place] [address]  Phone: [phone]  FAX: [phone]      Any additional details: THIS IS THE PROVIDER THAT DID HER KNEES AND SHE JUST FOUND OUT THAT HE DOES HIPS ALSO.    SHE WOULD LIKE THE PREVIOUS REFERRAL TO ANOTHER ORTHO CANCELLED PLEASE.

## 2023-06-19 ENCOUNTER — OFFICE (AMBULATORY)
Dept: URBAN - METROPOLITAN AREA CLINIC 64 | Facility: CLINIC | Age: 63
End: 2023-06-19

## 2023-06-19 ENCOUNTER — CLINICAL SUPPORT (OUTPATIENT)
Dept: FAMILY MEDICINE CLINIC | Facility: CLINIC | Age: 63
End: 2023-06-19
Payer: MEDICARE

## 2023-06-19 VITALS
HEIGHT: 66 IN | DIASTOLIC BLOOD PRESSURE: 74 MMHG | HEART RATE: 78 BPM | SYSTOLIC BLOOD PRESSURE: 116 MMHG | WEIGHT: 197 LBS

## 2023-06-19 DIAGNOSIS — E11.65 TYPE 2 DIABETES MELLITUS WITH HYPERGLYCEMIA, WITHOUT LONG-TERM CURRENT USE OF INSULIN: ICD-10-CM

## 2023-06-19 DIAGNOSIS — I10 ESSENTIAL HYPERTENSION: ICD-10-CM

## 2023-06-19 DIAGNOSIS — Z12.11 ENCOUNTER FOR SCREENING FOR MALIGNANT NEOPLASM OF COLON: ICD-10-CM

## 2023-06-19 DIAGNOSIS — E78.5 HYPERLIPIDEMIA, UNSPECIFIED HYPERLIPIDEMIA TYPE: ICD-10-CM

## 2023-06-19 LAB
ALBUMIN SERPL-MCNC: 4.5 G/DL (ref 3.5–5.2)
ALBUMIN/GLOB SERPL: 1.5 G/DL
ALP SERPL-CCNC: 83 U/L (ref 39–117)
ALT SERPL W P-5'-P-CCNC: 23 U/L (ref 1–33)
ANION GAP SERPL CALCULATED.3IONS-SCNC: 11.1 MMOL/L (ref 5–15)
AST SERPL-CCNC: 23 U/L (ref 1–32)
BASOPHILS # BLD AUTO: 0.06 10*3/MM3 (ref 0–0.2)
BASOPHILS NFR BLD AUTO: 0.9 % (ref 0–1.5)
BILIRUB SERPL-MCNC: 0.9 MG/DL (ref 0–1.2)
BUN SERPL-MCNC: 27 MG/DL (ref 8–23)
BUN/CREAT SERPL: 28.7 (ref 7–25)
CALCIUM SPEC-SCNC: 10.6 MG/DL (ref 8.6–10.5)
CHLORIDE SERPL-SCNC: 103 MMOL/L (ref 98–107)
CHOLEST SERPL-MCNC: 166 MG/DL (ref 0–200)
CO2 SERPL-SCNC: 23.9 MMOL/L (ref 22–29)
CREAT SERPL-MCNC: 0.94 MG/DL (ref 0.57–1)
DEPRECATED RDW RBC AUTO: 43.1 FL (ref 37–54)
EGFRCR SERPLBLD CKD-EPI 2021: 67.5 ML/MIN/1.73
EOSINOPHIL # BLD AUTO: 0.17 10*3/MM3 (ref 0–0.4)
EOSINOPHIL NFR BLD AUTO: 2.6 % (ref 0.3–6.2)
ERYTHROCYTE [DISTWIDTH] IN BLOOD BY AUTOMATED COUNT: 14.3 % (ref 12.3–15.4)
GLOBULIN UR ELPH-MCNC: 3.1 GM/DL
GLUCOSE SERPL-MCNC: 171 MG/DL (ref 65–99)
HBA1C MFR BLD: 8.6 % (ref 4.8–5.6)
HCT VFR BLD AUTO: 39.2 % (ref 34–46.6)
HDLC SERPL-MCNC: 37 MG/DL (ref 40–60)
HGB BLD-MCNC: 13.1 G/DL (ref 12–15.9)
IMM GRANULOCYTES # BLD AUTO: 0.03 10*3/MM3 (ref 0–0.05)
IMM GRANULOCYTES NFR BLD AUTO: 0.5 % (ref 0–0.5)
LDLC SERPL CALC-MCNC: 67 MG/DL (ref 0–100)
LDLC/HDLC SERPL: 1.34 {RATIO}
LYMPHOCYTES # BLD AUTO: 1.32 10*3/MM3 (ref 0.7–3.1)
LYMPHOCYTES NFR BLD AUTO: 20.5 % (ref 19.6–45.3)
MCH RBC QN AUTO: 27.8 PG (ref 26.6–33)
MCHC RBC AUTO-ENTMCNC: 33.4 G/DL (ref 31.5–35.7)
MCV RBC AUTO: 83.2 FL (ref 79–97)
MONOCYTES # BLD AUTO: 0.63 10*3/MM3 (ref 0.1–0.9)
MONOCYTES NFR BLD AUTO: 9.8 % (ref 5–12)
NEUTROPHILS NFR BLD AUTO: 4.22 10*3/MM3 (ref 1.7–7)
NEUTROPHILS NFR BLD AUTO: 65.7 % (ref 42.7–76)
NRBC BLD AUTO-RTO: 0 /100 WBC (ref 0–0.2)
PLATELET # BLD AUTO: 213 10*3/MM3 (ref 140–450)
PMV BLD AUTO: 12.8 FL (ref 6–12)
POTASSIUM SERPL-SCNC: 4.3 MMOL/L (ref 3.5–5.2)
PROT SERPL-MCNC: 7.6 G/DL (ref 6–8.5)
RBC # BLD AUTO: 4.71 10*6/MM3 (ref 3.77–5.28)
SODIUM SERPL-SCNC: 138 MMOL/L (ref 136–145)
TRIGL SERPL-MCNC: 398 MG/DL (ref 0–150)
TSH SERPL DL<=0.05 MIU/L-ACNC: 1 UIU/ML (ref 0.27–4.2)
VLDLC SERPL-MCNC: 62 MG/DL (ref 5–40)
WBC NRBC COR # BLD: 6.43 10*3/MM3 (ref 3.4–10.8)

## 2023-06-19 PROCEDURE — 99203 OFFICE O/P NEW LOW 30 MIN: CPT | Performed by: NURSE PRACTITIONER

## 2023-06-19 PROCEDURE — 36415 COLL VENOUS BLD VENIPUNCTURE: CPT | Performed by: NURSE PRACTITIONER

## 2023-06-19 NOTE — PROGRESS NOTES
Venipuncture performed on Left Arm by Gala Elizondo MA  with good hemostasis. Patient tolerated well. 06/19/23  MIKAEL Steele

## 2023-07-21 ENCOUNTER — TELEPHONE (OUTPATIENT)
Dept: FAMILY MEDICINE CLINIC | Facility: CLINIC | Age: 63
End: 2023-07-21

## 2023-07-21 NOTE — TELEPHONE ENCOUNTER
Caller: Pearl Elizalde    Relationship: Self    Best call back number: [phone]     What was the call regarding: PATIENT STATED THAT THE INHALER SAMPLE THAT WAS GIVEN TO HER TO TRY IS WORKING AND SHE WOULD LIKE TO GET A PRESCRIPTION FOR THIS SENT TO [facility] - [address] [phone] [facility] [phone]      PATIENT STATED SHE CAN NOT REMEMBER THE NAME OF THE MEDICATION.     PLEASE CALL TO DISCUSS IF NEEDED.

## 2023-07-24 DIAGNOSIS — J45.909 ASTHMA, UNSPECIFIED ASTHMA SEVERITY, UNSPECIFIED WHETHER COMPLICATED, UNSPECIFIED WHETHER PERSISTENT: Primary | ICD-10-CM

## 2023-07-24 RX ORDER — BUDESONIDE, GLYCOPYRROLATE, AND FORMOTEROL FUMARATE 160; 9; 4.8 UG/1; UG/1; UG/1
2 AEROSOL, METERED RESPIRATORY (INHALATION) 2 TIMES DAILY
Qty: 3 EACH | Refills: 1 | Status: SHIPPED | OUTPATIENT
Start: 2023-07-24 | End: 2023-10-22

## 2023-07-24 NOTE — TELEPHONE ENCOUNTER
Please clarify with the patient what medication was given to her.  I believe it was Breztri but review of the chart only states sample was given.

## 2023-07-31 ENCOUNTER — HOSPITAL ENCOUNTER (OUTPATIENT)
Facility: HOSPITAL | Age: 63
Setting detail: HOSPITAL OUTPATIENT SURGERY
Discharge: HOME | End: 2023-07-31
Attending: ORTHOPAEDIC SURGERY | Admitting: ORTHOPAEDIC SURGERY
Payer: MEDICARE

## 2023-07-31 VITALS
SYSTOLIC BLOOD PRESSURE: 146 MMHG | TEMPERATURE: 97.9 F | DIASTOLIC BLOOD PRESSURE: 87 MMHG | RESPIRATION RATE: 16 BRPM | OXYGEN SATURATION: 98 % | HEART RATE: 89 BPM

## 2023-07-31 LAB — GLUCOSE BLDC GLUCOMTR-MCNC: 227 MG/DL (ref 70–130)

## 2023-07-31 PROCEDURE — 82948 REAGENT STRIP/BLOOD GLUCOSE: CPT

## 2023-07-31 PROCEDURE — G0463 HOSPITAL OUTPT CLINIC VISIT: HCPCS | Performed by: ORTHOPAEDIC SURGERY

## 2023-07-31 RX ORDER — MELOXICAM 15 MG/1
15 TABLET ORAL ONCE
Status: DISCONTINUED | OUTPATIENT
Start: 2023-07-31 | End: 2023-07-31 | Stop reason: HOSPADM

## 2023-07-31 RX ORDER — CEFAZOLIN SODIUM 2 G/100ML
2000 INJECTION, SOLUTION INTRAVENOUS ONCE
Status: DISCONTINUED | OUTPATIENT
Start: 2023-07-31 | End: 2023-07-31 | Stop reason: HOSPADM

## 2023-08-16 ENCOUNTER — ANESTHESIA (OUTPATIENT)
Dept: PERIOP | Facility: HOSPITAL | Age: 63
End: 2023-08-16
Payer: MEDICARE

## 2023-08-16 ENCOUNTER — APPOINTMENT (OUTPATIENT)
Dept: GENERAL RADIOLOGY | Facility: HOSPITAL | Age: 63
End: 2023-08-16
Payer: MEDICARE

## 2023-08-16 ENCOUNTER — ANESTHESIA EVENT (OUTPATIENT)
Dept: PERIOP | Facility: HOSPITAL | Age: 63
End: 2023-08-16
Payer: MEDICARE

## 2023-08-16 ENCOUNTER — HOSPITAL ENCOUNTER (OUTPATIENT)
Facility: HOSPITAL | Age: 63
Discharge: HOME HEALTH | End: 2023-08-16
Attending: ORTHOPAEDIC SURGERY | Admitting: ORTHOPAEDIC SURGERY
Payer: MEDICARE

## 2023-08-16 VITALS
WEIGHT: 185 LBS | RESPIRATION RATE: 20 BRPM | TEMPERATURE: 97.9 F | BODY MASS INDEX: 30.82 KG/M2 | SYSTOLIC BLOOD PRESSURE: 88 MMHG | DIASTOLIC BLOOD PRESSURE: 51 MMHG | OXYGEN SATURATION: 96 % | HEART RATE: 75 BPM | HEIGHT: 65 IN

## 2023-08-16 DIAGNOSIS — M16.12 PRIMARY OSTEOARTHRITIS OF LEFT HIP: Primary | ICD-10-CM

## 2023-08-16 LAB
GLUCOSE BLDC GLUCOMTR-MCNC: 155 MG/DL (ref 70–130)
HBA1C MFR BLD: 7.7 % (ref 4.8–5.6)

## 2023-08-16 PROCEDURE — 25010000002 PROPOFOL 10 MG/ML EMULSION: Performed by: ANESTHESIOLOGY

## 2023-08-16 PROCEDURE — 25010000002 MAGNESIUM SULFATE PER 500 MG OF MAGNESIUM: Performed by: ANESTHESIOLOGY

## 2023-08-16 PROCEDURE — C1776 JOINT DEVICE (IMPLANTABLE): HCPCS | Performed by: ORTHOPAEDIC SURGERY

## 2023-08-16 PROCEDURE — 25010000002 CEFAZOLIN IN DEXTROSE 2-4 GM/100ML-% SOLUTION: Performed by: ORTHOPAEDIC SURGERY

## 2023-08-16 PROCEDURE — 25010000002 MIDAZOLAM PER 1 MG: Performed by: STUDENT IN AN ORGANIZED HEALTH CARE EDUCATION/TRAINING PROGRAM

## 2023-08-16 PROCEDURE — 73501 X-RAY EXAM HIP UNI 1 VIEW: CPT

## 2023-08-16 PROCEDURE — 25010000002 DEXAMETHASONE SODIUM PHOSPHATE 20 MG/5ML SOLUTION: Performed by: ANESTHESIOLOGY

## 2023-08-16 PROCEDURE — 25010000002 ONDANSETRON PER 1 MG: Performed by: ANESTHESIOLOGY

## 2023-08-16 PROCEDURE — 25010000002 SUGAMMADEX 200 MG/2ML SOLUTION: Performed by: ANESTHESIOLOGY

## 2023-08-16 PROCEDURE — 76000 FLUOROSCOPY <1 HR PHYS/QHP: CPT

## 2023-08-16 PROCEDURE — 25010000002 EPINEPHRINE 1 MG/ML SOLUTION 30 ML VIAL: Performed by: ORTHOPAEDIC SURGERY

## 2023-08-16 PROCEDURE — 25010000002 ROPIVACAINE PER 1 MG: Performed by: ORTHOPAEDIC SURGERY

## 2023-08-16 PROCEDURE — 97110 THERAPEUTIC EXERCISES: CPT

## 2023-08-16 PROCEDURE — 25010000002 FENTANYL CITRATE (PF) 100 MCG/2ML SOLUTION: Performed by: ANESTHESIOLOGY

## 2023-08-16 PROCEDURE — 82948 REAGENT STRIP/BLOOD GLUCOSE: CPT

## 2023-08-16 PROCEDURE — 83036 HEMOGLOBIN GLYCOSYLATED A1C: CPT | Performed by: ORTHOPAEDIC SURGERY

## 2023-08-16 PROCEDURE — 97161 PT EVAL LOW COMPLEX 20 MIN: CPT

## 2023-08-16 PROCEDURE — 25010000002 KETOROLAC TROMETHAMINE PER 15 MG: Performed by: ORTHOPAEDIC SURGERY

## 2023-08-16 DEVICE — BIOLOX® DELTA HEAD, 12/14, 28 X -3.5
Type: IMPLANTABLE DEVICE | Site: HIP | Status: FUNCTIONAL
Brand: BIOLOX® DELTA

## 2023-08-16 DEVICE — IMPLANTABLE DEVICE
Type: IMPLANTABLE DEVICE | Site: HIP | Status: FUNCTIONAL
Brand: AVENIR COMPLETE™

## 2023-08-16 DEVICE — TOTAL HIP PRIMARY: Type: IMPLANTABLE DEVICE | Status: FUNCTIONAL

## 2023-08-16 DEVICE — IMPLANTABLE DEVICE
Type: IMPLANTABLE DEVICE | Site: HIP | Status: FUNCTIONAL
Brand: VIVACIT-E®

## 2023-08-16 DEVICE — CP HIP UPCHRG OSSEOTI LTD HL CUPS: Type: IMPLANTABLE DEVICE | Status: FUNCTIONAL

## 2023-08-16 DEVICE — IMPLANTABLE DEVICE
Type: IMPLANTABLE DEVICE | Site: HIP | Status: FUNCTIONAL
Brand: G7® DUAL MOBILITY ACETABULAR SYSTEM

## 2023-08-16 DEVICE — CAP HIP 2 MOBL UPCHRG: Type: IMPLANTABLE DEVICE | Status: FUNCTIONAL

## 2023-08-16 DEVICE — KNOTLESS TISSUE CONTROL DEVICE, UNDYED UNIDIRECTIONAL (ANTIBACTERIAL) SYNTHETIC ABSORBABLE DEVICE
Type: IMPLANTABLE DEVICE | Site: HIP | Status: FUNCTIONAL
Brand: STRATAFIX

## 2023-08-16 DEVICE — IMPLANTABLE DEVICE
Type: IMPLANTABLE DEVICE | Site: HIP | Status: FUNCTIONAL
Brand: G7® ACETABULAR SYSTEM

## 2023-08-16 RX ORDER — DROPERIDOL 2.5 MG/ML
0.62 INJECTION, SOLUTION INTRAMUSCULAR; INTRAVENOUS
Status: DISCONTINUED | OUTPATIENT
Start: 2023-08-16 | End: 2023-08-16 | Stop reason: HOSPADM

## 2023-08-16 RX ORDER — ONDANSETRON 2 MG/ML
INJECTION INTRAMUSCULAR; INTRAVENOUS AS NEEDED
Status: DISCONTINUED | OUTPATIENT
Start: 2023-08-16 | End: 2023-08-16 | Stop reason: SURG

## 2023-08-16 RX ORDER — ASPIRIN 81 MG/1
81 TABLET ORAL 2 TIMES DAILY
Qty: 60 TABLET | Refills: 0 | Status: SHIPPED | OUTPATIENT
Start: 2023-08-16 | End: 2023-09-15

## 2023-08-16 RX ORDER — ONDANSETRON 4 MG/1
4 TABLET, FILM COATED ORAL EVERY 8 HOURS PRN
Qty: 20 TABLET | Refills: 0 | Status: SHIPPED | OUTPATIENT
Start: 2023-08-16 | End: 2024-08-15

## 2023-08-16 RX ORDER — GLYCOPYRROLATE 0.2 MG/ML
INJECTION INTRAMUSCULAR; INTRAVENOUS AS NEEDED
Status: DISCONTINUED | OUTPATIENT
Start: 2023-08-16 | End: 2023-08-16 | Stop reason: SURG

## 2023-08-16 RX ORDER — ROCURONIUM BROMIDE 10 MG/ML
INJECTION, SOLUTION INTRAVENOUS AS NEEDED
Status: DISCONTINUED | OUTPATIENT
Start: 2023-08-16 | End: 2023-08-16 | Stop reason: SURG

## 2023-08-16 RX ORDER — CELECOXIB 200 MG/1
CAPSULE ORAL
Qty: 35 CAPSULE | Refills: 0 | Status: SHIPPED | OUTPATIENT
Start: 2023-08-16 | End: 2023-08-16 | Stop reason: SDUPTHER

## 2023-08-16 RX ORDER — CEFAZOLIN SODIUM 2 G/100ML
2000 INJECTION, SOLUTION INTRAVENOUS ONCE
Status: COMPLETED | OUTPATIENT
Start: 2023-08-16 | End: 2023-08-16

## 2023-08-16 RX ORDER — HYDROMORPHONE HYDROCHLORIDE 1 MG/ML
0.5 INJECTION, SOLUTION INTRAMUSCULAR; INTRAVENOUS; SUBCUTANEOUS
Status: DISCONTINUED | OUTPATIENT
Start: 2023-08-16 | End: 2023-08-16 | Stop reason: HOSPADM

## 2023-08-16 RX ORDER — PROMETHAZINE HYDROCHLORIDE 25 MG/1
25 TABLET ORAL ONCE AS NEEDED
Status: DISCONTINUED | OUTPATIENT
Start: 2023-08-16 | End: 2023-08-16 | Stop reason: HOSPADM

## 2023-08-16 RX ORDER — EPHEDRINE SULFATE 50 MG/ML
5 INJECTION, SOLUTION INTRAVENOUS ONCE AS NEEDED
Status: DISCONTINUED | OUTPATIENT
Start: 2023-08-16 | End: 2023-08-16 | Stop reason: HOSPADM

## 2023-08-16 RX ORDER — FENTANYL CITRATE 50 UG/ML
50 INJECTION, SOLUTION INTRAMUSCULAR; INTRAVENOUS
Status: DISCONTINUED | OUTPATIENT
Start: 2023-08-16 | End: 2023-08-16 | Stop reason: HOSPADM

## 2023-08-16 RX ORDER — LIDOCAINE HYDROCHLORIDE 20 MG/ML
INJECTION, SOLUTION EPIDURAL; INFILTRATION; INTRACAUDAL; PERINEURAL AS NEEDED
Status: DISCONTINUED | OUTPATIENT
Start: 2023-08-16 | End: 2023-08-16 | Stop reason: SURG

## 2023-08-16 RX ORDER — MAGNESIUM SULFATE HEPTAHYDRATE 500 MG/ML
INJECTION, SOLUTION INTRAMUSCULAR; INTRAVENOUS AS NEEDED
Status: DISCONTINUED | OUTPATIENT
Start: 2023-08-16 | End: 2023-08-16 | Stop reason: SURG

## 2023-08-16 RX ORDER — DEXMEDETOMIDINE HYDROCHLORIDE 100 UG/ML
INJECTION, SOLUTION INTRAVENOUS AS NEEDED
Status: DISCONTINUED | OUTPATIENT
Start: 2023-08-16 | End: 2023-08-16 | Stop reason: SURG

## 2023-08-16 RX ORDER — PROPOFOL 10 MG/ML
VIAL (ML) INTRAVENOUS AS NEEDED
Status: DISCONTINUED | OUTPATIENT
Start: 2023-08-16 | End: 2023-08-16 | Stop reason: SURG

## 2023-08-16 RX ORDER — CELECOXIB 200 MG/1
CAPSULE ORAL
Qty: 35 CAPSULE | Refills: 0 | Status: SHIPPED | OUTPATIENT
Start: 2023-08-16 | End: 2023-09-15

## 2023-08-16 RX ORDER — SODIUM CHLORIDE 0.9 % (FLUSH) 0.9 %
3-10 SYRINGE (ML) INJECTION AS NEEDED
Status: DISCONTINUED | OUTPATIENT
Start: 2023-08-16 | End: 2023-08-16 | Stop reason: HOSPADM

## 2023-08-16 RX ORDER — FLUMAZENIL 0.1 MG/ML
0.2 INJECTION INTRAVENOUS AS NEEDED
Status: DISCONTINUED | OUTPATIENT
Start: 2023-08-16 | End: 2023-08-16 | Stop reason: HOSPADM

## 2023-08-16 RX ORDER — IPRATROPIUM BROMIDE AND ALBUTEROL SULFATE 2.5; .5 MG/3ML; MG/3ML
3 SOLUTION RESPIRATORY (INHALATION) ONCE AS NEEDED
Status: DISCONTINUED | OUTPATIENT
Start: 2023-08-16 | End: 2023-08-16 | Stop reason: HOSPADM

## 2023-08-16 RX ORDER — MAGNESIUM HYDROXIDE 1200 MG/15ML
LIQUID ORAL AS NEEDED
Status: DISCONTINUED | OUTPATIENT
Start: 2023-08-16 | End: 2023-08-16 | Stop reason: HOSPADM

## 2023-08-16 RX ORDER — ONDANSETRON 4 MG/1
4 TABLET, FILM COATED ORAL DAILY PRN
Qty: 30 TABLET | Refills: 1 | Status: SHIPPED | OUTPATIENT
Start: 2023-08-16 | End: 2023-08-16 | Stop reason: SDUPTHER

## 2023-08-16 RX ORDER — NALOXONE HCL 0.4 MG/ML
0.2 VIAL (ML) INJECTION AS NEEDED
Status: DISCONTINUED | OUTPATIENT
Start: 2023-08-16 | End: 2023-08-16 | Stop reason: HOSPADM

## 2023-08-16 RX ORDER — PROMETHAZINE HYDROCHLORIDE 25 MG/1
25 SUPPOSITORY RECTAL ONCE AS NEEDED
Status: DISCONTINUED | OUTPATIENT
Start: 2023-08-16 | End: 2023-08-16 | Stop reason: HOSPADM

## 2023-08-16 RX ORDER — HYDROCODONE BITARTRATE AND ACETAMINOPHEN 7.5; 325 MG/1; MG/1
1 TABLET ORAL ONCE AS NEEDED
Status: COMPLETED | OUTPATIENT
Start: 2023-08-16 | End: 2023-08-16

## 2023-08-16 RX ORDER — FENTANYL CITRATE 50 UG/ML
INJECTION, SOLUTION INTRAMUSCULAR; INTRAVENOUS AS NEEDED
Status: DISCONTINUED | OUTPATIENT
Start: 2023-08-16 | End: 2023-08-16 | Stop reason: SURG

## 2023-08-16 RX ORDER — TRANEXAMIC ACID 100 MG/ML
INJECTION, SOLUTION INTRAVENOUS AS NEEDED
Status: DISCONTINUED | OUTPATIENT
Start: 2023-08-16 | End: 2023-08-16 | Stop reason: SURG

## 2023-08-16 RX ORDER — EPHEDRINE SULFATE 50 MG/ML
INJECTION INTRAVENOUS AS NEEDED
Status: DISCONTINUED | OUTPATIENT
Start: 2023-08-16 | End: 2023-08-16 | Stop reason: SURG

## 2023-08-16 RX ORDER — SODIUM CHLORIDE 0.9 % (FLUSH) 0.9 %
3 SYRINGE (ML) INJECTION EVERY 12 HOURS SCHEDULED
Status: DISCONTINUED | OUTPATIENT
Start: 2023-08-16 | End: 2023-08-16 | Stop reason: HOSPADM

## 2023-08-16 RX ORDER — ONDANSETRON 2 MG/ML
4 INJECTION INTRAMUSCULAR; INTRAVENOUS ONCE AS NEEDED
Status: DISCONTINUED | OUTPATIENT
Start: 2023-08-16 | End: 2023-08-16 | Stop reason: HOSPADM

## 2023-08-16 RX ORDER — MIDAZOLAM HYDROCHLORIDE 1 MG/ML
0.5 INJECTION INTRAMUSCULAR; INTRAVENOUS
Status: DISCONTINUED | OUTPATIENT
Start: 2023-08-16 | End: 2023-08-16 | Stop reason: HOSPADM

## 2023-08-16 RX ORDER — KETAMINE HCL IN NACL, ISO-OSM 100MG/10ML
SYRINGE (ML) INJECTION AS NEEDED
Status: DISCONTINUED | OUTPATIENT
Start: 2023-08-16 | End: 2023-08-16 | Stop reason: SURG

## 2023-08-16 RX ORDER — DEXAMETHASONE SODIUM PHOSPHATE 4 MG/ML
INJECTION, SOLUTION INTRA-ARTICULAR; INTRALESIONAL; INTRAMUSCULAR; INTRAVENOUS; SOFT TISSUE AS NEEDED
Status: DISCONTINUED | OUTPATIENT
Start: 2023-08-16 | End: 2023-08-16 | Stop reason: SURG

## 2023-08-16 RX ORDER — SODIUM CHLORIDE, SODIUM LACTATE, POTASSIUM CHLORIDE, CALCIUM CHLORIDE 600; 310; 30; 20 MG/100ML; MG/100ML; MG/100ML; MG/100ML
9 INJECTION, SOLUTION INTRAVENOUS CONTINUOUS
Status: DISCONTINUED | OUTPATIENT
Start: 2023-08-16 | End: 2023-08-16 | Stop reason: HOSPADM

## 2023-08-16 RX ORDER — HYDRALAZINE HYDROCHLORIDE 20 MG/ML
5 INJECTION INTRAMUSCULAR; INTRAVENOUS
Status: DISCONTINUED | OUTPATIENT
Start: 2023-08-16 | End: 2023-08-16 | Stop reason: HOSPADM

## 2023-08-16 RX ORDER — SCOLOPAMINE TRANSDERMAL SYSTEM 1 MG/1
1 PATCH, EXTENDED RELEASE TRANSDERMAL ONCE
Status: DISCONTINUED | OUTPATIENT
Start: 2023-08-16 | End: 2023-08-16 | Stop reason: HOSPADM

## 2023-08-16 RX ORDER — DIPHENHYDRAMINE HYDROCHLORIDE 50 MG/ML
12.5 INJECTION INTRAMUSCULAR; INTRAVENOUS
Status: DISCONTINUED | OUTPATIENT
Start: 2023-08-16 | End: 2023-08-16 | Stop reason: HOSPADM

## 2023-08-16 RX ORDER — HYDROCODONE BITARTRATE AND ACETAMINOPHEN 7.5; 325 MG/1; MG/1
1 TABLET ORAL EVERY 4 HOURS PRN
Qty: 42 TABLET | Refills: 0 | Status: SHIPPED | OUTPATIENT
Start: 2023-08-16

## 2023-08-16 RX ORDER — MELOXICAM 15 MG/1
15 TABLET ORAL ONCE
Status: COMPLETED | OUTPATIENT
Start: 2023-08-16 | End: 2023-08-16

## 2023-08-16 RX ORDER — ASPIRIN 81 MG/1
81 TABLET ORAL 2 TIMES DAILY
Qty: 60 TABLET | Refills: 0 | Status: SHIPPED | OUTPATIENT
Start: 2023-08-16 | End: 2023-08-16 | Stop reason: SDUPTHER

## 2023-08-16 RX ADMIN — Medication 30 MG: at 09:03

## 2023-08-16 RX ADMIN — DEXMEDETOMIDINE HCL 10 MCG: 100 INJECTION INTRAVENOUS at 09:47

## 2023-08-16 RX ADMIN — MAGNESIUM SULFATE HEPTAHYDRATE 2 G: 500 INJECTION, SOLUTION INTRAMUSCULAR; INTRAVENOUS at 09:38

## 2023-08-16 RX ADMIN — SUGAMMADEX 160 MG: 100 INJECTION, SOLUTION INTRAVENOUS at 10:40

## 2023-08-16 RX ADMIN — GLYCOPYRROLATE 0.2 MCG: 1 INJECTION INTRAMUSCULAR; INTRAVENOUS at 08:59

## 2023-08-16 RX ADMIN — EPHEDRINE SULFATE 10 MG: 50 INJECTION INTRAVENOUS at 09:20

## 2023-08-16 RX ADMIN — LIDOCAINE HYDROCHLORIDE 80 MG: 20 INJECTION, SOLUTION EPIDURAL; INFILTRATION; INTRACAUDAL; PERINEURAL at 09:03

## 2023-08-16 RX ADMIN — ROCURONIUM BROMIDE 50 MG: 10 INJECTION, SOLUTION INTRAVENOUS at 09:03

## 2023-08-16 RX ADMIN — LIDOCAINE HYDROCHLORIDE 80 MG: 20 INJECTION, SOLUTION EPIDURAL; INFILTRATION; INTRACAUDAL; PERINEURAL at 10:39

## 2023-08-16 RX ADMIN — SODIUM CHLORIDE, POTASSIUM CHLORIDE, SODIUM LACTATE AND CALCIUM CHLORIDE: 600; 310; 30; 20 INJECTION, SOLUTION INTRAVENOUS at 10:12

## 2023-08-16 RX ADMIN — FENTANYL CITRATE 50 MCG: 50 INJECTION, SOLUTION INTRAMUSCULAR; INTRAVENOUS at 09:03

## 2023-08-16 RX ADMIN — HYDROCODONE BITARTRATE AND ACETAMINOPHEN 1 TABLET: 7.5; 325 TABLET ORAL at 13:20

## 2023-08-16 RX ADMIN — Medication 10 MG: at 10:22

## 2023-08-16 RX ADMIN — PROPOFOL 110 MG: 10 INJECTION, EMULSION INTRAVENOUS at 09:03

## 2023-08-16 RX ADMIN — DEXAMETHASONE SODIUM PHOSPHATE 6 MG: 4 INJECTION, SOLUTION INTRAMUSCULAR; INTRAVENOUS at 09:11

## 2023-08-16 RX ADMIN — SCOPALAMINE 1 PATCH: 1 PATCH, EXTENDED RELEASE TRANSDERMAL at 07:45

## 2023-08-16 RX ADMIN — MELOXICAM 15 MG: 15 TABLET ORAL at 07:41

## 2023-08-16 RX ADMIN — Medication 10 MG: at 10:40

## 2023-08-16 RX ADMIN — CEFAZOLIN SODIUM 2000 MG: 2 INJECTION, SOLUTION INTRAVENOUS at 08:42

## 2023-08-16 RX ADMIN — ONDANSETRON 4 MG: 2 INJECTION INTRAMUSCULAR; INTRAVENOUS at 09:03

## 2023-08-16 RX ADMIN — MIDAZOLAM 0.5 MG: 1 INJECTION INTRAMUSCULAR; INTRAVENOUS at 07:49

## 2023-08-16 RX ADMIN — FENTANYL CITRATE 50 MCG: 50 INJECTION, SOLUTION INTRAMUSCULAR; INTRAVENOUS at 09:24

## 2023-08-16 RX ADMIN — SODIUM CHLORIDE, POTASSIUM CHLORIDE, SODIUM LACTATE AND CALCIUM CHLORIDE 9 ML/HR: 600; 310; 30; 20 INJECTION, SOLUTION INTRAVENOUS at 07:14

## 2023-08-16 RX ADMIN — TRANEXAMIC ACID 1000 MG: 100 INJECTION INTRAVENOUS at 09:10

## 2023-08-16 RX ADMIN — DEXMEDETOMIDINE HCL 30 MCG: 100 INJECTION INTRAVENOUS at 08:58

## 2023-08-16 NOTE — H&P
ORTHOPEDIC SURGERY PRE-OP HISTORY AND PHYSICAL      Patient: Pearl Elizalde  Date of Admission: 8/16/2023  5:59 AM  YOB: 1960  Medical Record Number: [id number]  Attending Physician: Francis Vogel MD  Consulting Physician: Francis Vogel MD    CHIEF COMPLAINT: Left Hip Pain.    HISTORY OF PRESENT ILLNESS: Patient is a 63 y.o. female presents to [facility] with above complaints.  The patient failed conservative treatment and patient requested surgical intervention.  The patient presents for a  Left total hip.    ALLERGIES:   Allergies   Allergen Reactions    Oxycodone Hives and Nausea And Vomiting     Other reaction(s): Nausea And Vomiting    Penicillin G Unknown (See Comments)     Patient states hives as a child and as an adult     Oxycodone Hcl Unknown - High Severity    Penicillins Hives     As child. Unsure if can take keflex       HOME MEDICATIONS:  Medications Prior to Admission   Medication Sig Dispense Refill Last Dose    albuterol sulfate  (90 Base) MCG/ACT inhaler Inhale 2 puffs Every 4 (Four) Hours As Needed for Wheezing or Shortness of Air. 18 g 3     ascorbic acid (VITAMIN C) 500 MG tablet Take 1 tablet by mouth Daily.       baclofen (LIORESAL) 10 MG tablet Take 1 tablet by mouth Every Night. 90 tablet 1     Blood Glucose Monitoring Suppl (BLOOD GLUCOSE MONITOR SYSTEM) w/Device kit BLOOD GLUCOSE MONITOR SYSTEM w/Device KIT       Budeson-Glycopyrrol-Formoterol (Breztri Aerosphere) 160-9-4.8 MCG/ACT aerosol inhaler Inhale 2 puffs 2 (Two) Times a Day for 90 days. 3 each 1     celecoxib (CeleBREX) 200 MG capsule Take 1 capsule by mouth Daily. (Patient taking differently: Take 1 capsule by mouth Daily. HOLD PRIOR TO SURGERY PER MD INSTRUCTIONS) 90 capsule 1 8/13/2023    Cetirizine HCl 10 MG capsule Take 1 dose by mouth As Needed (ALLERGIES).       Chlorhexidine Gluconate Cloth 2 % pads Apply  topically Take As Directed. PRIOR TO SURGERY       cholecalciferol  (VITAMIN D3) 25 MCG (1000 UT) tablet Take 1 tablet by mouth Daily.       colesevelam (WELCHOL) 625 MG tablet Take 1 tablet by mouth 2 (Two) Times a Day With Meals. 180 tablet 3     EPINEPHrine (EPIPEN) 0.3 MG/0.3ML solution auto-injector injection        ezetimibe (ZETIA) 10 MG tablet Take 1 tablet by mouth Daily. 90 tablet 3     glucose blood (FREESTYLE LITE) test strip 1 each by Other route 2 (two) times a day. Use as instructed 100 each 6     hydrALAZINE (APRESOLINE) 50 MG tablet TAKE 1 TABLET THREE TIMES A DAY (Patient taking differently: Take 1 tablet by mouth Every Evening.) 270 tablet 3     ipratropium-albuterol (COMBIVENT RESPIMAT)  MCG/ACT inhaler Inhale 1 puff 4 (Four) Times a Day As Needed for Wheezing. 4 g 11     Januvia 100 MG tablet Take 1 tablet by mouth Daily. 90 tablet 1     Lancets (FREESTYLE) lancets        magnesium oxide (MAG-OX) 400 MG tablet Take 1 tablet by mouth Daily.       metFORMIN (Glucophage) 500 MG tablet Take 1 tablet by mouth 2 (Two) Times a Day With Meals. 180 tablet 1     multivitamin with minerals tablet tablet Take 1 tablet by mouth Daily. HOLD FOR SURGERY       Olmesartan-amLODIPine-HCTZ 40-10-12.5 MG tablet Take 1 tablet by mouth Daily. (Patient taking differently: Take 1 tablet by mouth Every Evening.) 90 tablet 1     pravastatin (PRAVACHOL) 80 MG tablet Take 1 tablet by mouth Every Night. 30 tablet 6     pregabalin (LYRICA) 150 MG capsule Take 1 capsule by mouth 2 (Two) Times a Day. 180 capsule 1     Zinc 30 MG capsule Take  by mouth.          Past Medical History:   Diagnosis Date    Asthma 06/03/2019    Body mass index (BMI) of 30.0-30.9 in adult 04/11/2019    Breast cancer     NO CHEMO, NO RADIATION    Chronic obstructive pulmonary disease 06/03/2019    Diabetic peripheral neuropathy 02/06/2019    Gastroesophageal reflux disease 06/03/2019    History of COVID-19     2019, 2021,2022    Hyperlipidemia     Hypertension     Low back pain     Obesity     Osteoarthritis      PONV (postoperative nausea and vomiting)     SEVERE    Slow to wake up after anesthesia     Under care of pain management specialist      Past Surgical History:   Procedure Laterality Date    ADENOIDECTOMY      CHOLECYSTECTOMY      COLONOSCOPY      ENDOSCOPY      EYE SURGERY      CATARACTS    INNER EAR SURGERY      STAPENDECTOMY    KNEE SURGERY Bilateral     MULTIPLE    MASTECTOMY Left     REPLACEMENT TOTAL KNEE Left     REPLACEMENT TOTAL KNEE Right     TONSILLECTOMY       Social History     Occupational History    Not on file   Tobacco Use    Smoking status: Former     Packs/day: 0.20     Years: 0.50     Pack years: 0.10     Types: Cigarettes     Quit date:      Years since quittin.6     Passive exposure: Past    Smokeless tobacco: Never   Vaping Use    Vaping Use: Never used   Substance and Sexual Activity    Alcohol use: Yes     Comment: Occassional    Drug use: No    Sexual activity: Yes     Partners: Male     Birth control/protection: None      Social History     Social History Narrative    Not on file     Family History   Adopted: Yes   Problem Relation Age of Onset    Malig Hyperthermia Neg Hx        REVIEW OF SYSTEMS:    HEENT: Patient denies any headaches, vision changes, change in hearing, or tinnitus, Patient denies epistaxis, sinus pain, hoarseness, or dysphagia   Pulmonary: Patient denies any cough, congestion, acute change in SOA or wheezing.   Cardiovascular: Patient denies any change in chest pain, dyspnea, palpitations, weakness, intolerance of exercise, varicosities, change in murmur   Gastrointestinal:  Patient denies change in appetite, melena, change in bowel habits.   Genital/Urinary: Patient denies dysuria, change in color of urine, change in frequency of urination, pain with urgency, change in incontinence, retention.   Musculoskeletal: Patient denies complaints of acute changes in symptoms of other joints not mentioned above.   Neurological: Patient denies changes in  "dizziness, tremor, ataxia, or difficulty in speaking or changes in memory.   Endocrine system: Patient denies acute changes in tremors, palpitations, polyuria, polydipsia, polyphagia, diaphoresis, exophthalmos, or goiter.   Psychological: Patient denies thoughts/plans or harming self or other; denies acute changes in depression,  insomnia, night terrors, mely, disorientation.   Skin: Patient denies any bruising, rashes, discoloration, pruritus,or wounds not mentioned in history of present illness or chief complaint above.   Hematopoietic: Patient denies current bleeding, epistaxis, hematuria, or melena.    PHYSICAL EXAM:   Vitals:  Vitals:    08/15/23 1030 08/16/23 0631   BP:  122/78   BP Location:  Right arm   Patient Position:  Sitting   Pulse:  90   Resp:  18   Temp:  98 øF (36.7 øC)   TempSrc:  Oral   SpO2:  97%   Weight: 83.9 kg (185 lb)    Height: 165.1 cm (65\")        General:  63 y.o. female who appears about stated age.    Alert, cooperative, in no acute distress                       Head:    Normocephalic, without obvious abnormality, atraumatic   Eyes:            Lids and lashes normal, conjunctivae and sclerae normal, no         icterus, no pallor, corneas clear, PERRLA   Ears:    Ears appear intact with no abnormalities noted   Throat:   No oral lesions, no thrush, oral mucosa moist   Neck:   No adenopathy, supple, trachea midline, no JVD   Back:     Limited exam shows no severe kyphosis present,no visible           erythema, no excessive  tenderness to palpation.    Lungs:     Respirations regular, even and unlabored.     Heart:    Normal rate, Pulses palpable   Chest Wall:    No abnormalities observed.   Abdomen:     Normal bowel sounds, no masses, no organomegaly, soft              non-tender, non-distended, no guarding, no rebound                      tenderness   Rectal:     Deferred   Pulses:   Pulses palpable and equal bilaterally   Skin:   No bleeding, bruising or rash   Lymph nodes:   No " palpable adenopathy   Extremities:     Left Hip: Skin intact.  Painful ROM.  NVI distally.      DIAGNOSTIC TEST:  Admission on 08/16/2023   Component Date Value Ref Range Status    Glucose 08/16/2023 155 (H)  70 - 130 mg/dL Final     Hemoglobin A1C: 8.6 on 6/19/23    ASSESSMENT:  Left Hip Osteoarthritis  Diabetes poorly controlled.  Diabetic medications recently changed.    PLAN:    Will check hemoglobin A1C now.  If 8 or less will proceed with left anterior FLAKO today as scheduled.  If greater than 8 will cancel surgery and reschedule when Hemoglobin A1c in more appropriate levels.      If procedure is performed, patient is planning on going home today.    Risks and benefits of surgical intervention were discussed in detail with the patient.  Risks of infection, fracture, dislocation, extremity length discrepancy, neurovascular injury, persistent pain, medical risks, anesthetic risk, need for additional surgery, deep venous thrombosis, pulmonary embolism and death.      The above diagnosis and treatment plan was discussed with the patient and/or family.  They were educated in both non-surgical and surgical treatment options for their condition.   They were given the opportunity to ask questions and were answered to their satisfaction.  They agreed to proceed with the above treatment plan.        Francis Vogel MD  Date: 8/16/2023

## 2023-08-16 NOTE — ANESTHESIA PREPROCEDURE EVALUATION
Anesthesia Evaluation     Patient summary reviewed and Nursing notes reviewed   history of anesthetic complications:  PONV  NPO Solid Status: > 8 hours  NPO Liquid Status: > 2 hours           Airway   Mallampati: II  TM distance: >3 FB  Neck ROM: full  Dental      Pulmonary    (+) COPD,  Cardiovascular     ECG reviewed    (+) hypertensionhyperlipidemia      Neuro/Psych  GI/Hepatic/Renal/Endo    (+) obesity, GERD, renal disease CRI, diabetes mellitus type 2 poorly controlled    Musculoskeletal     Abdominal    Substance History      OB/GYN          Other                        Anesthesia Plan    ASA 3     general   total IV anesthesia  (Hx of severe PONV and prolonged sedation following anesthesia. Plan for TIVA with BIS monitoring)  intravenous induction     Anesthetic plan, risks, benefits, and alternatives have been provided, discussed and informed consent has been obtained with: patient.      CODE STATUS:

## 2023-08-16 NOTE — OP NOTE
TOTAL HIP ARTHROPLASTY ANTERIOR WITH HANA TABLE  Procedure Note    Pearl Elizalde  8/16/2023    Pre-op Diagnosis: Left Hip Osteoarthritis Primary  Post-op Diagnosis: Same  Procedure:  Anterior approach Left  Total Hip Arthroplasty  Surgical Approach: Hip Direct Anterior (Smith-Kang)   Surgeon: Francis Vogel MD  Anesthesia: General, Anesthesiologist: Wayne Donohue MD  Staff: Circulator: Cherise Whitehead RN  Radiology Technologist: Sidney Luque RRT  Scrub Person: Ovi Boles  Vendor Representative: Kaden Miller  Orderly: Ming Rodriguez  Assistant: Adam Mattson PA-C  Estimated Blood Loss:200ml   Specimens:   Order Name Source Comment Collection Info Order Time   HEMOGLOBIN A1C Arm, Right  Collected By: Bayron Scanlon RN 8/16/2023  6:57 AM     Release to patient   Routine Release          Drains: none  Complications: None    Components Utilized:     Implant Name Type Inv. Item Serial No.  Lot No. LRB No. Used Action   DEV CONTRL TISS STRATAFIXSPIRALMNCRYL PLSPS2 REV3/0 45CM - [id number] Implant DEV CONTRL TISS STRATAFIXSPIRALMNCRYL PLSPS2 REV3/0 45CM  [employer]  [id number] Left 1 Implanted   LINER G7 2MOBL SZC 38MM - [id number] Implant LINER G7 2MOBL SZC 38MM  [employer] [id number] Left 1 Implanted   SHLL ACET OSSEOTI G7 3H SZC 48MM - [id number] Implant SHLL ACET OSSEOTI G7 3H SZC 48MM  [employer] [id number] Left 1 Implanted   STEM FEM/HIP AVENIR/COMPLETE HI/OFFST HA SZ3 - [id number] Implant STEM FEM/HIP AVENIR/COMPLETE HI/OFFST HA SZ3  [employer] [id number] Left 1 Implanted   BEAR HIP VIVACIT/E 2MOBIL HXPE [id number] - [id number] Implant BEAR HIP VIVACIT/E 2MOBIL HXPE [id number]  [employer] [id number] Left 1 Implanted   HD FEM/HIP BIOLOX/DELTA CERAM [id number] MIN3.5MM - [id number] Implant HD FEM/HIP BIOLOX/DELTA CERAM [id number] MIN3.5MM  [employer] [id number] Left 1 Implanted       Indication for Procedure:   The patient is a 63 y.o. female presents today for total hip  arthroplasty  procedure because of failure to conservatively manage the patient's pain. The patient was educated in risks of surgery that could include possible risk of infection, deep venous thrombosis, pulmonary embolism, fracture, neurovascular injury, leg length discrepancy, dislocation, possible persistent pain, need for additional surgeries, anesthetic risks, medical risks including heart attack and stroke, and death.  The discussion occurred in the office pre-operatively, and patient had the opportunity to ask questions, and concerns about the proposed surgery.  The patient also understood that medicine is not an exact science, and that outcomes of the surgical procedure may be less than desired. The patient wished to proceed.      Protocols for intravenous antibiotics and venous thrombosis were followed for this patient.  IV antibiotics were infused prior to surgery and will be discontinued within 24 hours of completion of the surgical procedure.  Thrombosis prophylaxis will be initiated within 24 hours of the completion of the surgical procedure.      Procedure:   After the patient was identified in the preoperative area, and the surgical site confirmed and marked, the patient was brought to the operating room on a stretcher.  The above anesthetic was placed uneventfully on the stretcher and the patient was transferred to the [facility] operating room table.  A time-out procedure was performed.  The intravenous ancef infusion was completed. The operative leg was then prepped and draped in usual sterile fashion.     A 10 blade scalpel was then used to make an anterior based incision over the operative hip.  The tensor fascia keyana fascia was opened longitudinally and the tensor fascia keyana was mobilized laterally and sartorius muscle medially.  The anterior hip capsule was then opened and excised.  The femoral neck osteotomy was completed with a reciprocating saw.  The femoral head was removed with a tuning fork  and a mallet.  The acetabular labrum was excised and the pulvinar was removed.  The femoral head was measured, and acetabular reaming commenced 2 mm smaller than the measured femoral head size. Reaming was performed under C-arm guidance and was used to ream to the medial wall and base of teardrop.  Reaming continued until concentric reaming was performed and good back bleeding was visualized in the floor and rim of the acetabulum.  Then the above 48 mm G-7 cup was impacted, again under C-arm guidance until it was well seated.  The was good pressfit so no screws were deem necessary.  The dual mobility liner was impacted into place into the acetabular shell and was well seated.     Then the femoral retractors were placed and the piriformis and posterior capsule was released.  The femur was subluxed anterior and the  was used to open up the intramedullary canal.  The rattail rasp was inserted to further lateralize the medullary canal.  Then the starter broach was inserted, and sequential larger sizes were used until a tight fit was palpated and cortical chatter was heard.  Trial neck and head balls were placed and the hip was reduced. The hip was checked for stability both anterior and posteriorly and laterally using a bone hook. A extended offset neck was used to improve lateral stability with a -3.5 mm neck length. C-arm was used to confirm correct implant position and size and leg length.  The operative side was approximately 2-3 mm longer than the non-operative side.  It was felt that anything shorter would be unstable so the above implants were selected.  The trial implants were removed and the final implants were assembled on the back table and impacted into place.  The hip was reduced and was copiously irrigated with a 3 liter mixture of betadine and normal saline.  The wound was closed in layers with 0 Vicryl used to close the TFL fascia, 2-0 Vicryl to close the deep dermis, and 3-0 monocryl to close  the skin.  Skin glue was applied and sterile dressings were placed.    Sponge and needle counts were completed and were found to be correct.  The patient was awakened from the anesthetic and was brought into the recovery room in stable condition.      Francis Vogel MD     Date: 8/16/2023  Time: 10:22 EDT

## 2023-08-16 NOTE — ANESTHESIA PROCEDURE NOTES
Airway  Urgency: elective    Date/Time: 8/16/2023 9:07 AM  Airway not difficult    General Information and Staff    Patient location during procedure: OR  Anesthesiologist: Wayne Donohue MD    Indications and Patient Condition  Indications for airway management: airway protection    Preoxygenated: yes  Mask difficulty assessment: 1 - vent by mask    Final Airway Details  Final airway type: endotracheal airway      Successful airway: ETT  Cuffed: yes   Successful intubation technique: direct laryngoscopy  Facilitating devices/methods: anterior pressure/BURP  Endotracheal tube insertion site: oral  Blade: Reshma  Blade size: 4  ETT size (mm): 7.0  Cormack-Lehane Classification: grade IIa - partial view of glottis  Placement verified by: chest auscultation and capnometry   Cuff volume (mL): 5  Measured from: teeth  ETT/EBT  to teeth (cm): 22  Number of attempts at approach: 1  Assessment: lips, teeth, and gum same as pre-op and atraumatic intubation

## 2023-08-16 NOTE — DISCHARGE PLACEMENT REQUEST
"Pearl Nelson (63 y.o. Female)    YOB: 1960  Social Security Number:   Address: [address] [place] [address] [address] [place] [facility]  Home Phone: [phone]  MRN: [id number]  Amish: None  Marital Status:         Admission Date: 8/16/23  Admission Type: Elective  Admitting Provider: Francis Vogel MD  Attending Provider: Francis Vogel MD  Department, Room/Bed: [facility] OR, [facility] OR[facility] OR  Discharge Date:   Discharge Disposition: Home-Health Care [facility] [place] [facility]  Discharge Destination:               Attending Provider: Francis Vogel MD    Allergies: Oxycodone, Penicillin G, Oxycodone Hcl, Penicillins    Isolation: None   Infection: None   Code Status: Not on file    Ht: 165.1 cm (65\")   Wt: 83.9 kg (185 lb)    Admission Cmt: None   Principal Problem: Osteoarthritis of left hip, unspecified osteoarthritis type [M16.12]                   Active Insurance as of 8/16/2023       Primary Coverage       Payor Plan Insurance Group Employer/Plan Group    HUMANA MEDICARE REPLACEMENT HUMANA MEDICARE REPLACEMENT [id number]       Payor Plan Address Payor Plan Phone Number Payor Plan Fax Number Effective Dates    [address] [address] [phone]  5/1/2023 - None Entered    [facility] [id number]         Subscriber Name Subscriber Birth Date Member ID       PEARL NELSON 1960 [id number]               Secondary Coverage       Payor Plan Insurance Group Employer/Plan Group     FOR LIFE  FOR LIFE MC [id number]         Payor Plan Address Payor Plan Phone Number Payor Plan Fax Number Effective Dates    [address] [phone]  1/1/2023 - None Entered    [facility] [id number]         Subscriber Name Subscriber Birth Date Member ID       PEARL NELSON 1960 [id number]                     Emergency Contacts        (Rel.) Home Phone Work Phone Mobile Phone    UzielKushal lu (Spouse) [phone] None [phone]                "

## 2023-08-16 NOTE — PLAN OF CARE
Goal Outcome Evaluation:  Plan of Care Reviewed With: patient           Outcome Evaluation: Pt. is currently SBA/CGA x1 with functional mobility and has no further questions/concerns regarding functional mobility or home safety.  Encouraged pt. to continue ther. ex. program 2-3 x's daily and to ambulate every 1-2 hours once home. Plan for discharge home this date.  Will sign off.      Anticipated Discharge Disposition (PT): home with assist, home with home health    Patient was not wearing a face mask during this therapy encounter. Therapist used appropriate personal protective equipment including eye protection, mask, and gloves.  Mask used was standard procedure mask. Appropriate PPE was worn during the entire therapy session. Hand hygiene was completed before and after therapy session. Patient is not in enhanced droplet precautions.

## 2023-08-16 NOTE — ANESTHESIA POSTPROCEDURE EVALUATION
Patient: Pearl Elizalde    Procedure Summary       Date: 08/16/23 Room / Location:  [facility] [address] [place] [address] [facility]    Anesthesia Start: 0847 Anesthesia Stop: 1056    Procedure: TOTAL HIP ARTHROPLASTY ANTERIOR WITH HANA TABLE (Left: Hip) Diagnosis:     Surgeons: Francis Vogel MD Provider: Wayne Donohue MD    Anesthesia Type: general ASA Status: 3            Anesthesia Type: general    Vitals  Vitals Value Taken Time   BP 98/59 08/16/23 1215   Temp 36.6 øC (97.9 øF) 08/16/23 1051   Pulse 73 08/16/23 1220   Resp 20 08/16/23 1215   SpO2 100 % 08/16/23 1220   Vitals shown include unvalidated device data.        Post Anesthesia Care and Evaluation    Patient location during evaluation: bedside  Patient participation: complete - patient participated  Level of consciousness: awake and alert  Pain management: adequate    Airway patency: patent  Anesthetic complications: No anesthetic complications  PONV Status: controlled  Cardiovascular status: blood pressure returned to baseline and acceptable  Respiratory status: acceptable  Hydration status: acceptable

## 2023-08-16 NOTE — THERAPY DISCHARGE NOTE
Patient Name: Pearl Elizalde  : 1960    MRN: [id number]                              Today's Date: 2023       Admit Date: 2023    Visit Dx:     ICD-10-CM ICD-9-CM   1. Primary osteoarthritis of left hip  M16.12 715.15     Patient Active Problem List   Diagnosis    Allergic rhinitis    Anxiety    Asthma    Body mass index (BMI) of 30.0-30.9 in adult    Chronic obstructive pulmonary disease    Diabetic peripheral neuropathy    Disorder associated with type 2 diabetes mellitus    Gastroesophageal reflux disease    Hyperlipidemia    Hypertension    Hypomagnesemia    Onychomycosis of toenail    Palpitations    Vitamin D deficiency    Post-menopausal    Obesity    Female stress incontinence    Nocturia    Rectocele    Uterovaginal prolapse, incomplete    Low back pain    Hip pain, bilateral    Uterovaginal prolapse    Atypical lobular hyperplasia (ALH) of right breast    Artificial lens present    Primary osteoarthritis of left hip    Osteoarthritis of left hip, unspecified osteoarthritis type     Past Medical History:   Diagnosis Date    Asthma 2019    Body mass index (BMI) of 30.0-30.9 in adult 2019    Breast cancer     NO CHEMO, NO RADIATION    Chronic obstructive pulmonary disease 2019    Diabetic peripheral neuropathy 2019    Gastroesophageal reflux disease 2019    History of COVID-19     , ,    Hyperlipidemia     Hypertension     Low back pain     Obesity     Osteoarthritis     PONV (postoperative nausea and vomiting)     SEVERE    Slow to wake up after anesthesia     Under care of pain management specialist      Past Surgical History:   Procedure Laterality Date    ADENOIDECTOMY      CHOLECYSTECTOMY      COLONOSCOPY      ENDOSCOPY      EYE SURGERY      CATARACTS    INNER EAR SURGERY      STAPENDECTOMY    KNEE SURGERY Bilateral     MULTIPLE    MASTECTOMY Left     REPLACEMENT TOTAL KNEE Left     REPLACEMENT TOTAL KNEE Right     TONSILLECTOMY         General Information       Row Name 08/16/23 1416          Physical Therapy Time and Intention    Document Type discharge evaluation/summary  Pt. is s/p Left THR (Anterior)  -MS     Mode of Treatment physical therapy;individual therapy  -MS       Row Name 08/16/23 1416          General Information    Patient Profile Reviewed yes  -MS     Prior Level of Function independent:  -MS     Existing Precautions/Restrictions hip, anterior  -MS     Barriers to Rehab none identified  -MS       Row Name 08/16/23 1416          Cognition    Orientation Status (Cognition) oriented x 3  -MS       Row Name 08/16/23 1416          Safety Issues, Functional Mobility    Comment, Safety Issues/Impairments (Mobility) Gait belt used for safety.  -MS               User Key  (r) = Recorded By, (t) = Taken By, (c) = Cosigned By      Initials Name Provider Type    MS Layla Emeka PETRONA, PT Physical Therapist                   Mobility       Row Name 08/16/23 1417          Bed Mobility    Comment, (Bed Mobility) Up in chair this PM.  -MS       Row Name 08/16/23 1417          Sit-Stand Transfer    Sit-Stand Arp (Transfers) standby assist  -MS     Assistive Device (Sit-Stand Transfers) walker, front-wheeled  -MS       Row Name 08/16/23 1417          Gait/Stairs (Locomotion)    Arp Level (Gait) contact guard  -MS     Assistive Device (Gait) walker, front-wheeled  -MS     Distance in Feet (Gait) 40 feet  -MS     Deviations/Abnormal Patterns (Gait) antalgic  -MS     Arp Level (Stairs) contact guard  -MS     Handrail Location (Stairs) left side (ascending)  -MS     Number of Steps (Stairs) 2  -MS     Ascending Technique (Stairs) step-to-step  -MS     Descending Technique (Stairs) step-to-step  -MS     Comment, (Gait/Stairs) Limited in gait distance by increased pain.  -MS       Row Name 08/16/23 1417          Mobility    Extremity Weight-bearing Status left lower extremity  -MS     Left Lower Extremity (Weight-bearing Status)  weight-bearing as tolerated (WBAT)  -MS               User Key  (r) = Recorded By, (t) = Taken By, (c) = Cosigned By      Initials Name Provider Type    Emeka Spangler, PT Physical Therapist                   Obj/Interventions       Row Name 08/16/23 1418          Range of Motion Comprehensive    Comment, General Range of Motion BUE/RLE (WFL's)  -MS       Row Name 08/16/23 1418          Strength Comprehensive (MMT)    Comment, General Manual Muscle Testing (MMT) Assessment BUE/RLE (>/= 3/5)  -MS       Row Name 08/16/23 1418          Motor Skills    Therapeutic Exercise --  Left THR ther. ex. program x 10 reps completed.  -MS               User Key  (r) = Recorded By, (t) = Taken By, (c) = Cosigned By      Initials Name Provider Type    Emeka Spangler, PT Physical Therapist                   Goals/Plan    No documentation.                  Clinical Impression       Row Name 08/16/23 1419          Pain    Pretreatment Pain Rating 7/10  -MS     Posttreatment Pain Rating 7/10  -MS     Pain Location - Side/Orientation Left  -MS     Pain Location - hip  -MS     Pain Intervention(s) Medication (See MAR);Elevated;Nursing Notified;Repositioned;Cold applied  -MS       Row Name 08/16/23 1419          Plan of Care Review    Plan of Care Reviewed With patient;family  -MS       Row Name 08/16/23 1419          Positioning and Restraints    Pre-Treatment Position sitting in chair/recliner  -MS     Post Treatment Position chair  -MS     In Chair notified nsg;reclined;sitting;call light within reach;encouraged to call for assist;exit alarm on;with family/caregiver  Ice pack to Left hip.  -MS               User Key  (r) = Recorded By, (t) = Taken By, (c) = Cosigned By      Initials Name Provider Type    Emeka Spangler, PT Physical Therapist                   Outcome Measures       Row Name 08/16/23 1419          How much help from another person do you currently need...    Turning from your back to your side while in  flat bed without using bedrails? 4  -MS     Moving from lying on back to sitting on the side of a flat bed without bedrails? 3  -MS     Moving to and from a bed to a chair (including a wheelchair)? 3  -MS     Standing up from a chair using your arms (e.g., wheelchair, bedside chair)? 3  -MS     Climbing 3-5 steps with a railing? 3  -MS     To walk in hospital room? 3  -MS     AM-PAC 6 Clicks Score (PT) 19  -MS     Highest level of mobility 6 --> Walked 10 steps or more  -MS       Row Name 08/16/23 1419          Functional Assessment    Outcome Measure Options AM-PAC 6 Clicks Basic Mobility (PT)  -MS               User Key  (r) = Recorded By, (t) = Taken By, (c) = Cosigned By      Initials Name Provider Type    Emeka Spangler, PT Physical Therapist                  Physical Therapy Education       Title: PT OT SLP Therapies (Resolved)       Topic: Physical Therapy (Resolved)       Point: Mobility training (Resolved)       Learning Progress Summary             Patient Acceptance, E,D, VU,NR by MS at 8/16/2023 1420                         Point: Home exercise program (Resolved)       Learning Progress Summary             Patient Acceptance, E,D, VU,NR by MS at 8/16/2023 1420                         Point: Body mechanics (Resolved)       Learning Progress Summary             Patient Acceptance, E,D, VU,NR by MS at 8/16/2023 1420                         Point: Precautions (Resolved)       Learning Progress Summary             Patient Acceptance, E,D, VU,NR by MS at 8/16/2023 1420                                         User Key       Initials Effective Dates Name Provider Type Discipline    MS 06/16/21 -  Emeka Rich PT Physical Therapist PT                  PT Recommendation and Plan     Plan of Care Reviewed With: patient  Outcome Evaluation: Pt. is currently SBA/CGA x1 with functional mobility and has no further questions/concerns regarding functional mobility or home safety.  Encouraged pt. to continue  ther. ex. program 2-3 x's daily and to ambulate every 1-2 hours once home. Plan for discharge home this date.  Will sign off.     Time Calculation:         PT Charges       Row Name 08/16/23 1421             Time Calculation    Start Time 1350  -MS      Stop Time 1410  -MS      Time Calculation (min) 20 min  -MS      PT Received On 08/16/23  -MS         Time Calculation- PT    Total Timed Code Minutes- PT 19 minute(s)  -MS                User Key  (r) = Recorded By, (t) = Taken By, (c) = Cosigned By      Initials Name Provider Type    Emeka Spangler, PT Physical Therapist                  Therapy Charges for Today       Code Description Service Date Service Provider Modifiers Qty    29758104320 HC PT EVAL LOW COMPLEXITY 1 8/16/2023 Emeka Rich, PT GP 1    79799762768 HC PT THER PROC EA 15 MIN 8/16/2023 Emeka Rich, PT GP 1            PT G-Codes  Outcome Measure Options: AM-PAC 6 Clicks Basic Mobility (PT)  AM-PAC 6 Clicks Score (PT): 19    PT Discharge Summary  Anticipated Discharge Disposition (PT): home with assist, home with home health  Reason for Discharge: Discharge from facility  Discharge Destination: Home with assist, Home with home health    Emeka Rich, PT  8/16/2023

## 2023-08-16 NOTE — CASE MANAGEMENT/SOCIAL WORK
Continued Stay Note  [facility]     Patient Name: Pearl Elizalde  MRN: [id number]  Today's Date: 8/16/2023    Admit Date: 8/16/2023    Plan: Home with [facility]    Discharge Plan       Plan       Row Name 08/16/23 1507    Plan Home with [facility]                            Discharge Codes    No documentation.                 Expected Discharge Date and Time       Expected Discharge Date Expected Discharge Time    Aug 16, 2023               Shannon Epley, RN

## 2023-08-18 ENCOUNTER — TELEPHONE (OUTPATIENT)
Dept: ORTHOPEDIC SURGERY | Facility: HOSPITAL | Age: 63
End: 2023-08-18
Payer: MEDICARE

## 2023-08-18 NOTE — TELEPHONE ENCOUNTER
Attempted to reach Ms. Elizalde to see how she is doing as she is POD 2 LTK Revision. Message left at this time.    Ms. Elizalde called me back at this time    Post op day 2  Discharge Instructions:  Ask patient about his or her discharge instructions  ?  Patient confirmed understanding   []  Further instruction needed   What, if any, recommendations, teaching, or interventions did you provide? Click or tap here to enter text.  Health status:  Pain controlled Yes   Does have some increased pain, but she knew to expect it and the medication does help.   Recommended interventions:  Yes  incision/dressing status   ?  Clean without redness, drainage, odor  []  Redness    []  Drainage - color Click or tap here to enter text.  []  Odor  HEMA - Green light blinking Yes  Difficulties urination No  Last BM 8/18/2023 (if no BM by day 3-recommend OTC suppository or fleets enema)  No issues   Medications:  ?Medications reviewed with patient/family/caregiver  Patient taking medications as prescribed?   Yes  If not taking medications as prescribed, note specific medicine(s) and reason for each:  Click or tap here to enter text.  Hospital Follow Up Plan:  Follow up Appointment with Orthopedic surgeon:  ?Has f/u appointment                []Scheduled f/u appointment  Home Care ordered at discharge?    Yes        Home Care started, or contact made?    Yes   If no, action taken:   DME obtained/used in home?         Yes   Using IS  Yes   Other information: Ms. Elizalde said she is doing really well. She actually went out to dinner last night. She does have some increased pain which she expected because the MD told her that she would have that pain, but the pain medication does help. She said she feels so much better and she has been up and walking all over the house. Dressing looks good. Everything is going well. Ms. Elizalde doesn't have any questions for me at this time. She has my contact information should she need anything.

## 2023-08-28 ENCOUNTER — TELEPHONE (OUTPATIENT)
Dept: ORTHOPEDIC SURGERY | Facility: HOSPITAL | Age: 63
End: 2023-08-28
Payer: MEDICARE

## 2023-08-28 NOTE — TELEPHONE ENCOUNTER
Called and spoke with Ms. Elizalde to see how she is doing as she is 2 weeks SP LTH revision. She said things are going well. She is off the cane and walker for the most part. She still has quite a bit of swelling. She continues to ice. She is working with HH PT. Pain is controlled. She was worried because her left hip is quite a bit higher than her right. Told her she is still healing and with the swelling that could change, but to talk to the MD about it at her F/U appt which she has 8/31. Ms Elizalde  doesn't have any questions for me at this time. She has my contact information should she need anything.

## 2023-09-02 ENCOUNTER — APPOINTMENT (OUTPATIENT)
Dept: GENERAL RADIOLOGY | Facility: HOSPITAL | Age: 63
End: 2023-09-02
Payer: MEDICARE

## 2023-09-02 ENCOUNTER — HOSPITAL ENCOUNTER (EMERGENCY)
Facility: HOSPITAL | Age: 63
Discharge: HOME | End: 2023-09-02
Attending: EMERGENCY MEDICINE
Payer: MEDICARE

## 2023-09-02 VITALS
DIASTOLIC BLOOD PRESSURE: 72 MMHG | OXYGEN SATURATION: 94 % | BODY MASS INDEX: 31.5 KG/M2 | HEART RATE: 78 BPM | SYSTOLIC BLOOD PRESSURE: 130 MMHG | TEMPERATURE: 97.4 F | HEIGHT: 66 IN | RESPIRATION RATE: 18 BRPM | WEIGHT: 196 LBS

## 2023-09-02 DIAGNOSIS — S73.035A ANTERIOR DISLOCATION OF LEFT HIP, INITIAL ENCOUNTER: Primary | ICD-10-CM

## 2023-09-02 PROCEDURE — 96375 TX/PRO/DX INJ NEW DRUG ADDON: CPT

## 2023-09-02 PROCEDURE — 96374 THER/PROPH/DIAG INJ IV PUSH: CPT

## 2023-09-02 PROCEDURE — 25010000002 PROPOFOL 10 MG/ML EMULSION

## 2023-09-02 PROCEDURE — 25010000002 HYDROMORPHONE PER 4 MG: Performed by: EMERGENCY MEDICINE

## 2023-09-02 PROCEDURE — 73502 X-RAY EXAM HIP UNI 2-3 VIEWS: CPT

## 2023-09-02 PROCEDURE — 25010000002 ONDANSETRON PER 1 MG: Performed by: EMERGENCY MEDICINE

## 2023-09-02 PROCEDURE — 99285 EMERGENCY DEPT VISIT HI MDM: CPT

## 2023-09-02 PROCEDURE — 25010000002 PROPOFOL 10 MG/ML EMULSION: Performed by: EMERGENCY MEDICINE

## 2023-09-02 RX ORDER — ONDANSETRON 2 MG/ML
4 INJECTION INTRAMUSCULAR; INTRAVENOUS ONCE
Status: COMPLETED | OUTPATIENT
Start: 2023-09-02 | End: 2023-09-02

## 2023-09-02 RX ORDER — SODIUM CHLORIDE 0.9 % (FLUSH) 0.9 %
10 SYRINGE (ML) INJECTION AS NEEDED
Status: DISCONTINUED | OUTPATIENT
Start: 2023-09-02 | End: 2023-09-02 | Stop reason: HOSPADM

## 2023-09-02 RX ORDER — LORAZEPAM 2 MG/ML
INJECTION INTRAMUSCULAR
Status: DISCONTINUED
Start: 2023-09-02 | End: 2023-09-02

## 2023-09-02 RX ORDER — PROPOFOL 10 MG/ML
VIAL (ML) INTRAVENOUS
Status: COMPLETED
Start: 2023-09-02 | End: 2023-09-02

## 2023-09-02 RX ORDER — HYDROMORPHONE HYDROCHLORIDE 1 MG/ML
0.5 INJECTION, SOLUTION INTRAMUSCULAR; INTRAVENOUS; SUBCUTANEOUS ONCE
Status: COMPLETED | OUTPATIENT
Start: 2023-09-02 | End: 2023-09-02

## 2023-09-02 RX ORDER — PROPOFOL 10 MG/ML
VIAL (ML) INTRAVENOUS
Status: COMPLETED | OUTPATIENT
Start: 2023-09-02 | End: 2023-09-02

## 2023-09-02 RX ADMIN — PROPOFOL: 10 INJECTION, EMULSION INTRAVENOUS at 13:31

## 2023-09-02 RX ADMIN — ONDANSETRON 4 MG: 2 INJECTION INTRAMUSCULAR; INTRAVENOUS at 10:43

## 2023-09-02 RX ADMIN — PROPOFOL 60 MG: 10 INJECTION, EMULSION INTRAVENOUS at 11:55

## 2023-09-02 RX ADMIN — PROPOFOL 60 MG: 10 INJECTION, EMULSION INTRAVENOUS at 12:04

## 2023-09-02 RX ADMIN — HYDROMORPHONE HYDROCHLORIDE 0.5 MG: 1 INJECTION, SOLUTION INTRAMUSCULAR; INTRAVENOUS; SUBCUTANEOUS at 10:43

## 2023-09-02 NOTE — CONSULTS
ORTHOPEDIC SURGERY CONSULT      Patient: Pearl Elizalde  Date of Admission: 9/2/2023 10:32 AM  YOB: 1960  Medical Record Number: [id number]  Attending Physician: Kushal Segura MD  Consulting Physician: Augie Abad MD    CHIEF COMPLAINT: left total hip dislocation    HISTORY OF PRESENT ILLINESS: Patient is a 63 y.o. year old female [facility] with above complaints.  I was consulted for further evaluation and treatment. She slipped on the floor and had immediate pain in the left hip.  She is 2 weeks out from total hip arthoplasty.  In the ER she was found to have a dislocation of her total hip arthoplasty.    ALLERGIES:   Allergies   Allergen Reactions    Oxycodone Hives and Nausea And Vomiting     Other reaction(s): Nausea And Vomiting    Penicillin G Unknown (See Comments)     Patient states hives as a child and as an adult     Oxycodone Hcl Unknown - High Severity    Penicillins Hives     As child. Unsure if can take keflex       HOME MEDICATIONS:  (Not in a hospital admission)      CURRENT MEDICATIONS:  Scheduled Meds:Propofol, , ,       Continuous Infusions:   PRN Meds:.  Propofol    [COMPLETED] Insert Peripheral IV **AND** sodium chloride    Past Medical History:   Diagnosis Date    Asthma 06/03/2019    Body mass index (BMI) of 30.0-30.9 in adult 04/11/2019    Breast cancer     NO CHEMO, NO RADIATION    Chronic obstructive pulmonary disease 06/03/2019    Diabetic peripheral neuropathy 02/06/2019    Gastroesophageal reflux disease 06/03/2019    History of COVID-19     2019, 2021,2022    Hyperlipidemia     Hypertension     Low back pain     Obesity     Osteoarthritis     PONV (postoperative nausea and vomiting)     SEVERE    Slow to wake up after anesthesia     Under care of pain management specialist      Past Surgical History:   Procedure Laterality Date    ADENOIDECTOMY      CHOLECYSTECTOMY      COLONOSCOPY      ENDOSCOPY      EYE SURGERY       CATARACTS    INNER EAR SURGERY      STAPENDECTOMY    KNEE SURGERY Bilateral     MULTIPLE    MASTECTOMY Left     REPLACEMENT TOTAL KNEE Left     REPLACEMENT TOTAL KNEE Right     TONSILLECTOMY      TOTAL HIP ARTHROPLASTY Left 2023    Procedure: TOTAL HIP ARTHROPLASTY ANTERIOR WITH HANA TABLE;  Surgeon: Francis Vogel MD;  Location: [facility];  Service: Orthopedics;  Laterality: Left;     Social History     Occupational History    Not on file   Tobacco Use    Smoking status: Former     Packs/day: 0.20     Years: 0.50     Pack years: 0.10     Types: Cigarettes     Quit date:      Years since quittin.6     Passive exposure: Past    Smokeless tobacco: Never   Vaping Use    Vaping Use: Never used   Substance and Sexual Activity    Alcohol use: Yes     Comment: Occassional    Drug use: No    Sexual activity: Yes     Partners: Male     Birth control/protection: None      Social History     Social History Narrative    Not on file     Family History   Adopted: Yes   Problem Relation Age of Onset    Malig Hyperthermia Neg Hx        REVIEW OF SYSTEMS:    HEENT: Patient denies any headaches, vision changes, change in hearing, or tinnitus, Patient denies epistaxis, sinus pain, hoarseness, or dysphagia   Pulmonary: Patient denies any cough, congestion, acute change in SOA or wheezing.   Cardiovascular: Patient denies any change in chest pain, dyspnea, palpitations, weakness, intolerance of exercise, varicosities, change in murmur   Gastrointestinal:  Patient denies change in appetite, melena, change in bowel habits.   Genital/Urinary: Patient denies dysuria, change in color of urine, change in frequency of urination, pain with urgency, change in incontinence, retention.   Musculoskeletal: Patient denies complaints of acute changes in symptoms of other joints not mentioned above.   Neurological: Patient denies changes in dizziness, tremor, ataxia, or difficulty in speaking or changes in memory.   Endocrine  system: Patient denies acute changes in tremors, palpitations, polyuria, polydipsia, polyphagia, diaphoresis, exophthalmos, or goiter.   Psychological: Patient denies thoughts/plans or harming self or other; denies acute changes in depression,  insomnia, night terrors, mely, disorientation.   Skin: Patient denies any bruising, rashes, discoloration, pruritus,or wounds not mentioned in history of present illness or chief complaint above.   Hematopoietic: Patient denies current bleeding, epistaxis, hematuria, or melena.    PHYSICAL EXAM:   Vitals:  Vitals:    09/02/23 1154 09/02/23 1203 09/02/23 1209 09/02/23 1209   BP: 112/62 134/78 124/65 124/65   BP Location:       Patient Position:       Pulse: 95 84 81 84   Resp: 16 18 18 18   Temp:       TempSrc:       SpO2: 97% 95% 95% 98%   Weight:       Height:           General: no distress  Resp: no audible wheezes  Abdomen: nontender  LLE: palpable pulses.  Sensation intact.  Shortened and externally rotated left leg      DIAGNOSTIC TEST:  No visits with results within 2 Day(s) from this visit.   Latest known visit with results is:   Admission on 08/16/2023, Discharged on 08/16/2023   Component Date Value Ref Range Status    Glucose 08/16/2023 155 (H)  70 - 130 mg/dL Final    Hemoglobin A1C 08/16/2023 7.70 (H)  4.80 - 5.60 % Final       AP pelvis showed a dislocated total hip arthroplasty      ASSESSMENT:  Ms. Elizalde is a 63 year old female 2 weeks out from total hip arthoplasty with a dislocated hip    Patient Active Problem List   Diagnosis    Allergic rhinitis    Anxiety    Asthma    Body mass index (BMI) of 30.0-30.9 in adult    Chronic obstructive pulmonary disease    Diabetic peripheral neuropathy    Disorder associated with type 2 diabetes mellitus    Gastroesophageal reflux disease    Hyperlipidemia    Hypertension    Hypomagnesemia    Onychomycosis of toenail    Palpitations    Vitamin D deficiency    Post-menopausal    Obesity    Female stress incontinence     Nocturia    Rectocele    Uterovaginal prolapse, incomplete    Low back pain    Hip pain, bilateral    Uterovaginal prolapse    Atypical lobular hyperplasia (ALH) of right breast    Artificial lens present    Primary osteoarthritis of left hip    Osteoarthritis of left hip, unspecified osteoarthritis type       PLAN:    I discussed with the patient that we will plan to do a closed reduction of the left hip.  We discussed the risks and benefits including pain, failure to reduce, and fracture.  She agreed to go forward andit was reduced.  She can follow up with Dr. Vogel this week.  Knee immobilizer to the left leg.    The above diagnosis and treatment plan was discussed with the patient.  They were educated in treatment options for their condition.   They were given the opportunity to ask questions and were answered to their satisfaction.  They agreed to proceed with the above treatment plan.        Augie Abad MD  Date: 9/2/2023

## 2023-09-02 NOTE — ED PROCEDURE NOTE
Procedure Report    Patient Name:  Pearl Elizalde  YOB: 1960    Date of Surgery:  9/2/2023     Indications: Ms. Elizalde is a 63-year-old female who is 2 weeks out from total hip arthroplasty on the left.  She slipped and fell at home and dislocated her left total hip arthroplasty.  We discussed the risks and benefits of closed reduction and she agreed to go forward.    Pre-op Diagnosis:   Dislocated left total hip arthroplasty       Post-Op Diagnosis Codes:  Same    Procedure/CPT® Codes:  Closed reduction of left total hip arthroplasty    Anesthesia: Sedation performed by ER attending    Estimated Blood Loss: *None    Implants:    None    Specimen:          None    Findings: Dislocated left total hip arthroplasty    Complications: None    Description of Procedure: I saw the patient in the ER.  We discussed the risks and benefits of closed reduction including inability to reduce, fracture, need for future surgery.  She agreed to go forward.  She was sedated by the emergency room physician.  I then pulled longitudinal traction and felt it shift.  We got digital x-ray which showed it was not quite in.  I then pulled again and internally rotated the hip and felt it clunk in.  We then got x-rays which showed it was reduced.  Afterwards she was able to move her foot and had palpable pulses.    Augie Abad MD     Date: 9/2/2023  Time: 12:16 EDT

## 2023-09-02 NOTE — ED PROVIDER NOTES
EMERGENCY DEPARTMENT ENCOUNTER    Room Number:  HB1/C  PCP: Rosa Hooper APRN  Historian: Patient      HPI:  Chief Complaint: Left hip injury  A complete HPI/ROS/PMH/PSH/SH/FH are unobtainable due to: None  Context: Pearl Elizalde is a 63 y.o. female who presents to the ED c/o left hip injury.  Patient has history of hip replacement 816.  Patient states she has been doing well.  Has been ambulatory without difficulty.  Patient states she twisted funny and had immediate onset of pain left hip.  Patient unable to ambulate.  Patient did not hit her head.  No neck pain.            PAST MEDICAL HISTORY  Active Ambulatory Problems     Diagnosis Date Noted    Allergic rhinitis 10/01/2018    Anxiety 06/03/2019    Asthma 06/03/2019    Body mass index (BMI) of 30.0-30.9 in adult 04/11/2019    Chronic obstructive pulmonary disease 06/03/2019    Diabetic peripheral neuropathy 02/06/2019    Disorder associated with type 2 diabetes mellitus 10/01/2018    Gastroesophageal reflux disease 06/03/2019    Hyperlipidemia 10/01/2018    Hypertension 10/01/2018    Hypomagnesemia 11/29/2018    Onychomycosis of toenail 10/01/2018    Palpitations 11/29/2018    Vitamin D deficiency 04/11/2019    Post-menopausal 08/19/2019    Obesity 08/30/2019    Female stress incontinence 04/09/2017    Nocturia 03/30/2017    Rectocele 04/09/2017    Uterovaginal prolapse, incomplete 04/09/2017    Low back pain     Hip pain, bilateral     Uterovaginal prolapse 04/10/2017    Atypical lobular hyperplasia (ALH) of right breast 01/10/2022    Artificial lens present 08/08/2019    Primary osteoarthritis of left hip 08/16/2023    Osteoarthritis of left hip, unspecified osteoarthritis type 08/16/2023     Resolved Ambulatory Problems     Diagnosis Date Noted    Type 2 diabetes mellitus without complications 06/03/2019     Past Medical History:   Diagnosis Date    Breast cancer     History of COVID-19     Osteoarthritis     PONV (postoperative nausea and vomiting)      Slow to wake up after anesthesia     Under care of pain management specialist          PAST SURGICAL HISTORY  Past Surgical History:   Procedure Laterality Date    ADENOIDECTOMY      CHOLECYSTECTOMY      COLONOSCOPY      ENDOSCOPY      EYE SURGERY      CATARACTS    INNER EAR SURGERY      STAPENDECTOMY    KNEE SURGERY Bilateral     MULTIPLE    MASTECTOMY Left     REPLACEMENT TOTAL KNEE Left     REPLACEMENT TOTAL KNEE Right     TONSILLECTOMY      TOTAL HIP ARTHROPLASTY Left 2023    Procedure: TOTAL HIP ARTHROPLASTY ANTERIOR WITH HANA TABLE;  Surgeon: Francis Vogel MD;  [place] [facility] [place] [facility] [place]: [place]opedics;  Laterality: Left;         FAMILY HISTORY  Family History   Adopted: Yes   Problem Relation Age of Onset    Malig Hyperthermia Neg Hx          SOCIAL HISTORY  Social History     Socioeconomic History    Marital status:    Tobacco Use    Smoking status: Former     Packs/day: 0.20     Years: 0.50     Pack years: 0.10     Types: Cigarettes     Quit date:      Years since quittin.6     Passive exposure: Past    Smokeless tobacco: Never   Vaping Use    Vaping Use: Never used   Substance and Sexual Activity    Alcohol use: Yes     Comment: Occassional    Drug use: No    Sexual activity: Yes     Partners: Male     Birth control/protection: None         ALLERGIES  Oxycodone, Penicillin g, Oxycodone hcl, and Penicillins        REVIEW OF SYSTEMS  Review of Systems   Left hip dislocation      PHYSICAL EXAM  ED Triage Vitals [23 1030]   Temp Heart Rate Resp BP SpO2   97.4 °F (36.3 °C) 97 16 124/76 97 %      Temp src Heart Rate Source Patient Position BP Location FiO2 (%)   Tympanic Monitor Sitting Left arm --       Physical Exam      GENERAL: no acute distress  HENT: nares patent  EYES: no scleral icterus  CV: regular rhythm, normal rate  RESPIRATORY: normal effort  ABDOMEN: soft  MUSCULOSKELETAL: Left hip is shortened and tender.  Wound is well-healed  NEURO: alert, moves all  extremities, follows commands  PSYCH:  calm, cooperative  SKIN: warm, dry    Vital signs and nursing notes reviewed.          LAB RESULTS  No results found for this or any previous visit (from the past 24 hour(s)).    Ordered the above labs and reviewed the results.        RADIOLOGY  XR Hip With or Without Pelvis 2 - 3 View Left    Result Date: 9/2/2023  Left hip portable x-ray  HISTORY: Post reduction.  TECHNIQUE: 2 portable x-rays of the left hip were acquired.  On the first image, the femoral arthroplasty component remains abnormally dislocated posterosuperiorly. On the second image, the femoral component has been reduced to anatomic position relative to the acetabular cup. There is no periprosthetic fracture.  This report was finalized on 9/2/2023 12:30 PM by Dr. Bernard Joel M.D.      XR Hip With or Without Pelvis 2 - 3 View Left    Result Date: 9/2/2023  X-ray pelvis and left hip  History: Fall with twisting injury this morning. Hip pain. Prior arthroplasty.  TECHNIQUE: 2 x-rays of the pelvis and left hip are provided.  FINDINGS: Left total hip arthroplasty hardware with posterior superior dislocation of the femoral component. No periprosthetic fracture. Degenerative change partially visualized in the lower lumbar spine.      Acutely dislocated left hip arthroplasty.  This report was finalized on 9/2/2023 11:15 AM by Dr. Bernard Joel M.D.       Ordered the above noted radiological studies. Reviewed by me in PACS.            PROCEDURES  Procedural Sedation    Date/Time: 9/2/2023 1:42 PM  Performed by: Kushal Segura MD  Authorized by: Kushal Segura MD     Consent:     Consent obtained:  Written    Consent given by:  Patient    Risks discussed:  Allergic reaction, prolonged hypoxia resulting in organ damage, prolonged sedation necessitating reversal and inadequate sedation  Universal protocol:     Procedure explained and questions answered to patient or proxy's satisfaction: yes      Relevant  documents present and verified: yes      Imaging studies available: yes      Immediately prior to procedure, a time out was called: yes      Patient identity confirmed:  Verbally with patient  Indications:     Procedure performed:  Dislocation reduction    Procedure necessitating sedation performed by:  Physician performing sedation    Intended level of sedation:  Moderate  Pre-sedation assessment:     NPO status caution: urgency dictates proceeding with non-ideal NPO status      ASA classification: class 2 - patient with mild systemic disease      Mouth opening:  3 or more finger widths    Mallampati score:  I - soft palate, uvula, fauces, pillars visible    History of difficult intubation: no      Pre-sedation assessment completed:  9/2/2023 11:53 AM  Immediate pre-procedure details:     Reassessment: Patient reassessed immediately prior to procedure      Reviewed: vital signs      Verified: bag valve mask available    Procedure details (see MAR for exact dosages):     Preoxygenation:  Nasal cannula    Sedation:  Propofol    Analgesia:  Hydromorphone    Intra-procedure monitoring:  Blood pressure monitoring, cardiac monitor, continuous capnometry and continuous pulse oximetry    Intra-procedure events: none      Sedation end time:  9/2/2023 12:09 PM  Post-procedure details:     Estimated blood loss (see I/O flowsheets): no      Post-sedation assessments completed and reviewed: airway patency              MEDICATIONS GIVEN IN ER  Medications   sodium chloride 0.9 % flush 10 mL (has no administration in time range)   HYDROmorphone (DILAUDID) injection 0.5 mg (0.5 mg Intravenous Given 9/2/23 1043)   ondansetron (ZOFRAN) injection 4 mg (4 mg Intravenous Given 9/2/23 1043)   Propofol (DIPRIVAN) injection ( Intravenous Given 9/2/23 1331)                   MEDICAL DECISION MAKING, PROGRESS, and CONSULTS     Discussion below represents my analysis of pertinent findings related to patient's condition, differential  diagnosis, treatment plan and final disposition.      Additional sources:  - Discussed/ obtained information from independent historians: None    - External (non-ED) record review: Epic reviewed and patient had surgery 8/16/2023    - Chronic or social conditions impacting care: None    - Shared decision making: None      Orders placed during this visit:  Orders Placed This Encounter   Procedures    XR Hip With or Without Pelvis 2 - 3 View Left    XR Hip With or Without Pelvis 2 - 3 View Left    Ortho (on-call MD unless specified)    Insert Peripheral IV         Additional orders considered but not ordered:  None        Differential diagnosis includes but is not limited to:    Hip fracture versus dislocation      Independent interpretation of labs, radiology studies, and discussions with consultants:  ED Course as of 09/02/23 1342   Sat Sep 02, 2023   1303 13:03 EDT  Patient here with hip dislocation.  Patient has fresh hip replacement.  Discussed with Dr. Abad and we sedated and reduced in the ED.  Will be discharged home in knee immobilizer.  Will follow up in the office. [SL]      ED Course User Index  [SL] Kushal Segura MD                 DIAGNOSIS  Final diagnoses:   Anterior dislocation of left hip, initial encounter         DISPOSITION  DISCHARGE    Patient discharged in stable condition.    Reviewed implications of results, diagnosis, meds, responsibility to follow up, warning signs and symptoms of possible worsening, potential complications and reasons to return to ER, including worsening symptoms    Patient/Family voiced understanding of above instructions.    Discussed plan for discharge, as there is no emergent indication for admission. Patient referred to primary care provider for BP management due to today's BP. Pt/family is agreeable and understands need for follow up and repeat testing.  Pt is aware that discharge does not mean that nothing is wrong but it indicates no emergency is present  that requires admission and they must continue care with follow-up as given below or physician of their choice.     FOLLOW-UP  Francis Vogel MD  [id number] [id number]  [phone]    Schedule an appointment as soon as possible for a visit            Medication List        Changed      hydrALAZINE 50 MG tablet  Commonly known as: APRESOLINE  TAKE 1 TABLET THREE TIMES A DAY  What changed: when to take this     Olmesartan-amLODIPine-HCTZ 40-10-12.5 MG tablet  Take 1 tablet by mouth Daily.  What changed: when to take this                       Latest Documented Vital Signs:  As of 13:42 EDT  BP- 130/72 HR- 78 Temp- 97.4 °F (36.3 °C) (Tympanic) O2 sat- 94%              --    Please note that portions of this were completed with a voice recognition program.       Note Disclaimer: At [facility], we believe that sharing information builds trust and better relationships. You are receiving this note because you are receiving care at [facility] or recently visited. It is possible you will see health information before a provider has talked with you about it. This kind of information can be easy to misunderstand. To help you fully understand what it means for your health, we urge you to discuss this note with your provider.            Kushal Segura MD  09/02/23 [id number]

## 2023-09-02 NOTE — ED NOTES
Pt to ER via EMS after she tripped and fell. Pt had left hip surgery a little over 2wks ago and had been walking well without cane/walker. Pain is 10/10.    This RN in appropriate PPE while in pt room. Pt wearing mask

## 2023-09-11 ENCOUNTER — APPOINTMENT (OUTPATIENT)
Dept: GENERAL RADIOLOGY | Facility: HOSPITAL | Age: 63
End: 2023-09-11
Payer: MEDICARE

## 2023-09-11 ENCOUNTER — HOSPITAL ENCOUNTER (EMERGENCY)
Facility: HOSPITAL | Age: 63
Discharge: HOME | End: 2023-09-11
Attending: EMERGENCY MEDICINE | Admitting: EMERGENCY MEDICINE
Payer: MEDICARE

## 2023-09-11 VITALS
HEART RATE: 88 BPM | SYSTOLIC BLOOD PRESSURE: 111 MMHG | OXYGEN SATURATION: 94 % | WEIGHT: 191 LBS | RESPIRATION RATE: 18 BRPM | TEMPERATURE: 98.3 F | BODY MASS INDEX: 30.7 KG/M2 | DIASTOLIC BLOOD PRESSURE: 63 MMHG | HEIGHT: 66 IN

## 2023-09-11 DIAGNOSIS — Z96.642 HISTORY OF TOTAL HIP ARTHROPLASTY, LEFT: ICD-10-CM

## 2023-09-11 DIAGNOSIS — S73.005A DISLOCATION OF LEFT HIP, INITIAL ENCOUNTER: Primary | ICD-10-CM

## 2023-09-11 PROCEDURE — 96375 TX/PRO/DX INJ NEW DRUG ADDON: CPT

## 2023-09-11 PROCEDURE — 25010000002 HYDROMORPHONE PER 4 MG: Performed by: EMERGENCY MEDICINE

## 2023-09-11 PROCEDURE — 96374 THER/PROPH/DIAG INJ IV PUSH: CPT

## 2023-09-11 PROCEDURE — 73501 X-RAY EXAM HIP UNI 1 VIEW: CPT

## 2023-09-11 PROCEDURE — 25010000002 PROPOFOL 10 MG/ML EMULSION: Performed by: EMERGENCY MEDICINE

## 2023-09-11 PROCEDURE — 99285 EMERGENCY DEPT VISIT HI MDM: CPT

## 2023-09-11 PROCEDURE — 25010000002 ONDANSETRON PER 1 MG: Performed by: EMERGENCY MEDICINE

## 2023-09-11 RX ORDER — ONDANSETRON 2 MG/ML
4 INJECTION INTRAMUSCULAR; INTRAVENOUS ONCE
Status: COMPLETED | OUTPATIENT
Start: 2023-09-11 | End: 2023-09-11

## 2023-09-11 RX ORDER — PROPOFOL 10 MG/ML
40 VIAL (ML) INTRAVENOUS ONCE
Status: COMPLETED | OUTPATIENT
Start: 2023-09-11 | End: 2023-09-11

## 2023-09-11 RX ORDER — SODIUM CHLORIDE 0.9 % (FLUSH) 0.9 %
10 SYRINGE (ML) INJECTION AS NEEDED
Status: DISCONTINUED | OUTPATIENT
Start: 2023-09-11 | End: 2023-09-11 | Stop reason: HOSPADM

## 2023-09-11 RX ORDER — HYDROMORPHONE HYDROCHLORIDE 1 MG/ML
0.5 INJECTION, SOLUTION INTRAMUSCULAR; INTRAVENOUS; SUBCUTANEOUS ONCE
Status: COMPLETED | OUTPATIENT
Start: 2023-09-11 | End: 2023-09-11

## 2023-09-11 RX ORDER — PROPOFOL 10 MG/ML
VIAL (ML) INTRAVENOUS
Status: COMPLETED | OUTPATIENT
Start: 2023-09-11 | End: 2023-09-11

## 2023-09-11 RX ADMIN — PROPOFOL 80 MG: 10 INJECTION, EMULSION INTRAVENOUS at 06:16

## 2023-09-11 RX ADMIN — HYDROMORPHONE HYDROCHLORIDE 0.5 MG: 1 INJECTION, SOLUTION INTRAMUSCULAR; INTRAVENOUS; SUBCUTANEOUS at 05:08

## 2023-09-11 RX ADMIN — PROPOFOL 80 MG: 10 INJECTION, EMULSION INTRAVENOUS at 06:23

## 2023-09-11 RX ADMIN — PROPOFOL 40 MG: 10 INJECTION, EMULSION INTRAVENOUS at 06:16

## 2023-09-11 RX ADMIN — ONDANSETRON 4 MG: 2 INJECTION INTRAMUSCULAR; INTRAVENOUS at 05:08

## 2023-09-11 NOTE — ED PROVIDER NOTES
Lower Extremity Dislocation    Date/Time: 9/11/2023 6:28 AM  Performed by: Nelda Forman PA-C  Authorized by: Zack Schultz MD   Consent: Verbal consent obtained.  Risks and benefits: risks, benefits and alternatives were discussed  Consent given by: patient  Patient understanding: patient states understanding of the procedure being performed  Patient identity confirmed: verbally with patient  Injury location: hip  Location details: left hip  Injury type: dislocation  Dislocation type: posterior  Spontaneous dislocation: no  Prosthesis: yes  Pre-procedure distal perfusion: normal  Pre-procedure neurological function: normal  Pre-procedure range of motion: reduced    Anesthesia:  Local anesthesia used: no    Sedation:  Patient sedated: yes  Sedation type: moderate (conscious) sedation  Sedatives: propofol  Vitals: Vital signs were monitored during sedation.    Manipulation performed: yes  Reduction method: traction and counter traction and abduction  Reduction successful: yes  X-ray confirmed reduction: yes  Post-procedure distal perfusion: normal  Post-procedure neurological function: normal  Post-procedure range of motion: normal  Patient tolerance: patient tolerated the procedure well with no immediate complications           Nelda Forman PA-C  09/11/23 0632

## 2023-09-11 NOTE — DISCHARGE INSTRUCTIONS
Wear knee immobilizer until you follow-up with Dr. Vogel.  Call his office today to make an appointment.  Apply cold compresses to affected area as needed.  Take Tylenol, ibuprofen, or Naprosyn as needed for pain.

## 2023-09-11 NOTE — ED PROVIDER NOTES
" EMERGENCY DEPARTMENT ENCOUNTER    Room Number:  14/14  PCP: Rosa Hooper APRN  Historian: Patient, EMS      HPI:  Chief Complaint: Left hip injury  A complete HPI/ROS/PMH/PSH/SH/FH are unobtainable due to: Nothing  Context: Pearl Elizalde is a 63 y.o. female who presents to the ED from home by EMS c/o a left hip injury.  Patient got up to go to the bathroom about 1 hour ago when she felt her left hip \"pop out of socket\" he then sat back down on her bed and was unable to bear weight on her left leg.  She had a left hip replacement last month.  She was seen here in the ED 9 days ago for a left hip dislocation.  She underwent closed reduction at that time.  She currently complains of mild discomfort in her left hip.  Denies new numbness/tingling/weakness in her left leg.            PAST MEDICAL HISTORY  Active Ambulatory Problems     Diagnosis Date Noted    Allergic rhinitis 10/01/2018    Anxiety 06/03/2019    Asthma 06/03/2019    Body mass index (BMI) of 30.0-30.9 in adult 04/11/2019    Chronic obstructive pulmonary disease 06/03/2019    Diabetic peripheral neuropathy 02/06/2019    Disorder associated with type 2 diabetes mellitus 10/01/2018    Gastroesophageal reflux disease 06/03/2019    Hyperlipidemia 10/01/2018    Hypertension 10/01/2018    Hypomagnesemia 11/29/2018    Onychomycosis of toenail 10/01/2018    Palpitations 11/29/2018    Vitamin D deficiency 04/11/2019    Post-menopausal 08/19/2019    Obesity 08/30/2019    Female stress incontinence 04/09/2017    Nocturia 03/30/2017    Rectocele 04/09/2017    Uterovaginal prolapse, incomplete 04/09/2017    Low back pain     Hip pain, bilateral     Uterovaginal prolapse 04/10/2017    Atypical lobular hyperplasia (ALH) of right breast 01/10/2022    Artificial lens present 08/08/2019    Primary osteoarthritis of left hip 08/16/2023    Osteoarthritis of left hip, unspecified osteoarthritis type 08/16/2023     Resolved Ambulatory Problems     Diagnosis Date Noted    " Type 2 diabetes mellitus without complications 2019     Past Medical History:   Diagnosis Date    Breast cancer     History of COVID-19     Osteoarthritis     PONV (postoperative nausea and vomiting)     Slow to wake up after anesthesia     Under care of pain management specialist          PAST SURGICAL HISTORY  Past Surgical History:   Procedure Laterality Date    ADENOIDECTOMY      CHOLECYSTECTOMY      COLONOSCOPY      ENDOSCOPY      EYE SURGERY      CATARACTS    INNER EAR SURGERY      STAPENDECTOMY    KNEE SURGERY Bilateral     MULTIPLE    MASTECTOMY Left     REPLACEMENT TOTAL KNEE Left     REPLACEMENT TOTAL KNEE Right     TONSILLECTOMY      TOTAL HIP ARTHROPLASTY Left 2023    Procedure: TOTAL HIP ARTHROPLASTY ANTERIOR WITH HANA TABLE;  Surgeon: Francis Vogel MD;  Location: [facility] OR [facility];  Service: Orthopedics;  Laterality: Left;         FAMILY HISTORY  Family History   Adopted: Yes   Problem Relation Age of Onset    Malig Hyperthermia Neg Hx          SOCIAL HISTORY  Social History     Socioeconomic History    Marital status:    Tobacco Use    Smoking status: Former     Packs/day: 0.20     Years: 0.50     Pack years: 0.10     Types: Cigarettes     Quit date:      Years since quittin.7     Passive exposure: Past    Smokeless tobacco: Never   Vaping Use    Vaping Use: Never used   Substance and Sexual Activity    Alcohol use: Yes     Comment: Occassional    Drug use: No    Sexual activity: Yes     Partners: Male     Birth control/protection: None         ALLERGIES  Oxycodone, Penicillin g, Oxycodone hcl, and Penicillins    REVIEW OF SYSTEMS  All systems have been reviewed and are negative except for those listed in the HPI      PHYSICAL EXAM  ED Triage Vitals [23 0321]   Temp Heart Rate Resp BP SpO2   98.3 °F (36.8 °C) 102 20 140/74 99 %      Temp src Heart Rate Source Patient Position BP Location FiO2 (%)   Tympanic Monitor Lying Right arm --       Physical Exam      GENERAL:  Awake, alert, oriented x3.  Well-developed, well-nourished female.  Resting comfortably in no acute distress  HENT: NCAT, nares patent  EYES: no scleral icterus  CV: regular rhythm, normal rate, normal left pedal pulses  RESPIRATORY: normal effort, clear to auscultation bilaterally  ABDOMEN: soft, nontender  MUSCULOSKELETAL: Left leg is shortened.  There is mild tenderness over the left anterior hip.  Decreased range of motion of the left hip.  Remainder of the left leg is nontender  NEURO: Normal strength and light touch sensation in the left lower extremity  PSYCH:  calm, cooperative  SKIN: warm, dry    Vital signs and nursing notes reviewed.          LAB RESULTS  No results found for this or any previous visit (from the past 24 hour(s)).    Ordered the above labs and reviewed the results.        RADIOLOGY  XR Hip With or Without Pelvis 1 View Left    Result Date: 9/11/2023  Clinical: Hip dislocation, post reduction  Film labeled #1 demonstrates dislocation of the total left hip replacement prosthesis similar to the earlier radiographs. Film labeled #2 demonstrates appropriate alignment between the acetabular cup and the femoral component. No acute osseous abnormality.  This report was finalized on 9/11/2023 6:44 AM by Dr. Catracho Bangura M.D.      XR Hip With or Without Pelvis 1 View Left    Result Date: 9/11/2023  Patient: PATT NELSON  Time Out: 06:28 Exam(s): XR HIP + PELVIS EXAM: SINGLE VIEW OF THE LEFT HIP CLINICAL HISTORY:    Reason for exam: Left hip injury with suspected dislocation. TECHNIQUE: Single view of the left hip was obtained. COMPARISON:   No relevant prior studies available. IMPRESSION:   1.  Superior likely posterior dislocation of the left hip.    Electronically signed by Emeka Munoz MD on 09-11-23 at 0628     Ordered the above noted radiological studies. Reviewed by me in PACS.            PROCEDURES  Procedural Sedation    Date/Time: 9/11/2023 6:30 AM  Performed by: Zack Schultz  MD VENKATA  Authorized by: Zack Schultz MD     Consent:     Consent obtained:  Written    Consent given by:  Patient    Risks discussed:  Allergic reaction, prolonged hypoxia resulting in organ damage, dysrhythmia, inadequate sedation, respiratory compromise necessitating ventilatory assistance and intubation, nausea and vomiting    Alternatives discussed:  Analgesia without sedation  Universal protocol:     Procedure explained and questions answered to patient or proxy's satisfaction: yes      Imaging studies available: yes      Immediately prior to procedure, a time out was called: yes      Patient identity confirmed:  Verbally with patient and arm band  Indications:     Procedure performed:  Dislocation reduction    Procedure necessitating sedation performed by:  Physician performing sedation    Intended level of sedation:  Moderate  Pre-sedation assessment:     Time since last food or drink:  Approximately 7 hours    ASA classification: class 2 - patient with mild systemic disease      Mouth opening:  3 or more finger widths    Mallampati score:  I - soft palate, uvula, fauces, pillars visible    Neck mobility: normal      Pre-sedation assessments completed and reviewed: airway patency, anesthesia/sedation history, cardiovascular function, hydration status, mental status, nausea/vomiting, pain level and respiratory function      History of difficult intubation: no      Pre-sedation assessment completed:  9/11/2023 6:10 AM  Immediate pre-procedure details:     Reassessment: Patient reassessed immediately prior to procedure      Reviewed: vital signs      Verified: bag valve mask available, emergency equipment available, intubation equipment available, IV patency confirmed, oxygen available and suction available    Procedure details (see MAR for exact dosages):     Sedation start time:  9/11/2023 6:15 AM    Preoxygenation:  Nasal cannula    Sedation:  Propofol    Analgesia:  Hydromorphone    Intra-procedure  monitoring:  Blood pressure monitoring, cardiac monitor, continuous pulse oximetry, continuous capnometry, frequent LOC assessments and frequent vital sign checks    Intra-procedure events: hypoxia      Intra-procedure events comment:  Patient was placed on a facemask and oxygen saturations quickly increased to the mid 90    Intra-procedure management:  Supplemental oxygen    Sedation end time:  9/11/2023 6:30 AM    Total sedation time (minutes):  15  Post-procedure details:     Post-sedation assessment completed:  9/11/2023 6:33 AM    Attendance: Constant attendance by certified staff until patient recovered      Recovery: Patient returned to pre-procedure baseline      Post-sedation assessments completed and reviewed: airway patency, cardiovascular function, mental status, nausea/vomiting, pain level and respiratory function      Patient is stable for discharge or admission: yes      Procedure completion:  Tolerated well, no immediate complications              MEDICATIONS GIVEN IN ER  Medications   sodium chloride 0.9 % flush 10 mL (has no administration in time range)   ondansetron (ZOFRAN) injection 4 mg (4 mg Intravenous Given 9/11/23 0508)   HYDROmorphone (DILAUDID) injection 0.5 mg (0.5 mg Intravenous Given 9/11/23 0508)   Propofol (DIPRIVAN) injection 40 mg (40 mg Intravenous Given by Other 9/11/23 0616)   Propofol (DIPRIVAN) injection (80 mg Intravenous Given 9/11/23 0623)                   MEDICAL DECISION MAKING, PROGRESS, and CONSULTS    All labs have been independently reviewed by me.  All radiology studies have been reviewed by me and I have also reviewed the radiology report.   EKG's independently viewed and interpreted by me.  Discussion below represents my analysis of pertinent findings related to patient's condition, differential diagnosis, treatment plan and final disposition.      Additional sources:  - Discussed/ obtained information from independent historians: EMS, daughter at bedside    -  External (non-ED) record review: Patient had a left total hip arthroplasty performed by Dr. Vogel on 8/16/2023.  Patient was seen here in the ED on 9/2/2023 for left hip dislocation.  She underwent closed reduction by Dr. Abad, orthopedist.    - Chronic or social conditions impacting care: N/A          Orders placed during this visit:  Orders Placed This Encounter   Procedures    Orthopedic Injury Treatment    Procedural Sedation    [facility] 05.  Knee Immobilizer    XR Hip With or Without Pelvis 1 View Left    XR Hip With or Without Pelvis 1 View Left    Monitor Blood Pressure    Pulse Oximetry, Continuous    Obtain & Apply The Following- Lower extremity; Knee immobilizer    Ortho (on-call MD unless specified)    Insert Peripheral IV         Additional orders considered but not ordered:  N/A        Differential diagnosis:    Hip dislocation/fracture, pelvic fracture      Independent interpretation of labs, radiology studies, and discussions with consultants:  ED Course as of 09/11/23 0729   Mon Sep 11, 2023   0416 Left hip x-ray personally interpreted by me.  My personal interpretation is: The femoral component of the left hip arthroplasty is dislocated superiorly.  No acute fracture. [WH]   0534 Case discussed with Dr. Camacho, orthopedist.  Pertinent history, exam findings, and x-ray results were discussed with him.  He recommends attempting a closed reduction here in the ED. [WH]   0539 I informed the patient of my discussion with Dr. Camacho and his recommendation for closed reduction.  She is agreeable to proceeding. [WH]   0635 Closed reduction was performed under conscious sedation.  Left pedal pulses were normal following reduction.  Patient's pain resolved. [WH]   0643 Case discussed with Dr. Lela Cuevas.  Pertinent history, exam findings, test results, ED course, and diagnoses were discussed with him.  He agrees with the plan to discharge the patient in a knee immobilizer.  He will have  the office contact her later today to schedule a follow-up appointment. []   0658 Postreduction x-ray confirms successful reduction. [WH]   7179 Patient presented to the ED with a prosthetic left hip dislocation.  Closed reduction was performed under conscious sedation.  Case was discussed with orthopedics.  She will be discharged in a knee immobilizer and will follow-up with Dr. Vogel. []      ED Course User Index  [] Zack Schultz MD               DIAGNOSIS  Final diagnoses:   Dislocation of left hip, initial encounter   History of total hip arthroplasty, left         DISPOSITION  DISCHARGE    Patient discharged in stable condition.    Reviewed implications of results, diagnosis, meds, responsibility to follow up, warning signs and symptoms of possible worsening, potential complications and reasons to return to ER, including recurrent symptoms, worsening pain, numbness/tingling/weakness in left leg, or other concern.    Patient/Family voiced understanding of above instructions.    Discussed plan for discharge, as there is no emergent indication for admission. Patient referred to primary care provider for BP management due to today's BP. Pt/family is agreeable and understands need for follow up and repeat testing.  Pt is aware that discharge does not mean that nothing is wrong but it indicates no emergency is present that requires admission and they must continue care with follow-up as given below or physician of their choice.     FOLLOW-UP  Francis Vogel MD  [address] [facility] [address] [phone]    Call today           Medication List        Changed      hydrALAZINE 50 MG tablet  Commonly known as: APRESOLINE  TAKE 1 TABLET THREE TIMES A DAY  What changed: when to take this     Olmesartan-amLODIPine-HCTZ 40-10-12.5 MG tablet  Take 1 tablet by mouth Daily.  What changed: when to take this                        Latest Documented Vital Signs:  As of 07:29 EDT  BP- 122/73 HR- 96 Temp-  98.3 °F (36.8 °C) (Tympanic) O2 sat- 94%              --    Please note that portions of this were completed with a voice recognition program.       Note Disclaimer: At [facility], we believe that sharing information builds trust and better relationships. You are receiving this note because you are receiving care at [facility] or recently visited. It is possible you will see health information before a provider has talked with you about it. This kind of information can be easy to misunderstand. To help you fully understand what it means for your health, we urge you to discuss this note with your provider.             Zack Schultz MD  09/11/23 5526

## 2023-09-18 ENCOUNTER — READMISSION MANAGEMENT (OUTPATIENT)
Dept: CALL CENTER | Facility: HOSPITAL | Age: 63
End: 2023-09-18
Payer: MEDICARE

## 2023-09-18 ENCOUNTER — ANESTHESIA EVENT (OUTPATIENT)
Dept: PERIOP | Facility: HOSPITAL | Age: 63
DRG: 468 | End: 2023-09-18
Payer: MEDICARE

## 2023-09-18 ENCOUNTER — ANESTHESIA (OUTPATIENT)
Dept: PERIOP | Facility: HOSPITAL | Age: 63
DRG: 468 | End: 2023-09-18
Payer: MEDICARE

## 2023-09-18 ENCOUNTER — HOSPITAL ENCOUNTER (INPATIENT)
Facility: HOSPITAL | Age: 63
LOS: 1 days | Discharge: HOME HEALTH | DRG: 468 | End: 2023-09-18
Attending: ORTHOPAEDIC SURGERY | Admitting: ORTHOPAEDIC SURGERY
Payer: MEDICARE

## 2023-09-18 VITALS
OXYGEN SATURATION: 98 % | HEART RATE: 85 BPM | RESPIRATION RATE: 16 BRPM | DIASTOLIC BLOOD PRESSURE: 72 MMHG | TEMPERATURE: 97.6 F | SYSTOLIC BLOOD PRESSURE: 115 MMHG

## 2023-09-18 DIAGNOSIS — T84.021D FAILURE OF LEFT TOTAL HIP ARTHROPLASTY WITH DISLOCATION OF HIP, SUBSEQUENT ENCOUNTER: Primary | ICD-10-CM

## 2023-09-18 LAB
ALBUMIN SERPL-MCNC: 4.1 G/DL (ref 3.5–5.2)
ALBUMIN/GLOB SERPL: 1.6 G/DL
ALP SERPL-CCNC: 110 U/L (ref 39–117)
ALT SERPL W P-5'-P-CCNC: 18 U/L (ref 1–33)
ANION GAP SERPL CALCULATED.3IONS-SCNC: 11 MMOL/L (ref 5–15)
AST SERPL-CCNC: 19 U/L (ref 1–32)
BASOPHILS # BLD AUTO: 0.05 10*3/MM3 (ref 0–0.2)
BASOPHILS NFR BLD AUTO: 0.8 % (ref 0–1.5)
BILIRUB SERPL-MCNC: 0.6 MG/DL (ref 0–1.2)
BUN SERPL-MCNC: 22 MG/DL (ref 8–23)
BUN/CREAT SERPL: 23.9 (ref 7–25)
CALCIUM SPEC-SCNC: 9.7 MG/DL (ref 8.6–10.5)
CHLORIDE SERPL-SCNC: 104 MMOL/L (ref 98–107)
CO2 SERPL-SCNC: 24 MMOL/L (ref 22–29)
CREAT SERPL-MCNC: 0.92 MG/DL (ref 0.57–1)
DEPRECATED RDW RBC AUTO: 43.1 FL (ref 37–54)
EGFRCR SERPLBLD CKD-EPI 2021: 69.2 ML/MIN/1.73
EOSINOPHIL # BLD AUTO: 0.21 10*3/MM3 (ref 0–0.4)
EOSINOPHIL NFR BLD AUTO: 3.2 % (ref 0.3–6.2)
ERYTHROCYTE [DISTWIDTH] IN BLOOD BY AUTOMATED COUNT: 14 % (ref 12.3–15.4)
GLOBULIN UR ELPH-MCNC: 2.5 GM/DL
GLUCOSE BLDC GLUCOMTR-MCNC: 138 MG/DL (ref 70–130)
GLUCOSE SERPL-MCNC: 119 MG/DL (ref 65–99)
HCT VFR BLD AUTO: 35.2 % (ref 34–46.6)
HGB BLD-MCNC: 11.5 G/DL (ref 12–15.9)
IMM GRANULOCYTES # BLD AUTO: 0.05 10*3/MM3 (ref 0–0.05)
IMM GRANULOCYTES NFR BLD AUTO: 0.8 % (ref 0–0.5)
LYMPHOCYTES # BLD AUTO: 1.35 10*3/MM3 (ref 0.7–3.1)
LYMPHOCYTES NFR BLD AUTO: 20.5 % (ref 19.6–45.3)
MCH RBC QN AUTO: 28 PG (ref 26.6–33)
MCHC RBC AUTO-ENTMCNC: 32.7 G/DL (ref 31.5–35.7)
MCV RBC AUTO: 85.6 FL (ref 79–97)
MONOCYTES # BLD AUTO: 0.48 10*3/MM3 (ref 0.1–0.9)
MONOCYTES NFR BLD AUTO: 7.3 % (ref 5–12)
NEUTROPHILS NFR BLD AUTO: 4.45 10*3/MM3 (ref 1.7–7)
NEUTROPHILS NFR BLD AUTO: 67.4 % (ref 42.7–76)
NRBC BLD AUTO-RTO: 0 /100 WBC (ref 0–0.2)
PLATELET # BLD AUTO: 224 10*3/MM3 (ref 140–450)
PMV BLD AUTO: 10.3 FL (ref 6–12)
POTASSIUM SERPL-SCNC: 4.4 MMOL/L (ref 3.5–5.2)
PROT SERPL-MCNC: 6.6 G/DL (ref 6–8.5)
RBC # BLD AUTO: 4.11 10*6/MM3 (ref 3.77–5.28)
SODIUM SERPL-SCNC: 139 MMOL/L (ref 136–145)
WBC NRBC COR # BLD: 6.59 10*3/MM3 (ref 3.4–10.8)

## 2023-09-18 PROCEDURE — 25010000002 HYDROMORPHONE 1 MG/ML SOLUTION: Performed by: ANESTHESIOLOGY

## 2023-09-18 PROCEDURE — 25010000002 ONDANSETRON PER 1 MG: Performed by: ANESTHESIOLOGY

## 2023-09-18 PROCEDURE — 25010000002 PROPOFOL 10 MG/ML EMULSION: Performed by: ANESTHESIOLOGY

## 2023-09-18 PROCEDURE — 25010000002 SUGAMMADEX 200 MG/2ML SOLUTION: Performed by: ANESTHESIOLOGY

## 2023-09-18 PROCEDURE — 0SRS0JZ REPLACEMENT OF LEFT HIP JOINT, FEMORAL SURFACE WITH SYNTHETIC SUBSTITUTE, OPEN APPROACH: ICD-10-PCS | Performed by: ORTHOPAEDIC SURGERY

## 2023-09-18 PROCEDURE — C1776 JOINT DEVICE (IMPLANTABLE): HCPCS | Performed by: ORTHOPAEDIC SURGERY

## 2023-09-18 PROCEDURE — 82948 REAGENT STRIP/BLOOD GLUCOSE: CPT

## 2023-09-18 PROCEDURE — 25010000002 KETOROLAC TROMETHAMINE PER 15 MG: Performed by: ORTHOPAEDIC SURGERY

## 2023-09-18 PROCEDURE — 0SPS0JZ REMOVAL OF SYNTHETIC SUBSTITUTE FROM LEFT HIP JOINT, FEMORAL SURFACE, OPEN APPROACH: ICD-10-PCS | Performed by: ORTHOPAEDIC SURGERY

## 2023-09-18 PROCEDURE — 25010000002 MIDAZOLAM PER 1 MG: Performed by: ANESTHESIOLOGY

## 2023-09-18 PROCEDURE — 25010000002 CEFAZOLIN IN DEXTROSE 2-4 GM/100ML-% SOLUTION: Performed by: ORTHOPAEDIC SURGERY

## 2023-09-18 PROCEDURE — 80053 COMPREHEN METABOLIC PANEL: CPT | Performed by: ANESTHESIOLOGY

## 2023-09-18 PROCEDURE — 25010000002 ROPIVACAINE PER 1 MG: Performed by: ORTHOPAEDIC SURGERY

## 2023-09-18 PROCEDURE — 25010000002 EPINEPHRINE 1 MG/ML SOLUTION 30 ML VIAL: Performed by: ORTHOPAEDIC SURGERY

## 2023-09-18 PROCEDURE — 85025 COMPLETE CBC W/AUTO DIFF WBC: CPT | Performed by: ANESTHESIOLOGY

## 2023-09-18 PROCEDURE — 25010000002 DROPERIDOL PER 5 MG: Performed by: ANESTHESIOLOGY

## 2023-09-18 PROCEDURE — 25010000002 FENTANYL CITRATE (PF) 50 MCG/ML SOLUTION: Performed by: ANESTHESIOLOGY

## 2023-09-18 PROCEDURE — 25010000002 MORPHINE PER 10 MG: Performed by: ORTHOPAEDIC SURGERY

## 2023-09-18 DEVICE — IMPLANTABLE DEVICE
Type: IMPLANTABLE DEVICE | Site: HIP | Status: FUNCTIONAL
Brand: VIVACIT-E®

## 2023-09-18 DEVICE — BIOLOX® OPTION, HEAD, M, Ø 28/0, TAPER 12/14
Type: IMPLANTABLE DEVICE | Site: HIP | Status: FUNCTIONAL
Brand: BIOLOX® OPTION

## 2023-09-18 DEVICE — DEV CONTRL TISS STRATAFIXSPIRALMNCRYL PLSPS2 REV3/0 45CM: Type: IMPLANTABLE DEVICE | Site: HIP | Status: FUNCTIONAL

## 2023-09-18 RX ORDER — FAMOTIDINE 10 MG/ML
20 INJECTION, SOLUTION INTRAVENOUS ONCE
Status: COMPLETED | OUTPATIENT
Start: 2023-09-18 | End: 2023-09-18

## 2023-09-18 RX ORDER — FENTANYL CITRATE 50 UG/ML
50 INJECTION, SOLUTION INTRAMUSCULAR; INTRAVENOUS ONCE AS NEEDED
Status: DISCONTINUED | OUTPATIENT
Start: 2023-09-18 | End: 2023-09-18 | Stop reason: HOSPADM

## 2023-09-18 RX ORDER — EPHEDRINE SULFATE 50 MG/ML
5 INJECTION, SOLUTION INTRAVENOUS ONCE AS NEEDED
Status: DISCONTINUED | OUTPATIENT
Start: 2023-09-18 | End: 2023-09-18 | Stop reason: HOSPADM

## 2023-09-18 RX ORDER — SODIUM CHLORIDE, SODIUM LACTATE, POTASSIUM CHLORIDE, CALCIUM CHLORIDE 600; 310; 30; 20 MG/100ML; MG/100ML; MG/100ML; MG/100ML
INJECTION, SOLUTION INTRAVENOUS CONTINUOUS PRN
Status: DISCONTINUED | OUTPATIENT
Start: 2023-09-18 | End: 2023-09-18 | Stop reason: SURG

## 2023-09-18 RX ORDER — FENTANYL CITRATE 50 UG/ML
INJECTION, SOLUTION INTRAMUSCULAR; INTRAVENOUS AS NEEDED
Status: DISCONTINUED | OUTPATIENT
Start: 2023-09-18 | End: 2023-09-18 | Stop reason: SURG

## 2023-09-18 RX ORDER — LIDOCAINE HYDROCHLORIDE 10 MG/ML
0.5 INJECTION, SOLUTION INFILTRATION; PERINEURAL ONCE AS NEEDED
Status: DISCONTINUED | OUTPATIENT
Start: 2023-09-18 | End: 2023-09-18 | Stop reason: HOSPADM

## 2023-09-18 RX ORDER — KETAMINE HCL IN NACL, ISO-OSM 100MG/10ML
SYRINGE (ML) INJECTION AS NEEDED
Status: DISCONTINUED | OUTPATIENT
Start: 2023-09-18 | End: 2023-09-18 | Stop reason: SURG

## 2023-09-18 RX ORDER — ROCURONIUM BROMIDE 10 MG/ML
INJECTION, SOLUTION INTRAVENOUS AS NEEDED
Status: DISCONTINUED | OUTPATIENT
Start: 2023-09-18 | End: 2023-09-18 | Stop reason: SURG

## 2023-09-18 RX ORDER — LIDOCAINE HYDROCHLORIDE 20 MG/ML
INJECTION, SOLUTION EPIDURAL; INFILTRATION; INTRACAUDAL; PERINEURAL AS NEEDED
Status: DISCONTINUED | OUTPATIENT
Start: 2023-09-18 | End: 2023-09-18 | Stop reason: SURG

## 2023-09-18 RX ORDER — MIDAZOLAM HYDROCHLORIDE 1 MG/ML
0.5 INJECTION INTRAMUSCULAR; INTRAVENOUS
Status: DISCONTINUED | OUTPATIENT
Start: 2023-09-18 | End: 2023-09-18 | Stop reason: HOSPADM

## 2023-09-18 RX ORDER — HYDROCODONE BITARTRATE AND ACETAMINOPHEN 7.5; 325 MG/1; MG/1
1 TABLET ORAL EVERY 4 HOURS PRN
Qty: 42 TABLET | Refills: 0 | Status: SHIPPED | OUTPATIENT
Start: 2023-09-18

## 2023-09-18 RX ORDER — CEPHALEXIN 500 MG/1
1000 CAPSULE ORAL 4 TIMES DAILY
Qty: 8 CAPSULE | Refills: 0 | Status: SHIPPED | OUTPATIENT
Start: 2023-09-18 | End: 2023-09-19

## 2023-09-18 RX ORDER — HYDRALAZINE HYDROCHLORIDE 20 MG/ML
5 INJECTION INTRAMUSCULAR; INTRAVENOUS
Status: DISCONTINUED | OUTPATIENT
Start: 2023-09-18 | End: 2023-09-18 | Stop reason: HOSPADM

## 2023-09-18 RX ORDER — HYDROCODONE BITARTRATE AND ACETAMINOPHEN 7.5; 325 MG/1; MG/1
1 TABLET ORAL ONCE AS NEEDED
Status: DISCONTINUED | OUTPATIENT
Start: 2023-09-18 | End: 2023-09-18 | Stop reason: HOSPADM

## 2023-09-18 RX ORDER — MAGNESIUM HYDROXIDE 1200 MG/15ML
LIQUID ORAL AS NEEDED
Status: DISCONTINUED | OUTPATIENT
Start: 2023-09-18 | End: 2023-09-18 | Stop reason: HOSPADM

## 2023-09-18 RX ORDER — FLUMAZENIL 0.1 MG/ML
0.2 INJECTION INTRAVENOUS AS NEEDED
Status: DISCONTINUED | OUTPATIENT
Start: 2023-09-18 | End: 2023-09-18 | Stop reason: HOSPADM

## 2023-09-18 RX ORDER — SCOLOPAMINE TRANSDERMAL SYSTEM 1 MG/1
1 PATCH, EXTENDED RELEASE TRANSDERMAL
Status: DISCONTINUED | OUTPATIENT
Start: 2023-09-18 | End: 2023-09-18 | Stop reason: HOSPADM

## 2023-09-18 RX ORDER — HYDROMORPHONE HYDROCHLORIDE 1 MG/ML
0.5 INJECTION, SOLUTION INTRAMUSCULAR; INTRAVENOUS; SUBCUTANEOUS
Status: DISCONTINUED | OUTPATIENT
Start: 2023-09-18 | End: 2023-09-18 | Stop reason: HOSPADM

## 2023-09-18 RX ORDER — DROPERIDOL 2.5 MG/ML
0.62 INJECTION, SOLUTION INTRAMUSCULAR; INTRAVENOUS
Status: DISCONTINUED | OUTPATIENT
Start: 2023-09-18 | End: 2023-09-18 | Stop reason: HOSPADM

## 2023-09-18 RX ORDER — PROMETHAZINE HYDROCHLORIDE 25 MG/1
25 TABLET ORAL ONCE AS NEEDED
Status: DISCONTINUED | OUTPATIENT
Start: 2023-09-18 | End: 2023-09-18 | Stop reason: HOSPADM

## 2023-09-18 RX ORDER — TRANEXAMIC ACID 100 MG/ML
INJECTION, SOLUTION INTRAVENOUS AS NEEDED
Status: DISCONTINUED | OUTPATIENT
Start: 2023-09-18 | End: 2023-09-18 | Stop reason: SURG

## 2023-09-18 RX ORDER — ONDANSETRON 4 MG/1
4 TABLET, FILM COATED ORAL DAILY PRN
Qty: 30 TABLET | Refills: 1 | Status: SHIPPED | OUTPATIENT
Start: 2023-09-18 | End: 2024-09-17

## 2023-09-18 RX ORDER — DIPHENHYDRAMINE HYDROCHLORIDE 50 MG/ML
12.5 INJECTION INTRAMUSCULAR; INTRAVENOUS
Status: DISCONTINUED | OUTPATIENT
Start: 2023-09-18 | End: 2023-09-18 | Stop reason: HOSPADM

## 2023-09-18 RX ORDER — LABETALOL HYDROCHLORIDE 5 MG/ML
5 INJECTION, SOLUTION INTRAVENOUS
Status: DISCONTINUED | OUTPATIENT
Start: 2023-09-18 | End: 2023-09-18 | Stop reason: HOSPADM

## 2023-09-18 RX ORDER — DROPERIDOL 2.5 MG/ML
INJECTION, SOLUTION INTRAMUSCULAR; INTRAVENOUS AS NEEDED
Status: DISCONTINUED | OUTPATIENT
Start: 2023-09-18 | End: 2023-09-18 | Stop reason: SURG

## 2023-09-18 RX ORDER — SODIUM CHLORIDE, SODIUM LACTATE, POTASSIUM CHLORIDE, CALCIUM CHLORIDE 600; 310; 30; 20 MG/100ML; MG/100ML; MG/100ML; MG/100ML
9 INJECTION, SOLUTION INTRAVENOUS CONTINUOUS
Status: DISCONTINUED | OUTPATIENT
Start: 2023-09-18 | End: 2023-09-18 | Stop reason: HOSPADM

## 2023-09-18 RX ORDER — MIDAZOLAM HYDROCHLORIDE 1 MG/ML
1 INJECTION INTRAMUSCULAR; INTRAVENOUS
Status: COMPLETED | OUTPATIENT
Start: 2023-09-18 | End: 2023-09-18

## 2023-09-18 RX ORDER — CEFAZOLIN SODIUM 2 G/100ML
2000 INJECTION, SOLUTION INTRAVENOUS ONCE
Status: COMPLETED | OUTPATIENT
Start: 2023-09-18 | End: 2023-09-18

## 2023-09-18 RX ORDER — HYDROCODONE BITARTRATE AND ACETAMINOPHEN 10; 325 MG/1; MG/1
1 TABLET ORAL EVERY 4 HOURS PRN
Status: DISCONTINUED | OUTPATIENT
Start: 2023-09-18 | End: 2023-09-18 | Stop reason: HOSPADM

## 2023-09-18 RX ORDER — ONDANSETRON 2 MG/ML
4 INJECTION INTRAMUSCULAR; INTRAVENOUS ONCE AS NEEDED
Status: DISCONTINUED | OUTPATIENT
Start: 2023-09-18 | End: 2023-09-18 | Stop reason: HOSPADM

## 2023-09-18 RX ORDER — SODIUM CHLORIDE 0.9 % (FLUSH) 0.9 %
3 SYRINGE (ML) INJECTION EVERY 12 HOURS SCHEDULED
Status: DISCONTINUED | OUTPATIENT
Start: 2023-09-18 | End: 2023-09-18 | Stop reason: HOSPADM

## 2023-09-18 RX ORDER — FENTANYL CITRATE 50 UG/ML
50 INJECTION, SOLUTION INTRAMUSCULAR; INTRAVENOUS
Status: DISCONTINUED | OUTPATIENT
Start: 2023-09-18 | End: 2023-09-18 | Stop reason: HOSPADM

## 2023-09-18 RX ORDER — ASPIRIN 81 MG/1
81 TABLET ORAL 2 TIMES DAILY
Qty: 60 TABLET | Refills: 0 | Status: SHIPPED | OUTPATIENT
Start: 2023-09-18 | End: 2023-10-18

## 2023-09-18 RX ORDER — IPRATROPIUM BROMIDE AND ALBUTEROL SULFATE 2.5; .5 MG/3ML; MG/3ML
3 SOLUTION RESPIRATORY (INHALATION) ONCE AS NEEDED
Status: DISCONTINUED | OUTPATIENT
Start: 2023-09-18 | End: 2023-09-18 | Stop reason: HOSPADM

## 2023-09-18 RX ORDER — PROPOFOL 10 MG/ML
VIAL (ML) INTRAVENOUS AS NEEDED
Status: DISCONTINUED | OUTPATIENT
Start: 2023-09-18 | End: 2023-09-18 | Stop reason: SURG

## 2023-09-18 RX ORDER — MELOXICAM 15 MG/1
15 TABLET ORAL DAILY
Status: DISCONTINUED | OUTPATIENT
Start: 2023-09-18 | End: 2023-09-18 | Stop reason: HOSPADM

## 2023-09-18 RX ORDER — NALOXONE HCL 0.4 MG/ML
0.2 VIAL (ML) INJECTION AS NEEDED
Status: DISCONTINUED | OUTPATIENT
Start: 2023-09-18 | End: 2023-09-18 | Stop reason: HOSPADM

## 2023-09-18 RX ORDER — SODIUM CHLORIDE 0.9 % (FLUSH) 0.9 %
3-10 SYRINGE (ML) INJECTION AS NEEDED
Status: DISCONTINUED | OUTPATIENT
Start: 2023-09-18 | End: 2023-09-18 | Stop reason: HOSPADM

## 2023-09-18 RX ORDER — PROMETHAZINE HYDROCHLORIDE 25 MG/1
25 SUPPOSITORY RECTAL ONCE AS NEEDED
Status: DISCONTINUED | OUTPATIENT
Start: 2023-09-18 | End: 2023-09-18 | Stop reason: HOSPADM

## 2023-09-18 RX ORDER — ONDANSETRON 2 MG/ML
INJECTION INTRAMUSCULAR; INTRAVENOUS AS NEEDED
Status: DISCONTINUED | OUTPATIENT
Start: 2023-09-18 | End: 2023-09-18 | Stop reason: SURG

## 2023-09-18 RX ORDER — CELECOXIB 200 MG/1
200 CAPSULE ORAL DAILY
COMMUNITY

## 2023-09-18 RX ADMIN — HYDROMORPHONE HYDROCHLORIDE 0.5 MG: 1 INJECTION, SOLUTION INTRAMUSCULAR; INTRAVENOUS; SUBCUTANEOUS at 16:47

## 2023-09-18 RX ADMIN — PROPOFOL 150 MG: 10 INJECTION, EMULSION INTRAVENOUS at 16:16

## 2023-09-18 RX ADMIN — FENTANYL CITRATE 50 MCG: 50 INJECTION, SOLUTION INTRAMUSCULAR; INTRAVENOUS at 16:16

## 2023-09-18 RX ADMIN — Medication 20 MG: at 16:55

## 2023-09-18 RX ADMIN — LIDOCAINE HYDROCHLORIDE 100 MG: 20 INJECTION, SOLUTION EPIDURAL; INFILTRATION; INTRACAUDAL; PERINEURAL at 16:16

## 2023-09-18 RX ADMIN — ONDANSETRON 4 MG: 2 INJECTION INTRAMUSCULAR; INTRAVENOUS at 17:06

## 2023-09-18 RX ADMIN — SODIUM CHLORIDE, POTASSIUM CHLORIDE, SODIUM LACTATE AND CALCIUM CHLORIDE: 600; 310; 30; 20 INJECTION, SOLUTION INTRAVENOUS at 16:07

## 2023-09-18 RX ADMIN — DROPERIDOL 0.62 MG: 2.5 INJECTION, SOLUTION INTRAMUSCULAR; INTRAVENOUS at 16:23

## 2023-09-18 RX ADMIN — PROPOFOL 120 MCG/KG/MIN: 10 INJECTION, EMULSION INTRAVENOUS at 16:18

## 2023-09-18 RX ADMIN — PROPOFOL 30 MG: 10 INJECTION, EMULSION INTRAVENOUS at 17:18

## 2023-09-18 RX ADMIN — PROPOFOL 50 MG: 10 INJECTION, EMULSION INTRAVENOUS at 17:25

## 2023-09-18 RX ADMIN — MIDAZOLAM 1 MG: 1 INJECTION INTRAMUSCULAR; INTRAVENOUS at 13:15

## 2023-09-18 RX ADMIN — SUGAMMADEX 200 MG: 100 INJECTION, SOLUTION INTRAVENOUS at 17:06

## 2023-09-18 RX ADMIN — SCOPALAMINE 1 PATCH: 1 PATCH, EXTENDED RELEASE TRANSDERMAL at 12:42

## 2023-09-18 RX ADMIN — TRANEXAMIC ACID 1000 MG: 100 INJECTION INTRAVENOUS at 16:32

## 2023-09-18 RX ADMIN — Medication 30 MG: at 16:34

## 2023-09-18 RX ADMIN — SODIUM CHLORIDE, POTASSIUM CHLORIDE, SODIUM LACTATE AND CALCIUM CHLORIDE 9 ML/HR: 600; 310; 30; 20 INJECTION, SOLUTION INTRAVENOUS at 12:46

## 2023-09-18 RX ADMIN — PROPOFOL 30 MG: 10 INJECTION, EMULSION INTRAVENOUS at 17:20

## 2023-09-18 RX ADMIN — CEFAZOLIN SODIUM 2000 MG: 2 INJECTION, SOLUTION INTRAVENOUS at 16:03

## 2023-09-18 RX ADMIN — ROCURONIUM BROMIDE 50 MG: 10 INJECTION, SOLUTION INTRAVENOUS at 16:16

## 2023-09-18 RX ADMIN — FAMOTIDINE 20 MG: 10 INJECTION INTRAVENOUS at 12:41

## 2023-09-18 RX ADMIN — MIDAZOLAM 1 MG: 1 INJECTION INTRAMUSCULAR; INTRAVENOUS at 16:02

## 2023-09-18 NOTE — CASE MANAGEMENT/SOCIAL WORK
Continued Stay Note  [facility]     Patient Name: Pearl Elizalde  MRN: [id number]  Today's Date: 9/18/2023    Admit Date: 9/18/2023    Plan: Home with [facility]    Discharge Plan       Plan       Row Name 09/18/23 1439    Plan Home with [facility]                            Discharge Codes    No documentation.                 Expected Discharge Date and Time       Expected Discharge Date Expected Discharge Time    Sep 18, 2023               Shannon Epley, RN

## 2023-09-18 NOTE — OP NOTE
TOTAL HIP ARTHROPLASTY ANTERIOR REVISION WITH HANA TABLE  Procedure Note    Pearl Elizalde  9/18/2023    Pre-op Diagnosis: Left Unstable Total Hip Arthroplasty  Post-op Diagnosis: Unstable left total hip arthroplasty secondary to large hip seroma  Procedure:  Left Total Hip Arthroplasty Revision with Femoral Head Ball Exchange  Surgical Approach: Hip Direct Anterior (Smith-Kang)   Surgeon:   Francis Vogel MD  Anesthesia: General, Anesthesiologist: Wellington Esquivel MD  Staff: Circulator: Ninoska Alfaro RN; Qiana Ames RN  Scrub Person: Virginie Perez  Vendor Representative: Kaden Miller  Assistant: Oswaldo Perez APRN; Adam Mattson PA-C  Other: Emeka Solorio  Estimated Blood Loss:100ml  Specimens:   Order Name Source Comment Collection Info Order Time   COMPREHENSIVE METABOLIC PANEL Arm, Left  Collected By: Rose Gutierres RN 9/18/2023 11:11 AM     Release to patient   Routine Release          Drains: one  Complications: None    Components Utilized:        Implant Name Type Inv. Item Serial No.  Lot No. LRB No. Used Action   DEV CONTRL TISS STRATAFIXSPIRALMNCRYL [id number] REV3/0 45CM - [id number] Implant DEV CONTRL TISS STRATAFIXSPIRALMNCRYL PLSPS2 REV3/0 45CM  [employer]  DIV [employer] J AND J NR Left 1 Implanted   HD FEM/HIP BIOLOX/DELTA OPTN CERAM 12/[id number] - [id number] Implant HD FEM/HIP BIOLOX/DELTA OPTN CERAM [id number]  [employer] [id number] Left 1 Implanted   BEAR HIP VIVACIT/E 2MOBIL HXPE [id number] - [id number] Implant BEAR HIP VIVACIT/E 2MOBIL HXPE [id number]  [employer] [id number] Left 1 Implanted       Indication for Procedure:   The patient is a 63 y.o. female presents today for a Left FLAKO revision for an unstable total hip arthroplasty.  She was doing very well post-operatively until when she fell.  She had a dislocation event and subsequently underwent a closed reduction.  She has had another dislocation event with a minor event.  Recommendations were made for  left FLAKO revision.  The patient was educated in risks of surgery that could include possible risk of persistent infection, deep venous thrombosis, pulmonary embolism, fracture, neurovascular injury, leg length discrepancy, dislocation, possible persistent pain, need for additional surgeries, anesthetic risks, medical risks including heart attack and stroke, and death.  The discussion occurred in the office pre-operatively, and patient had the opportunity to ask questions, and concerns about the proposed surgery.  The patient also understood that medicine is not an exact science, and that outcomes of the surgical procedure may be less than desired. The patient wished to proceed.      Protocols for intravenous antibiotics and venous thrombosis were followed for this patient.  IV antibiotics were infused prior to surgery and will be continued likely for 6 weeks post procedure.  Thrombosis prophylaxis will be initiated within 24 hours of the completion of the surgical procedure.      Procedure:   After the patient was identified in the preoperative area, and the surgical site confirmed and marked, the patient was brought to the operating room on a stretcher.  The above anesthetic was placed uneventfully on the stretcher and the patient was transferred to the [place] operating room table.  A time-out procedure was performed.  The intravenous ancef antibiotics infusion was completed. The operative leg was then prepped and draped in usual sterile fashion.     A 10 blade scalpel was then used to make an anterior based incision over the operative hip over her old incision line.  The tensor fascia keyana fascia was split longitudinally in line with the incision.  The tensor fascia keyana was mobilized laterally sartorius medially.  Upon this the large seroma was expressed out of the left hip area.  This was all suctioned out.  Then the hip joint was exposed.  The hip was reduced into the acetabulum.  The hip was checked for  stability and the hip was not able to be dislocated through normal means.  Bone hook was placed around the femoral neck and lateral based as well as anterior based traction was applied to the femoral neck.  The hip was unable to be dislocated.  The hip was externally rotated to greater than 90 degrees and the hip was stable.  The internal rotation was performed to 90 degrees the hip was stable.  The hip was then externally rotated 45 degrees and the hip was extended with the foot almost to the floor.  The hip did not dislocate.  The leg was then brought back up to neutral maximally externally rotated, left hip traction was applied and the bone hook was placed around the femoral neck and pulled laterally.  The hip was able to be dislocated.  The femoral head was then disimpacted off the trunnion with a bone tamp.  Trunnion was in good position.  The stem was still well fixed.  Then a trial head ball with a +0 length dual mobility construct was placed on the trunnion and the hip was reduced.  It was found to be extremely stable.  The hip was again dislocated which much effort.  The final implants were opened and assembled on the back table.  There were then impacted on the trunnion.  The hip was reduced and found to be stable again in all planes.  The wound was then copiously irrigated with 3 L of Betadine laced normal saline.  A drain was placed exiting out the distally.  The TFL fascia was closed with 0 Vicryl in a running fashion, 2-0 Vicryl close deep dermis and 3-0 Monocryl with skin glue to close the skin.  Sterile dressings were applied.  She was awakened from anesthesia and returned to recovery room in stable condition.  There were no intraoperative complications.      Francis Vogel MD     Date: 9/18/2023  Time: 17:19 EDT

## 2023-09-18 NOTE — ANESTHESIA POSTPROCEDURE EVALUATION
Patient: Pearl Elizalde    Procedure Summary       Date: 09/18/23 Room / Location:  [facility] [address] [place] [address] [facility]    Anesthesia Start: 1607 Anesthesia Stop: 1744    Procedure: TOTAL HIP ARTHROPLASTY ANTERIOR REVISION WITH HANA TABLE (Left: Hip) Diagnosis:     Surgeons: Francis Vogel MD Provider: Wellington Esquivel MD    Anesthesia Type: general ASA Status: 3            Anesthesia Type: general    Vitals  Vitals Value Taken Time   /62 09/18/23 1845   Temp     Pulse 79 09/18/23 1847   Resp     SpO2 92 % 09/18/23 1847   Vitals shown include unvalidated device data.        Post Anesthesia Care and Evaluation    Patient location during evaluation: bedside  Patient participation: complete - patient participated  Level of consciousness: awake and alert  Pain management: adequate    Airway patency: patent  Anesthetic complications: No anesthetic complications  PONV Status: controlled  Cardiovascular status: acceptable  Respiratory status: acceptable  Hydration status: acceptable

## 2023-09-18 NOTE — H&P
ORTHOPEDIC SURGERY PRE-OP HISTORY AND PHYSICAL      Patient: Pearl Elizalde  Date of Admission: 9/18/2023 10:37 AM  YOB: 1960  Medical Record Number: [id number]  Attending Physician: Francis Vogel MD  Consulting Physician: Francis Vogel MD    CHIEF COMPLAINT: Left Hip Pain.    HISTORY OF PRESENT ILLNESS: Patient is a 63 y.o. female presents to [facility] with above complaints.  The patient had history of left ADÁN performed at [facility] on 8/16/23. She had a fall in the acute post-op period and had a hip dislocation.  She underwent closed reduction of her hip.  Then she had another dislocation a week later without fall.  Recommendations were made to proceed with a adán revision since she has now had 2 dislocations in a month period of time.  The patient presents for a  Left total hip revision.    ALLERGIES:   Allergies   Allergen Reactions    Oxycodone Hives and Nausea And Vomiting     Other reaction(s): Nausea And Vomiting    Penicillin G Unknown (See Comments)     Patient states hives as a child and as an adult     Oxycodone Hcl Unknown - High Severity    Penicillins Hives     As child. Unsure if can take keflex       HOME MEDICATIONS:  Medications Prior to Admission   Medication Sig Dispense Refill Last Dose    ascorbic acid (VITAMIN C) 500 MG tablet Take 1 tablet by mouth Daily.   9/17/2023    baclofen (LIORESAL) 10 MG tablet Take 1 tablet by mouth Every Night. 90 tablet 1 9/17/2023    Blood Glucose Monitoring Suppl (BLOOD GLUCOSE MONITOR SYSTEM) w/Device kit BLOOD GLUCOSE MONITOR SYSTEM w/Device KIT       Budeson-Glycopyrrol-Formoterol (Breztri Aerosphere) 160-9-4.8 MCG/ACT aerosol inhaler Inhale 2 puffs 2 (Two) Times a Day for 90 days. 3 each 1 9/17/2023    celecoxib (CeleBREX) 200 MG capsule Take 1 capsule by mouth Daily.   9/18/2023    Cetirizine HCl 10 MG capsule Take 1 dose by mouth As Needed (ALLERGIES).   9/17/2023    cholecalciferol (VITAMIN D3) 25 MCG (1000 UT) tablet  Take 1 tablet by mouth Daily.   Past Week    colesevelam (WELCHOL) 625 MG tablet Take 1 tablet by mouth 2 (Two) Times a Day With Meals. 180 tablet 3 9/17/2023    EPINEPHrine (EPIPEN) 0.3 MG/0.3ML solution auto-injector injection        ezetimibe (ZETIA) 10 MG tablet Take 1 tablet by mouth Daily. 90 tablet 3 9/17/2023    glucose blood (FREESTYLE LITE) test strip 1 each by Other route 2 (two) times a day. Use as instructed 100 each 6     hydrALAZINE (APRESOLINE) 50 MG tablet TAKE 1 TABLET THREE TIMES A DAY (Patient taking differently: Take 1 tablet by mouth Every Evening.) 270 tablet 3 9/17/2023    HYDROcodone-acetaminophen (Norco) 7.5-325 MG per tablet Take 1 tablet by mouth Every 4 (Four) Hours As Needed for Moderate Pain (max 6/day). 42 tablet 0 9/17/2023    Januvia 100 MG tablet Take 1 tablet by mouth Daily. 90 tablet 1 9/17/2023    Lancets (FREESTYLE) lancets        magnesium oxide (MAG-OX) 400 MG tablet Take 1 tablet by mouth Daily.   Past Month    metFORMIN (Glucophage) 500 MG tablet Take 1 tablet by mouth 2 (Two) Times a Day With Meals. 180 tablet 1 9/17/2023    multivitamin with minerals tablet tablet Take 1 tablet by mouth Daily. HOLD FOR SURGERY   9/17/2023    Olmesartan-amLODIPine-HCTZ 40-10-12.5 MG tablet Take 1 tablet by mouth Daily. (Patient taking differently: Take 1 tablet by mouth Every Evening.) 90 tablet 1 9/17/2023    pravastatin (PRAVACHOL) 80 MG tablet Take 1 tablet by mouth Every Night. 30 tablet 6 9/17/2023    pregabalin (LYRICA) 150 MG capsule Take 1 capsule by mouth 2 (Two) Times a Day. 180 capsule 1 9/17/2023    Zinc 30 MG capsule Take  by mouth.   9/17/2023    albuterol sulfate  (90 Base) MCG/ACT inhaler Inhale 2 puffs Every 4 (Four) Hours As Needed for Wheezing or Shortness of Air. 18 g 3 More than a month    ipratropium-albuterol (COMBIVENT RESPIMAT)  MCG/ACT inhaler Inhale 1 puff 4 (Four) Times a Day As Needed for Wheezing. 4 g 11 More than a month    ondansetron (Zofran) 4  MG tablet Take 1 tablet by mouth Every 8 (Eight) Hours As Needed for Nausea or Vomiting. 20 tablet 0 More than a month       Past Medical History:   Diagnosis Date    Asthma 2019    At risk for falls     Body mass index (BMI) of 30.0-30.9 in adult 2019    Breast cancer     NO CHEMO, NO RADIATION; LEFT    Chronic obstructive pulmonary disease 2019    Diabetic peripheral neuropathy 2019    Gastroesophageal reflux disease 2019    History of COVID-19     , ,    Hyperlipidemia     Hypertension     Low back pain     Obesity     Osteoarthritis     PONV (postoperative nausea and vomiting)     SEVERE    Slow to wake up after anesthesia     Under care of pain management specialist      Past Surgical History:   Procedure Laterality Date    ADENOIDECTOMY      CHOLECYSTECTOMY      COLONOSCOPY      ENDOSCOPY      EYE SURGERY      CATARACTS    INNER EAR SURGERY      STAPENDECTOMY    KNEE SURGERY Bilateral     MULTIPLE    MASTECTOMY Left     REPLACEMENT TOTAL KNEE Left     REPLACEMENT TOTAL KNEE Right     TONSILLECTOMY      TOTAL HIP ARTHROPLASTY Left 2023    Procedure: TOTAL HIP ARTHROPLASTY ANTERIOR WITH HANA TABLE;  Surgeon: Francis Vogel MD;  [place] [facility] [place] [facility] [place]  Service: [place]opedics;  Laterality: Left;     Social History     Occupational History    Not on file   Tobacco Use    Smoking status: Former     Packs/day: 0.20     Years: 0.50     Pack years: 0.10     Types: Cigarettes     Quit date:      Years since quittin.7     Passive exposure: Past    Smokeless tobacco: Never   Vaping Use    Vaping Use: Never used   Substance and Sexual Activity    Alcohol use: Yes     Comment: Occassional    Drug use: No    Sexual activity: Yes     Partners: Male     Birth control/protection: None      Social History     Social History Narrative    Not on file     Family History   Adopted: Yes   Problem Relation Age of Onset    Malig Hyperthermia Neg Hx        REVIEW OF SYSTEMS:     HEENT: Patient denies any headaches, vision changes, change in hearing, or tinnitus, Patient denies epistaxis, sinus pain, hoarseness, or dysphagia   Pulmonary: Patient denies any cough, congestion, acute change in SOA or wheezing.   Cardiovascular: Patient denies any change in chest pain, dyspnea, palpitations, weakness, intolerance of exercise, varicosities, change in murmur   Gastrointestinal:  Patient denies change in appetite, melena, change in bowel habits.   Genital/Urinary: Patient denies dysuria, change in color of urine, change in frequency of urination, pain with urgency, change in incontinence, retention.   Musculoskeletal: Patient denies complaints of acute changes in symptoms of other joints not mentioned above.   Neurological: Patient denies changes in dizziness, tremor, ataxia, or difficulty in speaking or changes in memory.   Endocrine system: Patient denies acute changes in tremors, palpitations, polyuria, polydipsia, polyphagia, diaphoresis, exophthalmos, or goiter.   Psychological: Patient denies thoughts/plans or harming self or other; denies acute changes in depression,  insomnia, night terrors, mely, disorientation.   Skin: Patient denies any bruising, rashes, discoloration, pruritus,or wounds not mentioned in history of present illness or chief complaint above.   Hematopoietic: Patient denies current bleeding, epistaxis, hematuria, or melena.    PHYSICAL EXAM:   Vitals:  Vitals:    09/18/23 1108 09/18/23 1141   BP: 124/79    BP Location: Right arm    Patient Position: Sitting    Pulse: 73    Resp: 18    Temp:  98.1 °F (36.7 °C)   TempSrc:  Oral   SpO2: 100%        General:  63 y.o. female who appears about stated age.    Alert, cooperative, in no acute distress                       Head:    Normocephalic, without obvious abnormality, atraumatic   Eyes:            Lids and lashes normal, conjunctivae and sclerae normal, no         icterus, no pallor, corneas clear, PERRLA   Ears:    Ears  appear intact with no abnormalities noted   Throat:   No oral lesions, no thrush, oral mucosa moist   Neck:   No adenopathy, supple, trachea midline, no JVD   Back:     Limited exam shows no severe kyphosis present,no visible           erythema, no excessive  tenderness to palpation.    Lungs:     Respirations regular, even and unlabored.     Heart:    Normal rate, Pulses palpable   Chest Wall:    No abnormalities observed.   Abdomen:     Normal bowel sounds, no masses, no organomegaly, soft              non-tender, non-distended, no guarding, no rebound                      tenderness   Rectal:     Deferred   Pulses:   Pulses palpable and equal bilaterally   Skin:   No bleeding, bruising or rash   Lymph nodes:   No palpable adenopathy   Extremities:     Left Hip: Skin intact.  Healed incision. Painful ROM.  NVI distally.      DIAGNOSTIC TEST:  Admission on 09/18/2023   Component Date Value Ref Range Status    Glucose 09/18/2023 138 (H)  70 - 130 mg/dL Final    WBC 09/18/2023 6.59  3.40 - 10.80 10*3/mm3 Final    RBC 09/18/2023 4.11  3.77 - 5.28 10*6/mm3 Final    Hemoglobin 09/18/2023 11.5 (L)  12.0 - 15.9 g/dL Final    Hematocrit 09/18/2023 35.2  34.0 - 46.6 % Final    MCV 09/18/2023 85.6  79.0 - 97.0 fL Final    MCH 09/18/2023 28.0  26.6 - 33.0 pg Final    MCHC 09/18/2023 32.7  31.5 - 35.7 g/dL Final    RDW 09/18/2023 14.0  12.3 - 15.4 % Final    RDW-SD 09/18/2023 43.1  37.0 - 54.0 fl Final    MPV 09/18/2023 10.3  6.0 - 12.0 fL Final    Platelets 09/18/2023 224  140 - 450 10*3/mm3 Final    Neutrophil % 09/18/2023 67.4  42.7 - 76.0 % Final    Lymphocyte % 09/18/2023 20.5  19.6 - 45.3 % Final    Monocyte % 09/18/2023 7.3  5.0 - 12.0 % Final    Eosinophil % 09/18/2023 3.2  0.3 - 6.2 % Final    Basophil % 09/18/2023 0.8  0.0 - 1.5 % Final    Immature Grans % 09/18/2023 0.8 (H)  0.0 - 0.5 % Final    Neutrophils, Absolute 09/18/2023 4.45  1.70 - 7.00 10*3/mm3 Final    Lymphocytes, Absolute 09/18/2023 1.35  0.70 - 3.10  10*3/mm3 Final    Monocytes, Absolute 09/18/2023 0.48  0.10 - 0.90 10*3/mm3 Final    Eosinophils, Absolute 09/18/2023 0.21  0.00 - 0.40 10*3/mm3 Final    Basophils, Absolute 09/18/2023 0.05  0.00 - 0.20 10*3/mm3 Final    Immature Grans, Absolute 09/18/2023 0.05  0.00 - 0.05 10*3/mm3 Final    nRBC 09/18/2023 0.0  0.0 - 0.2 /100 WBC Final       No results found.      ASSESSMENT:  Left Unstable total hip arthroplasty      PLAN:    Left total hip arthroplasty revision, anterior approach. Patient wants to go home today.  Will plan on outpatient procedure.    Risks and benefits of surgical intervention were discussed in detail with the patient.  Risks of infection, fracture, dislocation, extremity length discrepancy, neurovascular injury, persistent pain, medical risks, anesthetic risk, need for additional surgery, deep venous thrombosis, pulmonary embolism and death.      The above diagnosis and treatment plan was discussed with the patient and/or family.  They were educated in both non-surgical and surgical treatment options for their condition.   They were given the opportunity to ask questions and were answered to their satisfaction.  They agreed to proceed with the above treatment plan.        Francis Vogel MD  Date: 9/18/2023

## 2023-09-18 NOTE — ANESTHESIA PREPROCEDURE EVALUATION
Anesthesia Evaluation     history of anesthetic complications:  PONV               Airway   Mallampati: II  TM distance: >3 FB  Neck ROM: full  no difficulty expected  Dental - normal exam     Pulmonary    (+) COPD, asthma,  (-) decreased breath sounds, wheezes  Cardiovascular - normal exam    (+) hypertension, hyperlipidemia      Neuro/Psych  GI/Hepatic/Renal/Endo    (+) obesity, GERD, diabetes mellitus    Musculoskeletal     Abdominal    Substance History      OB/GYN          Other                      Anesthesia Plan    ASA 3     general     (total IV anesthesia  (Hx of severe PONV and prolonged sedation following anesthesia. Plan for TIVA with BIS monitoring)    Has only been nausea free after TIVA.   )  intravenous induction     Anesthetic plan, risks, benefits, and alternatives have been provided, discussed and informed consent has been obtained with: patient.    CODE STATUS:

## 2023-09-18 NOTE — DISCHARGE PLACEMENT REQUEST
"Pearl Nelson (63 y.o. Female)    YOB: 1960  Social Security Number:   Address: [address] [place] [address] [address] [place] [facility]  Home Phone: [phone]  MRN: [id number]  Congregational: None  Marital Status:         Admission Date: 9/18/23  Admission Type: Elective  Admitting Provider: Francis Vogel MD  Attending Provider: Francis Vogel MD  Department, Room/Bed: [facility] OR, [facility] OR/[facility] OR  Discharge Date:   Discharge Disposition:   Discharge Destination:               Attending Provider: Francis Vogel MD    Allergies: Oxycodone, Penicillin G, Oxycodone Hcl, Penicillins    Isolation: None   Infection: None   Code Status: Not on file    Ht: 167.6 cm (66\")   Wt: 86.6 kg (191 lb)    Admission Cmt: None   Principal Problem: None                  Active Insurance as of 9/18/2023       Primary Coverage       Payor Plan Insurance Group Employer/Plan Group    HUMANA MEDICARE REPLACEMENT HUMANA MEDICARE REPLACEMENT [id number]       Payor Plan Address Payor Plan Phone Number Payor Plan Fax Number Effective Dates    [address] [address] [phone]  5/1/2023 - None Entered    [facility] [id number]         Subscriber Name Subscriber Birth Date Member ID       PEARL NELSON 1960 [id number]               Secondary Coverage       Payor Plan Insurance Group Employer/Plan Group     FOR LIFE  FOR LIFE MC SUP         Payor Plan Address Payor Plan Phone Number Payor Plan Fax Number Effective Dates    [address] [phone]  1/1/2023 - None Entered    [facility] [address]         Subscriber Name Subscriber Birth Date Member ID       PEARL NELSON 1960 [id number]                     Emergency Contacts        (Rel.) Home Phone Work Phone Mobile Phone    LeslieKushal (Spouse) [phone] None [phone]    LESLIEANN (Daughter) [phone] None None                "

## 2023-09-18 NOTE — ANESTHESIA PROCEDURE NOTES
Airway  Urgency: elective    Date/Time: 9/18/2023 4:19 PM  Airway not difficult    General Information and Staff    Patient location during procedure: OR    Indications and Patient Condition  Indications for airway management: airway protection    Preoxygenated: yes  Mask difficulty assessment: 1 - vent by mask    Final Airway Details  Final airway type: endotracheal airway      Successful airway: ETT  Cuffed: yes   Successful intubation technique: direct laryngoscopy  Endotracheal tube insertion site: oral  Blade: Reshma  Blade size: 3  ETT size (mm): 7.0  Cormack-Lehane Classification: grade I - full view of glottis  Placement verified by: chest auscultation and capnometry   Measured from: lips  ETT/EBT  to lips (cm): 20  Number of attempts at approach: 1  Assessment: lips, teeth, and gum same as pre-op and atraumatic intubation

## 2023-09-19 ENCOUNTER — TRANSITIONAL CARE MANAGEMENT TELEPHONE ENCOUNTER (OUTPATIENT)
Dept: CALL CENTER | Facility: HOSPITAL | Age: 63
End: 2023-09-19
Payer: MEDICARE

## 2023-09-19 NOTE — OUTREACH NOTE
Call Center TCM Note      Flowsheet Row Responses   [facility] patient discharged from? [place]   Does the patient have one of the following disease processes/diagnoses(primary or secondary)? Total Joint Replacement   Joint surgery performed? Hip   TCM attempt successful? Yes   Call start time 1225   Call end time 1228   Has the patient been back in either the hospital or Emergency Department since discharge? No   Discharge diagnosis Left Total Hip Arthroplasty Revision with Femoral Head Ball Exchange   Does the patient have all medications related to this admission filled (includes all antibiotics, pain medications, etc.) Yes   Is the patient taking all medications as directed (includes completed medication regime)? Yes   Is the patient able to teach back alternate methods of pain control? Ice, Reposition, Correct alignment, Knee-elevation/no pillow under knee, Short, frequent activity   Does the patient have an appointment with their PCP within 7-14 days of discharge? Yes   What is the Home health agency?  [facility]    Has home health visited the patient within 72 hours of discharge? Yes   Psychosocial issues? No   Has the patient began therapy sessions (either in the home or as an out patient)? No   Does the patient have a wound vac in place? N/A   Has the patient fallen since discharge? No   Did the patient receive a copy of their discharge instructions? Yes   What is the patient's perception of their functional status since discharge? Improving   Is the patient able to teach back signs and symptoms of infection? Temp >100.4 for 24h or longer, Blisters around incision, Severe discomfort or pain, Shortness of breath or chest pain, Changes in mobility, Increased swelling or redness around incision (not associated with surgical edema), Incisional drainage   Is the patient able to teach back how to prevent infection? Check incision daily, Shower only as directed by surgeon, No lotion or creams, Wash hands  before and after touching incision, Keep incision covered if drainage, Eat well-balanced diet, No tub baths, hot tub or swimming, Keep incision covered if pets in house, Monitor blood sugar if diabetic   Is the patient able to teach back signs and symptoms of DVT? Redness in calf, Swelling in calf, Area hot to touch, Severe pain in calf, Shortness of breath or chest pain   Is the patient able to teach back home safety measures? Ability to shower, Modifications with ADLs such as dressing, cooking, toileting, Accessibility to necessary areas in home, Modifications to reach items   Did the patient implement home safety suggestions from pre-surgery classes if attended? N/A   If the patient is a current smoker, are they able to teach back resources for cessation? Not a smoker   Is the patient/caregiver able to teach back the hierarchy of who to call/visit for symptoms/problems? PCP, Specialist, Home health nurse, Urgent Care, ED, 911 Yes   TCM call completed? Yes   Wrap up additional comments D/C DX: Left Total Hip Arthroplasty Revision with Femoral Head Ball Exchange - THR 08/16/2023, closed dislocation 09/02 & 09/11/2023, prompting revision sx. Pt doing pretty well. KORT  will be following. Pt has upcoming [place] and [place] trips and wants to get past all this. New rx's asa, Cephalexin in place. FIrst POST OP is 09/28/2023. Pt has WELCOME TO  visit with PCP Moriah Hooper 10/02/2023. this date is still within TCM APPT if provider chooses to satisfy this vist instead.   Call end time [id number]            Elma Malhotra MA    9/19/2023, 12:38 EDT

## 2023-09-19 NOTE — OUTREACH NOTE
Prep Survey      Flowsheet Row Responses   [facility] facility patient discharged from? [place]   Is LACE score < 7 ? Yes   Eligibility [facility]   Date of Admission 09/18/23   Date of Discharge 09/18/23   Discharge Disposition Home-Health Care [facility] [place] [facility]   Discharge diagnosis Left Total Hip Arthroplasty Revision with Femoral Head Ball Exchange   Does the patient have one of the following disease processes/diagnoses(primary or secondary)? Total Joint Replacement   Does the patient have Home health ordered? Yes   What is the Home health agency?  [facility]    Is there a DME ordered? No   Prep survey completed? Yes            Nena MANCILLA - Registered Nurse

## 2023-09-20 ENCOUNTER — TELEPHONE (OUTPATIENT)
Dept: ORTHOPEDIC SURGERY | Facility: HOSPITAL | Age: 63
End: 2023-09-20
Payer: MEDICARE

## 2023-09-20 NOTE — TELEPHONE ENCOUNTER
Attempted to reach Ms. Elizalde to see how she is doing as she is POD 2 LTH Revision. Message left at this time.

## 2023-09-21 DIAGNOSIS — E78.5 HYPERLIPIDEMIA, UNSPECIFIED HYPERLIPIDEMIA TYPE: ICD-10-CM

## 2023-09-21 RX ORDER — PRAVASTATIN SODIUM 80 MG/1
80 TABLET ORAL NIGHTLY
Qty: 90 TABLET | Refills: 0 | Status: SHIPPED | OUTPATIENT
Start: 2023-09-21

## 2023-09-27 DIAGNOSIS — K59.1 FUNCTIONAL DIARRHEA: ICD-10-CM

## 2023-09-27 RX ORDER — COLESEVELAM 180 1/1
TABLET ORAL
Qty: 180 TABLET | Refills: 3 | Status: SHIPPED | OUTPATIENT
Start: 2023-09-27

## 2023-09-27 NOTE — TELEPHONE ENCOUNTER
Rx Refill Note  Requested Prescriptions     Pending Prescriptions Disp Refills   • colesevelam (WELCHOL) 625 MG tablet [Pharmacy Med Name: COLESEVELAM HCL TABS 625MG] 180 tablet 3     Sig: TAKE 1 TABLET TWICE A DAY WITH MEALS      Last office visit with prescribing clinician: 7/3/2023   Last telemedicine visit with prescribing clinician: Visit date not found   Next office visit with prescribing clinician: 10/2/2023       Would you like a call back once the refill request has been completed: [] Yes [] No    If the office needs to give you a call back, can they leave a voicemail: [] Yes [] No    Gala Elizondo MA  09/27/23, 10:15 EDT

## 2023-09-29 DIAGNOSIS — E11.65 TYPE 2 DIABETES MELLITUS WITH HYPERGLYCEMIA, WITHOUT LONG-TERM CURRENT USE OF INSULIN: ICD-10-CM

## 2023-09-29 RX ORDER — SITAGLIPTIN 100 MG/1
100 TABLET, FILM COATED ORAL DAILY
Qty: 90 TABLET | Refills: 0 | Status: SHIPPED | OUTPATIENT
Start: 2023-09-29 | End: 2023-10-02

## 2023-09-29 NOTE — TELEPHONE ENCOUNTER
Caller: Pearl Elizalde    Relationship: Self    Best call back number:     Requested Prescriptions:   Requested Prescriptions     Pending Prescriptions Disp Refills    Januvia 100 MG tablet 90 tablet 1     Sig: Take 1 tablet by mouth Daily.        Pharmacy where request should be sent: [facility] [address] [phone] [facility] [phone] FX     Last office visit with prescribing clinician: 7/3/2023   Last telemedicine visit with prescribing clinician: Visit date not found   Next office visit with prescribing clinician: 10/2/2023     Additional details provided by patient:     Does the patient have less than a 3 day supply:  [x] Yes  [] No    Would you like a call back once the refill request has been completed: [] Yes [] No    If the office needs to give you a call back, can they leave a voicemail: [] Yes [] No    Connor Morales   09/29/23 11:51 EDT

## 2023-10-02 ENCOUNTER — TELEPHONE (OUTPATIENT)
Dept: FAMILY MEDICINE CLINIC | Facility: CLINIC | Age: 63
End: 2023-10-02

## 2023-10-02 ENCOUNTER — TELEPHONE (OUTPATIENT)
Dept: ORTHOPEDIC SURGERY | Facility: HOSPITAL | Age: 63
End: 2023-10-02
Payer: MEDICARE

## 2023-10-02 ENCOUNTER — OFFICE VISIT (OUTPATIENT)
Dept: FAMILY MEDICINE CLINIC | Facility: CLINIC | Age: 63
End: 2023-10-02
Payer: MEDICARE

## 2023-10-02 VITALS
HEIGHT: 66 IN | BODY MASS INDEX: 31.15 KG/M2 | WEIGHT: 193.8 LBS | TEMPERATURE: 98.9 F | HEART RATE: 82 BPM | DIASTOLIC BLOOD PRESSURE: 66 MMHG | SYSTOLIC BLOOD PRESSURE: 100 MMHG | OXYGEN SATURATION: 97 %

## 2023-10-02 DIAGNOSIS — E11.65 TYPE 2 DIABETES MELLITUS WITH HYPERGLYCEMIA, WITHOUT LONG-TERM CURRENT USE OF INSULIN: ICD-10-CM

## 2023-10-02 DIAGNOSIS — Z00.00 WELCOME TO MEDICARE PREVENTIVE VISIT: Primary | ICD-10-CM

## 2023-10-02 DIAGNOSIS — Z29.11 NEED FOR RSV VACCINATION: ICD-10-CM

## 2023-10-02 DIAGNOSIS — I10 ESSENTIAL HYPERTENSION: ICD-10-CM

## 2023-10-02 RX ORDER — TIRZEPATIDE 2.5 MG/.5ML
2.5 INJECTION, SOLUTION SUBCUTANEOUS WEEKLY
Qty: 2 ML | Refills: 1 | Status: SHIPPED | OUTPATIENT
Start: 2023-10-02 | End: 2023-10-02

## 2023-10-02 RX ORDER — SITAGLIPTIN 100 MG/1
100 TABLET, FILM COATED ORAL DAILY
Start: 2023-10-02 | End: 2023-10-03 | Stop reason: SDUPTHER

## 2023-10-02 NOTE — PROGRESS NOTES
The ABCs of the Annual Wellness Visit  [facility] Visit    Subjective     Pearl Elizalde is a 63 y.o. female who presents for a  Welcome to Medicare Visit.    The following portions of the patient's history were reviewed and   updated as appropriate: allergies, current medications, past family history, past medical history, past social history, past surgical history, and problem list.     Compared to one year ago, the patient feels her physical   health is worse.    Compared to one year ago, the patient feels her mental   health is the same.    Recent Hospitalizations:  This patient has had a [facility] admission record on file within the last 365 days.    Current Medical Providers:  Patient Care Team:  Rosa Hooper APRN as PCP - General (Nurse Practitioner)  Lex Serna MD as Consulting Physician (Endocrinology)  Elvis Park MD as Consulting Physician (General Surgery)  Mandy Thomson APRN as Nurse Practitioner (Nurse Practitioner)  Francis Vogel MD as Consulting Physician (Orthopedic Surgery)  Zack Sevilla MD as Consulting Physician (Pain Medicine)    Outpatient Medications Prior to Visit   Medication Sig Dispense Refill    ascorbic acid (VITAMIN C) 500 MG tablet Take 1 tablet by mouth Daily.      aspirin 81 MG EC tablet Take 1 tablet by mouth 2 (Two) Times a Day. 60 tablet 0    baclofen (LIORESAL) 10 MG tablet Take 1 tablet by mouth Every Night. 90 tablet 1    Blood Glucose Monitoring Suppl (BLOOD GLUCOSE MONITOR SYSTEM) w/Device kit BLOOD GLUCOSE MONITOR SYSTEM w/Device KIT      Budeson-Glycopyrrol-Formoterol (Breztri Aerosphere) 160-9-4.8 MCG/ACT aerosol inhaler Inhale 2 puffs 2 (Two) Times a Day for 90 days. 3 each 1    celecoxib (CeleBREX) 200 MG capsule Take 1 capsule by mouth As Needed.      Cetirizine HCl 10 MG capsule Take 1 dose by mouth As Needed (ALLERGIES).      cholecalciferol (VITAMIN D3) 25 MCG (1000 UT) tablet Take 1 tablet by mouth Daily.       colesevelam (WELCHOL) 625 MG tablet TAKE 1 TABLET TWICE A DAY WITH MEALS 180 tablet 3    EPINEPHrine (EPIPEN) 0.3 MG/0.3ML solution auto-injector injection       ezetimibe (ZETIA) 10 MG tablet Take 1 tablet by mouth Daily. 90 tablet 3    glucose blood (FREESTYLE LITE) test strip 1 each by Other route 2 (two) times a day. Use as instructed 100 each 6    Lancets (FREESTYLE) lancets       metFORMIN (Glucophage) 500 MG tablet Take 1 tablet by mouth 2 (Two) Times a Day With Meals. 180 tablet 1    multivitamin with minerals tablet tablet Take 1 tablet by mouth Daily. HOLD FOR SURGERY      Olmesartan-amLODIPine-HCTZ 40-10-12.5 MG tablet Take 1 tablet by mouth Daily. (Patient taking differently: Take 1 tablet by mouth Every Evening.) 90 tablet 1    ondansetron (Zofran) 4 MG tablet Take 1 tablet by mouth Every 8 (Eight) Hours As Needed for Nausea or Vomiting. 20 tablet 0    pregabalin (LYRICA) 150 MG capsule Take 1 capsule by mouth 2 (Two) Times a Day. 180 capsule 1    Zinc 30 MG capsule Take  by mouth.      hydrALAZINE (APRESOLINE) 50 MG tablet TAKE 1 TABLET THREE TIMES A DAY (Patient taking differently: Take 1 tablet by mouth Every Evening.) 270 tablet 3    Januvia 100 MG tablet Take 1 tablet by mouth Daily. 90 tablet 0    albuterol sulfate  (90 Base) MCG/ACT inhaler Inhale 2 puffs Every 4 (Four) Hours As Needed for Wheezing or Shortness of Air. (Patient not taking: Reported on 10/2/2023) 18 g 3    HYDROcodone-acetaminophen (Norco) 7.5-325 MG per tablet Take 1 tablet by mouth Every 4 (Four) Hours As Needed for Moderate Pain (max 6/day). (Patient not taking: Reported on 10/2/2023) 42 tablet 0    ipratropium-albuterol (COMBIVENT RESPIMAT)  MCG/ACT inhaler Inhale 1 puff 4 (Four) Times a Day As Needed for Wheezing. (Patient not taking: Reported on 10/2/2023) 4 g 11    magnesium oxide (MAG-OX) 400 MG tablet Take 1 tablet by mouth Daily. (Patient not taking: Reported on 10/2/2023)      ondansetron (Zofran) 4 MG  tablet Take 1 tablet by mouth Daily As Needed for Nausea or Vomiting. (Patient not taking: Reported on 10/2/2023) 30 tablet 1    pravastatin (PRAVACHOL) 80 MG tablet TAKE 1 TABLET BY MOUTH EVERY NIGHT (Patient not taking: Reported on 10/2/2023) 90 tablet 0     No facility-administered medications prior to visit.       No opioid medication identified on active medication list. I have reviewed chart for other potential  high risk medication/s and harmful drug interactions in the elderly.        Aspirin is on active medication list. Aspirin use is indicated based on review of current medical condition/s. Pros and cons of this therapy have been discussed today. Benefits of this medication outweigh potential harm.  Patient has been encouraged to continue taking this medication.  .      Patient Active Problem List   Diagnosis    Allergic rhinitis    Anxiety    Asthma    Body mass index (BMI) of 30.0-30.9 in adult    Chronic obstructive pulmonary disease    Diabetic peripheral neuropathy    Disorder associated with type 2 diabetes mellitus    Gastroesophageal reflux disease    Hyperlipidemia    Hypertension    Hypomagnesemia    Onychomycosis of toenail    Palpitations    Vitamin D deficiency    Post-menopausal    Obesity    Female stress incontinence    Nocturia    Rectocele    Uterovaginal prolapse, incomplete    Low back pain    Hip pain, bilateral    Uterovaginal prolapse    Atypical lobular hyperplasia (ALH) of right breast    Artificial lens present    Primary osteoarthritis of left hip    Osteoarthritis of left hip, unspecified osteoarthritis type    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  ACP discussion was held with the patient during this visit. Patient does not have an advance directive, information provided.       Objective   Vitals:    10/02/23 0942   BP: 100/66   BP Location:  "Right arm   Patient Position: Sitting   Cuff Size: Large Adult   Pulse: 82   Temp: 98.9 °F (37.2 °C)   TempSrc: Tympanic   SpO2: 97%   Weight: 87.9 kg (193 lb 12.8 oz)   Height: 167.6 cm (65.98\")     Estimated body mass index is 31.3 kg/m² as calculated from the following:    Height as of this encounter: 167.6 cm (65.98\").    Weight as of this encounter: 87.9 kg (193 lb 12.8 oz).           Does the patient have evidence of cognitive impairment?   No    Lab Results   Component Value Date    HGBA1C 7.70 (H) 2023       Procedures       HEALTH RISK ASSESSMENT    Smoking Status:  Social History     Tobacco Use   Smoking Status Former    Packs/day: 0.20    Years: 0.50    Pack years: 0.10    Types: Cigarettes    Quit date:     Years since quittin.7    Passive exposure: Past   Smokeless Tobacco Never     Alcohol Consumption:  Social History     Substance and Sexual Activity   Alcohol Use Yes    Comment: Occassional       Fall Risk Screen:    ALBARO Fall Risk Assessment was completed, and patient is at HIGH risk for falls. Assessment completed on:10/2/2023    Depression Screen:       10/2/2023     9:48 AM   PHQ-2/PHQ-9 Depression Screening   Little Interest or Pleasure in Doing Things 0-->not at all   Feeling Down, Depressed or Hopeless 0-->not at all   PHQ-9: Brief Depression Severity Measure Score 0       Health Habits and Functional and Cognitive Screening:      10/2/2023     9:48 AM   Functional & Cognitive Status   Do you have difficulty preparing food and eating? No   Do you have difficulty bathing yourself, getting dressed or grooming yourself? No   Do you have difficulty using the toilet? No   Do you have difficulty moving around from place to place? No   Do you have trouble with steps or getting out of a bed or a chair? No   Current Diet Limited Junk Food   Dental Exam Up to date   Eye Exam Up to date   Exercise (times per week) 0 times per week   Current Exercises Include No Regular Exercise   Do you " need help using the phone?  No   Are you deaf or do you have serious difficulty hearing?  No   Do you need help to go to places out of walking distance? No   Do you need help shopping? No   Do you need help preparing meals?  No   Do you need help with housework?  No   Do you need help with laundry? No   Do you need help taking your medications? No   Do you need help managing money? No   Do you ever drive or ride in a car without wearing a seat belt? No   Have you felt unusual stress, anger or loneliness in the last month? Yes   Who do you live with? Spouse   If you need help, do you have trouble finding someone available to you? No   Have you been bothered in the last four weeks by sexual problems? No   Do you have difficulty concentrating, remembering or making decisions? No       Visual Acuity:    Vision Screening    Right eye Left eye Both eyes   Without correction 20/40 20/30 20/40   With correction          Age-appropriate Screening Schedule:  Refer to the list below for future screening recommendations based on patient's age, sex and/or medical conditions. Orders for these recommended tests are listed in the plan section. The patient has been provided with a written plan.    Health Maintenance   Topic Date Due    ZOSTER VACCINE (1 of 2) Never done    ANNUAL WELLNESS VISIT  Never done    DIABETIC FOOT EXAM  05/10/2022    URINE MICROALBUMIN  06/14/2022    INFLUENZA VACCINE  Never done    COVID-19 Vaccine (2 - 2023-24 season) 09/01/2023    MAMMOGRAM  01/27/2024    HEMOGLOBIN A1C  02/16/2024    COLORECTAL CANCER SCREENING  05/01/2024    LIPID PANEL  06/19/2024    BMI FOLLOWUP  07/03/2024    DIABETIC EYE EXAM  08/07/2024    DXA SCAN  06/05/2025    TDAP/TD VACCINES (2 - Td or Tdap) 08/30/2029    HEPATITIS C SCREENING  Completed    Pneumococcal Vaccine 65+  Completed    PAP SMEAR  Discontinued        CMS Preventative Services Quick Reference  Risk Factors Identified During Encounter    Fall Risk-High or Moderate:  Discussed Fall Prevention in the home and Information on Fall Prevention Shared in After Visit Summary  Immunizations Discussed/Encouraged: Influenza and RSV  The above risks/problems have been discussed with the patient.  Pertinent information has been shared with the patient in the After Visit Summary.    Follow Up:   Initial Medicare Visit in one year    An After Visit Summary and PPPS were made available to the patient.      Additional E&M Note during same encounter follows:  Patient has multiple medical problems which are significant and separately identifiable that require additional work above and beyond the Medicare Wellness Visit.      Chief Complaint  Welcome To Medicare    Subjective        HPI  Pearl Elizalde is also being seen today for follow-up hypertension, diabetes and follow-up from left total hip replacement.  She reports that she recently had left hip pop out of place x 2 was seen in ER and had it put back in place.  She then followed up with ortho and was scheduled for repeat surgery 2 weeks ago and had a larger ball placed.  She state she is feeling well and that hip has not popped out since then.    She reports concern for her BP running low.  She denies feeling dizzy when standing up but states it has been low enough and physical therapy and they have discussed holding PT at times.  Instructed patient to stop hydralazine at this time, but continue to monitor blood pressure to ensure we do not need to adjust dosing further.   She also reports that she could not tolerate ozempic.  She states it made her feel jittery and overall not well.  She is currently taking metformin and Januvia for diabetes and states she tolerates it well but is also asking for something to help her lose weight.  She is concerned with limited mobility currently until her hip heals that she will continue to gain weight.  Discussed with patient trying Mounjaro but she is agreeable at this time.  She states her   "has been taking Contrave and asked about it.  Discussed with her that the dual benefit of Mounjaro would be more beneficial at this time.         Objective   Vital Signs:  /66 (BP Location: Right arm, Patient Position: Sitting, Cuff Size: Large Adult)   Pulse 82   Temp 98.9 °F (37.2 °C) (Tympanic)   Ht 167.6 cm (65.98\")   Wt 87.9 kg (193 lb 12.8 oz)   SpO2 97%   BMI 31.30 kg/m²     Physical Exam  Vitals and nursing note reviewed.   Constitutional:       Appearance: Normal appearance. She is well-developed. She is obese.   HENT:      Head: Normocephalic and atraumatic.      Right Ear: Tympanic membrane, ear canal and external ear normal.      Left Ear: Tympanic membrane, ear canal and external ear normal.      Nose: Nose normal.   Eyes:      Extraocular Movements: Extraocular movements intact.      Conjunctiva/sclera: Conjunctivae normal.      Pupils: Pupils are equal, round, and reactive to light.   Cardiovascular:      Rate and Rhythm: Normal rate and regular rhythm.      Pulses: Normal pulses.      Heart sounds: Normal heart sounds.   Pulmonary:      Effort: Pulmonary effort is normal.      Breath sounds: Normal breath sounds.   Abdominal:      General: Bowel sounds are normal.      Palpations: Abdomen is soft.   Genitourinary:     Vagina: Normal.   Musculoskeletal:         General: Normal range of motion.      Cervical back: Normal range of motion and neck supple.   Skin:     General: Skin is warm and dry.   Neurological:      General: No focal deficit present.      Mental Status: She is alert and oriented to person, place, and time.   Psychiatric:         Mood and Affect: Mood normal.         Behavior: Behavior normal.         Judgment: Judgment normal.                    Assessment and Plan   Diagnoses and all orders for this visit:    1. Welcome to Medicare preventive visit (Primary)  -     ECG 12 Lead    2. Need for RSV vaccination  -     Discontinue: RSVPreF3 Vac Recomb Adjuvanted (AREXVY) 120 " MCG/0.5ML reconstituted suspension injection; Inject 0.5 mL into the appropriate muscle as directed by prescriber 1 (One) Time for 1 dose.  Dispense: 0.5 mL; Refill: 0  -     RSVPreF3 Vac Recomb Adjuvanted (AREXVY) 120 MCG/0.5ML reconstituted suspension injection; Inject 0.5 mL into the appropriate muscle as directed by prescriber 1 (One) Time for 1 dose.  Dispense: 0.5 mL; Refill: 0    3. Type 2 diabetes mellitus with hyperglycemia, without long-term current use of insulin  -     Discontinue: Tirzepatide (Mounjaro) 2.5 MG/0.5ML solution pen-injector; Inject 0.5 mL under the skin into the appropriate area as directed 1 (One) Time Per Week.  Dispense: 2 mL; Refill: 1    4. Essential hypertension  Comments:  Running low at this time.  Patient instructed to stop hydralazine and continue to monitor blood pressure    Other orders  -     SCANNED EKG  -     Januvia 100 MG tablet; Take 1 tablet by mouth Daily.             Follow Up   Return in about 3 months (around 1/2/2024).  Patient was given instructions and counseling regarding her condition or for health maintenance advice. Please see specific information pulled into the AVS if appropriate.

## 2023-10-02 NOTE — TELEPHONE ENCOUNTER
Called and spoke with Ms. Elizalde at this time to see how she is doing as she is 2 weeks SP LTH Revision. She said she is doing great. She has seen the MD and everything looks good. She said her hip has popped a few times but the MD said that was normal. She is walking with a cane when outside, otherwise she doesn't require any DME. Ms. Elizalde doesn't have any questions for me at this time. She has my contact information should she need anything.

## 2023-10-02 NOTE — TELEPHONE ENCOUNTER
PATIENT CALLED TO SAY SHE WENT TO THE PHARMACY TO  THE MOUNJARO AND IT WAS 1000.00. SHE IS NOT GOING TO SPEND THAT ON A MEDICATION, AND JUST WANTED TO LET YOU KNOW.

## 2023-10-02 NOTE — PROGRESS NOTES
Procedure     ECG 12 Lead    Date/Time: 10/2/2023 9:54 AM  Performed by: Rosa Hooper APRN  Authorized by: Rosa Hooper APRN   Rhythm: sinus rhythm  Rate: normal  Conduction: conduction normal  ST Segments: ST segments normal  T Waves: T waves normal  QRS axis: normal  Other: no other findings    Clinical impression: normal ECG

## 2023-10-03 RX ORDER — SITAGLIPTIN 100 MG/1
100 TABLET, FILM COATED ORAL DAILY
Qty: 90 TABLET | Refills: 0 | Status: SHIPPED | OUTPATIENT
Start: 2023-10-03

## 2023-10-03 RX ORDER — SITAGLIPTIN 100 MG/1
100 TABLET, FILM COATED ORAL DAILY
Qty: 90 TABLET | Refills: 1 | Status: SHIPPED | OUTPATIENT
Start: 2023-10-03 | End: 2023-10-03 | Stop reason: SDUPTHER

## 2023-10-03 NOTE — TELEPHONE ENCOUNTER
I will send it to [facility].  Also please let her know that I have done additional research on Contrave and it actually is a medication that I can prescribe for her.  Let me know if she still wants me to try sending it in and which pharmacy.  Thanks

## 2023-10-03 NOTE — TELEPHONE ENCOUNTER
Caller: Pearl Elizalde    Relationship: Self    Best call back number: [phone]     Requested Prescriptions:   Requested Prescriptions     Pending Prescriptions Disp Refills    Januvia 100 MG tablet 90 tablet 1     Sig: Take 1 tablet by mouth Daily.        Pharmacy where request should be sent: [id number] - [place], [place] - [address] AT [facility] [address] - [phone]  - [phone]     Last office visit with prescribing clinician: 10/2/2023   Last telemedicine visit with prescribing clinician: Visit date not found   Next office visit with prescribing clinician: 1/3/2024     Additional details provided by patient: PATIENT STATED THAT [facility] IS OUT OF THIS MEDICATION UNTIL 12.5.23 AND PATIENT ONLY HAS A ONE DAY SUPPLY REMAINING     Does the patient have less than a 3 day supply:  [x] Yes  [] No    Would you like a call back once the refill request has been completed: [x] Yes [] No    If the office needs to give you a call back, can they leave a voicemail: [x] Yes [] No    Connor Crawford Rep   10/03/23 14:21 EDT

## 2023-10-03 NOTE — TELEPHONE ENCOUNTER
PATIENT IS CALLING TO STATE SHE CAN NOT PAY r$1,000 FOR THE MOUNJARO.  SHE IS WANTING TO KNOW IF [place] LANCE WOULD BE WILLING TO RE PRESCRIBE THE JANUVIA.  SHE STATES SHE ONLY HAS 1 PILL OF JANUVIA REMAINING.      [facility] - [place], [place] - [address] [place] [address] - [phone]  - [phone]  [phone]     PLEASE ADVISE.

## 2023-10-04 ENCOUNTER — PRIOR AUTHORIZATION (OUTPATIENT)
Dept: FAMILY MEDICINE CLINIC | Facility: CLINIC | Age: 63
End: 2023-10-04
Payer: MEDICARE

## 2023-10-04 NOTE — TELEPHONE ENCOUNTER
PA sent 10/04/23 for Contrave 8-90MG er tablets  Key: [id number]    Form  [employer] Medicare Tier Exceptions Form  [employer] Prior Authorization Form for Tier Exceptions for Medicare Members ONLY

## 2023-11-01 RX ORDER — NALTREXONE HYDROCHLORIDE AND BUPROPION HYDROCHLORIDE 8; 90 MG/1; MG/1
2 TABLET, EXTENDED RELEASE ORAL 2 TIMES DAILY
Qty: 360 TABLET | Refills: 1 | Status: SHIPPED | OUTPATIENT
Start: 2023-11-01

## 2023-11-01 NOTE — TELEPHONE ENCOUNTER
Rx Refill Note  Requested Prescriptions     Pending Prescriptions Disp Refills   • Contrave 8-90 MG tablet [Pharmacy Med Name: CONTRAVE ER TABS 8/90MG] 120 tablet 8     Sig: TAKE AS INSTRUCTED BY YOUR PRESCRIBER      Last office visit with prescribing clinician: 10/2/2023   Last telemedicine visit with prescribing clinician: Visit date not found   Next office visit with prescribing clinician: 1/3/2024       Would you like a call back once the refill request has been completed: [] Yes [] No    If the office needs to give you a call back, can they leave a voicemail: [] Yes [] No    Gala Elizondo MA  11/01/23, 11:36 EDT

## 2023-11-06 ENCOUNTER — TELEPHONE (OUTPATIENT)
Dept: PAIN MEDICINE | Facility: CLINIC | Age: 63
End: 2023-11-06
Payer: MEDICARE

## 2023-11-06 NOTE — TELEPHONE ENCOUNTER
Pt LM w/answering service that she had been up all night ill and is unable to come to her appt today.

## 2023-11-09 ENCOUNTER — OFFICE VISIT (OUTPATIENT)
Dept: FAMILY MEDICINE CLINIC | Facility: CLINIC | Age: 63
End: 2023-11-09
Payer: MEDICARE

## 2023-11-09 VITALS
HEART RATE: 98 BPM | WEIGHT: 185 LBS | DIASTOLIC BLOOD PRESSURE: 81 MMHG | BODY MASS INDEX: 29.73 KG/M2 | OXYGEN SATURATION: 98 % | TEMPERATURE: 96.4 F | SYSTOLIC BLOOD PRESSURE: 122 MMHG | HEIGHT: 66 IN

## 2023-11-09 DIAGNOSIS — E66.3 OVERWEIGHT WITH BODY MASS INDEX (BMI) OF 29 TO 29.9 IN ADULT: ICD-10-CM

## 2023-11-09 DIAGNOSIS — E11.8 DISORDER ASSOCIATED WITH TYPE 2 DIABETES MELLITUS: ICD-10-CM

## 2023-11-09 DIAGNOSIS — R11.0 NAUSEA: Primary | ICD-10-CM

## 2023-11-09 DIAGNOSIS — E11.43 TYPE 2 DIABETES MELLITUS WITH DIABETIC AUTONOMIC NEUROPATHY, WITHOUT LONG-TERM CURRENT USE OF INSULIN: ICD-10-CM

## 2023-11-09 DIAGNOSIS — R19.7 DIARRHEA, UNSPECIFIED TYPE: ICD-10-CM

## 2023-11-09 DIAGNOSIS — I10 PRIMARY HYPERTENSION: ICD-10-CM

## 2023-11-09 LAB
EXPIRATION DATE: NORMAL
EXPIRATION DATE: NORMAL
FLUAV AG UPPER RESP QL IA.RAPID: NOT DETECTED
FLUBV AG UPPER RESP QL IA.RAPID: NOT DETECTED
INTERNAL CONTROL: NORMAL
INTERNAL CONTROL: NORMAL
Lab: NORMAL
Lab: NORMAL
S PYO AG THROAT QL: NEGATIVE
SARS-COV-2 AG UPPER RESP QL IA.RAPID: NOT DETECTED

## 2023-11-09 PROCEDURE — 3051F HG A1C>EQUAL 7.0%<8.0%: CPT | Performed by: PREVENTIVE MEDICINE

## 2023-11-09 PROCEDURE — 3079F DIAST BP 80-89 MM HG: CPT | Performed by: PREVENTIVE MEDICINE

## 2023-11-09 PROCEDURE — 87880 STREP A ASSAY W/OPTIC: CPT | Performed by: PREVENTIVE MEDICINE

## 2023-11-09 PROCEDURE — 87428 SARSCOV & INF VIR A&B AG IA: CPT | Performed by: PREVENTIVE MEDICINE

## 2023-11-09 PROCEDURE — 99213 OFFICE O/P EST LOW 20 MIN: CPT | Performed by: PREVENTIVE MEDICINE

## 2023-11-09 PROCEDURE — 3074F SYST BP LT 130 MM HG: CPT | Performed by: PREVENTIVE MEDICINE

## 2023-11-09 RX ORDER — BLOOD-GLUCOSE METER
1 KIT MISCELLANEOUS DAILY
Qty: 100 EACH | Refills: 6 | Status: SHIPPED | OUTPATIENT
Start: 2023-11-09

## 2023-11-09 NOTE — PATIENT INSTRUCTIONS
Health Maintenance Due   Topic Date Due    ZOSTER VACCINE (1 of 2) Never done    DIABETIC FOOT EXAM  05/10/2022    URINE MICROALBUMIN  06/14/2022    INFLUENZA VACCINE  Never done    COVID-19 Vaccine (2 - 2023-24 season) 09/01/2023    You are due for Shingrix vaccination series ( the newest shingles vaccine).  It is a two shot series spaced 2-6 months apart. Please get this vaccine series started at your earliest convenience at your local pharmacy to help avoid shingles outbreak. It is more effective than the old Zostavax vaccine and is recommended even if you have had the Zostavax vaccine in the past.  Once the Shingrix series is completed, it does not need to be repeated.   For more information, please look at the website below:  [facility] Shingrix Vaccine Information  2 Immodium after each stool.  Continue electrolytes-monitor blood sugars and avoid spicey greasy and limit dairy.

## 2023-11-09 NOTE — PROGRESS NOTES
Subjective   Pearl Elizalde is a 63 y.o. female presents for nausea, diarrhea, hypertension disorder associated with type 2 diabetes and overweight with a body mass index of 29.    The patient reports she developed nausea and vomiting 5 days ago. She states going to urgent Care on 11/7/23 and was given Zofran for the vomiting. She notes on 11/8/23 she developed diarrhea, that has continued through today. She reports she is taking Imodium A-D, 1 dose in the am and 1 dose in the pm yesterday, and 1 dose this am. She states having diarrhea this morning. She notes she has been drinking Gatorade. She denies having anyone at home sick. She denies vomiting since 11/8/23, and denies having blood in her stools or vomit. She denies eating since 11/4/23. She states she tool Tylenol to help her sleep last night because she had not slept in 5 days. She notes she is urinating because she is drinking fluids.    The patient reports she has been out of glucose strips for a while, so has not been checking her blood glucose. She denies taking her diabetic medication since becoming sick. She states she has loss 15 pounds. She denies dysuria or hematuria.    The patient reports having 2 hip surgeries in the past 8 weeks. She states they had to go in and redo the one hip after it popped out of place.  Chief Complaint   Patient presents with    Nausea     Was seen 2 day's ago in the Urgent Care.      Vomiting     Started on Saturday has been taking imodium.      Diarrhea     Vomiting has stopped but diarrhea is continuing.       Health Maintenance Due   Topic Date Due    ZOSTER VACCINE (1 of 2) Never done    DIABETIC FOOT EXAM  05/10/2022    URINE MICROALBUMIN  06/14/2022    INFLUENZA VACCINE  Never done    COVID-19 Vaccine (2 - 2023-24 season) 09/01/2023       Nausea  Associated symptoms include nausea and vomiting. Pertinent negatives include no abdominal pain, coughing or fever.   Vomiting   Associated symptoms include diarrhea.  "Pertinent negatives include no abdominal pain, coughing or fever.   Diarrhea   Associated symptoms include vomiting. Pertinent negatives include no abdominal pain, coughing or fever.        Vitals:    11/09/23 1105   BP: 122/81   BP Location: Right arm   Patient Position: Sitting   Cuff Size: Adult   Pulse: 98   Temp: 96.4 °F (35.8 °C)   TempSrc: Temporal   SpO2: 98%   Weight: 83.9 kg (185 lb)   Height: 167.6 cm (66\")     Body mass index is 29.86 kg/m².    Current Outpatient Medications on File Prior to Visit   Medication Sig Dispense Refill    ascorbic acid (VITAMIN C) 500 MG tablet Take 1 tablet by mouth Daily.      baclofen (LIORESAL) 10 MG tablet Take 1 tablet by mouth Every Night. 90 tablet 1    Blood Glucose Monitoring Suppl (BLOOD GLUCOSE MONITOR SYSTEM) w/Device kit BLOOD GLUCOSE MONITOR SYSTEM w/Device KIT      celecoxib (CeleBREX) 200 MG capsule Take 1 capsule by mouth As Needed.      Cetirizine HCl 10 MG capsule Take 1 dose by mouth As Needed (ALLERGIES).      cholecalciferol (VITAMIN D3) 25 MCG (1000 UT) tablet Take 1 tablet by mouth Daily.      colesevelam (WELCHOL) 625 MG tablet TAKE 1 TABLET TWICE A DAY WITH MEALS 180 tablet 3    EPINEPHrine (EPIPEN) 0.3 MG/0.3ML solution auto-injector injection       ezetimibe (ZETIA) 10 MG tablet Take 1 tablet by mouth Daily. 90 tablet 3    Januvia 100 MG tablet Take 1 tablet by mouth Daily. 90 tablet 0    Lancets (FREESTYLE) lancets       metFORMIN (Glucophage) 500 MG tablet Take 1 tablet by mouth 2 (Two) Times a Day With Meals. 180 tablet 1    multivitamin with minerals tablet tablet Take 1 tablet by mouth Daily. HOLD FOR SURGERY      naltrexone-bupropion ER (Contrave) 8-90 MG tablet Take 2 tablets by mouth 2 (Two) Times a Day. 360 tablet 1    Olmesartan-amLODIPine-HCTZ 40-10-12.5 MG tablet Take 1 tablet by mouth Daily. (Patient taking differently: Take 1 tablet by mouth Every Evening.) 90 tablet 1    ondansetron ODT (ZOFRAN-ODT) 4 MG disintegrating tablet Place 1 " tablet on the tongue 4 (Four) Times a Day As Needed for Nausea or Vomiting. 12 tablet 0    pregabalin (LYRICA) 150 MG capsule Take 1 capsule by mouth 2 (Two) Times a Day. 180 capsule 1    Zinc 30 MG capsule Take  by mouth.       No current facility-administered medications on file prior to visit.       The following portions of the patient's history were reviewed and updated as appropriate: allergies, current medications, past family history, past medical history, past social history, past surgical history, and problem list.    Review of Systems   Constitutional:  Negative for fever.   Respiratory:  Negative for cough.    Gastrointestinal:  Positive for diarrhea, nausea and vomiting. Negative for abdominal pain and blood in stool.   Genitourinary:  Negative for dysuria and hematuria.       Objective   Physical Exam  Constitutional:       Appearance: Normal appearance.   HENT:      Right Ear: Tympanic membrane normal.      Left Ear: Tympanic membrane normal.      Mouth/Throat:      Mouth: Mucous membranes are moist.      Pharynx: Oropharynx is clear.   Eyes:      Pupils: Pupils are equal, round, and reactive to light.   Cardiovascular:      Rate and Rhythm: Normal rate and regular rhythm.      Pulses: Normal pulses.      Heart sounds: Normal heart sounds. No murmur heard.  Pulmonary:      Effort: Pulmonary effort is normal.      Breath sounds: Normal breath sounds.   Abdominal:      General: Bowel sounds are normal. There is no distension.      Palpations: There is no mass.      Tenderness: There is no abdominal tenderness.      Hernia: No hernia is present.   Musculoskeletal:      Cervical back: No rigidity or tenderness.      Right lower leg: No edema.      Left lower leg: No edema.   Feet:      Right foot:      Protective Sensation: 10 sites tested.   1 site sensed.     Skin integrity: Callus present.      Toenail Condition: Right toenails are normal.      Left foot:      Protective Sensation: 10 sites tested.  0  sites sensed.      Skin integrity: Callus present.      Toenail Condition: Left toenails are normal.      Comments:     Lymphadenopathy:      Cervical: No cervical adenopathy.   Skin:     General: Skin is warm and dry.   Neurological:      Mental Status: She is alert and oriented to person, place, and time.   Psychiatric:         Mood and Affect: Mood normal.         Behavior: Behavior normal.       PHQ-9 Total Score:      Assessment & Plan   Diagnoses and all orders for this visit:    1. Nausea (Primary)  -     POCT SARS-CoV-2 Antigen CONCEPCION + Flu  -     POCT rapid strep A    2. Diarrhea, unspecified type    3. Primary hypertension    4. Disorder associated with type 2 diabetes mellitus    5. Overweight with body mass index (BMI) of 29 to 29.9 in adult    6. Type 2 diabetes mellitus with diabetic autonomic neuropathy, without long-term current use of insulin  -     Microalbumin / Creatinine Urine Ratio - Urine, Clean Catch    Other orders  -     glucose blood (FREESTYLE LITE) test strip; 1 each by Other route Daily. Use as instructed  Dispense: 100 each; Refill: 6        Patient Instructions     Health Maintenance Due   Topic Date Due    ZOSTER VACCINE (1 of 2) Never done    DIABETIC FOOT EXAM  05/10/2022    URINE MICROALBUMIN  06/14/2022    INFLUENZA VACCINE  Never done    COVID-19 Vaccine (2 - 2023-24 season) 09/01/2023    You are due for Shingrix vaccination series ( the newest shingles vaccine).  It is a two shot series spaced 2-6 months apart. Please get this vaccine series started at your earliest convenience at your local pharmacy to help avoid shingles outbreak. It is more effective than the old Zostavax vaccine and is recommended even if you have had the Zostavax vaccine in the past.  Once the Shingrix series is completed, it does not need to be repeated.   For more information, please look at the website below:  CDC Shingrix Vaccine Information  2 Immodium after each stool.  Continue electrolytes-monitor  blood sugars and avoid spicey greasy and limit dairy.       Transcribed from ambient dictation for Lizette Sigala MD by Kat Inman.  11/09/23   13:33 EST    Patient or patient representative verbalized consent to the visit recording.  I have personally performed the services described in this document as transcribed by the above individual, and it is both accurate and complete.

## 2023-11-29 ENCOUNTER — OFFICE VISIT (OUTPATIENT)
Dept: PAIN MEDICINE | Facility: CLINIC | Age: 63
End: 2023-11-29
Payer: MEDICARE

## 2023-11-29 VITALS
SYSTOLIC BLOOD PRESSURE: 151 MMHG | HEART RATE: 88 BPM | OXYGEN SATURATION: 98 % | RESPIRATION RATE: 16 BRPM | DIASTOLIC BLOOD PRESSURE: 78 MMHG

## 2023-11-29 DIAGNOSIS — M15.9 PRIMARY OSTEOARTHRITIS INVOLVING MULTIPLE JOINTS: Primary | ICD-10-CM

## 2023-11-29 DIAGNOSIS — G89.4 CHRONIC PAIN SYNDROME: ICD-10-CM

## 2023-11-29 DIAGNOSIS — G25.81 RESTLESS LEG SYNDROME: ICD-10-CM

## 2023-11-29 RX ORDER — CELECOXIB 200 MG/1
200 CAPSULE ORAL DAILY
Qty: 90 CAPSULE | Refills: 1 | Status: CANCELLED | OUTPATIENT
Start: 2023-11-29

## 2023-11-29 RX ORDER — BACLOFEN 10 MG/1
10 TABLET ORAL NIGHTLY
Qty: 90 TABLET | Refills: 1 | Status: SHIPPED | OUTPATIENT
Start: 2023-11-29

## 2023-11-29 RX ORDER — CELECOXIB 200 MG/1
200 CAPSULE ORAL AS NEEDED
Qty: 90 CAPSULE | Refills: 0 | Status: SHIPPED | OUTPATIENT
Start: 2023-11-29

## 2023-11-29 RX ORDER — PREGABALIN 150 MG/1
150 CAPSULE ORAL 2 TIMES DAILY
Qty: 180 CAPSULE | Refills: 1 | Status: SHIPPED | OUTPATIENT
Start: 2023-11-29

## 2023-11-29 NOTE — PROGRESS NOTES
CHIEF COMPLAINT  Chief Complaint   Patient presents with    Osteoarthritis     6 month f/u  CC  multiple joint pain, shoulders bilaterally as well as her left foot, left ankle, and low back.  No Opoid Use        Primary Care  Rosa Hooper APRN Subjective   Pearl Elizalde is a 63 y.o. female  who presents for chronic pain syndrome and osteoarthritis.  She is initially referred for left foot pain however she states that she has pain throughout her body multiple sites including her shoulders bilaterally as well as her left foot, left ankle, and low back.  She states that she has had pain off and on in many locations for many years.  She states that she has previously been taking celecoxib as needed which does provide her some level of pain relief.  She denies any significant radicular features or any symptoms suggestive of neuropathic pain.  He describes the pain primarily as a sharp, stabbing pain especially given and slightly improved with rest and has any red flag symptoms such as loss of bowel or bladder    Pain  Associated symptoms include arthralgias, joint swelling and myalgias. Pertinent negatives include no neck pain, numbness or weakness.   Osteoarthritis  She complains of joint swelling. Her past medical history is significant for osteoarthritis.        Location: Bilateral shoulders, left ankle, left foot, low back  Onset: Many years ago  Duration: Progressively worsening  Timing: Constant throughout the day  Quality:, Stabbing  Severity: Today: 1       Last Week: 7       Worst: 10  Modifying Factors: The pain is worse with movement and physical activity and slightly improved with rest and anti-inflammatories  Functional Deficit: The pain makes it difficult for her to perform her activities of daily living as well as take care of her grandchild    Physical Therapy: no    Interval Update 11/29/2023: She continues with very good benefit with a combination of baclofen, Lyrica, and Celebrex.  She did  recently undergo a hip replacement and unfortunately is having some issues with joint articulation and laxity which intermittently causes very severe pain    The following portions of the patient's history were reviewed and updated as appropriate: allergies, current medications, past family history, past medical history, past social history, past surgical history and problem list.      Current Outpatient Medications:     ascorbic acid (VITAMIN C) 500 MG tablet, Take 1 tablet by mouth Daily., Disp: , Rfl:     baclofen (LIORESAL) 10 MG tablet, Take 1 tablet by mouth Every Night., Disp: 90 tablet, Rfl: 1    Blood Glucose Monitoring Suppl (BLOOD GLUCOSE MONITOR SYSTEM) w/Device kit, BLOOD GLUCOSE MONITOR SYSTEM w/Device KIT, Disp: , Rfl:     celecoxib (CeleBREX) 200 MG capsule, Take 1 capsule by mouth As Needed for Mild Pain., Disp: 90 capsule, Rfl: 0    Cetirizine HCl 10 MG capsule, Take 1 dose by mouth As Needed (ALLERGIES)., Disp: , Rfl:     cholecalciferol (VITAMIN D3) 25 MCG (1000 UT) tablet, Take 1 tablet by mouth Daily., Disp: , Rfl:     colesevelam (WELCHOL) 625 MG tablet, TAKE 1 TABLET TWICE A DAY WITH MEALS, Disp: 180 tablet, Rfl: 3    EPINEPHrine (EPIPEN) 0.3 MG/0.3ML solution auto-injector injection, , Disp: , Rfl:     ezetimibe (ZETIA) 10 MG tablet, Take 1 tablet by mouth Daily., Disp: 90 tablet, Rfl: 3    glucose blood (FREESTYLE LITE) test strip, 1 each by Other route Daily. Use as instructed, Disp: 100 each, Rfl: 6    Januvia 100 MG tablet, Take 1 tablet by mouth Daily., Disp: 90 tablet, Rfl: 0    Lancets (FREESTYLE) lancets, , Disp: , Rfl:     metFORMIN (Glucophage) 500 MG tablet, Take 1 tablet by mouth 2 (Two) Times a Day With Meals., Disp: 180 tablet, Rfl: 1    multivitamin with minerals tablet tablet, Take 1 tablet by mouth Daily. HOLD FOR SURGERY, Disp: , Rfl:     naltrexone-bupropion ER (Contrave) 8-90 MG tablet, Take 2 tablets by mouth 2 (Two) Times a Day., Disp: 360 tablet, Rfl: 1     Olmesartan-amLODIPine-HCTZ 40-10-12.5 MG tablet, Take 1 tablet by mouth Daily. (Patient taking differently: Take 1 tablet by mouth Every Evening.), Disp: 90 tablet, Rfl: 1    ondansetron ODT (ZOFRAN-ODT) 4 MG disintegrating tablet, Place 1 tablet on the tongue 4 (Four) Times a Day As Needed for Nausea or Vomiting., Disp: 12 tablet, Rfl: 0    pregabalin (LYRICA) 150 MG capsule, Take 1 capsule by mouth 2 (Two) Times a Day., Disp: 180 capsule, Rfl: 1    Zinc 30 MG capsule, Take  by mouth., Disp: , Rfl:     Review of Systems   Musculoskeletal:  Positive for arthralgias, back pain, gait problem, joint swelling and myalgias. Negative for neck pain and neck stiffness.   Neurological:  Negative for weakness and numbness.       Vitals:    11/29/23 1525   BP: 151/78   BP Location: Left arm   Patient Position: Sitting   Cuff Size: Adult   Pulse: 88   Resp: 16   SpO2: 98%   PainSc:   9   PainLoc: Hip       Objective   Physical Exam  Vitals and nursing note reviewed.   Constitutional:       General: She is not in acute distress.     Appearance: Normal appearance. She is obese.   Musculoskeletal:         General: Tenderness present. No swelling or deformity.      Right shoulder: Tenderness present. No bony tenderness. Decreased range of motion.      Left shoulder: Tenderness present. No bony tenderness. Decreased range of motion.      Cervical back: Normal range of motion and neck supple. Tenderness present. No rigidity.      Left ankle: Tenderness present. Decreased range of motion.   Neurological:      Mental Status: She is alert.           Assessment & Plan   Problems Addressed this Visit    None  Visit Diagnoses       Chronic pain syndrome        Relevant Medications    pregabalin (LYRICA) 150 MG capsule          Diagnoses         Codes Comments    Chronic pain syndrome     ICD-10-CM: G89.4  ICD-9-CM: 338.4             Plan:  Continue celecoxib 200 mg daily as needed  Getting very good benefit with the baclofen in the  evenings  Continue Lyrica 150 mg twice daily    --- Follow-up 6 months           INSPECT REPORT    As part of the patient's treatment plan, I may be prescribing controlled substances. The patient has been made aware of appropriate use of such medications, including potential risk of somnolence, limited ability to drive and/or work safely, and the potential for dependence or overdose. It has also bee made clear that these medications are for use by this patient only, without concomitant use of alcohol or other substances unless prescribed.     Patient has completed prescribing agreement detailing terms of continued prescribing of controlled substances, including monitoring ANNE-MARIE reports, urine drug screening, and pill counts if necessary. The patient is aware that inappropriate use will results in cessation of prescribing such medications.    INSPECT report has been reviewed and scanned into the patient's chart.    As the clinician, I personally reviewed the INSPECT from 11/28/2023.    History and physical exam exhibit continued safe and appropriate use of controlled substances.      EMR Dragon/Transcription disclaimer:   Much of this encounter note is an electronic transcription/translation of spoken language to printed text. The electronic translation of spoken language may permit erroneous, or at times, nonsensical words or phrases to be inadvertently transcribed; Although I have reviewed the note for such errors, some may still exist.

## 2023-12-20 ENCOUNTER — TELEPHONE (OUTPATIENT)
Dept: FAMILY MEDICINE CLINIC | Facility: CLINIC | Age: 63
End: 2023-12-20
Payer: MEDICARE

## 2023-12-20 DIAGNOSIS — F41.9 ANXIETY: Primary | ICD-10-CM

## 2023-12-20 RX ORDER — DIAZEPAM 5 MG/1
5 TABLET ORAL 2 TIMES DAILY PRN
Qty: 2 TABLET | Refills: 0 | Status: SHIPPED | OUTPATIENT
Start: 2023-12-20

## 2023-12-20 NOTE — TELEPHONE ENCOUNTER
Caller: Pearl Elizalde    Relationship: Self    Best call back number: [phone]     What medication are you requesting: VALIUM     What are your current symptoms: CLAUSTROPHOBIA    Have you had these symptoms before:    [x] Yes  [] No    Have you been treated for these symptoms before:   [x] Yes  [] No    If a prescription is needed, what is your preferred pharmacy and phone number: [id number] - [place], [place] - [address] AT [facility] [address] [facility] [address] [facility] [address] - [phone]  - [phone]     Additional notes: PATIENT STATES SHE HAS A CLOSED MRI SCHEDULED FOR TOMORROW MORNING & IS REQUESTING A PRESCRIPTION FOR 2 VALIUM. PATIENT STATES SHE CAN NOT DO THE MRI WITHOUT THIS MEDICATION.

## 2024-01-11 DIAGNOSIS — E11.9 TYPE 2 DIABETES MELLITUS WITHOUT COMPLICATION, UNSPECIFIED WHETHER LONG TERM INSULIN USE: ICD-10-CM

## 2024-01-22 ENCOUNTER — OFFICE VISIT (OUTPATIENT)
Dept: FAMILY MEDICINE CLINIC | Facility: CLINIC | Age: 64
End: 2024-01-22
Payer: OTHER GOVERNMENT

## 2024-01-22 VITALS
OXYGEN SATURATION: 96 % | TEMPERATURE: 98.6 F | HEIGHT: 66 IN | BODY MASS INDEX: 29.96 KG/M2 | SYSTOLIC BLOOD PRESSURE: 152 MMHG | HEART RATE: 86 BPM | DIASTOLIC BLOOD PRESSURE: 105 MMHG | WEIGHT: 186.4 LBS

## 2024-01-22 DIAGNOSIS — E11.8 DISORDER ASSOCIATED WITH TYPE 2 DIABETES MELLITUS: ICD-10-CM

## 2024-01-22 DIAGNOSIS — I10 PRIMARY HYPERTENSION: ICD-10-CM

## 2024-01-22 DIAGNOSIS — K21.9 GASTROESOPHAGEAL REFLUX DISEASE, UNSPECIFIED WHETHER ESOPHAGITIS PRESENT: ICD-10-CM

## 2024-01-22 DIAGNOSIS — R25.1 SHAKING: ICD-10-CM

## 2024-01-22 DIAGNOSIS — R51.9 NONINTRACTABLE HEADACHE, UNSPECIFIED CHRONICITY PATTERN, UNSPECIFIED HEADACHE TYPE: ICD-10-CM

## 2024-01-22 DIAGNOSIS — I10 ESSENTIAL HYPERTENSION: ICD-10-CM

## 2024-01-22 DIAGNOSIS — R11.10 VOMITING, UNSPECIFIED VOMITING TYPE, UNSPECIFIED WHETHER NAUSEA PRESENT: ICD-10-CM

## 2024-01-22 DIAGNOSIS — E66.09 CLASS 1 OBESITY DUE TO EXCESS CALORIES WITH SERIOUS COMORBIDITY AND BODY MASS INDEX (BMI) OF 30.0 TO 30.9 IN ADULT: ICD-10-CM
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
## 2024-01-22 DIAGNOSIS — R19.7 DIARRHEA, UNSPECIFIED TYPE: Primary | ICD-10-CM

## 2024-01-22 PROBLEM — E66.811 CLASS 1 OBESITY DUE TO EXCESS CALORIES WITH SERIOUS COMORBIDITY AND BODY MASS INDEX (BMI) OF 30.0 TO 30.9 IN ADULT: Status: ACTIVE | Noted: 2019-04-11

## 2024-01-22 LAB — GLUCOSE BLDC GLUCOMTR-MCNC: 204 MG/DL (ref 70–130)

## 2024-01-22 PROCEDURE — 1160F RVW MEDS BY RX/DR IN RCRD: CPT | Performed by: PREVENTIVE MEDICINE

## 2024-01-22 PROCEDURE — 99214 OFFICE O/P EST MOD 30 MIN: CPT | Performed by: PREVENTIVE MEDICINE

## 2024-01-22 PROCEDURE — 3080F DIAST BP >= 90 MM HG: CPT | Performed by: PREVENTIVE MEDICINE

## 2024-01-22 PROCEDURE — 1159F MED LIST DOCD IN RCRD: CPT | Performed by: PREVENTIVE MEDICINE

## 2024-01-22 PROCEDURE — 82948 REAGENT STRIP/BLOOD GLUCOSE: CPT | Performed by: PREVENTIVE MEDICINE

## 2024-01-22 PROCEDURE — 3077F SYST BP >= 140 MM HG: CPT | Performed by: PREVENTIVE MEDICINE

## 2024-01-22 RX ORDER — PANTOPRAZOLE SODIUM 40 MG/1
40 TABLET, DELAYED RELEASE ORAL DAILY
Qty: 30 TABLET | Refills: 0 | Status: SHIPPED | OUTPATIENT
Start: 2024-01-22 | End: 2024-01-22

## 2024-01-22 RX ORDER — PRAVASTATIN SODIUM 80 MG/1
80 TABLET ORAL DAILY
COMMUNITY

## 2024-01-22 RX ORDER — OLMESARTAN MEDOXOMIL / AMLODIPINE BESYLATE / HYDROCHLOROTHIAZIDE 40; 10; 12.5 MG/1; MG/1; MG/1
1 TABLET, FILM COATED ORAL DAILY
Qty: 90 TABLET | Refills: 1 | Status: SHIPPED | OUTPATIENT
Start: 2024-01-22

## 2024-01-22 RX ORDER — PANTOPRAZOLE SODIUM 40 MG/1
40 TABLET, DELAYED RELEASE ORAL DAILY
Qty: 90 TABLET | Refills: 0 | Status: SHIPPED | OUTPATIENT
Start: 2024-01-22

## 2024-01-22 RX ORDER — PROCHLORPERAZINE MALEATE 10 MG
10 TABLET ORAL EVERY 8 HOURS PRN
Qty: 20 TABLET | Refills: 0 | Status: SHIPPED | OUTPATIENT
Start: 2024-01-22

## 2024-01-22 RX ORDER — LOPERAMIDE HYDROCHLORIDE 2 MG/1
2 TABLET ORAL 4 TIMES DAILY PRN
Qty: 40 TABLET | Refills: 0 | Status: SHIPPED | OUTPATIENT
Start: 2024-01-22

## 2024-01-22 RX ORDER — BUDESONIDE, GLYCOPYRROLATE, AND FORMOTEROL FUMARATE 160; 9; 4.8 UG/1; UG/1; UG/1
2 AEROSOL, METERED RESPIRATORY (INHALATION) 2 TIMES DAILY
COMMUNITY

## 2024-01-22 NOTE — PROGRESS NOTES
Chief Complaint  Diabetes, Headache (For over a week,  Symptoms have been off and on since November ), Diarrhea (Only had sip of water today.  ), Vomiting (Been unable to keep meds down for over 2 weeks.), and Shaking (Been going on for a couple weeks.  When she ate a bowl of ice cream shaking stopped..)    Subjective        Pearl Elizalde presents to [facility]  Diabetes  Hypoglycemia symptoms include headaches.   Headache  Diarrhea   Associated symptoms include headaches and vomiting.   Vomiting   Associated symptoms include diarrhea and headaches.   Shaking  Associated symptoms include headaches and vomiting.       Roxana Elizalde is a 63-year-old female who is here today for diarrhea, vomiting disorder associated with type 2 diabetes mellitus, GERD, primary hypertension, and class 1 obesity due to excess calories with serious comorbidities.    The patient reports since her last visit in 11/2023, she has had diarrhea intermittently. She was taking Imodium 1 a day, but it was not helping.  She has been vomiting and not able to keep her medicine down. She still has Zofran at home. She took Zofran yesterday for nausea, but she still vomited. She denies having blood in the vomit.  Her blood sugar was 140 mg/dL the last time, but she has not eaten anything and everything she eats comes up. She denies any blood in her diarrhea. She states she has lost 18 pounds since before Christmas. She weighed 179.5 this morning without any clothes on at home. She urinates 1 to 2 times a day.    She takes Celebrex when she is walking a lot. Her last dose of Celebrex was 1/19/2024.  She notes she had her hip replaced 8/2023, and due to complications she had to have a second surgery. She seen her orthopedic  last week, and the second one is not healing. She had fluid removed. There is no infection at the site.    She has had a headache for over 1 week. She has been sleeping with a ice pack on  "her neck and hip. She was taken off her blood pressure medication, due to having a reading of 114/70 at her last visit. Her  took her blood pressure the other night, and it was 187. She is out of her blood pressure medication. She had heart flutters last night, which woke her up, but normally she does not.     She has shakes, which she thinks could be from her blood sugar being high. She has a way to check her blood sugar at home.    Her toes have not good since 10/2023 when she went to [employer]. Her second toe and 2 other toes have darkness to it. She has neuropathy in her feet. She does not wear tight shoes.    She has been burping continuously since 11/2023. She is taking Tums, which sometimes helps. She used to take Nexium for years, and it got better.      Objective   Vital Signs:  BP (!) 152/105 (BP Location: Left arm, Patient Position: Sitting, Cuff Size: Adult)   Pulse 86   Temp 98.6 °F (37 °C) (Temporal)   Ht 167.6 cm (66\")   Wt 84.6 kg (186 lb 6.4 oz)   SpO2 96%   BMI 30.09 kg/m²   Estimated body mass index is 30.09 kg/m² as calculated from the following:    Height as of this encounter: 167.6 cm (66\").    Weight as of this encounter: 84.6 kg (186 lb 6.4 oz).     Review of systems:  Headache  Nausea with vomiting  Diarrhea  Mild stomach cramping  No fever  Sore first and second toenails of the right foot and second toenail of the left foot    :      Physical Exam  Vitals reviewed.   Constitutional:       General: She is not in acute distress.     Appearance: Normal appearance. She is well-developed. She is not ill-appearing or toxic-appearing.   HENT:      Head: Normocephalic and atraumatic.      Right Ear: Tympanic membrane, ear canal and external ear normal.      Left Ear: Tympanic membrane, ear canal and external ear normal.      Nose: Nose normal.      Mouth/Throat:      Mouth: Mucous membranes are moist.      Pharynx: Oropharynx is clear.   Eyes:      Extraocular Movements: Extraocular " movements intact.      Conjunctiva/sclera: Conjunctivae normal.      Pupils: Pupils are equal, round, and reactive to light.   Cardiovascular:      Rate and Rhythm: Normal rate and regular rhythm.      Pulses:           Dorsalis pedis pulses are 1+ on the right side and 1+ on the left side.        Posterior tibial pulses are 1+ on the right side and 1+ on the left side.      Heart sounds: Normal heart sounds.   Pulmonary:      Effort: Pulmonary effort is normal.      Breath sounds: Normal breath sounds. No wheezing, rhonchi or rales.   Abdominal:      General: Bowel sounds are normal. There is no distension.      Palpations: Abdomen is soft. There is no mass.      Tenderness: There is no abdominal tenderness. There is no rebound.   Musculoskeletal:         General: Normal range of motion.      Cervical back: Neck supple.   Feet:      Right foot:      Skin integrity: Skin integrity normal.      Toenail Condition: Fungal disease present.     Left foot:      Skin integrity: Skin integrity normal.      Toenail Condition: Fungal disease present.     Comments:     Skin:     General: Skin is warm.   Neurological:      General: No focal deficit present.      Mental Status: She is alert and oriented to person, place, and time.   Psychiatric:         Mood and Affect: Mood normal.         Behavior: Behavior normal.        Result Review :                     Assessment and Plan     Diagnoses and all orders for this visit:    1. Diarrhea, unspecified type (Primary)  -     Cancel: POCT SARS-CoV-2 + Flu Antigen CONCEPCION  -     POCT Glucose  -     Ambulatory Referral to Gastroenterology    2. Vomiting, unspecified vomiting type, unspecified whether nausea present  -     POCT Glucose  -     Ambulatory Referral to Gastroenterology    3. Disorder associated with type 2 diabetes mellitus  -     Microalbumin / Creatinine Urine Ratio - Urine, Clean Catch    4. Gastroesophageal reflux disease, unspecified whether esophagitis present  -      Ambulatory Referral to Gastroenterology    5. Primary hypertension    6. Class 1 obesity due to excess calories with serious comorbidity and body mass index (BMI) of 30.0 to 30.9 in adult    7. Essential hypertension  -     Olmesartan-amLODIPine-HCTZ 40-10-12.5 MG tablet; Take 1 tablet by mouth Daily.  Dispense: 90 tablet; Refill: 1    8. Shaking    9. Nonintractable headache, unspecified chronicity pattern, unspecified headache type    10. Injury by nail, initial encounter  -     Ambulatory Referral to Podiatry    Other orders  -     prochlorperazine (COMPAZINE) 10 MG tablet; Take 1 tablet by mouth Every 8 (Eight) Hours As Needed for Nausea or Vomiting.  Dispense: 20 tablet; Refill: 0  -     loperamide (Imodium A-D) 2 MG tablet; Take 1 tablet by mouth 4 (Four) Times a Day As Needed for Diarrhea.  Dispense: 40 tablet; Refill: 0  -     Discontinue: pantoprazole (PROTONIX) 40 MG EC tablet; Take 1 tablet by mouth Daily.  Dispense: 30 tablet; Refill: 0             Follow Up     No follow-ups on file.  Patient was given instructions and counseling regarding her condition or for health maintenance advice. Please see specific information pulled into the AVS if appropriate.     Transcribed from ambient dictation for Lizette Sigala MD by Kat Inman.  01/22/24   12:57 EST    Patient or patient representative verbalized consent to the visit recording.  I have personally performed the services described in this document as transcribed by the above individual, and it is both accurate and complete.

## 2024-01-22 NOTE — PATIENT INSTRUCTIONS
Health Maintenance Due   Topic Date Due    ZOSTER VACCINE (1 of 2) Never done    DIABETIC FOOT EXAM  05/10/2022    URINE MICROALBUMIN  06/14/2022    COVID-19 Vaccine (2 - 2023-24 season) 09/01/2023    Pause celebrex and Contrave until you see gastroenterology.    Take BP, pulse, temperature and blood sugar when shaking

## 2024-01-24 ENCOUNTER — OFFICE VISIT (OUTPATIENT)
Dept: PODIATRY | Facility: CLINIC | Age: 64
End: 2024-01-24
Payer: MEDICARE

## 2024-01-24 VITALS — RESPIRATION RATE: 20 BRPM | BODY MASS INDEX: 29.89 KG/M2 | WEIGHT: 186 LBS | HEIGHT: 66 IN

## 2024-01-24 DIAGNOSIS — L60.3 ONYCHODYSTROPHY: Primary | ICD-10-CM

## 2024-01-24 NOTE — PROGRESS NOTES
"01/24/2024  Foot and Ankle Surgery - Established Patient/Follow-up  Provider: Dr. Michele Roland DPM  Location: [facility]    Subjective:  Pearl Elizalde is a 63 y.o. female.     Chief Complaint   Patient presents with    Left Foot - Nail Problem     Nails thick and painful    Right Foot - Nail Problem     Nails thick and painful     Establish Shon Sigala MD 1/22/2024       HPI: The patient is a 63-year-old female who presents to the clinic for a follow-up regarding her bilateral feet.    She reports she is standing and ambulating frequently. She denies pain; however, she notes thickness and \"ugliness\". The patient states she has had 2 hip surgeries since 08/2023, and it is not healing. She notes her left foot is 2 inches higher than her right foot. She reports she wears boots and lifts in her shoes; however, they are not working as well as she does. The patient states she moisturizes her feet every morning and before bed. She notes she wears socks at night. She reports she has neuropathy in her bilateral feet.    Allergies   Allergen Reactions    Oxycodone Hives and Nausea And Vomiting     Other reaction(s): Nausea And Vomiting    Penicillin G Unknown (See Comments)     Patient states hives as a child and as an adult     Oxycodone Hcl Unknown - High Severity    Penicillins Hives     As child. Unsure if can take keflex       Current Outpatient Medications on File Prior to Visit   Medication Sig Dispense Refill    ascorbic acid (VITAMIN C) 500 MG tablet Take 1 tablet by mouth Daily.      baclofen (LIORESAL) 10 MG tablet Take 1 tablet by mouth Every Night. 90 tablet 1    Blood Glucose Monitoring Suppl (BLOOD GLUCOSE MONITOR SYSTEM) w/Device kit       Budeson-Glycopyrrol-Formoterol (Breztri Aerosphere) 160-9-4.8 MCG/ACT aerosol inhaler Inhale 2 puffs 2 (Two) Times a Day.      celecoxib (CeleBREX) 200 MG capsule Take 1 capsule by mouth As Needed for Mild Pain. 90 capsule 0    Cetirizine HCl 10 MG " "capsule Take 1 dose by mouth As Needed (ALLERGIES).      cholecalciferol (VITAMIN D3) 25 MCG (1000 UT) tablet Take 1 tablet by mouth Daily.      colesevelam (WELCHOL) 625 MG tablet TAKE 1 TABLET TWICE A DAY WITH MEALS 180 tablet 3    EPINEPHrine (EPIPEN) 0.3 MG/0.3ML solution auto-injector injection       ezetimibe (ZETIA) 10 MG tablet Take 1 tablet by mouth Daily. 90 tablet 3    glucose blood (FREESTYLE LITE) test strip 1 each by Other route Daily. Use as instructed 100 each 6    Januvia 100 MG tablet Take 1 tablet by mouth Daily. 90 tablet 0    Lancets (FREESTYLE) lancets       loperamide (Imodium A-D) 2 MG tablet Take 1 tablet by mouth 4 (Four) Times a Day As Needed for Diarrhea. 40 tablet 0    metFORMIN (GLUCOPHAGE) 500 MG tablet TAKE 1 TABLET TWICE A DAY WITH MEALS 180 tablet 3    naltrexone-bupropion ER (Contrave) 8-90 MG tablet Take 2 tablets by mouth 2 (Two) Times a Day. 360 tablet 1    Olmesartan-amLODIPine-HCTZ 40-10-12.5 MG tablet Take 1 tablet by mouth Daily. 90 tablet 1    ondansetron ODT (ZOFRAN-ODT) 4 MG disintegrating tablet Place 1 tablet on the tongue 4 (Four) Times a Day As Needed for Nausea or Vomiting. 12 tablet 0    pantoprazole (PROTONIX) 40 MG EC tablet TAKE 1 TABLET BY MOUTH DAILY 90 tablet 0    pravastatin (PRAVACHOL) 80 MG tablet Take 1 tablet by mouth Daily.      pregabalin (LYRICA) 150 MG capsule Take 1 capsule by mouth 2 (Two) Times a Day. 180 capsule 1    prochlorperazine (COMPAZINE) 10 MG tablet Take 1 tablet by mouth Every 8 (Eight) Hours As Needed for Nausea or Vomiting. 20 tablet 0    Zinc 30 MG capsule Take  by mouth.       No current facility-administered medications on file prior to visit.       Objective   Resp 20   Ht 167.6 cm (66\")   Wt 84.4 kg (186 lb)   LMP  (LMP Unknown)   BMI 30.02 kg/m²     Foot/Ankle Exam  General:   Appearance: appears stated age and healthy    Orientation: AAOx3    Affect: appropriate      VASCULAR      Right Foot Vascularity   Normal vascular " exam    Dorsalis pedis:  2+  Posterior tibial:  2+  Skin Temperature: warm    Edema Grading:  None  CFT:  < 3 seconds  Pedal Hair Growth:  Present  Varicosities: none       Left Foot Vascularity   Normal vascular exam    Dorsalis pedis:  2+  Posterior tibial:  2+  Skin Temperature: warm    Edema Grading:  None  CFT:  < 3 seconds  Pedal Hair Growth:  Present  Varicosities: none        NEUROLOGIC     Right Foot Neurologic   Light touch sensation:  Normal  Hot/Cold sensation: normal    Achilles reflex:  2+     Left Foot Neurologic   Light touch sensation:  Normal  Hot/cold sensation: normal    Achilles reflex:  2+     MUSCULOSKELETAL      Right Foot Musculoskeletal   Ecchymosis:  None  Arch:  Normal     Left Foot Musculoskeletal   Ecchymosis:  None  Arch:  Normal     MUSCLE STRENGTH     Right Foot Muscle Strength   Normal strength    Foot dorsiflexion:  5  Foot plantar flexion:  5  Foot inversion:  5  Foot eversion:  5     Left Foot Muscle Strength   Normal strength    Foot dorsiflexion:  5  Foot plantar flexion:  5  Foot inversion:  5  Foot eversion:  5     DERMATOLOGIC     Right Foot Dermatologic   Skin: skin intact    Nails comment:  Nails 1-5     Left Foot Dermatologic   Skin: skin intact    Nails comment:  Nails 1-5     TESTS     Right Foot Tests   Anterior drawer: negative    Varus tilt: negative       Left Foot Tests   Anterior drawer: negative    Varus tilt: negative        Left Foot Additional Comments: Mild discomfort involving the dorsal midfoot. No gross deformity or instability. No ecchymosis. Range of motion is somewhat limited secondary to guarding. No instability.    07/25/2022 Superficial ulceration noted to the fifth metatarsal head region. No surrounding erythema, drainage, or signs of infection. Wound measures approximately 0.5 cm in diameter.    08/08/2022 No significant pain with palpation. Wound to the lateral aspect of the fifth metatarsal head is now healed. No other complicating  features.    01/24/2024: Thickness and mild dystrophy involving the toenails of both feet. No signs of inflammation or infection on exam. Dryness noted to the feet. No gross deformity or instability. Neurovascular status is grossly intact.    Assessment & Plan   Diagnoses and all orders for this visit:    1. Onychodystrophy (Primary)      The patient is a 63-year-old female who presents to the office today for a follow-up regarding her bilateral feet. No results were obtained or interpreted today. We discussed the etiology and biomechanics involved with her bilateral foot pain. I explained that her symptoms are likely related to increased activity. I recommend Epsom salt soaks and to keep the nails and the skin around the area soft. I recommend Aquaphor. We discussed supportive measures. I recommend routine pedicures to keep the nails trimmed back and then trying to do the softening procedures with Aquaphor and Epsom salt soak to soften it even further. The patient will return to the office as needed. Greater than 20 minutes was spent before, during, and after evaluation for patient care.    No orders of the defined types were placed in this encounter.         Note is dictated utilizing voice recognition software. Unfortunately this leads to occasional typographical errors. I apologize in advance if the situation occurs. If questions occur please do not hesitate to call our office.    Transcribed from ambient dictation for CHRIS Roland DPM by Suad Inman.  01/24/24   11:32 EST    Patient or patient representative verbalized consent to the visit recording.  I have personally performed the services described in this document as transcribed by the above individual, and it is both accurate and complete.

## 2024-01-31 DIAGNOSIS — E78.5 HYPERLIPIDEMIA, UNSPECIFIED HYPERLIPIDEMIA TYPE: ICD-10-CM

## 2024-01-31 RX ORDER — EZETIMIBE 10 MG/1
10 TABLET ORAL DAILY
Qty: 90 TABLET | Refills: 0 | Status: SHIPPED | OUTPATIENT
Start: 2024-01-31

## 2024-02-06 RX ORDER — PRAVASTATIN SODIUM 80 MG/1
80 TABLET ORAL DAILY
Qty: 90 TABLET | Refills: 0 | Status: SHIPPED | OUTPATIENT
Start: 2024-02-06

## 2024-02-06 RX ORDER — PRAVASTATIN SODIUM 80 MG/1
80 TABLET ORAL DAILY
Qty: 90 TABLET | Refills: 1 | Status: SHIPPED | OUTPATIENT
Start: 2024-02-06 | End: 2024-02-06 | Stop reason: SDUPTHER

## 2024-02-26 ENCOUNTER — TRANSCRIBE ORDERS (OUTPATIENT)
Dept: PHYSICAL THERAPY | Facility: CLINIC | Age: 64
End: 2024-02-26
Payer: MEDICARE

## 2024-02-26 DIAGNOSIS — M46.1 SACROILIAC INFLAMMATION: ICD-10-CM

## 2024-02-26 DIAGNOSIS — S76.812D STRAIN OF ILIOPSOAS MUSCLE, LEFT, SUBSEQUENT ENCOUNTER: Primary | ICD-10-CM

## 2024-02-26 DIAGNOSIS — Z96.642 HISTORY OF TOTAL HIP ARTHROPLASTY, LEFT: ICD-10-CM

## 2024-03-14 DIAGNOSIS — I10 ESSENTIAL HYPERTENSION: ICD-10-CM

## 2024-03-14 RX ORDER — OLMESARTAN MEDOXOMIL / AMLODIPINE BESYLATE / HYDROCHLOROTHIAZIDE 40; 10; 12.5 MG/1; MG/1; MG/1
1 TABLET, FILM COATED ORAL DAILY
Qty: 90 TABLET | Refills: 0 | Status: SHIPPED | OUTPATIENT
Start: 2024-03-14

## 2024-03-14 RX ORDER — OLMESARTAN MEDOXOMIL / AMLODIPINE BESYLATE / HYDROCHLOROTHIAZIDE 40; 10; 12.5 MG/1; MG/1; MG/1
1 TABLET, FILM COATED ORAL DAILY
Qty: 90 TABLET | Refills: 0 | Status: SHIPPED | OUTPATIENT
Start: 2024-03-14 | End: 2024-03-14 | Stop reason: SDUPTHER

## 2024-03-14 NOTE — TELEPHONE ENCOUNTER
Caller: Pearl Elizalde    Relationship: Self    Best call back number: [phone]     Requested Prescriptions:   Requested Prescriptions     Pending Prescriptions Disp Refills    Olmesartan-amLODIPine-HCTZ 40-10-12.5 MG tablet 90 tablet 1     Sig: Take 1 tablet by mouth Daily.        Pharmacy where request should be sent: [facility] [id number] - [place], [place] [address] - [phone] [facility] [phone] FX     Last office visit with prescribing clinician: 10/2/2023   Last telemedicine visit with prescribing clinician: Visit date not found   Next office visit with prescribing clinician: 4/16/2024     Additional details provided by patient: PATIENT HAS 5 TABLETS LEFT     Does the patient have less than a 3 day supply:  [] Yes  [x] No    Would you like a call back once the refill request has been completed: [] Yes [x] No    If the office needs to give you a call back, can they leave a voicemail: [] Yes [x] No    Connor Shannon   03/14/24 12:35 EDT

## 2024-03-14 NOTE — TELEPHONE ENCOUNTER
Caller: Pearl Elizalde    Relationship: Self    Best call back number: [phone]     Requested Prescriptions:   Requested Prescriptions     Pending Prescriptions Disp Refills    Olmesartan-amLODIPine-HCTZ 40-10-12.5 MG tablet 90 tablet 0     Sig: Take 1 tablet by mouth Daily.        Pharmacy where request should be sent: [facility] #[id number] - [place], [place] - [address] [facility] [address] [facility] [address] - [phone]  - [phone] FX     Last office visit with prescribing clinician: 10/2/2023   Last telemedicine visit with prescribing clinician: Visit date not found   Next office visit with prescribing clinician: 4/16/2024     Additional details provided by patient: PATIENT STATES THAT THE PRESCRIPTION WAS SENT TO THE WRONG PHARMACY. NEEDS TO GO TO [facility]      Connor Ferrara Rep   03/14/24 14:42 EDT

## 2024-03-22 ENCOUNTER — TELEPHONE (OUTPATIENT)
Dept: FAMILY MEDICINE CLINIC | Facility: CLINIC | Age: 64
End: 2024-03-22
Payer: MEDICARE

## 2024-03-22 DIAGNOSIS — I10 ESSENTIAL HYPERTENSION: ICD-10-CM

## 2024-03-22 NOTE — TELEPHONE ENCOUNTER
Pharmacy Name: [facility] #[id number] - [place], [place] - [address] AT [address] [facility] - [phone] [facility] [phone] FX       What medication are you calling in regards to: Olmesartan-amLODIPine-HCTZ 40-10-12.5 MG tablet     What question does the pharmacy have: MEDICATION SENT ON 3/14 WAS NOT RECEIVED BY PHARMACY. PATIENT HAS ONE PILL. REQUESTING RE FAXED

## 2024-03-23 RX ORDER — OLMESARTAN MEDOXOMIL / AMLODIPINE BESYLATE / HYDROCHLOROTHIAZIDE 40; 10; 12.5 MG/1; MG/1; MG/1
1 TABLET, FILM COATED ORAL DAILY
Qty: 90 TABLET | Refills: 0 | Status: SHIPPED | OUTPATIENT
Start: 2024-03-23 | End: 2024-03-25 | Stop reason: SDUPTHER

## 2024-03-25 ENCOUNTER — TELEPHONE (OUTPATIENT)
Dept: FAMILY MEDICINE CLINIC | Facility: CLINIC | Age: 64
End: 2024-03-25
Payer: MEDICARE

## 2024-03-25 DIAGNOSIS — E11.9 TYPE 2 DIABETES MELLITUS WITHOUT COMPLICATION, UNSPECIFIED WHETHER LONG TERM INSULIN USE: ICD-10-CM

## 2024-03-25 DIAGNOSIS — I10 ESSENTIAL HYPERTENSION: ICD-10-CM

## 2024-03-25 RX ORDER — OLMESARTAN MEDOXOMIL / AMLODIPINE BESYLATE / HYDROCHLOROTHIAZIDE 40; 10; 12.5 MG/1; MG/1; MG/1
1 TABLET, FILM COATED ORAL DAILY
Qty: 90 TABLET | Refills: 0 | Status: SHIPPED | OUTPATIENT
Start: 2024-03-25

## 2024-03-25 NOTE — TELEPHONE ENCOUNTER
Caller: Pearl Elizalde    Relationship: Self    Best call back number:     Pearl Elizalde (Self) [phone] (Mobile)       What orders are you requesting (i.e. lab or imaging): LAB PRIOR TO UPCOMING APPT       In what timeframe would the patient need to come in: MAY     Where will you receive your lab/imaging services: [facility]/ OFFICE

## 2024-03-25 NOTE — TELEPHONE ENCOUNTER
Caller: Pearl Elizalde    Relationship: Self    Best call back number: [phone]    Requested Prescriptions:   Requested Prescriptions     Pending Prescriptions Disp Refills    Olmesartan-amLODIPine-HCTZ 40-10-12.5 MG tablet 90 tablet 0     Sig: Take 1 tablet by mouth Daily.        Pharmacy where request should be sent: [facility] [address] [phone] [facility] [phone] FX     Last office visit with prescribing clinician: 10/2/2023   Last telemedicine visit with prescribing clinician: Visit date not found   Next office visit with prescribing clinician: 4/16/2024     Additional details provided by patient: PATIENT IS OUT OF THIS MEDICATION AND HER LOCAL PHARMACY DOES NOT HAVE ANY IN STOCK AT THIS TIME.     Does the patient have less than a 3 day supply:  [x] Yes  [] No    Would you like a call back once the refill request has been completed: [] Yes [x] No    If the office needs to give you a call back, can they leave a voicemail: [] Yes [x] No    Connor Abdullahi Rep   03/25/24 09:57 EDT

## 2024-03-26 DIAGNOSIS — E11.9 TYPE 2 DIABETES MELLITUS WITHOUT COMPLICATION, UNSPECIFIED WHETHER LONG TERM INSULIN USE: Primary | ICD-10-CM

## 2024-03-28 ENCOUNTER — TELEPHONE (OUTPATIENT)
Dept: PAIN MEDICINE | Facility: CLINIC | Age: 64
End: 2024-03-28
Payer: MEDICARE

## 2024-03-28 NOTE — TELEPHONE ENCOUNTER
"CALLED PATIENT TO RESCHEDULE 5/20/24 APPT AND SHE ASK IF SHE COULD GET AN INCREASE ON HER CELEBREX TO 300MG. SHE HAD A HIP REPLACEMENT AND HER HIP HAS POPPED OUT TWICE. HER LEGS ARE ALSO UNEVEN 2\" DIFFERENCE WALKING WITH A [place]UGUSTIN AND GOING TO [place] AND WILL HAVE A 14 HOUR FLIGHT.   "

## 2024-03-28 NOTE — TELEPHONE ENCOUNTER
DOES NOT TAKE EVERYDAY JUST ON DAYS THAT SHE IS GOING TO WALK A LOT OR SITTING A LOT LIKE ON THIS UP COMING FLIGHT. NEVER TAKEN MORE THAN TWICE A DAY JUST REALLY WANTING TO MAKE SURE SHE IS COVERED FOR HER TRIP. THERE IS GOING TO BE A LOT OF WALKING AND GOING UP AND DOWN STAIRS. SHE STARTS PHYSICAL THERAPY ON APRIL 4 TO HELP WITH HIP GOING TO GO THREE TIMES A WEEK.

## 2024-03-28 NOTE — TELEPHONE ENCOUNTER
How often is she taking it?  I can increase it, but I don't want to cause any renal dysfunction. Does she have a pan for the hip moving forward?

## 2024-03-29 RX ORDER — CELECOXIB 400 MG/1
400 CAPSULE ORAL DAILY PRN
Qty: 20 CAPSULE | Refills: 0 | Status: SHIPPED | OUTPATIENT
Start: 2024-03-29

## 2024-04-15 ENCOUNTER — TELEPHONE (OUTPATIENT)
Dept: PAIN MEDICINE | Facility: CLINIC | Age: 64
End: 2024-04-15
Payer: MEDICARE

## 2024-04-15 NOTE — TELEPHONE ENCOUNTER
Caller: Pearl Elizalde    Relationship to patient: Self    Best call back number:     Type of visit: FUP    Requested date: BETWEEN  14TH AND 19TH     If rescheduling, when is the original appointment: 5/13/24     Additional notes:PATIENT GETS BACK IN [place]OWN VERY LATE THE NIGHT BEFORE.  SHE NEEDS TO GET IN BEFORE 5/20 THOUGH

## 2024-04-17 DIAGNOSIS — E78.5 HYPERLIPIDEMIA, UNSPECIFIED HYPERLIPIDEMIA TYPE: ICD-10-CM

## 2024-04-17 RX ORDER — EZETIMIBE 10 MG/1
10 TABLET ORAL DAILY
Qty: 90 TABLET | Refills: 0 | Status: SHIPPED | OUTPATIENT
Start: 2024-04-17

## 2024-04-19 RX ORDER — PANTOPRAZOLE SODIUM 40 MG/1
40 TABLET, DELAYED RELEASE ORAL DAILY
Qty: 90 TABLET | Refills: 0 | Status: SHIPPED | OUTPATIENT
Start: 2024-04-19

## 2024-04-23 DIAGNOSIS — I10 ESSENTIAL HYPERTENSION: ICD-10-CM

## 2024-04-23 NOTE — TELEPHONE ENCOUNTER
Caller: Pearl Elizalde    Relationship: Self    Best call back number: [phone]     Requested Prescriptions:   Requested Prescriptions     Pending Prescriptions Disp Refills    hydrALAZINE (APRESOLINE) 50 MG tablet 270 tablet 3     Sig: Take 1 tablet by mouth 3 (Three) Times a Day.        Pharmacy where request should be sent: [facility] #[id number] - [place], [place] - [address] [facility] [address] [facility] [address] [facility] [address] - [phone]  - [phone] FX     Last office visit with prescribing clinician: 10/2/2023   Last telemedicine visit with prescribing clinician: Visit date not found   Next office visit with prescribing clinician: 5/16/2024     Additional details provided by patient: PATIENT IS OUT OF MEDICINE  PATIENT WILL BE OUT OF THE COUNTRY STARTING SUNDAY    Does the patient have less than a 3 day supply:  [x] Yes  [] No    Would you like a call back once the refill request has been completed: [x] Yes [] No    If the office needs to give you a call back, can they leave a voicemail: [x] Yes [] No    Connor Rosales Rep   04/23/24 15:30 EDT

## 2024-04-23 NOTE — NURSING NOTE
Brief note, full note to follow    Uncomplicated dual chamber Medtronic MRI compatible pacing system.  Access via two left axillary punctures, first rib technique, under fluoroscopic guidance.  Excellent sensing and pacing.  Temporary wire removed under fluoroscopy at end of the case.  Programmed DDD  ppm.  Tolerated well. No complications.  Can remove pocket press in am, chest xray and ecg  No further antibiotics needed. 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   He has history of vertigo, BPH, h/o prostate CA (in remission),   He has multi vessel CAD s/p multiple prior PCIs (prox/mid LAD, OM, distal RCA) with most recent in 2022 with MARU to OM1 and D1.  His first PCI with Dr. Gomez Rayo at [facility] in  was complicated by tamponade requiring pericardial window, had bifurcating complex PCI with 6 stents placed in [facility] in ),   He underwent Transfemoral  TAVR with 23 mm Fletcher Valve. There was no intra-operative rhythm issues.   A RIJ TVP was left in place due to history of trifascicular block.   Postoperatively, patient arrived to [facility] as a mini-ICU. Patient did not require pacing overnight. O  n POD 1, his EKG remained stable. The TVP was removed without incident.   A post-op TTE was done and showed no significant AI and good position of his TAVR valve.   He was cleared for discharge home per Dr. Nolen. He was sent home on an MCOT and Plavix and ASA 81 mg.    This afternoon he experienced some dizziness while walking, but it quickly subsided.   We got a phone call from the [employer] company letting us know that he had 2 pauses (8 sec and then 9 sec) with SB in the 30's.   He was advised to come to the Emergency Room for evaluation by EPS.       He was electively admitted for TVP and eventual PPM placement.        FAMILY HISTORY:  PAST MEDICAL & SURGICAL HISTORY:  Vertigo  History of BPH  HTN (hypertension)  HLD (hyperlipidemia)  PAF (paroxysmal atrial fibrillation)  CAD (coronary artery disease)  Trifascicular block  S/P cholecystectomy  S/P coronary artery stent placement        14 system review was unremarkable    Vital signs, hemodynamic and respiratory parameters were reviewed from the bedside nursing flow sheet.  ICU Vital Signs Last 24 Hrs  T(C): 36.9 (2024 22:16), Max: 36.9 (2024 22:16)  T(F): 98.5 (2024 22:16), Max: 98.5 (2024 22:16)  HR: 55 (2024 00:00) (49 - 71)  BP: 136/61 (2024 00:00) (98/62 - 159/81)  BP(mean): 88 (2024 00:00) (76 - 121)  ABP: --  ABP(mean): --  RR: 16 (2024 00:00) (14 - 18)  SpO2: 97% (2024 00:00) (94% - 99%)    O2 Parameters below as of 2024 00:00  Patient On (Oxygen Delivery Method): nasal cannula w/ humidification  O2 Flow (L/min): 2        Adult Advanced Hemodynamics Last 24 Hrs  CVP(mm Hg): 7 (2024 14:00) (4 - 15)  CVP(cm H2O): --  CO: --  CI: --  PA: --  PA(mean): --  PCWP: --  SVR: --  SVRI: --  PVR: --  PVRI: --,     Intake and output was reviewed and the fluid balance was calculated  Daily Height in cm: 170.18 (2024 15:55)    Daily Weight in k.8 (2024 06:41)  I&O's Summary    2024 07:01  -  2024 07:00  --------------------------------------------------------  IN: 320 mL / OUT: 701 mL / NET: -381 mL    2024 07:01  -  2024 00:46  --------------------------------------------------------  IN: 50 mL / OUT: 1300 mL / NET: -1250 mL          Neuro: No gross motor deficit.  Neck: No JVD.  CVS: S1, S2, No S3.  Lungs: Good air entry bilaterally.  Abd: Soft. No tenderness. + Bowel sounds.  Vascular: + DP/PT.  Extremities: No edema.  Lymphatic: Normal.  Skin: No abnormalities.      labs  CBC Full  -  ( 2024 19:00 )  WBC Count : 6.75 K/uL  RBC Count : 4.25 M/uL  Hemoglobin : 12.7 g/dL  Hematocrit : 37.8 %  Platelet Count - Automated : 133 K/uL  Mean Cell Volume : 88.9 fl  Mean Cell Hemoglobin : 29.9 pg  Mean Cell Hemoglobin Concentration : 33.6 gm/dL  Auto Neutrophil # : x  Auto Lymphocyte # : x  Auto Monocyte # : x  Auto Eosinophil # : x  Auto Basophil # : x  Auto Neutrophil % : x  Auto Lymphocyte % : x  Auto Monocyte % : x  Auto Eosinophil % : x  Auto Basophil % : x    04-    140  |  105  |  23  ----------------------------<  99  4.2   |  24  |  1.21    Ca    9.1      2024 19:00  Phos  4.2     -  Mg     2.3     -    TPro  5.6<L>  /  Alb  3.9  /  TBili  0.8  /  DBili  x   /  AST  23  /  ALT  18  /  AlkPhos  49  04-22    PT/INR - ( 2024 19:00 )   PT: 12.4 sec;   INR: 1.09          PTT - ( 2024 19:00 )  PTT:29.9 sec  The current medications were reviewed   MEDICATIONS  (STANDING):  amLODIPine   Tablet 10 milliGRAM(s) Oral daily  aspirin enteric coated 81 milliGRAM(s) Oral daily  clopidogrel Tablet 75 milliGRAM(s) Oral daily  folic acid 1 milliGRAM(s) Oral daily  heparin   Injectable 5000 Unit(s) SubCutaneous every 8 hours  lactated ringers. 1000 milliLiter(s) (75 mL/Hr) IV Continuous <Continuous>  pantoprazole    Tablet 40 milliGRAM(s) Oral before breakfast  rosuvastatin 20 milliGRAM(s) Oral at bedtime  sodium chloride 0.9% lock flush 3 milliLiter(s) IV Push every 8 hours  tamsulosin 0.4 milliGRAM(s) Oral <User Schedule>    MEDICATIONS  (PRN):  acetaminophen     Tablet .. 650 milliGRAM(s) Oral every 6 hours PRN Mild Pain (1 - 3)  hydrALAZINE 10 milliGRAM(s) Oral every 8 hours PRN SBP>140            80 year old  Male with intermittent symptomatic Complete Heart block.  S/P Dual chamber Medtronic permanent pacemaker implantation.   Hemodynamically stable.  Good oxygenation.  Fair urine out put.            My plan includes :  Statin Rx.  Calcium and Betablocker.  Dual antiplatelet Rx.  Close hemodynamic monitoring.  Monitor for arrhythmias and monitor parameters for organ perfusion  Monitor neurologic status  Monitor renal function.  Head of the bed should remain elevated to 45 deg .   Chest PT and IS will be encouraged  Monitor adequacy of oxygenation   Nutritional goals will be met using po eventually , ensure adequate caloric intake and monitor the same  Stress ulcer and VTE prophylaxis will be achieved    Glycemic control is satisfactory  Electrolytes have been repleted as necessary and wound care has been carried out. Pain control has been achieved.   Aggressive physical therapy and early mobility and ambulation goals will be met   The family was updated about the course and plan  CRITICAL CARE TIME SPENT in evaluation and management, reassessments, review and interpretation of labs and x-rays, hemodynamic management, formulating a plan and coordinating care: ___20____ MIN.  Time does not include procedural time.  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   He has history of vertigo, BPH, h/o prostate CA (in remission),   He has multi vessel CAD s/p multiple prior PCIs (prox/mid LAD, OM, distal RCA) with most recent in July 2022 with MARU to OM1 and D1.  His first PCI with Dr. Gomez Rayo at [facility] in 1992 was complicated by tamponade requiring pericardial window, had bifurcating complex PCI with 6 stents placed in [facility] in 2004),   He underwent Transfemoral  TAVR with 23 mm Fletcher Valve. There was no intra-operative rhythm issues.   A RIJ TVP was left in place due to history of trifascicular block.   Postoperatively, patient arrived to [facility] as a mini-ICU. Patient did not require pacing overnight. O  n POD 1, his EKG remained stable. The TVP was removed without incident.   A post-op TTE was done and showed no significant AI and good position of his TAVR valve.   He was cleared for discharge home per Dr. Nolen. He was sent home on an MCOT and Plavix and ASA 81 mg.    This afternoon he experienced some dizziness while walking, but it quickly subsided.   We got a phone call from the [employer] company letting us know that he had 2 pauses (8 sec and then 9 sec) with SB in the 30's.   He was advised to come to the Emergency Room for evaluation by EPS.       He was electively admitted for TVP and eventual PPM placement.      FAMILY HISTORY:  PAST MEDICAL & SURGICAL HISTORY:  Vertigo  History of BPH  HTN (hypertension)  HLD (hyperlipidemia)  PAF (paroxysmal atrial fibrillation)  CAD (coronary artery disease)  Trifascicular block  S/P cholecystectomy  S/P coronary artery stent placement          14 system review was unremarkable    Vital signs, hemodynamic and respiratory parameters were reviewed from the bedside nursing flow sheet.  ICU Vital Signs Last 24 Hrs  T(C): 37 (21 Apr 2024 21:19), Max: 37.1 (21 Apr 2024 17:26)  T(F): 98.6 (21 Apr 2024 21:19), Max: 98.8 (21 Apr 2024 17:26)  HR: 66 (21 Apr 2024 22:00) (53 - 72)  BP: 152/73 (21 Apr 2024 22:00) (130/74 - 177/81)  BP(mean): 107 (21 Apr 2024 22:00) (89 - 122)  ABP: --  ABP(mean): --  RR: 18 (21 Apr 2024 23:00) (15 - 18)  SpO2: 96% (21 Apr 2024 23:00) (94% - 97%)    O2 Parameters below as of 21 Apr 2024 23:00  Patient On (Oxygen Delivery Method): room air          Adult Advanced Hemodynamics Last 24 Hrs  CVP(mm Hg): --  CVP(cm H2O): --  CO: --  CI: --  PA: --  PA(mean): --  PCWP: --  SVR: --  SVRI: --  PVR: --  PVRI: --,     Intake and output was reviewed and the fluid balance was calculated  Daily     Daily   I&O's Summary    20 Apr 2024 07:01  -  21 Apr 2024 07:00  --------------------------------------------------------  IN: 980 mL / OUT: 1100 mL / NET: -120 mL    21 Apr 2024 07:01  -  21 Apr 2024 22:41  --------------------------------------------------------  IN: 320 mL / OUT: 151 mL / NET: 169 mL            Neuro: No focal motor deficit.  Neck: No JVD.  CVS: S1, S2, No S3.  Lungs: Good air entry bilaterally.  Abd: Soft. No tenderness. + Bowel sounds.  Vascular: + DP/PT.  Extremities: No edema.  Lymphatic: Normal.  Skin: No abnormalities.      labs  CBC Full  -  ( 21 Apr 2024 01:33 )  WBC Count : 7.61 K/uL  RBC Count : 4.61 M/uL  Hemoglobin : 13.9 g/dL  Hematocrit : 39.9 %  Platelet Count - Automated : 141 K/uL  Mean Cell Volume : 86.6 fl  Mean Cell Hemoglobin : 30.2 pg  Mean Cell Hemoglobin Concentration : 34.8 gm/dL  Auto Neutrophil # : x  Auto Lymphocyte # : x  Auto Monocyte # : x  Auto Eosinophil # : x  Auto Basophil # : x  Auto Neutrophil % : x  Auto Lymphocyte % : x  Auto Monocyte % : x  Auto Eosinophil % : x  Auto Basophil % : x    04-21    137  |  102  |  27<H>  ----------------------------<  111<H>  4.2   |  22  |  1.30    Ca    9.5      21 Apr 2024 01:33  Phos  4.1     04-21  Mg     2.2     04-21    TPro  6.1  /  Alb  3.8  /  TBili  1.0  /  DBili  x   /  AST  40  /  ALT  29  /  AlkPhos  46  04-21    PT/INR - ( 21 Apr 2024 01:33 )   PT: 12.5 sec;   INR: 1.10          PTT - ( 21 Apr 2024 01:33 )  PTT:33.5 sec  The current medications were reviewed   MEDICATIONS  (STANDING):  amLODIPine   Tablet 10 milliGRAM(s) Oral daily  aspirin enteric coated 81 milliGRAM(s) Oral daily  clopidogrel Tablet 75 milliGRAM(s) Oral daily  folic acid 1 milliGRAM(s) Oral daily  heparin   Injectable 5000 Unit(s) SubCutaneous every 8 hours  lactated ringers. 1000 milliLiter(s) (75 mL/Hr) IV Continuous <Continuous>  pantoprazole    Tablet 40 milliGRAM(s) Oral before breakfast  rosuvastatin 20 milliGRAM(s) Oral at bedtime  sodium chloride 0.9% lock flush 3 milliLiter(s) IV Push every 8 hours  tamsulosin 0.4 milliGRAM(s) Oral <User Schedule>    MEDICATIONS  (PRN):  acetaminophen     Tablet .. 650 milliGRAM(s) Oral every 6 hours PRN Mild Pain (1 - 3)  hydrALAZINE 10 milliGRAM(s) Oral every 8 hours PRN SBP>140        80 year old male with aortic valve disease S/P transfemoral TAVR  Readmitted with intermittent complete heart block and sinus pauses.  TVP was placed.  He had another episode of complete heart block this AM due to oversensing of the P waves.  TVP adjusted accordingly.  Hemodynamically stable.  Good oxygenation.  Fair urine out put.        My plan includes :  NPO post 12 MN (For PPM in AM).  Statin Rx  PO amlodipine   Dual antiplatelet Rx.  Close hemodynamic, ventilatory and drain monitoring and management  Monitor for arrhythmias and monitor parameters for organ perfusion  Monitor neurologic status  Monitor renal function.  Head of the bed should remain elevated to 45 deg .   Chest PT and IS will be encouraged  Monitor adequacy of oxygenation and ventilation and attempt to wean oxygen  Nutritional goals will be met using po eventually , ensure adequate caloric intake and monitor the same  Stress ulcer and VTE prophylaxis will be achieved    Glycemic control is satisfactory  Electrolytes have been repleted as necessary and wound care has been carried out. Pain control has been achieved.   Aggressive physical therapy and early mobility and ambulation goals will be met   The family was updated about the course and plan  CRITICAL CARE TIME SPENT in evaluation and management, reassessments, review and interpretation of labs and x-rays, ventilator and hemodynamic management, formulating a plan and coordinating care: ___25____ MIN.  Time does not include procedural time.  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   He has history of vertigo, BPH, h/o prostate CA (in remission),   He has multi vessel CAD s/p multiple prior PCIs (prox/mid LAD, OM, distal RCA) with most recent in July 2022 with MARU to OM1 and D1.  His first PCI with Dr. Gomez Rayo at [facility] in 1992 was complicated by tamponade requiring pericardial window, had bifurcating complex PCI with 6 stents placed in [facility] in 2004),   He underwent Transfemoral  TAVR with 23 mm Fletcher Valve. There was no intra-operative rhythm issues.   A RIJ TVP was left in place due to history of trifascicular block.   Postoperatively, patient arrived to [facility] as a mini-ICU. Patient did not require pacing overnight. O  n POD 1, his EKG remained stable. The TVP was removed without incident.   A post-op TTE was done and showed no significant AI and good position of his TAVR valve.   He was cleared for discharge home per Dr. Nolen. He was sent home on an MCOT and Plavix and ASA 81 mg.    This afternoon he experienced some dizziness while walking, but it quickly subsided.   We got a phone call from the [employer] company letting us know that he had 2 pauses (8 sec and then 9 sec) with SB in the 30's.   He was advised to come to the Emergency Room for evaluation by EPS.       He was electively admitted for TVP and eventual PPM placement.           FAMILY HISTORY:  PAST MEDICAL & SURGICAL HISTORY:  Vertigo  History of BPH  HTN (hypertension)  HLD (hyperlipidemia)  PAF (paroxysmal atrial fibrillation)  CAD (coronary artery disease)  Trifascicular block  S/P cholecystectomy  S/P coronary artery stent placement        14 system review was unremarkable    Vital signs, hemodynamic and respiratory parameters were reviewed from the bedside nursing flow sheet.  ICU Vital Signs Last 24 Hrs  T(C): 35.8 (20 Apr 2024 22:00), Max: 36.6 (20 Apr 2024 13:29)  T(F): 96.4 (20 Apr 2024 22:00), Max: 97.8 (20 Apr 2024 13:29)  HR: 67 (20 Apr 2024 22:00) (58 - 73)  BP: 150/180 (20 Apr 2024 22:00) (137/74 - 161/89)  BP(mean): 106 (20 Apr 2024 21:00) (99 - 114)  ABP: --  ABP(mean): --  RR: 16 (20 Apr 2024 22:00) (16 - 20)  SpO2: 97% (20 Apr 2024 22:00) (95% - 97%)    O2 Parameters below as of 20 Apr 2024 22:00  Patient On (Oxygen Delivery Method): room air          Adult Advanced Hemodynamics Last 24 Hrs  CVP(mm Hg): --  CVP(cm H2O): --  CO: --  CI: --  PA: --  PA(mean): --  PCWP: --  SVR: --  SVRI: --  PVR: --  PVRI: --,     Intake and output was reviewed and the fluid balance was calculated  Daily Height in cm: 170.18 (20 Apr 2024 13:29)    Daily   I&O's Summary    20 Apr 2024 07:01  -  20 Apr 2024 22:34  --------------------------------------------------------  IN: 680 mL / OUT: 800 mL / NET: -120 mL            Neuro: No focal motor deficit.   Neck: No JVD.  CVS: S1, S2, No S3.  Lungs: Good air entry bilaterally.  Abd: Soft. No tenderness. + Bowel sounds.  Vascular: + DP/PT.  Extremities: No edema.  Lymphatic: Normal.  Skin: No abnormalities.      labs  CBC Full  -  ( 20 Apr 2024 13:34 )  WBC Count : 8.18 K/uL  RBC Count : 5.05 M/uL  Hemoglobin : 15.1 g/dL  Hematocrit : 43.9 %  Platelet Count - Automated : 145 K/uL  Mean Cell Volume : 86.9 fl  Mean Cell Hemoglobin : 29.9 pg  Mean Cell Hemoglobin Concentration : 34.4 gm/dL  Auto Neutrophil # : 5.35 K/uL  Auto Lymphocyte # : 1.73 K/uL  Auto Monocyte # : 0.85 K/uL  Auto Eosinophil # : 0.18 K/uL  Auto Basophil # : 0.04 K/uL  Auto Neutrophil % : 65.4 %  Auto Lymphocyte % : 21.1 %  Auto Monocyte % : 10.4 %  Auto Eosinophil % : 2.2 %  Auto Basophil % : 0.5 %    04-20    138  |  101  |  29<H>  ----------------------------<  96  4.2   |  24  |  1.33<H>    Ca    9.9      20 Apr 2024 13:34  Mg     2.3     04-20    TPro  7.3  /  Alb  4.7  /  TBili  1.1  /  DBili  x   /  AST  40  /  ALT  32  /  AlkPhos  54  04-20    PT/INR - ( 20 Apr 2024 13:34 )   PT: 12.1 sec;   INR: 1.06          PTT - ( 20 Apr 2024 13:34 )  PTT:32.6 sec  The current medications were reviewed   MEDICATIONS  (STANDING):  aspirin enteric coated 81 milliGRAM(s) Oral daily  clopidogrel Tablet 75 milliGRAM(s) Oral daily  folic acid 1 milliGRAM(s) Oral daily  heparin   Injectable 5000 Unit(s) SubCutaneous every 8 hours  lactated ringers. 1000 milliLiter(s) (75 mL/Hr) IV Continuous <Continuous>  pantoprazole    Tablet 40 milliGRAM(s) Oral before breakfast  sodium chloride 0.9% lock flush 3 milliLiter(s) IV Push every 8 hours  tamsulosin 0.4 milliGRAM(s) Oral <User Schedule>    MEDICATIONS  (PRN):  acetaminophen     Tablet .. 650 milliGRAM(s) Oral every 6 hours PRN Mild Pain (1 - 3)          80 year old male with aortic valve disease S/P transfemoral TAVR  Readmitted with intermittent complete heart block and sinus pauses.  Hemodynamically stable.  Good oxygenation.  Fair urine out put.        My plan includes :  Schedule for permanent pacemaker.  Statin Rx  Dual antiplatelet Rx.  Close hemodynamic monitoring.  Monitor for arrhythmias and monitor parameters for organ perfusion  Monitor neurologic status  Monitor renal function.  Head of the bed should remain elevated to 45 deg .   Chest PT and IS will be encouraged  Monitor adequacy of oxygenation   Nutritional goals will be met using po eventually , ensure adequate caloric intake and monitor the same  Stress ulcer and VTE prophylaxis will be achieved    Glycemic control is satisfactory  Electrolytes have been repleted as necessary and wound care has been carried out. Pain control has been achieved.   Aggressive physical therapy and early mobility and ambulation goals will be met   The family was updated about the course and plan  CRITICAL CARE TIME SPENT in evaluation and management, reassessments, review and interpretation of labs and x-rays, hemodynamic management, formulating a plan and coordinating care: ___30____ MIN.  Time does not include procedural time.  CTICU ATTENDING     					    Wilbert Serrato MD                        	 INTERVAL COURSE  Preop for PPM placement   Overnight tele reviewed revealing episodes of sinus pauses and TVP not kicking/ setting adjusted for higher output and sensitivity lowered with good capturing   Alert and awake, hypertensive, denies any acute complaints     VITALS  Vital Signs Last 24 Hrs  T(C): 36.6 (21 Apr 2024 12:41), Max: 36.8 (21 Apr 2024 01:01)  T(F): 97.8 (21 Apr 2024 12:41), Max: 98.2 (21 Apr 2024 01:01)  HR: 60 (21 Apr 2024 13:00) (53 - 73)  BP: 135/62 (21 Apr 2024 13:00) (135/62 - 177/81)  BP(mean): 89 (21 Apr 2024 13:00) (89 - 122)  RR: 16 (21 Apr 2024 13:00) (16 - 20)  SpO2: 95% (21 Apr 2024 13:00) (94% - 97%)  Parameters below as of 21 Apr 2024 13:00  Patient On (Oxygen Delivery Method): room air      I&O's Summary  21 Apr 2024 07:01  -  21 Apr 2024 14:08  --------------------------------------------------------  IN: 120 mL / OUT: 151 mL / NET: -31 mL        PHYSICAL EXAM  General: A&Ox 3; NAD  Respiratory: CTA B/L; no wheezes  Cardiovascular: Regular rhythm/rate  Gastrointestinal: Soft; NTND   Extremities: WWP; no edema   Neurological:  CNII-XII grossly intact; no obvious focal deficits    LABS/IMAGING/EKG/ETC  Reviewed.  Patient requested to discharge home today. Spoke with Dr. Vogel at bedside. Per Dr. Vogel, patient may d/c home today without physical therapy seeing her. Will reach out to  to set up home health                                                                                Electrophysiology Device Interrogation       Indication: Heart Block     Device model: 	PAOLA BARON DR MRI [id number] Serial#: [id number]			                            Functioning Mode: 		AAI <-> DDD     Underlying Rhythm:        Sinus rhythm, AV block     Pacemaker dependency:  AP: 10%,  99.6%     Battery statu: Implanted 4/22/24    Interrogating parameters:   				RA			RV		  Sense:                                     2.6 mV                            paced at VVI 30bpm  Threshold:                         0.5V @ 0.4ms                     0.5 @ 0.4ms                                                                       Pacing Impedance:               456 ohms                          760 ohms                                                                                                                                                                               CXR 4/23/2024:   < from: Xray Chest 2 Views PA/Lat (04.23.24 @ 08:56) >  Similar appearance to prior exam 4/22/2024 except the right venous   catheter has been removed. Postop changes. Left-sided implanted cardiac   device. Minimal left lower lung focal atelectasis. No pleural effusion or   lung consolidation. No pneumothorax.      Events/Alert:  none     Assessment: Mr. Grace underwent dual chamber pacemaker implantation on 4/22/2024 for Heart Block. CXR unremarkable for pneumothorax, well positioned leads, device interrogated this AM. Pressure dressing removed. Wound site appears with slight ecchymotic and soft to palpation.   - Post implantation care/instructions including left upper limb restrictions, wound site care and home remote monitoring provided. Patient, patient's wife and patient's son confirm understanding.  - Follow up: Device Clinic/Wound Check: 5/9/2024 at 2PM.       CTICU  CRITICAL  CARE  attending     Hand off received 					   Pertinent clinical, laboratory, radiographic, hemodynamic, echocardiographic, respiratory data, microbiologic data and chart were reviewed and analyzed frequently throughout the course of the day  Patient seen and examined with CTS/ SH attending at bedside  Pt is a 80yr old male with PMH prostate ca in remission, HTN, HLD, CAD sp PCIs with MARU to OM1 and D1, severe AS who underwent TAVR (23mm Fletcher, 4/16/24, [place]) and uncomplicated post op course. Pt was dc 4/17 with MCOT. Today CTS received call from company regarding 2 pauses (8 sec and 9 sec) and sinus bradycardia in the 30s. Pt was called and informed to return [facility] for evaluation. Pt arrived asymptomatic and was admitted to CTICU.      FAMILY HISTORY:  PAST MEDICAL & SURGICAL HISTORY:  Vertigo  History of BPH  HTN (hypertension)  HLD (hyperlipidemia)  PAF (paroxysmal atrial fibrillation)  CAD (coronary artery disease)  Trifascicular block  S/P cholecystectomy  S/P coronary artery stent placement        Patient is a 80y old  Male who presents with a chief complaint of Heart block.      14 system review was unremarkable    Vital signs, hemodynamic and respiratory parameters were reviewed from the bedside nursing flowsheet.  ICU Vital Signs Last 24 Hrs  T(C): 36.3 (20 Apr 2024 17:00), Max: 36.6 (20 Apr 2024 13:29)  T(F): 97.4 (20 Apr 2024 17:00), Max: 97.8 (20 Apr 2024 13:29)  HR: 71 (20 Apr 2024 15:44) (58 - 71)  BP: 147/72 (20 Apr 2024 15:44) (147/72 - 158/73)  BP(mean): --  ABP: --  ABP(mean): --  RR: 20 (20 Apr 2024 15:44) (20 - 20)  SpO2: 97% (20 Apr 2024 15:44) (95% - 97%)    O2 Parameters below as of 20 Apr 2024 14:37  Patient On (Oxygen Delivery Method): room air          Adult Advanced Hemodynamics Last 24 Hrs  CVP(mm Hg): --  CVP(cm H2O): --  CO: --  CI: --  PA: --  PA(mean): --  PCWP: --  SVR: --  SVRI: --  PVR: --  PVRI: --,     Intake and output was reviewed and the fluid balance was calculated  Daily Height in cm: 170.18 (20 Apr 2024 13:29)    Daily   I&O's Summary      All lines and drain sites were assessed  Glycemic trend was reviewedCAPILLARY BLOOD GLUCOSE      Neuro: nad, pleasant male  HEENT: mmm  Heart: s1 s2  Lungs: clear bl  Abdomen: soft nt nd  Extremities: wwp        labs  CBC Full  -  ( 20 Apr 2024 13:34 )  WBC Count : 8.18 K/uL  RBC Count : 5.05 M/uL  Hemoglobin : 15.1 g/dL  Hematocrit : 43.9 %  Platelet Count - Automated : 145 K/uL  Mean Cell Volume : 86.9 fl  Mean Cell Hemoglobin : 29.9 pg  Mean Cell Hemoglobin Concentration : 34.4 gm/dL  Auto Neutrophil # : 5.35 K/uL  Auto Lymphocyte # : 1.73 K/uL  Auto Monocyte # : 0.85 K/uL  Auto Eosinophil # : 0.18 K/uL  Auto Basophil # : 0.04 K/uL  Auto Neutrophil % : 65.4 %  Auto Lymphocyte % : 21.1 %  Auto Monocyte % : 10.4 %  Auto Eosinophil % : 2.2 %  Auto Basophil % : 0.5 %    04-20    138  |  101  |  29<H>  ----------------------------<  96  4.2   |  24  |  1.33<H>    Ca    9.9      20 Apr 2024 13:34  Mg     2.3     04-20    TPro  7.3  /  Alb  4.7  /  TBili  1.1  /  DBili  x   /  AST  40  /  ALT  32  /  AlkPhos  54  04-20    PT/INR - ( 20 Apr 2024 13:34 )   PT: 12.1 sec;   INR: 1.06          PTT - ( 20 Apr 2024 13:34 )  PTT:32.6 sec  The current medications were reviewed   MEDICATIONS  (STANDING):  atorvastatin 40 milliGRAM(s) Oral at bedtime  folic acid 1 milliGRAM(s) Oral daily  lactated ringers. 1000 milliLiter(s) (75 mL/Hr) IV Continuous <Continuous>  pantoprazole    Tablet 40 milliGRAM(s) Oral before breakfast  sodium chloride 0.9% lock flush 3 milliLiter(s) IV Push every 8 hours  tamsulosin 0.4 milliGRAM(s) Oral at bedtime    MEDICATIONS  (PRN):      Assessment/Plan:  80yr old male with PMH prostate ca in remission, HTN, HLD, CAD sp PCIs with MARU to OM1 and D1, severe AS who underwent TAVR (23mm Fletcher, 4/16/24, [facility]) and uncomplicated post op course. Pt was dc 4/17 with MCOT. Today CTS received call from company regarding 2 pauses (8 sec and 9 sec) and sinus bradycardia in the 30s. Pt was called and informed to return [facility] for evaluation. Pt arrived asymptomatic and was admitted to CTICU.      Sinus bradycardia with long pauses  EP consulted  Placed TVP  Post TAVR  Aspirin  Statin  Plavix  No skyler blocking agents  Diet as tolerated  Replete lytes prn  GI/DVT PPX  Bowel Regimen  Pain control  OOB with PT  Close hemodynamic, ventilatory and drain monitoring and management per post op routine  Monitor for arrhythmias and monitor parameters for organ perfusion  Monitor neurologic status  Head of the bed should remain elevated to 45 deg   Chest PT and IS will be encouraged  Monitor adequacy of oxygenation and ventilation and attempt to wean oxygen  Antibiotic regimen will be tailored to the clinical, laboratory and microbiologic data  Nutritional goals will be met using po eventually, ensure adequate caloric intake and monitor the same  Stress ulcer and VTE prophylaxis will be achieved    Glycemic control is satisfactory  Electrolytes have been repleted as necessary and wound care has been carried out   Pain control has been achieved.   Aggressive physical therapy and early mobility and ambulation goals will be met   The family was updated about the course and plan.    CRITICAL CARE TIME personally provided by me  in evaluation and management, reassessments, review and interpretation of labs and x-rays, ventilator and hemodynamic management, formulating a plan and coordinating care: ___105____ MIN.  Time does not include procedural time.      CTICU ATTENDING     					  Rich Vivas MD CTICU  CRITICAL  CARE  attending     Hand off received 					   Pertinent clinical, laboratory, radiographic, hemodynamic, echocardiographic, respiratory data, microbiologic data and chart were reviewed and analyzed frequently throughout the course of the day and night  Patient seen and examined with CTS/ SH attending at bedside  Pt is a 80y , Male, HEALTH ISSUES - PROBLEM Dx:      , FAMILY HISTORY:  PAST MEDICAL & SURGICAL HISTORY:  Vertigo      History of BPH      HTN (hypertension)      HLD (hyperlipidemia)      PAF (paroxysmal atrial fibrillation)      CAD (coronary artery disease)      Trifascicular block      S/P cholecystectomy      S/P coronary artery stent placement        Patient is a 80y old  Male who presents with a chief complaint of Heart block (2024 18:21)      14 system review was unremarkable    Vital signs, hemodynamic and respiratory parameters were reviewed from the bedside nursing flowsheet.  ICU Vital Signs Last 24 Hrs  T(C): 36.9 (2024 22:16), Max: 36.9 (2024 22:16)  T(F): 98.5 (2024 22:16), Max: 98.5 (2024 22:16)  HR: 53 (2024 22:00) (49 - 71)  BP: 130/72 (2024 22:00) (98/62 - 163/71)  BP(mean): 95 (2024 22:00) (76 - 121)  ABP: --  ABP(mean): --  RR: 16 (2024 22:00) (14 - 18)  SpO2: 98% (2024 22:00) (94% - 99%)    O2 Parameters below as of 2024 22:00  Patient On (Oxygen Delivery Method): nasal cannula w/ humidification  O2 Flow (L/min): 2        Adult Advanced Hemodynamics Last 24 Hrs  CVP(mm Hg): 7 (2024 14:00) (4 - 15)  CVP(cm H2O): --  CO: --  CI: --  PA: --  PA(mean): --  PCWP: --  SVR: --  SVRI: --  PVR: --  PVRI: --,     Intake and output was reviewed and the fluid balance was calculated  Daily Height in cm: 170.18 (2024 15:55)    Daily Weight in k.8 (2024 06:41)  I&O's Summary    2024 07:01  -  2024 07:00  --------------------------------------------------------  IN: 320 mL / OUT: 701 mL / NET: -381 mL    2024 07:01  -  2024 23:23  --------------------------------------------------------  IN: 50 mL / OUT: 1300 mL / NET: -1250 mL        All lines and drain sites were assessed  Glycemic trend was reviewedCAPILLARY BLOOD GLUCOSE        No acute change in mental status  Auscultation of the chest reveals equal bs  Abdomen is soft  Extremities are warm and well perfused  Wounds appear clean and unremarkable  Antibiotics are periop    labs  CBC Full  -  ( 2024 19:00 )  WBC Count : 6.75 K/uL  RBC Count : 4.25 M/uL  Hemoglobin : 12.7 g/dL  Hematocrit : 37.8 %  Platelet Count - Automated : 133 K/uL  Mean Cell Volume : 88.9 fl  Mean Cell Hemoglobin : 29.9 pg  Mean Cell Hemoglobin Concentration : 33.6 gm/dL  Auto Neutrophil # : x  Auto Lymphocyte # : x  Auto Monocyte # : x  Auto Eosinophil # : x  Auto Basophil # : x  Auto Neutrophil % : x  Auto Lymphocyte % : x  Auto Monocyte % : x  Auto Eosinophil % : x  Auto Basophil % : x    04    140  |  105  |  23  ----------------------------<  99  4.2   |  24  |  1.21    Ca    9.1      2024 19:00  Phos  4.2     04-22  Mg     2.3     04-22    TPro  5.6<L>  /  Alb  3.9  /  TBili  0.8  /  DBili  x   /  AST  23  /  ALT  18  /  AlkPhos  49  04-22    PT/INR - ( 2024 19:00 )   PT: 12.4 sec;   INR: 1.09          PTT - ( 2024 19:00 )  PTT:29.9 sec  The current medications were reviewed   MEDICATIONS  (STANDING):  amLODIPine   Tablet 10 milliGRAM(s) Oral daily  aspirin enteric coated 81 milliGRAM(s) Oral daily  clopidogrel Tablet 75 milliGRAM(s) Oral daily  folic acid 1 milliGRAM(s) Oral daily  heparin   Injectable 5000 Unit(s) SubCutaneous every 8 hours  lactated ringers. 1000 milliLiter(s) (75 mL/Hr) IV Continuous <Continuous>  pantoprazole    Tablet 40 milliGRAM(s) Oral before breakfast  rosuvastatin 20 milliGRAM(s) Oral at bedtime  sodium chloride 0.9% lock flush 3 milliLiter(s) IV Push every 8 hours  tamsulosin 0.4 milliGRAM(s) Oral <User Schedule>  vancomycin  IVPB 1000 milliGRAM(s) IV Intermittent once    MEDICATIONS  (PRN):  acetaminophen     Tablet .. 650 milliGRAM(s) Oral every 6 hours PRN Mild Pain (1 - 3)  hydrALAZINE 10 milliGRAM(s) Oral every 8 hours PRN SBP>140       PROBLEM LIST/ ASSESSMENT:  HEALTH ISSUES - PROBLEM Dx:      ,   Patient is a 80y old  Male who presents with a chief complaint of Heart block (2024 18:21)     s/p sheila beyer              My plan includes :  close hemodynamic, ventilatory and drain monitoring and management per post op routine    Monitor for arrhythmias and monitor parameters for organ perfusion  beta blockade not administered due to hemodynamic instability and bradycardia  monitor neurologic status  Head of the bed should remain elevated to 45 deg .   chest PT and IS will be encouraged  monitor adequacy of oxygenation and ventilation and attempt to wean oxygen  antibiotic regimen will be tailored to the clinical, laboratory and microbiologic data  Nutritional goals will be met using po eventually , ensure adequate caloric intake and montior the same  Stress ulcer and VTE prophylaxis will be achieved    Glycemic control is satisfactory  Electrolytes have been repleted as necessary and wound care has been carried out. Pain control has been achieved.   agressive physical therapy and early mobility and ambulation goals will be met   The family was updated about the course and plan  CRITICAL CARE TIME Upon my evaluation, this patient had a high probability of imminent or life-threatening deterioration due to the above problems which required my direct attention, intervention, and personal management.  I have personally provided 150 minutes of critical care time exclusive of time spent on separately billable procedures. Time included review of laboratory data, radiology results, discussion with consultants, and monitoring for potential decompensation. Interventions were performed as documented abovepersonally provided by me  in evaluation and management, reassessments, review and interpretation of labs and x-rays, ventilator and hemodynamic management, formulating a plan and coordinating care: ___150___ MIN.  Time does not include procedural time.    CTICU ATTENDING     					    Ari Baker MD                        	 REASON FOR CONSULT:  Intermittent complete heart block    HISTORY OF PRESENT ILLNESS:    This is an 79 y/o male with PMHx of vertigo, BPH, h/o prostate CA (in remission), HTN, HLD, PAF, trifasicular block (1AVB, LAFB, RBBB), CAD s/p multiple prior PCIs (prox/mid LAD, OM, distal RCA) with most recent in July 2022 with MARU to OM1 and D1 (per patient his first PCI with Dr. Gomez Rayo at [facility] in 1992 was complicated by tamponade requiring pericardial window, had bifurcating complex PCI with 6 stents placed in [facility] in 2004), HFpEF, and severe AS. He presented to [facility] on 4/17/24 for a planned TF TAVR/LHC with 23 mm Fletcher Valve. There was no intra-operative rhythm issues. A RIJ TVP was left in place due to history of trifascular block. Postoperatively, patient arrived to [facility] as a mini-ICU. Patient did not require pacing overnight. On POD 1, his EKG remained stable. The TVP was removed without incident. A post-op TTE was done and showed no significant AI and good position of his TAVR valve.  He was sent home on an MCOT and Plavix and ASA 81 mg.  MCOT recorded 2 pauses (8 sec and then 9 sec).  Each pause was due to intermittent complete heart block initiated by a PVC, consistent with phase 4 block.  Each episode was correlated with an episode of near syncope.  He presented to [facility] and had TVP placed.  Repeat episode of complete heart block this AM due to oversensing of the P waves.  TVP adjusted accordingly.        PAST MEDICAL & SURGICAL HISTORY:  Vertigo  History of BPH  HTN (hypertension)  HLD (hyperlipidemia)  PAF (paroxysmal atrial fibrillation)  CAD (coronary artery disease)  Trifascicular block  S/P cholecystectomy  S/P coronary artery stent placement            MEDICATIONS:  amLODIPine   Tablet 10 milliGRAM(s) Oral daily  acetaminophen     Tablet .. 650 milliGRAM(s) Oral every 6 hours PRN  pantoprazole    Tablet 40 milliGRAM(s) Oral before breakfast  rosuvastatin 20 milliGRAM(s) Oral at bedtime  aspirin enteric coated 81 milliGRAM(s) Oral daily  clopidogrel Tablet 75 milliGRAM(s) Oral daily  folic acid 1 milliGRAM(s) Oral daily  heparin   Injectable 5000 Unit(s) SubCutaneous every 8 hours  lactated ringers. 1000 milliLiter(s) IV Continuous <Continuous>  sodium chloride 0.9% lock flush 3 milliLiter(s) IV Push every 8 hours  tamsulosin 0.4 milliGRAM(s) Oral <User Schedule>        ALLERGIES:  Keflex (Hives)      SOCIAL HISTORY:    Denies illicit drug use  Denies smoking history  Denies excessive alcohol intake        REVIEW OF SYSTEMS:    CONSTITUTIONAL: No fever, weight loss, or fatigue  EYES: No eye pain, visual disturbances, or discharge  ENMT:  No difficulty hearing, tinnitus, vertigo; No sinus or throat pain  NECK: No pain or stiffness  BREASTS: No pain, masses, or nipple discharge  RESPIRATORY: No cough, wheezing, chills or hemoptysis; No Shortness of Breath  CARDIOVASCULAR: No chest pain, palpitations, dizziness, or leg swelling  GASTROINTESTINAL: No abdominal or epigastric pain. No nausea, vomiting, or hematemesis; No diarrhea or constipation. No melena or hematochezia.  GENITOURINARY: No dysuria, frequency, hematuria, or incontinence  NEUROLOGICAL: No headaches, memory loss, loss of strength, numbness, or tremors  SKIN: No itching, burning, rashes, or lesions   LYMPH Nodes: No enlarged glands  ENDOCRINE: No heat or cold intolerance; No hair loss  MUSCULOSKELETAL: No joint pain or swelling; No muscle, back, or extremity pain  PSYCHIATRIC: No depression, anxiety, mood swings, or difficulty sleeping  HEME/LYMPH: No easy bruising, or bleeding gums  ALLERY AND IMMUNOLOGIC: No hives or eczema	      PHYSICAL EXAM:  T(C): 36.6 (04-21-24 @ 12:41), Max: 36.8 (04-21-24 @ 01:01)  HR: 60 (04-21-24 @ 13:00) (53 - 73)  BP: 135/62 (04-21-24 @ 13:00) (135/62 - 177/81)  RR: 16 (04-21-24 @ 13:00) (16 - 20)  SpO2: 95% (04-21-24 @ 13:00) (94% - 97%)  Wt(kg): --    Appearance: Normal	  HEENT:   Normal oral mucosa, PERRL, EOMI	  Cardiovascular: Normal S1 S2, No JVD, No murmurs, No edema  Respiratory: Lungs clear to auscultation	  Gastrointestinal:  Soft, Non-tender, + BS	  Neurologic: A&O x 3, Non-focal  Extremities: Normal range of motion, No clubbing, cyanosis or edema  Vascular: Peripheral pulses palpable 2+ bilaterally    	  LABS:	 	    CARDIAC MARKERS:                                  13.9   7.61  )-----------( 141      ( 21 Apr 2024 01:33 )             39.9     04-21    137  |  102  |  27<H>  ----------------------------<  111<H>  4.2   |  22  |  1.30    Ca    9.5      21 Apr 2024 01:33  Phos  4.1     04-21  Mg     2.2     04-21    TPro  6.1  /  Alb  3.8  /  TBili  1.0  /  DBili  x   /  AST  40  /  ALT  29  /  AlkPhos  46  04-21            PERTINENT DIAGNOSTIC TESTING:    Echocardiogram:  EF 60%, bioAVR, dilated RA, LVH  Telemetry:  Intermittent complete heart block, NSR  EKG:  NSR, 1st degree AVB, RBBB, LAFB

## 2024-04-25 ENCOUNTER — TELEPHONE (OUTPATIENT)
Dept: FAMILY MEDICINE CLINIC | Facility: CLINIC | Age: 64
End: 2024-04-25
Payer: MEDICARE

## 2024-04-25 NOTE — TELEPHONE ENCOUNTER
Please contact our office to schedule an appointment for 12 hour fasting labs. [phone]. You may message me back with a day and time you are willing to stop in and I will add you to the schedule.  Thank you and  have a great day.

## 2024-04-26 RX ORDER — HYDRALAZINE HYDROCHLORIDE 50 MG/1
50 TABLET, FILM COATED ORAL 3 TIMES DAILY
Qty: 270 TABLET | Refills: 3 | Status: SHIPPED | OUTPATIENT
Start: 2024-04-26

## 2024-05-13 ENCOUNTER — TELEPHONE (OUTPATIENT)
Dept: FAMILY MEDICINE CLINIC | Facility: CLINIC | Age: 64
End: 2024-05-13
Payer: MEDICARE

## 2024-05-13 NOTE — TELEPHONE ENCOUNTER
ATTEMPTED TO WARM TRANSFER BUT NO ANSWER    Caller: Pearl Elizalde    Relationship: Self    Best call back number: [phone]    What is the best time to reach you: ANY    Who are you requesting to speak with (clinical staff, provider,  specific staff member): CLINICAL STAFF    Do you know the name of the person who called: NO    What was the call regarding: SHE WAS RETURNING A MISSED CALL    Is it okay if the provider responds through [employer]: NO      IF SHE DOES NOT ANSWER PLEASE LEAVE A MESSAGE

## 2024-05-23 ENCOUNTER — TELEPHONE (OUTPATIENT)
Dept: FAMILY MEDICINE CLINIC | Facility: CLINIC | Age: 64
End: 2024-05-23
Payer: MEDICARE

## 2024-05-23 NOTE — TELEPHONE ENCOUNTER
Please contact our office to schedule an appointment for 12 hour fasting labs. [phone].     You may message me back with a day and time you are willing to stop in and I will add you to the schedule. Reminder of new lab hours 8AM-1PM  Thank you and  have a great day.

## 2024-06-12 DIAGNOSIS — I10 ESSENTIAL HYPERTENSION: ICD-10-CM

## 2024-06-13 RX ORDER — OLMESARTAN MEDOXOMIL / AMLODIPINE BESYLATE / HYDROCHLOROTHIAZIDE 40; 10; 12.5 MG/1; MG/1; MG/1
1 TABLET, FILM COATED ORAL DAILY
Qty: 90 TABLET | Refills: 3 | Status: SHIPPED | OUTPATIENT
Start: 2024-06-13

## 2024-06-28 ENCOUNTER — TELEPHONE (OUTPATIENT)
Dept: FAMILY MEDICINE CLINIC | Facility: CLINIC | Age: 64
End: 2024-06-28
Payer: OTHER GOVERNMENT

## 2024-06-28 NOTE — TELEPHONE ENCOUNTER
"PATIENT HAS A HIP THAT KEEPS \"POPPING OUT\" AND IS HOME BOUND UNTIL SURGERY ON 7/8/2024. PATIENT WILL CALL WHEN SHE'S ABLE TO WALK & DRIVE.  "

## 2024-07-08 DIAGNOSIS — E78.5 HYPERLIPIDEMIA, UNSPECIFIED HYPERLIPIDEMIA TYPE: ICD-10-CM

## 2024-07-08 NOTE — TELEPHONE ENCOUNTER
Rx Refill Note  Requested Prescriptions     Pending Prescriptions Disp Refills   • ezetimibe (ZETIA) 10 MG tablet [Pharmacy Med Name: EZETIMIBE TABS 10MG] 90 tablet 3     Sig: TAKE 1 TABLET DAILY      Last office visit with prescribing clinician: 10/2/2023   Last telemedicine visit with prescribing clinician: Visit date not found   Next office visit with prescribing clinician: 8/7/2024       Would you like a call back once the refill request has been completed: [] Yes [] No    If the office needs to give you a call back, can they leave a voicemail: [] Yes [] No    Gala Elizondo MA  07/08/24, 11:58 EDT

## 2024-07-09 RX ORDER — EZETIMIBE 10 MG/1
10 TABLET ORAL DAILY
Qty: 90 TABLET | Refills: 3 | Status: SHIPPED | OUTPATIENT
Start: 2024-07-09

## 2024-07-15 ENCOUNTER — TELEPHONE (OUTPATIENT)
Dept: PAIN MEDICINE | Facility: CLINIC | Age: 64
End: 2024-07-15
Payer: OTHER GOVERNMENT

## 2024-07-15 DIAGNOSIS — G89.4 CHRONIC PAIN SYNDROME: ICD-10-CM

## 2024-07-15 RX ORDER — PREGABALIN 150 MG/1
150 CAPSULE ORAL 2 TIMES DAILY
Qty: 180 CAPSULE | Refills: 1 | Status: SHIPPED | OUTPATIENT
Start: 2024-07-15

## 2024-07-15 NOTE — TELEPHONE ENCOUNTER
Caller: Pearl Elizalde    Relationship: Self    Best call back number: [phone]    Requested Prescriptions: pregabalin (LYRICA) 150 MG capsule   Requested Prescriptions      No prescriptions requested or ordered in this encounter        Pharmacy where request should be sent:  [facility]    Last office visit with prescribing clinician: 11/29/2023   Last telemedicine visit with prescribing clinician: Visit date not found   Next office visit with prescribing clinician: 7/22/2024     Additional details provided by patient: RAN OUT 7.14.24    Does the patient have less than a 3 day supply:  [x] Yes  [] No    Would you like a call back once the refill request has been completed: [] Yes [] No    If the office needs to give you a call back, can they leave a voicemail: [] Yes [] No    Connor Lutz Rep   07/15/24 11:34 EDT

## 2024-07-17 ENCOUNTER — HOSPITAL ENCOUNTER (OUTPATIENT)
Facility: HOSPITAL | Age: 64
Setting detail: OBSERVATION
Discharge: HOME | End: 2024-07-18
Attending: EMERGENCY MEDICINE | Admitting: INTERNAL MEDICINE
Payer: MEDICARE

## 2024-07-17 DIAGNOSIS — N39.0 URINARY TRACT INFECTION WITHOUT HEMATURIA, SITE UNSPECIFIED: ICD-10-CM

## 2024-07-17 DIAGNOSIS — R11.15 PERSISTENT VOMITING: Primary | ICD-10-CM

## 2024-07-17 PROBLEM — R11.2 NAUSEA AND VOMITING: Status: ACTIVE | Noted: 2024-07-17

## 2024-07-17 LAB
ALBUMIN SERPL-MCNC: 4.5 G/DL (ref 3.5–5.2)
ALBUMIN/GLOB SERPL: 1.5 G/DL
ALP SERPL-CCNC: 108 U/L (ref 39–117)
ALT SERPL W P-5'-P-CCNC: 22 U/L (ref 1–33)
ANION GAP SERPL CALCULATED.3IONS-SCNC: 15.5 MMOL/L (ref 5–15)
AST SERPL-CCNC: 25 U/L (ref 1–32)
BACTERIA UR QL AUTO: ABNORMAL /HPF
BASOPHILS # BLD AUTO: 0.03 10*3/MM3 (ref 0–0.2)
BASOPHILS NFR BLD AUTO: 0.3 % (ref 0–1.5)
BILIRUB SERPL-MCNC: 1.1 MG/DL (ref 0–1.2)
BILIRUB UR QL STRIP: NEGATIVE
BUN SERPL-MCNC: 15 MG/DL (ref 8–23)
BUN/CREAT SERPL: 19 (ref 7–25)
CALCIUM SPEC-SCNC: 9.6 MG/DL (ref 8.6–10.5)
CHLORIDE SERPL-SCNC: 101 MMOL/L (ref 98–107)
CLARITY UR: ABNORMAL
CO2 SERPL-SCNC: 22.5 MMOL/L (ref 22–29)
COLOR UR: YELLOW
CREAT SERPL-MCNC: 0.79 MG/DL (ref 0.57–1)
DEPRECATED RDW RBC AUTO: 41.7 FL (ref 37–54)
EGFRCR SERPLBLD CKD-EPI 2021: 82.6 ML/MIN/1.73
EOSINOPHIL # BLD AUTO: 0 10*3/MM3 (ref 0–0.4)
EOSINOPHIL NFR BLD AUTO: 0 % (ref 0.3–6.2)
ERYTHROCYTE [DISTWIDTH] IN BLOOD BY AUTOMATED COUNT: 13.3 % (ref 12.3–15.4)
FLUAV SUBTYP SPEC NAA+PROBE: NOT DETECTED
FLUBV RNA ISLT QL NAA+PROBE: NOT DETECTED
GLOBULIN UR ELPH-MCNC: 3 GM/DL
GLUCOSE BLDC GLUCOMTR-MCNC: 170 MG/DL (ref 70–105)
GLUCOSE SERPL-MCNC: 261 MG/DL (ref 65–99)
GLUCOSE UR STRIP-MCNC: ABNORMAL MG/DL
HCT VFR BLD AUTO: 41.1 % (ref 34–46.6)
HGB BLD-MCNC: 13.2 G/DL (ref 12–15.9)
HGB UR QL STRIP.AUTO: NEGATIVE
HOLD SPECIMEN: NORMAL
HYALINE CASTS UR QL AUTO: ABNORMAL /LPF
IMM GRANULOCYTES # BLD AUTO: 0.07 10*3/MM3 (ref 0–0.05)
IMM GRANULOCYTES NFR BLD AUTO: 0.7 % (ref 0–0.5)
KETONES UR QL STRIP: ABNORMAL
LEUKOCYTE ESTERASE UR QL STRIP.AUTO: ABNORMAL
LIPASE SERPL-CCNC: 42 U/L (ref 13–60)
LYMPHOCYTES # BLD AUTO: 1.03 10*3/MM3 (ref 0.7–3.1)
LYMPHOCYTES NFR BLD AUTO: 10.4 % (ref 19.6–45.3)
MAGNESIUM SERPL-MCNC: 1.9 MG/DL (ref 1.6–2.4)
MCH RBC QN AUTO: 27.8 PG (ref 26.6–33)
MCHC RBC AUTO-ENTMCNC: 32.1 G/DL (ref 31.5–35.7)
MCV RBC AUTO: 86.7 FL (ref 79–97)
MONOCYTES # BLD AUTO: 0.47 10*3/MM3 (ref 0.1–0.9)
MONOCYTES NFR BLD AUTO: 4.8 % (ref 5–12)
NEUTROPHILS NFR BLD AUTO: 8.26 10*3/MM3 (ref 1.7–7)
NEUTROPHILS NFR BLD AUTO: 83.8 % (ref 42.7–76)
NITRITE UR QL STRIP: POSITIVE
NRBC BLD AUTO-RTO: 0 /100 WBC (ref 0–0.2)
PH UR STRIP.AUTO: <=5 [PH] (ref 5–8)
PLATELET # BLD AUTO: 247 10*3/MM3 (ref 140–450)
PMV BLD AUTO: 9.6 FL (ref 6–12)
POTASSIUM SERPL-SCNC: 3.6 MMOL/L (ref 3.5–5.2)
PROT SERPL-MCNC: 7.5 G/DL (ref 6–8.5)
PROT UR QL STRIP: ABNORMAL
RBC # BLD AUTO: 4.74 10*6/MM3 (ref 3.77–5.28)
RBC # UR STRIP: ABNORMAL /HPF
REF LAB TEST METHOD: ABNORMAL
SARS-COV-2 RNA RESP QL NAA+PROBE: NOT DETECTED
SODIUM SERPL-SCNC: 139 MMOL/L (ref 136–145)
SP GR UR STRIP: 1.02 (ref 1–1.03)
SQUAMOUS #/AREA URNS HPF: ABNORMAL /HPF
T4 FREE SERPL-MCNC: 1.74 NG/DL (ref 0.93–1.7)
UROBILINOGEN UR QL STRIP: ABNORMAL
WBC # UR STRIP: ABNORMAL /HPF
WBC NRBC COR # BLD AUTO: 9.86 10*3/MM3 (ref 3.4–10.8)
WHOLE BLOOD HOLD COAG: NORMAL

## 2024-07-17 PROCEDURE — 87636 SARSCOV2 & INF A&B AMP PRB: CPT | Performed by: EMERGENCY MEDICINE

## 2024-07-17 PROCEDURE — 80053 COMPREHEN METABOLIC PANEL: CPT | Performed by: EMERGENCY MEDICINE

## 2024-07-17 PROCEDURE — G0378 HOSPITAL OBSERVATION PER HR: HCPCS

## 2024-07-17 PROCEDURE — 85025 COMPLETE CBC W/AUTO DIFF WBC: CPT | Performed by: EMERGENCY MEDICINE

## 2024-07-17 PROCEDURE — 96365 THER/PROPH/DIAG IV INF INIT: CPT

## 2024-07-17 PROCEDURE — 87086 URINE CULTURE/COLONY COUNT: CPT | Performed by: EMERGENCY MEDICINE

## 2024-07-17 PROCEDURE — 82948 REAGENT STRIP/BLOOD GLUCOSE: CPT

## 2024-07-17 PROCEDURE — 83690 ASSAY OF LIPASE: CPT | Performed by: EMERGENCY MEDICINE

## 2024-07-17 PROCEDURE — 96361 HYDRATE IV INFUSION ADD-ON: CPT

## 2024-07-17 PROCEDURE — 25010000002 CEFTRIAXONE PER 250 MG: Performed by: EMERGENCY MEDICINE

## 2024-07-17 PROCEDURE — 87088 URINE BACTERIA CULTURE: CPT | Performed by: EMERGENCY MEDICINE

## 2024-07-17 PROCEDURE — 25810000003 SODIUM CHLORIDE 0.9 % SOLUTION: Performed by: INTERNAL MEDICINE

## 2024-07-17 PROCEDURE — 81001 URINALYSIS AUTO W/SCOPE: CPT | Performed by: EMERGENCY MEDICINE

## 2024-07-17 PROCEDURE — 84439 ASSAY OF FREE THYROXINE: CPT | Performed by: EMERGENCY MEDICINE

## 2024-07-17 PROCEDURE — 87186 SC STD MICRODIL/AGAR DIL: CPT | Performed by: EMERGENCY MEDICINE

## 2024-07-17 PROCEDURE — 25010000002 MIDAZOLAM PER 1 MG: Performed by: EMERGENCY MEDICINE

## 2024-07-17 PROCEDURE — 96375 TX/PRO/DX INJ NEW DRUG ADDON: CPT

## 2024-07-17 PROCEDURE — 25810000003 SODIUM CHLORIDE 0.9 % SOLUTION: Performed by: EMERGENCY MEDICINE

## 2024-07-17 PROCEDURE — 83735 ASSAY OF MAGNESIUM: CPT | Performed by: EMERGENCY MEDICINE

## 2024-07-17 PROCEDURE — 25010000002 METOCLOPRAMIDE PER 10 MG: Performed by: EMERGENCY MEDICINE

## 2024-07-17 PROCEDURE — 99285 EMERGENCY DEPT VISIT HI MDM: CPT

## 2024-07-17 RX ORDER — PROCHLORPERAZINE MALEATE 5 MG/1
10 TABLET ORAL EVERY 8 HOURS PRN
Status: DISCONTINUED | OUTPATIENT
Start: 2024-07-17 | End: 2024-07-18 | Stop reason: HOSPADM

## 2024-07-17 RX ORDER — METOCLOPRAMIDE HYDROCHLORIDE 5 MG/ML
10 INJECTION INTRAMUSCULAR; INTRAVENOUS ONCE
Status: COMPLETED | OUTPATIENT
Start: 2024-07-17 | End: 2024-07-17

## 2024-07-17 RX ORDER — CHOLECALCIFEROL (VITAMIN D3) 25 MCG
1000 TABLET ORAL DAILY
Status: DISCONTINUED | OUTPATIENT
Start: 2024-07-18 | End: 2024-07-18 | Stop reason: HOSPADM

## 2024-07-17 RX ORDER — ONDANSETRON 2 MG/ML
4 INJECTION INTRAMUSCULAR; INTRAVENOUS EVERY 6 HOURS PRN
Status: DISCONTINUED | OUTPATIENT
Start: 2024-07-17 | End: 2024-07-18 | Stop reason: HOSPADM

## 2024-07-17 RX ORDER — PANTOPRAZOLE SODIUM 40 MG/1
40 TABLET, DELAYED RELEASE ORAL DAILY
Status: DISCONTINUED | OUTPATIENT
Start: 2024-07-18 | End: 2024-07-18 | Stop reason: HOSPADM

## 2024-07-17 RX ORDER — SODIUM CHLORIDE 9 MG/ML
100 INJECTION, SOLUTION INTRAVENOUS CONTINUOUS
Status: DISCONTINUED | OUTPATIENT
Start: 2024-07-17 | End: 2024-07-18 | Stop reason: HOSPADM

## 2024-07-17 RX ORDER — SODIUM CHLORIDE 0.9 % (FLUSH) 0.9 %
10 SYRINGE (ML) INJECTION AS NEEDED
Status: DISCONTINUED | OUTPATIENT
Start: 2024-07-17 | End: 2024-07-18 | Stop reason: HOSPADM

## 2024-07-17 RX ORDER — DEXTROSE MONOHYDRATE 25 G/50ML
25 INJECTION, SOLUTION INTRAVENOUS
Status: DISCONTINUED | OUTPATIENT
Start: 2024-07-17 | End: 2024-07-18 | Stop reason: HOSPADM

## 2024-07-17 RX ORDER — ATORVASTATIN CALCIUM 20 MG/1
20 TABLET, FILM COATED ORAL DAILY
Status: DISCONTINUED | OUTPATIENT
Start: 2024-07-18 | End: 2024-07-18 | Stop reason: HOSPADM

## 2024-07-17 RX ORDER — ALBUTEROL SULFATE 90 UG/1
2 AEROSOL, METERED RESPIRATORY (INHALATION) EVERY 4 HOURS PRN
COMMUNITY

## 2024-07-17 RX ORDER — SODIUM CHLORIDE 9 MG/ML
40 INJECTION, SOLUTION INTRAVENOUS AS NEEDED
Status: DISCONTINUED | OUTPATIENT
Start: 2024-07-17 | End: 2024-07-18 | Stop reason: HOSPADM

## 2024-07-17 RX ORDER — AMOXICILLIN 250 MG
2 CAPSULE ORAL 2 TIMES DAILY PRN
Status: DISCONTINUED | OUTPATIENT
Start: 2024-07-17 | End: 2024-07-18 | Stop reason: HOSPADM

## 2024-07-17 RX ORDER — CHOLESTYRAMINE LIGHT 4 G/5.7G
1 POWDER, FOR SUSPENSION ORAL DAILY
Status: DISCONTINUED | OUTPATIENT
Start: 2024-07-18 | End: 2024-07-18 | Stop reason: HOSPADM

## 2024-07-17 RX ORDER — ROPINIROLE 0.25 MG/1
0.25 TABLET, FILM COATED ORAL 3 TIMES DAILY
Status: DISCONTINUED | OUTPATIENT
Start: 2024-07-17 | End: 2024-07-18 | Stop reason: HOSPADM

## 2024-07-17 RX ORDER — OLMESARTAN MEDOXOMIL / AMLODIPINE BESYLATE / HYDROCHLOROTHIAZIDE 40; 10; 12.5 MG/1; MG/1; MG/1
1 TABLET, FILM COATED ORAL EVERY EVENING
COMMUNITY

## 2024-07-17 RX ORDER — SODIUM CHLORIDE 0.9 % (FLUSH) 0.9 %
10 SYRINGE (ML) INJECTION EVERY 12 HOURS SCHEDULED
Status: DISCONTINUED | OUTPATIENT
Start: 2024-07-17 | End: 2024-07-18 | Stop reason: HOSPADM

## 2024-07-17 RX ORDER — NICOTINE POLACRILEX 4 MG
15 LOZENGE BUCCAL
Status: DISCONTINUED | OUTPATIENT
Start: 2024-07-17 | End: 2024-07-18 | Stop reason: HOSPADM

## 2024-07-17 RX ORDER — HYDRALAZINE HYDROCHLORIDE 25 MG/1
50 TABLET, FILM COATED ORAL 3 TIMES DAILY
Status: DISCONTINUED | OUTPATIENT
Start: 2024-07-17 | End: 2024-07-17

## 2024-07-17 RX ORDER — IBUPROFEN 600 MG/1
1 TABLET ORAL
Status: DISCONTINUED | OUTPATIENT
Start: 2024-07-17 | End: 2024-07-18 | Stop reason: HOSPADM

## 2024-07-17 RX ORDER — BISACODYL 5 MG/1
5 TABLET, DELAYED RELEASE ORAL DAILY PRN
Status: DISCONTINUED | OUTPATIENT
Start: 2024-07-17 | End: 2024-07-18 | Stop reason: HOSPADM

## 2024-07-17 RX ORDER — PREGABALIN 75 MG/1
150 CAPSULE ORAL 2 TIMES DAILY
Status: DISCONTINUED | OUTPATIENT
Start: 2024-07-17 | End: 2024-07-18 | Stop reason: HOSPADM

## 2024-07-17 RX ORDER — POLYETHYLENE GLYCOL 3350 17 G/17G
17 POWDER, FOR SOLUTION ORAL DAILY PRN
Status: DISCONTINUED | OUTPATIENT
Start: 2024-07-17 | End: 2024-07-18 | Stop reason: HOSPADM

## 2024-07-17 RX ORDER — ASCORBIC ACID 500 MG
500 TABLET ORAL DAILY
Status: DISCONTINUED | OUTPATIENT
Start: 2024-07-18 | End: 2024-07-18 | Stop reason: HOSPADM

## 2024-07-17 RX ORDER — LOSARTAN POTASSIUM 50 MG/1
100 TABLET ORAL
Status: DISCONTINUED | OUTPATIENT
Start: 2024-07-18 | End: 2024-07-18 | Stop reason: HOSPADM

## 2024-07-17 RX ORDER — PHENOL 1.4 %
600 AEROSOL, SPRAY (ML) MUCOUS MEMBRANE 2 TIMES DAILY
COMMUNITY

## 2024-07-17 RX ORDER — INSULIN LISPRO 100 [IU]/ML
2-7 INJECTION, SOLUTION INTRAVENOUS; SUBCUTANEOUS
Status: DISCONTINUED | OUTPATIENT
Start: 2024-07-17 | End: 2024-07-18 | Stop reason: HOSPADM

## 2024-07-17 RX ORDER — AMLODIPINE BESYLATE 5 MG/1
10 TABLET ORAL
Status: DISCONTINUED | OUTPATIENT
Start: 2024-07-18 | End: 2024-07-18 | Stop reason: HOSPADM

## 2024-07-17 RX ORDER — ENOXAPARIN SODIUM 100 MG/ML
40 INJECTION SUBCUTANEOUS DAILY
Status: DISCONTINUED | OUTPATIENT
Start: 2024-07-18 | End: 2024-07-18 | Stop reason: HOSPADM

## 2024-07-17 RX ORDER — BISACODYL 10 MG
10 SUPPOSITORY, RECTAL RECTAL DAILY PRN
Status: DISCONTINUED | OUTPATIENT
Start: 2024-07-17 | End: 2024-07-18 | Stop reason: HOSPADM

## 2024-07-17 RX ORDER — MIDAZOLAM HYDROCHLORIDE 1 MG/ML
2 INJECTION INTRAMUSCULAR; INTRAVENOUS ONCE
Status: COMPLETED | OUTPATIENT
Start: 2024-07-17 | End: 2024-07-17

## 2024-07-17 RX ORDER — HYDROCHLOROTHIAZIDE 12.5 MG/1
12.5 TABLET ORAL
Status: DISCONTINUED | OUTPATIENT
Start: 2024-07-18 | End: 2024-07-18 | Stop reason: HOSPADM

## 2024-07-17 RX ORDER — BACLOFEN 10 MG/1
10 TABLET ORAL NIGHTLY
Status: DISCONTINUED | OUTPATIENT
Start: 2024-07-17 | End: 2024-07-18 | Stop reason: HOSPADM

## 2024-07-17 RX ORDER — LOPERAMIDE HYDROCHLORIDE 2 MG/1
2 CAPSULE ORAL 4 TIMES DAILY PRN
Status: DISCONTINUED | OUTPATIENT
Start: 2024-07-17 | End: 2024-07-18 | Stop reason: HOSPADM

## 2024-07-17 RX ADMIN — CEFTRIAXONE 2000 MG: 2 INJECTION, POWDER, FOR SOLUTION INTRAMUSCULAR; INTRAVENOUS at 20:59

## 2024-07-17 RX ADMIN — SODIUM CHLORIDE 1000 ML: 9 INJECTION, SOLUTION INTRAVENOUS at 17:33

## 2024-07-17 RX ADMIN — ROPINIROLE HYDROCHLORIDE 0.25 MG: 0.25 TABLET, FILM COATED ORAL at 22:40

## 2024-07-17 RX ADMIN — BACLOFEN 10 MG: 10 TABLET ORAL at 22:40

## 2024-07-17 RX ADMIN — MIDAZOLAM 2 MG: 1 INJECTION INTRAMUSCULAR; INTRAVENOUS at 21:00

## 2024-07-17 RX ADMIN — Medication 10 ML: at 17:30

## 2024-07-17 RX ADMIN — PREGABALIN 150 MG: 75 CAPSULE ORAL at 22:40

## 2024-07-17 RX ADMIN — SODIUM CHLORIDE 100 ML/HR: 9 INJECTION, SOLUTION INTRAVENOUS at 22:40

## 2024-07-17 RX ADMIN — METOCLOPRAMIDE 10 MG: 5 INJECTION, SOLUTION INTRAMUSCULAR; INTRAVENOUS at 20:30

## 2024-07-17 RX ADMIN — Medication 10 ML: at 22:40

## 2024-07-18 ENCOUNTER — READMISSION MANAGEMENT (OUTPATIENT)
Dept: CALL CENTER | Facility: HOSPITAL | Age: 64
End: 2024-07-18
Payer: OTHER GOVERNMENT

## 2024-07-18 VITALS
TEMPERATURE: 98.1 F | HEART RATE: 82 BPM | WEIGHT: 186.07 LBS | RESPIRATION RATE: 17 BRPM | OXYGEN SATURATION: 98 % | DIASTOLIC BLOOD PRESSURE: 79 MMHG | BODY MASS INDEX: 29.9 KG/M2 | HEIGHT: 66 IN | SYSTOLIC BLOOD PRESSURE: 137 MMHG

## 2024-07-18 PROBLEM — K52.9 GASTROENTERITIS: Status: ACTIVE | Noted: 2024-07-17

## 2024-07-18 LAB
ALBUMIN SERPL-MCNC: 4.3 G/DL (ref 3.5–5.2)
ALBUMIN/GLOB SERPL: 1.5 G/DL
ALP SERPL-CCNC: 103 U/L (ref 39–117)
ALT SERPL W P-5'-P-CCNC: 20 U/L (ref 1–33)
ANION GAP SERPL CALCULATED.3IONS-SCNC: 10.8 MMOL/L (ref 5–15)
AST SERPL-CCNC: 25 U/L (ref 1–32)
BASOPHILS # BLD AUTO: 0.04 10*3/MM3 (ref 0–0.2)
BASOPHILS NFR BLD AUTO: 0.5 % (ref 0–1.5)
BILIRUB SERPL-MCNC: 0.7 MG/DL (ref 0–1.2)
BUN SERPL-MCNC: 13 MG/DL (ref 8–23)
BUN/CREAT SERPL: 17.6 (ref 7–25)
CALCIUM SPEC-SCNC: 9 MG/DL (ref 8.6–10.5)
CHLORIDE SERPL-SCNC: 105 MMOL/L (ref 98–107)
CO2 SERPL-SCNC: 24.2 MMOL/L (ref 22–29)
CREAT SERPL-MCNC: 0.74 MG/DL (ref 0.57–1)
DEPRECATED RDW RBC AUTO: 43.7 FL (ref 37–54)
EGFRCR SERPLBLD CKD-EPI 2021: 89.4 ML/MIN/1.73
EOSINOPHIL # BLD AUTO: 0.08 10*3/MM3 (ref 0–0.4)
EOSINOPHIL NFR BLD AUTO: 1 % (ref 0.3–6.2)
ERYTHROCYTE [DISTWIDTH] IN BLOOD BY AUTOMATED COUNT: 13.6 % (ref 12.3–15.4)
GLOBULIN UR ELPH-MCNC: 2.8 GM/DL
GLUCOSE BLDC GLUCOMTR-MCNC: 137 MG/DL (ref 70–105)
GLUCOSE BLDC GLUCOMTR-MCNC: 262 MG/DL (ref 70–105)
GLUCOSE SERPL-MCNC: 145 MG/DL (ref 65–99)
HCT VFR BLD AUTO: 40.3 % (ref 34–46.6)
HGB BLD-MCNC: 12.9 G/DL (ref 12–15.9)
IMM GRANULOCYTES # BLD AUTO: 0.03 10*3/MM3 (ref 0–0.05)
IMM GRANULOCYTES NFR BLD AUTO: 0.4 % (ref 0–0.5)
LYMPHOCYTES # BLD AUTO: 1.67 10*3/MM3 (ref 0.7–3.1)
LYMPHOCYTES NFR BLD AUTO: 21.2 % (ref 19.6–45.3)
MAGNESIUM SERPL-MCNC: 2.1 MG/DL (ref 1.6–2.4)
MCH RBC QN AUTO: 28.1 PG (ref 26.6–33)
MCHC RBC AUTO-ENTMCNC: 32 G/DL (ref 31.5–35.7)
MCV RBC AUTO: 87.8 FL (ref 79–97)
MONOCYTES # BLD AUTO: 0.74 10*3/MM3 (ref 0.1–0.9)
MONOCYTES NFR BLD AUTO: 9.4 % (ref 5–12)
NEUTROPHILS NFR BLD AUTO: 5.31 10*3/MM3 (ref 1.7–7)
NEUTROPHILS NFR BLD AUTO: 67.5 % (ref 42.7–76)
NRBC BLD AUTO-RTO: 0 /100 WBC (ref 0–0.2)
PHOSPHATE SERPL-MCNC: 2.7 MG/DL (ref 2.5–4.5)
PLATELET # BLD AUTO: 235 10*3/MM3 (ref 140–450)
PMV BLD AUTO: 9.4 FL (ref 6–12)
POTASSIUM SERPL-SCNC: 3.3 MMOL/L (ref 3.5–5.2)
PROT SERPL-MCNC: 7.1 G/DL (ref 6–8.5)
RBC # BLD AUTO: 4.59 10*6/MM3 (ref 3.77–5.28)
SODIUM SERPL-SCNC: 140 MMOL/L (ref 136–145)
WBC NRBC COR # BLD AUTO: 7.87 10*3/MM3 (ref 3.4–10.8)

## 2024-07-18 PROCEDURE — 84100 ASSAY OF PHOSPHORUS: CPT | Performed by: INTERNAL MEDICINE

## 2024-07-18 PROCEDURE — 36415 COLL VENOUS BLD VENIPUNCTURE: CPT

## 2024-07-18 PROCEDURE — 85025 COMPLETE CBC W/AUTO DIFF WBC: CPT | Performed by: INTERNAL MEDICINE

## 2024-07-18 PROCEDURE — 80053 COMPREHEN METABOLIC PANEL: CPT | Performed by: INTERNAL MEDICINE

## 2024-07-18 PROCEDURE — G0378 HOSPITAL OBSERVATION PER HR: HCPCS

## 2024-07-18 PROCEDURE — 82948 REAGENT STRIP/BLOOD GLUCOSE: CPT | Performed by: INTERNAL MEDICINE

## 2024-07-18 PROCEDURE — 83735 ASSAY OF MAGNESIUM: CPT | Performed by: INTERNAL MEDICINE

## 2024-07-18 PROCEDURE — 96361 HYDRATE IV INFUSION ADD-ON: CPT

## 2024-07-18 RX ORDER — POTASSIUM CHLORIDE 20 MEQ/1
40 TABLET, EXTENDED RELEASE ORAL DAILY
Status: COMPLETED | OUTPATIENT
Start: 2024-07-18 | End: 2024-07-18

## 2024-07-18 RX ORDER — ONDANSETRON 4 MG/1
4 TABLET, ORALLY DISINTEGRATING ORAL EVERY 8 HOURS PRN
Qty: 12 TABLET | Refills: 0 | Status: SHIPPED | OUTPATIENT
Start: 2024-07-18

## 2024-07-18 RX ORDER — ALBUTEROL SULFATE 2.5 MG/3ML
2.5 SOLUTION RESPIRATORY (INHALATION) EVERY 4 HOURS PRN
Status: DISCONTINUED | OUTPATIENT
Start: 2024-07-18 | End: 2024-07-18 | Stop reason: HOSPADM

## 2024-07-18 RX ADMIN — HYDROCHLOROTHIAZIDE 12.5 MG: 12.5 TABLET ORAL at 09:19

## 2024-07-18 RX ADMIN — POTASSIUM CHLORIDE 40 MEQ: 1500 TABLET, EXTENDED RELEASE ORAL at 09:49

## 2024-07-18 RX ADMIN — AMLODIPINE BESYLATE 10 MG: 5 TABLET ORAL at 09:19

## 2024-07-18 RX ADMIN — ATORVASTATIN CALCIUM 20 MG: 20 TABLET, FILM COATED ORAL at 09:19

## 2024-07-18 RX ADMIN — PREGABALIN 150 MG: 75 CAPSULE ORAL at 09:19

## 2024-07-18 RX ADMIN — OXYCODONE HYDROCHLORIDE AND ACETAMINOPHEN 500 MG: 500 TABLET ORAL at 09:19

## 2024-07-18 RX ADMIN — ROPINIROLE HYDROCHLORIDE 0.25 MG: 0.25 TABLET, FILM COATED ORAL at 09:19

## 2024-07-18 RX ADMIN — Medication 10 ML: at 09:19

## 2024-07-18 RX ADMIN — Medication 1000 UNITS: at 09:19

## 2024-07-18 RX ADMIN — PANTOPRAZOLE SODIUM 40 MG: 40 TABLET, DELAYED RELEASE ORAL at 09:19

## 2024-07-18 RX ADMIN — CHOLESTYRAMINE 4 G: 4 POWDER, FOR SUSPENSION ORAL at 09:19

## 2024-07-18 RX ADMIN — LOSARTAN POTASSIUM 100 MG: 50 TABLET, FILM COATED ORAL at 09:19

## 2024-07-18 NOTE — ED PROVIDER NOTES
Subjective   History of Present Illness  Chief complaint vomiting diarrhea    History of present illness this is a 64-year-old female with a history of diabetes 2-day history of nausea vomiting diarrhea cannot hold anything down.  No fever chills no abdominal pain no black or bloody stool no bloody vomitus.  No chest pain neck arm jaw pain or shortness of breath no urinary complaints no ill exposures foreign travels antibiotic use or recent hospitalization.  Nothing seem to trigger make it better or worse.      Review of Systems   Constitutional:  Negative for chills and fever.   Respiratory:  Negative for chest tightness and shortness of breath.    Cardiovascular:  Negative for chest pain and palpitations.   Gastrointestinal:  Positive for diarrhea and vomiting. Negative for abdominal pain.   Genitourinary:  Negative for difficulty urinating and dysuria.   Musculoskeletal:  Negative for back pain and neck pain.   Skin:  Negative for rash.   Neurological:  Positive for weakness. Negative for dizziness and light-headedness.   Psychiatric/Behavioral:  Negative for confusion.        Past Medical History:   Diagnosis Date    Asthma 06/03/2019    At risk for falls     Body mass index (BMI) of 30.0-30.9 in adult 04/11/2019    Breast cancer     NO CHEMO, NO RADIATION; LEFT    Chronic obstructive pulmonary disease 06/03/2019    Diabetic peripheral neuropathy 02/06/2019    Gastroesophageal reflux disease 06/03/2019    History of COVID-19     2019, 2021,2022    Hyperlipidemia     Hypertension     Low back pain     Obesity     Osteoarthritis     PONV (postoperative nausea and vomiting)     SEVERE    Slow to wake up after anesthesia     Under care of pain management specialist        Allergies   Allergen Reactions    Oxycodone Hives and Nausea And Vomiting     Other reaction(s): Nausea And Vomiting    Penicillin G Unknown (See Comments)     Patient states hives as a child and as an adult     Oxycodone Hcl Unknown - High  Severity    Penicillins Hives     As child. Unsure if can take keflex       Past Surgical History:   Procedure Laterality Date    ADENOIDECTOMY      CHOLECYSTECTOMY      COLONOSCOPY      ENDOSCOPY      EYE SURGERY      CATARACTS    INNER EAR SURGERY      STAPENDECTOMY    KNEE SURGERY Bilateral     MULTIPLE    MASTECTOMY Left     REPLACEMENT TOTAL KNEE Left     REPLACEMENT TOTAL KNEE Right     TONSILLECTOMY      TOTAL HIP ARTHROPLASTY Left 2023    Procedure: TOTAL HIP ARTHROPLASTY ANTERIOR WITH HANA TABLE;  Surgeon: Francis Vogel MD;  Location: [facility] OR [facility];  Service: Orthopedics;  Laterality: Left;    TOTAL HIP ARTHROPLASTY REVISION Left 2023    Procedure: TOTAL HIP ARTHROPLASTY ANTERIOR REVISION WITH HANA TABLE;  Surgeon: Francis Vogel MD;  Location: [facility] OR [facility];  Service: Orthopedics;  Laterality: Left;       Family History   Adopted: Yes   Problem Relation Age of Onset    Malig Hyperthermia Neg Hx        Social History     Socioeconomic History    Marital status:    Tobacco Use    Smoking status: Former     Current packs/day: 0.00     Average packs/day: 0.2 packs/day for 0.5 years (0.1 ttl pk-yrs)     Types: Cigarettes     Start date: 1984     Quit date:      Years since quittin.5     Passive exposure: Past    Smokeless tobacco: Never   Vaping Use    Vaping status: Never Used   Substance and Sexual Activity    Alcohol use: Yes     Comment: Occassional    Drug use: No    Sexual activity: Yes     Partners: Male     Birth control/protection: None     Prior to Admission medications    Medication Sig Start Date End Date Taking? Authorizing Provider   albuterol sulfate  (90 Base) MCG/ACT inhaler Inhale 2 puffs Every 4 (Four) Hours As Needed for Wheezing.   Yes ProviderRobb MD   ascorbic acid (VITAMIN C) 1000 MG tablet Take 1 tablet by mouth 2 (Two) Times a Day.   Yes ProviderRobb MD   baclofen (LIORESAL) 10 MG tablet Take 1 tablet by mouth Every Night.  11/29/23  Yes Zack Sevilla MD   Budeson-Glycopyrrol-Formoterol (Breztri Aerosphere) 160-9-4.8 MCG/ACT aerosol inhaler Inhale 2 puffs 2 (Two) Times a Day.   Yes Robb Luke MD   calcium carbonate (OS-JERMAIN) 600 MG tablet Take 1 tablet by mouth 2 (Two) Times a Day.   Yes Robb Luke MD   Cetirizine HCl 10 MG capsule Take 1 dose by mouth As Needed (ALLERGIES).   Yes Robb Luke MD   cholecalciferol (VITAMIN D3) 25 MCG (1000 UT) tablet Take 1 tablet by mouth Daily.   Yes Robb Luke MD   colesevelam (WELCHOL) 625 MG tablet TAKE 1 TABLET TWICE A DAY WITH MEALS 9/27/23  Yes Rosa Hooper APRN   Cranberry 4200 MG capsule Take 1 Capful by mouth Daily.   Yes Robb Luke MD   ezetimibe (ZETIA) 10 MG tablet TAKE 1 TABLET DAILY 7/9/24  Yes Rosa Hooper APRN   Januvia 100 MG tablet Take 1 tablet by mouth Daily. 10/3/23  Yes Rosa Hooper APRN   metFORMIN (GLUCOPHAGE) 500 MG tablet TAKE 1 TABLET TWICE A DAY WITH MEALS 1/11/24  Yes Rosa Hooper APRN   naltrexone-bupropion ER (Contrave) 8-90 MG tablet Take 2 tablets by mouth 2 (Two) Times a Day. 11/1/23  Yes Rosa Hooper APRN   Olmesartan-amLODIPine-HCTZ (Tribenzor) 40-10-12.5 MG tablet Take 1 tablet by mouth Every Evening.   Yes Robb Luke MD   pravastatin (PRAVACHOL) 80 MG tablet Take 1 tablet by mouth Daily. 2/6/24  Yes Rosa Hooper APRN   pregabalin (LYRICA) 150 MG capsule Take 1 capsule by mouth 2 (Two) Times a Day. 7/15/24  Yes Zack Sevilla MD   rOPINIRole (REQUIP) 0.25 MG tablet Take 1 tablet by mouth 2 (Two) Times a Day. 4/18/24  Yes Robb Luke MD   pantoprazole (PROTONIX) 40 MG EC tablet TAKE 1 TABLET BY MOUTH DAILY 4/19/24 7/17/24 Yes Lizette Sigala MD   Blood Glucose Monitoring Suppl (BLOOD GLUCOSE MONITOR SYSTEM) w/Device kit  11/29/18   Provider, MD Robb   glucose blood (FREESTYLE LITE) test strip 1 each by Other route Daily. Use as instructed 11/9/23    Lizette Sigala MD   Lancets (FREESTYLE) lancets  6/25/19   Robb Luke MD   celecoxib (CeleBREX) 400 MG capsule Take 1 capsule by mouth Daily As Needed for Mild Pain. 3/29/24 7/17/24  Zack Sevilla MD   EPINEPHrine (EPIPEN) 0.3 MG/0.3ML solution auto-injector injection  7/3/23 7/17/24  Robb Luke MD   hydrALAZINE (APRESOLINE) 50 MG tablet Take 1 tablet by mouth 3 (Three) Times a Day. 4/26/24 7/17/24  Rosa Hooper APRN   loperamide (Imodium A-D) 2 MG tablet Take 1 tablet by mouth 4 (Four) Times a Day As Needed for Diarrhea. 1/22/24 7/17/24  Lizette Sigala MD   ondansetron ODT (ZOFRAN-ODT) 4 MG disintegrating tablet Place 1 tablet on the tongue 4 (Four) Times a Day As Needed for Nausea or Vomiting. 11/7/23 7/17/24  Fatemeh Beasley APRN   prochlorperazine (COMPAZINE) 10 MG tablet Take 1 tablet by mouth Every 8 (Eight) Hours As Needed for Nausea or Vomiting. 1/22/24 7/17/24  Lizette Sigala MD   Tribenzor 40-10-12.5 MG tablet TAKE 1 TABLET DAILY 6/13/24 7/17/24  Rosa Hooper APRN   Zinc 30 MG capsule Take  by mouth.  7/17/24  ProviderRobb MD          Objective   Physical Exam  Constitutional this is a 64-year-old awake alert no acute distress triage vital signs reviewed.  HEENT extraocular muscles are intact pupils equal round reactive sclera clear mouth was otherwise clear neck supple no adenopathy no meningeal signs lungs clear no retraction no use of accessory heart regular without murmur rub abdomen soft nontender nondistended good bowel sounds no peritoneal findings or pulsatile mass extremities cap refill is 2 seconds good skin turgor no rash anywhere no petechiae no purpura neurologic awake alert follows commands motor strength normal without focal weakness  Procedures           ED Course      Results for orders placed or performed during the hospital encounter of 07/17/24   COVID-19 and FLU A/B PCR, 1 HR TAT - Swab, Nasopharynx    Specimen:  Nasopharynx; Swab   Result Value Ref Range    COVID19 Not Detected Not Detected - Ref. Range    Influenza A PCR Not Detected Not Detected    Influenza B PCR Not Detected Not Detected   Comprehensive Metabolic Panel    Specimen: Blood   Result Value Ref Range    Glucose 261 (H) 65 - 99 mg/dL    BUN 15 8 - 23 mg/dL    Creatinine 0.79 0.57 - 1.00 mg/dL    Sodium 139 136 - 145 mmol/L    Potassium 3.6 3.5 - 5.2 mmol/L    Chloride 101 98 - 107 mmol/L    CO2 22.5 22.0 - 29.0 mmol/L    Calcium 9.6 8.6 - 10.5 mg/dL    Total Protein 7.5 6.0 - 8.5 g/dL    Albumin 4.5 3.5 - 5.2 g/dL    ALT (SGPT) 22 1 - 33 U/L    AST (SGOT) 25 1 - 32 U/L    Alkaline Phosphatase 108 39 - 117 U/L    Total Bilirubin 1.1 0.0 - 1.2 mg/dL    Globulin 3.0 gm/dL    A/G Ratio 1.5 g/dL    BUN/Creatinine Ratio 19.0 7.0 - 25.0    Anion Gap 15.5 (H) 5.0 - 15.0 mmol/L    eGFR 82.6 >60.0 mL/min/1.73   Lipase    Specimen: Blood   Result Value Ref Range    Lipase 42 13 - 60 U/L   Urinalysis With Microscopic If Indicated (No Culture) - Urine, Clean Catch    Specimen: Urine, Clean Catch   Result Value Ref Range    Color, UA Yellow Yellow, Straw    Appearance, UA Cloudy (A) Clear    pH, UA <=5.0 5.0 - 8.0    Specific Gravity, UA 1.022 1.005 - 1.030    Glucose,  mg/dL (Trace) (A) Negative    Ketones, UA 40 mg/dL (2+) (A) Negative    Bilirubin, UA Negative Negative    Blood, UA Negative Negative    Protein,  mg/dL (2+) (A) Negative    Leuk Esterase, UA Small (1+) (A) Negative    Nitrite, UA Positive (A) Negative    Urobilinogen, UA 1.0 E.U./dL 0.2 - 1.0 E.U./dL   T4, Free    Specimen: Blood   Result Value Ref Range    Free T4 1.74 (H) 0.93 - 1.70 ng/dL   Magnesium    Specimen: Blood   Result Value Ref Range    Magnesium 1.9 1.6 - 2.4 mg/dL   CBC Auto Differential    Specimen: Blood   Result Value Ref Range    WBC 9.86 3.40 - 10.80 10*3/mm3    RBC 4.74 3.77 - 5.28 10*6/mm3    Hemoglobin 13.2 12.0 - 15.9 g/dL    Hematocrit 41.1 34.0 - 46.6 %    MCV 86.7  79.0 - 97.0 fL    MCH 27.8 26.6 - 33.0 pg    MCHC 32.1 31.5 - 35.7 g/dL    RDW 13.3 12.3 - 15.4 %    RDW-SD 41.7 37.0 - 54.0 fl    MPV 9.6 6.0 - 12.0 fL    Platelets 247 140 - 450 10*3/mm3    Neutrophil % 83.8 (H) 42.7 - 76.0 %    Lymphocyte % 10.4 (L) 19.6 - 45.3 %    Monocyte % 4.8 (L) 5.0 - 12.0 %    Eosinophil % 0.0 (L) 0.3 - 6.2 %    Basophil % 0.3 0.0 - 1.5 %    Immature Grans % 0.7 (H) 0.0 - 0.5 %    Neutrophils, Absolute 8.26 (H) 1.70 - 7.00 10*3/mm3    Lymphocytes, Absolute 1.03 0.70 - 3.10 10*3/mm3    Monocytes, Absolute 0.47 0.10 - 0.90 10*3/mm3    Eosinophils, Absolute 0.00 0.00 - 0.40 10*3/mm3    Basophils, Absolute 0.03 0.00 - 0.20 10*3/mm3    Immature Grans, Absolute 0.07 (H) 0.00 - 0.05 10*3/mm3    nRBC 0.0 0.0 - 0.2 /100 WBC   Urinalysis, Microscopic Only - Urine, Clean Catch    Specimen: Urine, Clean Catch   Result Value Ref Range    RBC, UA 0-2 None Seen, 0-2 /HPF    WBC, UA 11-20 (A) None Seen, 0-2 /HPF    Bacteria, UA 4+ (A) None Seen /HPF    Squamous Epithelial Cells, UA 0-2 None Seen, 0-2 /HPF    Hyaline Casts, UA 3-6 None Seen /LPF    Methodology Manual Light Microscopy    POC Glucose Once    Specimen: Blood   Result Value Ref Range    Glucose 170 (H) 70 - 105 mg/dL   Gold Top - SST   Result Value Ref Range    Extra Tube Hold for add-ons.    Light Blue Top   Result Value Ref Range    Extra Tube Hold for add-ons.      No radiology results for the last day  Medications   sodium chloride 0.9 % flush 10 mL (10 mL Intravenous Given 7/17/24 1730)   ascorbic acid (VITAMIN C) tablet 500 mg (has no administration in time range)   baclofen (LIORESAL) tablet 10 mg (has no administration in time range)   cholecalciferol (VITAMIN D3) tablet 1,000 Units (has no administration in time range)   cholestyramine light packet 4 g (has no administration in time range)   hydrALAZINE (APRESOLINE) tablet 50 mg (has no administration in time range)   loperamide (IMODIUM) capsule 2 mg (has no administration in time  range)   pantoprazole (PROTONIX) EC tablet 40 mg (has no administration in time range)   atorvastatin (LIPITOR) tablet 20 mg (has no administration in time range)   pregabalin (LYRICA) capsule 150 mg (has no administration in time range)   prochlorperazine (COMPAZINE) tablet 10 mg (has no administration in time range)   rOPINIRole (REQUIP) tablet 0.25 mg (has no administration in time range)   losartan (COZAAR) tablet 100 mg (has no administration in time range)     And   amLODIPine (NORVASC) tablet 10 mg (has no administration in time range)     And   hydroCHLOROthiazide tablet 12.5 mg (has no administration in time range)   sodium chloride 0.9 % flush 10 mL (has no administration in time range)   sodium chloride 0.9 % flush 10 mL (has no administration in time range)   sodium chloride 0.9 % infusion 40 mL (has no administration in time range)   Enoxaparin Sodium (LOVENOX) syringe 40 mg (has no administration in time range)   sodium chloride 0.9 % infusion (has no administration in time range)   sennosides-docusate (PERICOLACE) 8.6-50 MG per tablet 2 tablet (has no administration in time range)     And   polyethylene glycol (MIRALAX) packet 17 g (has no administration in time range)     And   bisacodyl (DULCOLAX) EC tablet 5 mg (has no administration in time range)     And   bisacodyl (DULCOLAX) suppository 10 mg (has no administration in time range)   ondansetron (ZOFRAN) injection 4 mg (has no administration in time range)   dextrose (GLUTOSE) oral gel 15 g (has no administration in time range)   dextrose (D50W) (25 g/50 mL) IV injection 25 g (has no administration in time range)   glucagon (GLUCAGEN) injection 1 mg (has no administration in time range)   insulin lispro (HUMALOG/ADMELOG) injection 2-7 Units ( Subcutaneous Not Given 7/17/24 2206)   sodium chloride 0.9 % bolus 1,000 mL (1,000 mL Intravenous New Bag 7/17/24 6709)   metoclopramide (REGLAN) injection 10 mg (10 mg Intravenous Given 7/17/24 2030)    cefTRIAXone (ROCEPHIN) 2,000 mg in sodium chloride 0.9 % 100 mL MBP (2,000 mg Intravenous New Bag 7/17/24 2059)   midazolam (VERSED) injection 2 mg (2 mg Intravenous Given 7/17/24 2100)                                              Medical Decision Making  Medical decision making.  IV established monitor placement review of sinus rhythm given a liter lactated Ringer's she got Zofran IV prior to arrival by EMS and she had taken p.o. Zofran at home prior to arrival.  Labs obtained my independent review COVID-19 flu negative.  Comprehensive metabolic profile unremarkable other than a glucose of 261 liver lipase unremarkable CBC magnesium TSH T4 unremarkable and a T4 of 1.74 urine sample obtained here shows leukocyte positive nitrate 4+ bacteria 20 white cells I did culture that.  Patient was started on Rocephin 2 g IV.  She had another episode of vomiting despite the Zofran was given Reglan 10 mg IV.  Patient also was very anxious and started to get upset because of being admitted to the hospital and was given Versed 2 mg IV for anxiety.  Patient was resting company feeling better.  I do not see evidence of acute intra-abdominal process such as appendicitis or diverticulitis or pancreatitis cholecystitis ischemic bowel bowel obstruction sepsis or bacteremia aced on my history and physical clinical findings although not a complete list of all possibilities.  Patient repeat exam is resting comfortably abdomen soft nontender nondistended good bowel sounds no peritoneal findings or other masses.    Problems Addressed:  Persistent vomiting: complicated acute illness or injury  Urinary tract infection without hematuria, site unspecified: complicated acute illness or injury    Amount and/or Complexity of Data Reviewed  Labs: ordered. Decision-making details documented in ED Course.    Risk  Decision regarding hospitalization.        Final diagnoses:   Persistent vomiting   Urinary tract infection without hematuria, site  unspecified       ED Disposition  ED Disposition       ED Disposition   Decision to Admit    Condition   --    Comment   Level of Care: Med/Surg [1]   Diagnosis: Nausea and vomiting [556579]                 No follow-up provider specified.       Medication List        ASK your doctor about these medications      Tribenzor 40-10-12.5 MG tablet  Generic drug: Olmesartan-amLODIPine-HCTZ  Ask about: Which instructions should I use?                 Bernard Kelly MD  07/17/24 2396

## 2024-07-18 NOTE — PLAN OF CARE
Goal Outcome Evaluation:  Plan of Care Reviewed With: patient        Progress: no change  Outcome Evaluation: Pt resting in bed with no complaints at this time. Discharging

## 2024-07-18 NOTE — OUTREACH NOTE
Prep Survey      Flowsheet Row Responses   [facility] patient [facility]? [facility]   Is LACE score < 7 ? Yes   Eligibility [facility]   Date of Admission 07/17/24   Date of Discharge 07/18/24   Discharge Disposition Home or Self Care   Discharge diagnosis Gastroenteritis   Does the patient have one of the following disease processes/diagnoses(primary or secondary)? Other   Does the patient have Home health ordered? No   Is there a DME ordered? No   Prep survey completed? Yes            BRENDEN VALDES - Registered Nurse

## 2024-07-18 NOTE — CASE MANAGEMENT/SOCIAL WORK
Discharge Planning Assessment  [facility]     Patient Name: Pearl Elizalde  MRN: [id number]  Today's Date: 7/18/2024    Admit Date: 7/17/2024    Plan: Home. Watch for O2 needs.   Discharge Needs Assessment       Row Name 07/18/24 0910       Living Environment    People in Home spouse    Name(s) of People in Home Spouse: Kushal    Current Living Arrangements home    Potentially Unsafe Housing Conditions none    In the past 12 months has the electric, gas, oil, or water company threatened to shut off services in your home? No    Primary Care Provided by self    Provides Primary Care For no one    Family Caregiver if Needed child(carl), adult;spouse    Family Caregiver Names Spouse: Kushal, angus Garcia    Quality of Family Relationships helpful;involved;supportive    Able to Return to Prior Arrangements yes       Resource/Environmental Concerns    Resource/Environmental Concerns none    Transportation Concerns none       Transportation Needs    In the past 12 months, has lack of transportation kept you from medical appointments or from getting medications? no    In the past 12 months, has lack of transportation kept you from meetings, work, or from getting things needed for daily living? No       Food Insecurity    Within the past 12 months, you worried that your food would run out before you got the money to buy more. Never true    Within the past 12 months, the food you bought just didn't last and you didn't have money to get more. Never true       Transition Planning    Patient/Family Anticipates Transition to home with family    Patient/Family Anticipated Services at Transition none    Transportation Anticipated family or friend will provide  Angus Garcia can transport at d/c.       Discharge Needs Assessment    Readmission Within the Last 30 Days no previous admission in last 30 days    Equipment Currently Used at Home cane, straight;walker, rolling;shower chair;bp cuff;glucometer;grab bar    Concerns to be  Addressed no discharge needs identified              Discharge Plan       Row Name 07/18/24 0917       Plan    Plan Home. Watch for O2 needs.    Patient/Family in Agreement with Plan yes    Provided Post Acute Provider List? N/A    Provided Post Acute Provider Quality & Resource List? N/A    Plan Comments CM spoke to ptQuinton Kang at bedside. PCP and pharmacy confirmed. Pt. denies financial, transportation, or medication issues. Pt. declined M2B. Pt.'s daughter Radha can transport at dc. Watch for O2 needs. DC barriers: IV meds.              Demographic Summary       Row Name 07/18/24 0909       General Information    Admission Type observation    Arrived From home    Required Notices Provided Observation Status Notice    Referral Source admission list    Reason for Consult discharge planning    Preferred Language English       Contact Information    Permission Granted to Share Info With     Contact Information Obtained for                    Functional Status       Row Name 07/18/24 0910       Functional Status    Usual Activity Tolerance excellent    Current Activity Tolerance good       Physical Activity    On average, how many days per week do you engage in moderate to strenuous exercise (like a brisk walk)? 7 days    On average, how many minutes do you engage in exercise at this level? 150+ min    Number of minutes of exercise per week 1050       Functional Status, IADL    Medications independent    Meal Preparation independent    Housekeeping independent    Laundry independent    Shopping independent                Patient Forms       Row Name 07/18/24 0921       Patient Forms    Important Message from Medicare (IMM) Delivered  NORRIS per registration 7/17/2024    Patient Observation Letter Delivered  NORRIS per registration 7/17/2024                      Cherise Albert RN    Office: [phone]  Fax: [phone]  [email or web address]

## 2024-07-18 NOTE — H&P
[facility] Medicine Services  History & Physical    Patient Name: Pearl Elizalde  : 1960  MRN: [id number]  Primary Care Physician:  Rosa Hooper, MIKAEL  Date of admission: 2024  Date and Time of Service: 2024     Subjective      Chief Complaint: Nausea and vomiting    History of Present Illness:   This is a 64 years old female with a past medical history of diabetes mellitus, COPD, GERD, hyperlipidemia, hypertension who presented to the ED with complaints of nausea, and diarrhea vomiting.  She states she has had nausea and vomiting since Monday, developed diarrhea yesterday and her last bowel movement was earlier today.  She denies trying new restaurant, eaten something different, last international trip was in April.  Given the improvement she decided to go to the ED for further workup and management.    Vital signs on presentation showed a blood pressure 141/76, heart rate 92, respiratory rate 18 saturating 97% on room air and afebrile 37.2 °C.    Labs showed sodium 139, potassium 3.6, bicarb 20.5, anion gap 15.5, BUN 15, creatinine 0.7, glucose 261, LFT within normal limits, free T41.04, lipase 22, CBC within normal limits, urinalysis positive for bacteria, WBC, leukocyte esterase and nitrate.      Review of Systems    Personal History     Past Medical History:   Diagnosis Date    Asthma 2019    At risk for falls     Body mass index (BMI) of 30.0-30.9 in adult 2019    Breast cancer     NO CHEMO, NO RADIATION; LEFT    Chronic obstructive pulmonary disease 2019    Diabetic peripheral neuropathy 2019    Gastroesophageal reflux disease 2019    History of COVID-19     , ,    Hyperlipidemia     Hypertension     Low back pain     Obesity     Osteoarthritis     PONV (postoperative nausea and vomiting)     SEVERE    Slow to wake up after anesthesia     Under care of pain management specialist        Past Surgical History:   Procedure Laterality Date     ADENOIDECTOMY      CHOLECYSTECTOMY      COLONOSCOPY      ENDOSCOPY      EYE SURGERY      CATARACTS    INNER EAR SURGERY      STAPENDECTOMY    KNEE SURGERY Bilateral     MULTIPLE    MASTECTOMY Left 2023    REPLACEMENT TOTAL KNEE Left     REPLACEMENT TOTAL KNEE Right     TONSILLECTOMY      TOTAL HIP ARTHROPLASTY Left 8/16/2023    Procedure: TOTAL HIP ARTHROPLASTY ANTERIOR WITH HANA TABLE;  Surgeon: Francis Vogel MD;  Location: [facility];  Service: Orthopedics;  Laterality: Left;    TOTAL HIP ARTHROPLASTY REVISION Left 9/18/2023    Procedure: TOTAL HIP ARTHROPLASTY ANTERIOR REVISION WITH HANA TABLE;  Surgeon: Francis Vogel MD;  Location: [facility];  Service: Orthopedics;  Laterality: Left;       Family History: family history is not on file. She was adopted. Otherwise pertinent FHx was reviewed and not pertinent to current issue.    Social History:  reports that she quit smoking about 39 years ago. Her smoking use included cigarettes. She started smoking about 40 years ago. She has a 0.1 pack-year smoking history. She has been exposed to tobacco smoke. She has never used smokeless tobacco. She reports current alcohol use. She reports that she does not use drugs.    Home Medications:  Prior to Admission Medications       Prescriptions Last Dose Informant Patient Reported? Taking?    ascorbic acid (VITAMIN C) 500 MG tablet   Yes No    Take 1 tablet by mouth Daily.    baclofen (LIORESAL) 10 MG tablet   No No    Take 1 tablet by mouth Every Night.    Blood Glucose Monitoring Suppl (BLOOD GLUCOSE MONITOR SYSTEM) w/Device kit   Yes No    Budeson-Glycopyrrol-Formoterol (Breztri Aerosphere) 160-9-4.8 MCG/ACT aerosol inhaler   Yes No    Inhale 2 puffs 2 (Two) Times a Day.    celecoxib (CeleBREX) 400 MG capsule   No No    Take 1 capsule by mouth Daily As Needed for Mild Pain.    Cetirizine HCl 10 MG capsule   Yes No    Take 1 dose by mouth As Needed (ALLERGIES).    cholecalciferol (VITAMIN D3) 25 MCG (1000 UT)  tablet   Yes No    Take 1 tablet by mouth Daily.    colesevelam (WELCHOL) 625 MG tablet   No No    TAKE 1 TABLET TWICE A DAY WITH MEALS    EPINEPHrine (EPIPEN) 0.3 MG/0.3ML solution auto-injector injection   Yes No    ezetimibe (ZETIA) 10 MG tablet   No No    TAKE 1 TABLET DAILY    glucose blood (FREESTYLE LITE) test strip   No No    1 each by Other route Daily. Use as instructed    hydrALAZINE (APRESOLINE) 50 MG tablet   No No    Take 1 tablet by mouth 3 (Three) Times a Day.    Januvia 100 MG tablet   No No    Take 1 tablet by mouth Daily.    Lancets (FREESTYLE) lancets   Yes No    loperamide (Imodium A-D) 2 MG tablet   No No    Take 1 tablet by mouth 4 (Four) Times a Day As Needed for Diarrhea.    metFORMIN (GLUCOPHAGE) 500 MG tablet   No No    TAKE 1 TABLET TWICE A DAY WITH MEALS    naltrexone-bupropion ER (Contrave) 8-90 MG tablet   No No    Take 2 tablets by mouth 2 (Two) Times a Day.    ondansetron ODT (ZOFRAN-ODT) 4 MG disintegrating tablet   No No    Place 1 tablet on the tongue 4 (Four) Times a Day As Needed for Nausea or Vomiting.    pantoprazole (PROTONIX) 40 MG EC tablet   No No    TAKE 1 TABLET BY MOUTH DAILY    pravastatin (PRAVACHOL) 80 MG tablet   No No    Take 1 tablet by mouth Daily.    pregabalin (LYRICA) 150 MG capsule   No No    Take 1 capsule by mouth 2 (Two) Times a Day.    prochlorperazine (COMPAZINE) 10 MG tablet   No No    Take 1 tablet by mouth Every 8 (Eight) Hours As Needed for Nausea or Vomiting.    rOPINIRole (REQUIP) 0.25 MG tablet   Yes No    Take 1 tablet by mouth 3 times a day.    Tribenzor 40-10-12.5 MG tablet   No No    TAKE 1 TABLET DAILY    Zinc 30 MG capsule   Yes No    Take  by mouth.              Allergies:  Allergies   Allergen Reactions    Oxycodone Hives and Nausea And Vomiting     Other reaction(s): Nausea And Vomiting    Penicillin G Unknown (See Comments)     Patient states hives as a child and as an adult     Oxycodone Hcl Unknown - High Severity    Penicillins Hives      As child. Unsure if can take keflex       Objective      Vitals:   Temp:  [99 °F (37.2 °C)] 99 °F (37.2 °C)  Heart Rate:  [82-97] 97  Resp:  [12-26] 26  BP: (123-141)/(74-83) 123/74  Body mass index is 30.03 kg/m².  Physical Exam    Appearance: No apparent distress, nontoxic-appearing.   HEENT/Neck: Neck is supple, Extraocular movements intact, Moist mucous membranes,  Cardiovascular: Regular Rate and Rhythm,   Pulmonary: Clear to auscultation Bilaterally.   Abdomen: BS+, Soft, nontender, nondistended.   Ext: No Cyanosis, Clubbing, Edema. G  Neuro: Nonfocal, Alert & Oriented x 3        Diagnostic Data:  Lab Results (last 24 hours)       Procedure Component Value Units Date/Time    Urine Culture - Urine, Urine, Clean Catch [[id number]] Collected: 07/17/24 1952    Specimen: Urine, Clean Catch Updated: 07/17/24 2044    Urinalysis, Microscopic Only - Urine, Clean Catch [[id number]]  (Abnormal) Collected: 07/17/24 1952    Specimen: Urine, Clean Catch Updated: 07/17/24 2029     RBC, UA 0-2 /HPF      WBC, UA 11-20 /HPF      Bacteria, UA 4+ /HPF      Squamous Epithelial Cells, UA 0-2 /HPF      Hyaline Casts, UA 3-6 /LPF      Methodology Manual Light Microscopy    Urinalysis With Microscopic If Indicated (No Culture) - Urine, Clean Catch [[id number]]  (Abnormal) Collected: 07/17/24 1952    Specimen: Urine, Clean Catch Updated: 07/17/24 2009     Color, UA Yellow     Appearance, UA Cloudy     pH, UA <=5.0     Specific Gravity, UA 1.022     Glucose,  mg/dL (Trace)     Ketones, UA 40 mg/dL (2+)     Bilirubin, UA Negative     Blood, UA Negative     Protein,  mg/dL (2+)     Leuk Esterase, UA Small (1+)     Nitrite, UA Positive     Urobilinogen, UA 1.0 E.U./dL    COVID-19 and FLU A/B PCR, 1 HR TAT - Swab, Nasopharynx [[id number]]  (Normal) Collected: 07/17/24 1730    Specimen: Swab from Nasopharynx Updated: 07/17/24 1825     COVID19 Not Detected     Influenza A PCR Not Detected     Influenza B PCR Not Detected     Narrative:      Fact sheet for providers: https://www.fda.gov/media/555163/download    Fact sheet for patients: https://www.fda.gov/media/726305/download    Test performed by PCR.    Comprehensive Metabolic Panel [[id number]]  (Abnormal) Collected: 07/17/24 1730    Specimen: Blood Updated: 07/17/24 1814     Glucose 261 mg/dL      BUN 15 mg/dL      Creatinine 0.79 mg/dL      Sodium 139 mmol/L      Potassium 3.6 mmol/L      Chloride 101 mmol/L      CO2 22.5 mmol/L      Calcium 9.6 mg/dL      Total Protein 7.5 g/dL      Albumin 4.5 g/dL      ALT (SGPT) 22 U/L      AST (SGOT) 25 U/L      Alkaline Phosphatase 108 U/L      Total Bilirubin 1.1 mg/dL      Globulin 3.0 gm/dL      A/G Ratio 1.5 g/dL      BUN/Creatinine Ratio 19.0     Anion Gap 15.5 mmol/L      eGFR 82.6 mL/min/1.73     Narrative:      GFR Normal >60  Chronic Kidney Disease <60  Kidney Failure <15      Lipase [[id number]]  (Normal) Collected: 07/17/24 1730    Specimen: Blood Updated: 07/17/24 1814     Lipase 42 U/L     T4, Free [[id number]]  (Abnormal) Collected: 07/17/24 1730    Specimen: Blood Updated: 07/17/24 1814     Free T4 1.74 ng/dL     Magnesium [[id number]]  (Normal) Collected: 07/17/24 1730    Specimen: Blood Updated: 07/17/24 1814     Magnesium 1.9 mg/dL     Extra Tubes [[id number]] Collected: 07/17/24 1730    Specimen: Blood, Venous Line Updated: 07/17/24 1746    Narrative:      The following orders were created for panel order Extra Tubes.  Procedure                               Abnormality         Status                     ---------                               -----------         ------                     Gold Top - SST[[id number]]                                   Final result               Light Blue Top[[id number]]                                   Final result                 Please view results for these tests on the individual orders.    Gold Top - SST [[id number]] Collected: 07/17/24 1730    Specimen: Blood Updated: 07/17/24 1746     Extra  Tube Hold for add-ons.     Comment: Auto resulted.       Light Blue Top [[id number]] Collected: 07/17/24 1730    Specimen: Blood Updated: 07/17/24 1746     Extra Tube Hold for add-ons.     Comment: Auto resulted       CBC & Differential [[id number]]  (Abnormal) Collected: 07/17/24 1730    Specimen: Blood Updated: 07/17/24 1741    Narrative:      The following orders were created for panel order CBC & Differential.  Procedure                               Abnormality         Status                     ---------                               -----------         ------                     CBC Auto Differential[[id number]]        Abnormal            Final result                 Please view results for these tests on the individual orders.    CBC Auto Differential [[id number]]  (Abnormal) Collected: 07/17/24 1730    Specimen: Blood Updated: 07/17/24 1741     WBC 9.86 10*3/mm3      RBC 4.74 10*6/mm3      Hemoglobin 13.2 g/dL      Hematocrit 41.1 %      MCV 86.7 fL      MCH 27.8 pg      MCHC 32.1 g/dL      RDW 13.3 %      RDW-SD 41.7 fl      MPV 9.6 fL      Platelets 247 10*3/mm3      Neutrophil % 83.8 %      Lymphocyte % 10.4 %      Monocyte % 4.8 %      Eosinophil % 0.0 %      Basophil % 0.3 %      Immature Grans % 0.7 %      Neutrophils, Absolute 8.26 10*3/mm3      Lymphocytes, Absolute 1.03 10*3/mm3      Monocytes, Absolute 0.47 10*3/mm3      Eosinophils, Absolute 0.00 10*3/mm3      Basophils, Absolute 0.03 10*3/mm3      Immature Grans, Absolute 0.07 10*3/mm3      nRBC 0.0 /100 WBC              Imaging Results (Last 24 Hours)       ** No results found for the last 24 hours. **              Assessment & Plan    Nausea/vomiting  Diarrhea  -Patient presented with nausea, vomiting and diarrhea, she denies  eating anything new or trying a new restaurant.  She likely has a viral gastroenteritis.  Symptomatic management for now  -She is otherwise hemodynamically stable, labs within normal limits, continue gentle volume  resuscitation, IV Zofran for nausea. No diarrhea since earlier today  -She received Versed in the ED for anxiety.    -Continue to monitor        Diabetes mellitus  -Blood glucose 261 on presentation, add sliding scale insulin.  -Glucose check ACHS      COPD  GERD  Hyperlipidemia  Hypertension  -Chronic issues stable, continue home meds      VTE Prophylaxis: Lovenox  Full code  Obs level of care        Signature:     This document has been electronically signed by Koki Merida MD on July 17, 2024 21:19 EDT   [facility] Hospitalist Team

## 2024-07-18 NOTE — DISCHARGE SUMMARY
"             [facility] Medicine Services  Discharge Summary    Date of Service: 24  Patient Name: Pearl Elizalde  : 1960  MRN: [id number]    Date of Admission: 2024  Discharge Diagnosis: Gastroenteritis  Date of Discharge:  24  Primary Care Physician: Rosa Hooper APRN    Presenting Problem:   Nausea and vomiting [R11.2]    Active and Resolved Hospital Problems:  Active Hospital Problems    Diagnosis POA    **Gastroenteritis [K52.9] Yes      Resolved Hospital Problems   No resolved problems to display.         Hospital Course     HPI:  Per the H&P written by Koki Merida MD, dated 24:  \"This is a 64 years old female with a past medical history of diabetes mellitus, COPD, GERD, hyperlipidemia, hypertension who presented to the ED with complaints of nausea, and diarrhea vomiting.  She states she has had nausea and vomiting since Monday, developed diarrhea yesterday and her last bowel movement was earlier today.  She denies trying new restaurant, eaten something different, last international trip was in April.  Given the improvement she decided to go to the ED for further workup and management.     Vital signs on presentation showed a blood pressure 141/76, heart rate 92, respiratory rate 18 saturating 97% on room air and afebrile 37.2 °C.     Labs showed sodium 139, potassium 3.6, bicarb 20.5, anion gap 15.5, BUN 15, creatinine 0.7, glucose 261, LFT within normal limits, free T41.04, lipase 22, CBC within normal limits, urinalysis positive for bacteria, WBC, leukocyte esterase and nitrate.\"    Hospital Course:  Patient admitted as above. Given IV fluids and zofran with improvement in symptoms. Abnormal UA as above; however, patient without urinary symptoms. Labs normalized except for hypokalemia likely 2/2 diarrhea which was repleted prior to discharge. Patient discharged home with prn zofran and PCP follow up.     DISCHARGE Follow Up Recommendations for labs and diagnostics: "   - Follow up with PCP within 1-2 weeks of discharge with repeat BMP to monitor K      Reasons For Change In Medications and Indications for New Medications:  - Zofran ODT for nausea    Overall plan:   - Continue supportive care for gastroenteritis including oral hydration and zofran for symptom management  - Close PCP follow up    Day of Discharge     Vital Signs:  Temp:  [97.9 °F (36.6 °C)-99 °F (37.2 °C)] 98.1 °F (36.7 °C)  Heart Rate:  [69-97] 82  Resp:  [12-26] 17  BP: (107-141)/(74-83) 137/79    Physical Exam:  General: No acute distress, appears stated age  Neuro: Awake and alert, oriented x3, no focal deficits appreciated  HEENT: EOMI, moist mucus membranes  CV: RRR, no murmurs appreciated, no peripheral edema  Pulm: CTAB, no increased work of breathing  Abd: Soft, nontender, nondistended  Skin: Warm, dry and intact  Psych: Appropriate mood and affect      Pertinent  and/or Most Recent Results     LAB RESULTS:      Lab 07/18/24  0230 07/17/24  1730   WBC 7.87 9.86   HEMOGLOBIN 12.9 13.2   HEMATOCRIT 40.3 41.1   PLATELETS 235 247   NEUTROS ABS 5.31 8.26*   IMMATURE GRANS (ABS) 0.03 0.07*   LYMPHS ABS 1.67 1.03   MONOS ABS 0.74 0.47   EOS ABS 0.08 0.00   MCV 87.8 86.7         Lab 07/18/24  0230 07/17/24  1730   SODIUM 140 139   POTASSIUM 3.3* 3.6   CHLORIDE 105 101   CO2 24.2 22.5   ANION GAP 10.8 15.5*   BUN 13 15   CREATININE 0.74 0.79   EGFR 89.4 82.6   GLUCOSE 145* 261*   CALCIUM 9.0 9.6   MAGNESIUM 2.1 1.9   PHOSPHORUS 2.7  --          Lab 07/18/24  0230 07/17/24  1730   TOTAL PROTEIN 7.1 7.5   ALBUMIN 4.3 4.5   GLOBULIN 2.8 3.0   ALT (SGPT) 20 22   AST (SGOT) 25 25   BILIRUBIN 0.7 1.1   ALK PHOS 103 108   LIPASE  --  42                     Brief Urine Lab Results  (Last result in the past 365 days)        Color   Clarity   Blood   Leuk Est   Nitrite   Protein   CREAT   Urine HCG        07/17/24 1952 Yellow   Cloudy   Negative   Small (1+)   Positive   100 mg/dL (2+)                 Microbiology Results  (last 10 days)       Procedure Component Value - Date/Time    Urine Culture - Urine, Urine, Clean Catch [[id number]]  (Abnormal) Collected: 07/17/24 1952    Lab Status: Preliminary result Specimen: Urine, Clean Catch Updated: 07/18/24 1101     Urine Culture >100,000 CFU/mL Escherichia coli    Narrative:      Colonization of the urinary tract without infection is common. Treatment is discouraged unless the patient is symptomatic, pregnant, or undergoing an invasive urologic procedure.    COVID-19 and FLU A/B PCR, 1 HR TAT - Swab, Nasopharynx [[id number]]  (Normal) Collected: 07/17/24 1730    Lab Status: Final result Specimen: Swab from Nasopharynx Updated: 07/17/24 1825     COVID19 Not Detected     Influenza A PCR Not Detected     Influenza B PCR Not Detected    Narrative:      Fact sheet for providers: https://www.fda.gov/media/266430/download    Fact sheet for patients: https://www.fda.gov/media/921673/download    Test performed by PCR.                 Results for orders placed during the hospital encounter of 05/22/22    Duplex Venous Lower Extremity - Left CAR    Interpretation Summary  · Normal left lower extremity venous duplex scan.      Results for orders placed during the hospital encounter of 05/22/22    Duplex Venous Lower Extremity - Left CAR    Interpretation Summary  · Normal left lower extremity venous duplex scan.          Labs Pending at Discharge:  Pending Labs       Order Current Status    Urine Culture - Urine, Urine, Clean Catch Preliminary result            Procedures Performed  None         Consults:   Consults       No orders found for last 30 day(s).              Discharge Details        Discharge Medications        New Medications        Instructions Start Date   ondansetron ODT 4 MG disintegrating tablet  Commonly known as: ZOFRAN-ODT   4 mg, Translingual, Every 8 Hours PRN             Continue These Medications        Instructions Start Date   albuterol sulfate  (90 Base) MCG/ACT  inhaler  Commonly known as: PROVENTIL HFA;VENTOLIN HFA;PROAIR HFA   2 puffs, Inhalation, Every 4 Hours PRN      ascorbic acid 1000 MG tablet  Commonly known as: VITAMIN C   1,000 mg, Oral, 2 Times Daily      baclofen 10 MG tablet  Commonly known as: LIORESAL   10 mg, Oral, Nightly      Blood Glucose Monitor System w/Device kit       Breztri Aerosphere 160-9-4.8 MCG/ACT aerosol inhaler  Generic drug: Budeson-Glycopyrrol-Formoterol   2 puffs, Inhalation, 2 Times Daily      calcium carbonate 600 MG tablet  Commonly known as: OS-JERMAIN   600 mg, Oral, 2 Times Daily      Cetirizine HCl 10 MG capsule   1 dose, Oral, As Needed      cholecalciferol 25 MCG (1000 UT) tablet  Commonly known as: VITAMIN D3   1,000 Units, Oral, Daily      colesevelam 625 MG tablet  Commonly known as: WELCHOL   TAKE 1 TABLET TWICE A DAY WITH MEALS      Contrave 8-90 MG tablet  Generic drug: naltrexone-bupropion ER   2 tablets, Oral, 2 Times Daily      Cranberry 4200 MG capsule   1 Capful, Oral, Daily      ezetimibe 10 MG tablet  Commonly known as: ZETIA   10 mg, Oral, Daily      freestyle lancets   No dose, route, or frequency recorded.      FREESTYLE LITE test strip  Generic drug: glucose blood   1 each, Other, Daily, Use as instructed      Januvia 100 MG tablet  Generic drug: SITagliptin   100 mg, Oral, Daily      metFORMIN 500 MG tablet  Commonly known as: GLUCOPHAGE   500 mg, Oral, 2 Times Daily With Meals      pravastatin 80 MG tablet  Commonly known as: PRAVACHOL   80 mg, Oral, Daily      pregabalin 150 MG capsule  Commonly known as: LYRICA   150 mg, Oral, 2 Times Daily      rOPINIRole 0.25 MG tablet  Commonly known as: REQUIP   0.25 mg, Oral, 2 Times Daily      Tribenzor 40-10-12.5 MG tablet  Generic drug: Olmesartan-amLODIPine-HCTZ   1 tablet, Oral, Every Evening               Allergies   Allergen Reactions    Oxycodone Hives and Nausea And Vomiting     Other reaction(s): Nausea And Vomiting    Penicillin G Unknown (See Comments)     Patient  states hives as a child and as an adult     Oxycodone Hcl Unknown - High Severity    Penicillins Hives     As child. Unsure if can take keflex       Discharge Disposition:   Home or Self Care    Diet:  Advance diet as tolerated      Discharge Activity:   Activity Instructions       Activity as Tolerated              CODE STATUS:  Code Status and Medical Interventions:   Ordered at: 07/17/24 2118     Code Status (Patient has no pulse and is not breathing):    CPR (Attempt to Resuscitate)     Medical Interventions (Patient has pulse or is breathing):    Full Support       Future Appointments   Date Time Provider Department Center   7/22/2024  9:50 AM Zack Sevilla MD [facility]   8/7/2024 10:00 AM Rosa Hooper APRN [facility]       Additional Instructions for the Follow-ups that You Need to Schedule       Discharge Follow-up with PCP   As directed       Currently Documented PCP:    Rosa Hooper APRN    PCP Phone Number:    [phone]     Follow Up Details: Follow up with PCP within 1-2 weeks following discharge                Time spent on Discharge including face to face service:  >30 minutes    Signature: Electronically signed by Evelyne Thomason MD, 07/18/24, 16:13 EDT.  [facility] Hospitalist Team

## 2024-07-19 ENCOUNTER — TRANSITIONAL CARE MANAGEMENT TELEPHONE ENCOUNTER (OUTPATIENT)
Dept: CALL CENTER | Facility: HOSPITAL | Age: 64
End: 2024-07-19
Payer: OTHER GOVERNMENT

## 2024-07-19 LAB — BACTERIA SPEC AEROBE CULT: ABNORMAL

## 2024-07-19 NOTE — OUTREACH NOTE
Call Center TCM Note      Flowsheet Row Responses   [facility] facility patient discharged from? [facility]   Does the patient have one of the following disease processes/diagnoses(primary or secondary)? Other   TCM attempt successful? No   Unsuccessful attempts Attempt 1   Call Status Left message            Gloria Reveles RN    7/19/2024, 16:41 EDT

## 2024-07-22 ENCOUNTER — OFFICE VISIT (OUTPATIENT)
Dept: PAIN MEDICINE | Facility: CLINIC | Age: 64
End: 2024-07-22
Payer: MEDICARE

## 2024-07-22 ENCOUNTER — TRANSITIONAL CARE MANAGEMENT TELEPHONE ENCOUNTER (OUTPATIENT)
Dept: CALL CENTER | Facility: HOSPITAL | Age: 64
End: 2024-07-22
Payer: OTHER GOVERNMENT

## 2024-07-22 VITALS
RESPIRATION RATE: 16 BRPM | WEIGHT: 185 LBS | DIASTOLIC BLOOD PRESSURE: 77 MMHG | OXYGEN SATURATION: 95 % | BODY MASS INDEX: 29.86 KG/M2 | SYSTOLIC BLOOD PRESSURE: 117 MMHG | HEART RATE: 86 BPM

## 2024-07-22 DIAGNOSIS — M15.9 PRIMARY OSTEOARTHRITIS INVOLVING MULTIPLE JOINTS: ICD-10-CM

## 2024-07-22 DIAGNOSIS — G25.81 RESTLESS LEG SYNDROME: ICD-10-CM

## 2024-07-22 DIAGNOSIS — G89.4 CHRONIC PAIN SYNDROME: Primary | ICD-10-CM

## 2024-07-22 PROCEDURE — 3074F SYST BP LT 130 MM HG: CPT | Performed by: STUDENT IN AN ORGANIZED HEALTH CARE EDUCATION/TRAINING PROGRAM

## 2024-07-22 PROCEDURE — 1160F RVW MEDS BY RX/DR IN RCRD: CPT | Performed by: STUDENT IN AN ORGANIZED HEALTH CARE EDUCATION/TRAINING PROGRAM

## 2024-07-22 PROCEDURE — 3078F DIAST BP <80 MM HG: CPT | Performed by: STUDENT IN AN ORGANIZED HEALTH CARE EDUCATION/TRAINING PROGRAM

## 2024-07-22 PROCEDURE — 1125F AMNT PAIN NOTED PAIN PRSNT: CPT | Performed by: STUDENT IN AN ORGANIZED HEALTH CARE EDUCATION/TRAINING PROGRAM

## 2024-07-22 PROCEDURE — 99214 OFFICE O/P EST MOD 30 MIN: CPT | Performed by: STUDENT IN AN ORGANIZED HEALTH CARE EDUCATION/TRAINING PROGRAM

## 2024-07-22 PROCEDURE — 1159F MED LIST DOCD IN RCRD: CPT | Performed by: STUDENT IN AN ORGANIZED HEALTH CARE EDUCATION/TRAINING PROGRAM

## 2024-07-22 RX ORDER — BACLOFEN 10 MG/1
10 TABLET ORAL NIGHTLY
Qty: 90 TABLET | Refills: 1 | Status: SHIPPED | OUTPATIENT
Start: 2024-07-22

## 2024-07-22 RX ORDER — CELECOXIB 200 MG/1
200 CAPSULE ORAL DAILY
Qty: 90 CAPSULE | Refills: 1 | Status: SHIPPED | OUTPATIENT
Start: 2024-07-22

## 2024-07-22 NOTE — PROGRESS NOTES
CHIEF COMPLAINT  Chief Complaint   Patient presents with    Back Pain     No narcotics    Hip Pain       Primary Care  Rosa Hooper, APRN    Subjective   Pearl Elizalde is a 64 y.o. female  who presents for chronic pain syndrome and osteoarthritis.  She is initially referred for left foot pain however she states that she has pain throughout her body multiple sites including her shoulders bilaterally as well as her left foot, left ankle, and low back.  She states that she has had pain off and on in many locations for many years.  She states that she has previously been taking celecoxib as needed which does provide her some level of pain relief.  She denies any significant radicular features or any symptoms suggestive of neuropathic pain.  He describes the pain primarily as a sharp, stabbing pain especially given and slightly improved with rest and has any red flag symptoms such as loss of bowel or bladder    Pain  Associated symptoms include arthralgias, joint swelling and myalgias. Pertinent negatives include no neck pain, numbness or weakness.   Osteoarthritis  She complains of joint swelling. Her past medical history is significant for osteoarthritis.   Back Pain  Pertinent negatives include no numbness or weakness.   Hip Pain   Pertinent negatives include no numbness.        Location: Bilateral shoulders, left ankle, left foot, low back  Onset: Many years ago  Duration: Progressively worsening  Timing: Constant throughout the day  Quality:, Stabbing  Severity: Today: 1       Last Week: 7       Worst: 10  Modifying Factors: The pain is worse with movement and physical activity and slightly improved with rest and anti-inflammatories  Functional Deficit: The pain makes it difficult for her to perform her activities of daily living as well as take care of her grandchild    Physical Therapy: no    Interval Update 07/22/2024: Relatively unchanged.  Continues with combination of baclofen, Lyrica, and Celebrex.  She  is getting a second opinion on her hip next week as he continues to have issues with dislocation.    The following portions of the patient's history were reviewed and updated as appropriate: allergies, current medications, past family history, past medical history, past social history, past surgical history and problem list.      Current Outpatient Medications:     albuterol sulfate  (90 Base) MCG/ACT inhaler, Inhale 2 puffs Every 4 (Four) Hours As Needed for Wheezing., Disp: , Rfl:     ascorbic acid (VITAMIN C) 1000 MG tablet, Take 1 tablet by mouth 2 (Two) Times a Day., Disp: , Rfl:     baclofen (LIORESAL) 10 MG tablet, Take 1 tablet by mouth Every Night., Disp: 90 tablet, Rfl: 1    Blood Glucose Monitoring Suppl (BLOOD GLUCOSE MONITOR SYSTEM) w/Device kit, , Disp: , Rfl:     Budeson-Glycopyrrol-Formoterol (Breztri Aerosphere) 160-9-4.8 MCG/ACT aerosol inhaler, Inhale 2 puffs 2 (Two) Times a Day., Disp: , Rfl:     calcium carbonate (OS-JERMAIN) 600 MG tablet, Take 1 tablet by mouth 2 (Two) Times a Day., Disp: , Rfl:     Cetirizine HCl 10 MG capsule, Take 1 dose by mouth As Needed (ALLERGIES)., Disp: , Rfl:     cholecalciferol (VITAMIN D3) 25 MCG (1000 UT) tablet, Take 1 tablet by mouth Daily., Disp: , Rfl:     colesevelam (WELCHOL) 625 MG tablet, TAKE 1 TABLET TWICE A DAY WITH MEALS, Disp: 180 tablet, Rfl: 3    Cranberry 4200 MG capsule, Take 1 Capful by mouth Daily., Disp: , Rfl:     ezetimibe (ZETIA) 10 MG tablet, TAKE 1 TABLET DAILY, Disp: 90 tablet, Rfl: 3    glucose blood (FREESTYLE LITE) test strip, 1 each by Other route Daily. Use as instructed, Disp: 100 each, Rfl: 6    Januvia 100 MG tablet, Take 1 tablet by mouth Daily., Disp: 90 tablet, Rfl: 0    Lancets (FREESTYLE) lancets, , Disp: , Rfl:     metFORMIN (GLUCOPHAGE) 500 MG tablet, TAKE 1 TABLET TWICE A DAY WITH MEALS, Disp: 180 tablet, Rfl: 3    naltrexone-bupropion ER (Contrave) 8-90 MG tablet, Take 2 tablets by mouth 2 (Two) Times a Day., Disp: 360  tablet, Rfl: 1    Olmesartan-amLODIPine-HCTZ (Tribenzor) 40-10-12.5 MG tablet, Take 1 tablet by mouth Every Evening., Disp: , Rfl:     ondansetron ODT (ZOFRAN-ODT) 4 MG disintegrating tablet, Place 1 tablet on the tongue Every 8 (Eight) Hours As Needed for Nausea or Vomiting., Disp: 12 tablet, Rfl: 0    pravastatin (PRAVACHOL) 80 MG tablet, Take 1 tablet by mouth Daily., Disp: 90 tablet, Rfl: 0    pregabalin (LYRICA) 150 MG capsule, Take 1 capsule by mouth 2 (Two) Times a Day., Disp: 180 capsule, Rfl: 1    rOPINIRole (REQUIP) 0.25 MG tablet, Take 1 tablet by mouth 2 (Two) Times a Day., Disp: , Rfl:     celecoxib (CeleBREX) 200 MG capsule, Take 1 capsule by mouth Daily., Disp: 90 capsule, Rfl: 1    Review of Systems   Musculoskeletal:  Positive for arthralgias, back pain, gait problem, joint swelling and myalgias. Negative for neck pain and neck stiffness.   Neurological:  Negative for weakness and numbness.       Vitals:    07/22/24 1003   BP: 117/77   Pulse: 86   Resp: 16   SpO2: 95%   Weight: 83.9 kg (185 lb)   PainSc:   5       Objective   Physical Exam  Vitals and nursing note reviewed.   Constitutional:       General: She is not in acute distress.     Appearance: Normal appearance. She is obese.   Musculoskeletal:         General: Tenderness present. No swelling or deformity.      Right shoulder: Tenderness present. No bony tenderness. Decreased range of motion.      Left shoulder: Tenderness present. No bony tenderness. Decreased range of motion.      Cervical back: Normal range of motion and neck supple. Tenderness present. No rigidity.      Left ankle: Tenderness present. Decreased range of motion.   Neurological:      Mental Status: She is alert.           Assessment & Plan   Problems Addressed this Visit    None  Visit Diagnoses       Chronic pain syndrome    -  Primary    Primary osteoarthritis involving multiple joints        Restless leg syndrome              Diagnoses         Codes Comments    Chronic  pain syndrome    -  Primary ICD-10-CM: G89.4  ICD-9-CM: 338.4     Primary osteoarthritis involving multiple joints     ICD-10-CM: M15.9  ICD-9-CM: 715.98     Restless leg syndrome     ICD-10-CM: G25.81  ICD-9-CM: 333.94             Plan:  Continue Celebrex, Lyrica, and baclofen  I can have her send a message after her orthopedic follow-up to see what can be done regarding her hip instability  She reports that overall, she has to take minimal narcotics.    --- Follow-up 3 months           INSPECT REPORT    As part of the patient's treatment plan, I may be prescribing controlled substances. The patient has been made aware of appropriate use of such medications, including potential risk of somnolence, limited ability to drive and/or work safely, and the potential for dependence or overdose. It has also bee made clear that these medications are for use by this patient only, without concomitant use of alcohol or other substances unless prescribed.     Patient has completed prescribing agreement detailing terms of continued prescribing of controlled substances, including monitoring ANNE-MARIE reports, urine drug screening, and pill counts if necessary. The patient is aware that inappropriate use will results in cessation of prescribing such medications.    INSPECT report has been reviewed and scanned into the patient's chart.    As the clinician, I personally reviewed the INSPECT from 7/19/2024.    History and physical exam exhibit continued safe and appropriate use of controlled substances.      EMR Dragon/Transcription disclaimer:   Much of this encounter note is an electronic transcription/translation of spoken language to printed text. The electronic translation of spoken language may permit erroneous, or at times, nonsensical words or phrases to be inadvertently transcribed; Although I have reviewed the note for such errors, some may still exist.

## 2024-07-22 NOTE — OUTREACH NOTE
Call Center TCM Note      Flowsheet Row Responses   [facility] patient discharged from? [facility]   Does the patient have one of the following disease processes/diagnoses(primary or secondary)? Other   TCM attempt successful? No   Unsuccessful attempts Attempt 2  [Kushal on verbal release - no answer]            Radha Crum RN    7/22/2024, 08:21 EDT

## 2024-07-23 ENCOUNTER — TRANSITIONAL CARE MANAGEMENT TELEPHONE ENCOUNTER (OUTPATIENT)
Dept: CALL CENTER | Facility: HOSPITAL | Age: 64
End: 2024-07-23
Payer: OTHER GOVERNMENT

## 2024-07-23 RX ORDER — PANTOPRAZOLE SODIUM 40 MG/1
40 TABLET, DELAYED RELEASE ORAL DAILY
Qty: 90 TABLET | Refills: 0 | Status: SHIPPED | OUTPATIENT
Start: 2024-07-23

## 2024-07-23 NOTE — TELEPHONE ENCOUNTER
Rx Refill Note  Requested Prescriptions     Pending Prescriptions Disp Refills    pantoprazole (PROTONIX) 40 MG EC tablet [Pharmacy Med Name: PANTOPRAZOLE 40MG TABLETS] 90 tablet 0     Sig: TAKE 1 TABLET BY MOUTH DAILY      Last office visit with prescribing clinician: 1/22/2024   Last telemedicine visit with prescribing clinician: Visit date not found   Next office visit with prescribing clinician: Visit date not found                         Would you like a call back once the refill request has been completed: [] Yes [] No    If the office needs to give you a call back, can they leave a voicemail: [] Yes [] No    Stephenie Cornejo MA  07/23/24, 08:25 EDT

## 2024-07-23 NOTE — OUTREACH NOTE
Call Center TCM Note      Flowsheet Row Responses   [facility], [place] patient discharged from? [facility]   Does the patient have one of the following disease processes/diagnoses(primary or secondary)? Other   TCM attempt successful? Yes   Call start time 1420   Call end time 1422   Discharge diagnosis Gastroenteritis   Meds reviewed with patient/caregiver? Yes   Is the patient having any side effects they believe may be caused by any medication additions or changes? No   Does the patient have all medications ordered at discharge? Yes   Is the patient taking all medications as directed (includes completed medication regime)? Yes   Comments She prefers to leave her appointment on 8/7/24   Does the patient have an appointment with their PCP within 7-14 days of discharge? No   Nursing Interventions Patient declined scheduling/rescheduling appointment at this time   Has home health visited the patient within 72 hours of discharge? N/A   Psychosocial issues? No   Did the patient receive a copy of their discharge instructions? Yes   Nursing interventions Reviewed instructions with patient   What is the patient's perception of their health status since discharge? Improving   Is the patient/caregiver able to teach back signs and symptoms related to disease process for when to call PCP? Yes   Is the patient/caregiver able to teach back signs and symptoms related to disease process for when to call 911? Yes   Is the patient/caregiver able to teach back the hierarchy of who to call/visit for symptoms/problems? PCP, Specialist, Home health nurse, Urgent Care, ED, 911 Yes   If the patient is a current smoker, are they able to teach back resources for cessation? Not a smoker   TCM call completed? Yes   Call end time 1422   Would this patient benefit from a Referral to Amb Social Work? No   Is the patient interested in additional calls from an ambulatory ? No            Nancy Miramontes LPN    7/23/2024, 14:22 EDT

## 2024-08-07 ENCOUNTER — OFFICE VISIT (OUTPATIENT)
Dept: FAMILY MEDICINE CLINIC | Facility: CLINIC | Age: 64
End: 2024-08-07
Payer: MEDICARE

## 2024-08-07 ENCOUNTER — LAB (OUTPATIENT)
Dept: FAMILY MEDICINE CLINIC | Facility: CLINIC | Age: 64
End: 2024-08-07
Payer: OTHER GOVERNMENT

## 2024-08-07 VITALS
HEIGHT: 66 IN | BODY MASS INDEX: 31.34 KG/M2 | HEART RATE: 91 BPM | OXYGEN SATURATION: 96 % | TEMPERATURE: 97.7 F | DIASTOLIC BLOOD PRESSURE: 75 MMHG | SYSTOLIC BLOOD PRESSURE: 117 MMHG | WEIGHT: 195 LBS

## 2024-08-07 DIAGNOSIS — T84.021D FAILURE OF LEFT TOTAL HIP ARTHROPLASTY WITH DISLOCATION OF HIP, SUBSEQUENT ENCOUNTER: ICD-10-CM

## 2024-08-07 DIAGNOSIS — E11.9 TYPE 2 DIABETES MELLITUS WITHOUT COMPLICATION, UNSPECIFIED WHETHER LONG TERM INSULIN USE: ICD-10-CM

## 2024-08-07 DIAGNOSIS — I10 ESSENTIAL HYPERTENSION: ICD-10-CM

## 2024-08-07 DIAGNOSIS — Z00.00 MEDICARE ANNUAL WELLNESS VISIT, SUBSEQUENT: Primary | ICD-10-CM

## 2024-08-07 DIAGNOSIS — E78.5 HYPERLIPIDEMIA, UNSPECIFIED HYPERLIPIDEMIA TYPE: ICD-10-CM

## 2024-08-07 DIAGNOSIS — E66.09 CLASS 1 OBESITY DUE TO EXCESS CALORIES WITH SERIOUS COMORBIDITY AND BODY MASS INDEX (BMI) OF 30.0 TO 30.9 IN ADULT: ICD-10-CM

## 2024-08-07 LAB
ALBUMIN SERPL-MCNC: 4.6 G/DL (ref 3.5–5.2)
ALBUMIN/GLOB SERPL: 1.4 G/DL
ALP SERPL-CCNC: 100 U/L (ref 39–117)
ALT SERPL W P-5'-P-CCNC: 24 U/L (ref 1–33)
ANION GAP SERPL CALCULATED.3IONS-SCNC: 11.6 MMOL/L (ref 5–15)
AST SERPL-CCNC: 22 U/L (ref 1–32)
BASOPHILS # BLD AUTO: 0.07 10*3/MM3 (ref 0–0.2)
BASOPHILS NFR BLD AUTO: 0.7 % (ref 0–1.5)
BILIRUB SERPL-MCNC: 0.8 MG/DL (ref 0–1.2)
BUN SERPL-MCNC: 25 MG/DL (ref 8–23)
BUN/CREAT SERPL: 26 (ref 7–25)
CALCIUM SPEC-SCNC: 10.7 MG/DL (ref 8.6–10.5)
CHLORIDE SERPL-SCNC: 103 MMOL/L (ref 98–107)
CHOLEST SERPL-MCNC: 185 MG/DL (ref 0–200)
CO2 SERPL-SCNC: 26.4 MMOL/L (ref 22–29)
CREAT SERPL-MCNC: 0.96 MG/DL (ref 0.57–1)
DEPRECATED RDW RBC AUTO: 42.4 FL (ref 37–54)
EGFRCR SERPLBLD CKD-EPI 2021: 65.4 ML/MIN/1.73
EOSINOPHIL # BLD AUTO: 0.17 10*3/MM3 (ref 0–0.4)
EOSINOPHIL NFR BLD AUTO: 1.8 % (ref 0.3–6.2)
ERYTHROCYTE [DISTWIDTH] IN BLOOD BY AUTOMATED COUNT: 13.5 % (ref 12.3–15.4)
GLOBULIN UR ELPH-MCNC: 3.2 GM/DL
GLUCOSE SERPL-MCNC: 128 MG/DL (ref 65–99)
HBA1C MFR BLD: 8.3 % (ref 4.8–5.6)
HCT VFR BLD AUTO: 41.5 % (ref 34–46.6)
HDLC SERPL-MCNC: 47 MG/DL (ref 40–60)
HGB BLD-MCNC: 13.6 G/DL (ref 12–15.9)
IMM GRANULOCYTES # BLD AUTO: 0.07 10*3/MM3 (ref 0–0.05)
IMM GRANULOCYTES NFR BLD AUTO: 0.7 % (ref 0–0.5)
LDLC SERPL CALC-MCNC: 65 MG/DL (ref 0–100)
LDLC/HDLC SERPL: 0.91 {RATIO}
LYMPHOCYTES # BLD AUTO: 1.84 10*3/MM3 (ref 0.7–3.1)
LYMPHOCYTES NFR BLD AUTO: 19 % (ref 19.6–45.3)
MCH RBC QN AUTO: 28.4 PG (ref 26.6–33)
MCHC RBC AUTO-ENTMCNC: 32.8 G/DL (ref 31.5–35.7)
MCV RBC AUTO: 86.6 FL (ref 79–97)
MONOCYTES # BLD AUTO: 0.65 10*3/MM3 (ref 0.1–0.9)
MONOCYTES NFR BLD AUTO: 6.7 % (ref 5–12)
NEUTROPHILS NFR BLD AUTO: 6.87 10*3/MM3 (ref 1.7–7)
NEUTROPHILS NFR BLD AUTO: 71.1 % (ref 42.7–76)
NRBC BLD AUTO-RTO: 0 /100 WBC (ref 0–0.2)
PLATELET # BLD AUTO: 266 10*3/MM3 (ref 140–450)
PMV BLD AUTO: 10.6 FL (ref 6–12)
POTASSIUM SERPL-SCNC: 4.7 MMOL/L (ref 3.5–5.2)
PROT SERPL-MCNC: 7.8 G/DL (ref 6–8.5)
RBC # BLD AUTO: 4.79 10*6/MM3 (ref 3.77–5.28)
SODIUM SERPL-SCNC: 141 MMOL/L (ref 136–145)
TRIGL SERPL-MCNC: 476 MG/DL (ref 0–150)
TSH SERPL DL<=0.05 MIU/L-ACNC: 1.13 UIU/ML (ref 0.27–4.2)
VLDLC SERPL-MCNC: 73 MG/DL (ref 5–40)
WBC NRBC COR # BLD AUTO: 9.67 10*3/MM3 (ref 3.4–10.8)

## 2024-08-07 PROCEDURE — G0439 PPPS, SUBSEQ VISIT: HCPCS | Performed by: NURSE PRACTITIONER

## 2024-08-07 PROCEDURE — 99214 OFFICE O/P EST MOD 30 MIN: CPT | Performed by: NURSE PRACTITIONER

## 2024-08-07 PROCEDURE — 80053 COMPREHEN METABOLIC PANEL: CPT | Performed by: NURSE PRACTITIONER

## 2024-08-07 PROCEDURE — 3074F SYST BP LT 130 MM HG: CPT | Performed by: NURSE PRACTITIONER

## 2024-08-07 PROCEDURE — 1125F AMNT PAIN NOTED PAIN PRSNT: CPT | Performed by: NURSE PRACTITIONER

## 2024-08-07 PROCEDURE — 3078F DIAST BP <80 MM HG: CPT | Performed by: NURSE PRACTITIONER

## 2024-08-07 PROCEDURE — 83036 HEMOGLOBIN GLYCOSYLATED A1C: CPT | Performed by: NURSE PRACTITIONER

## 2024-08-07 PROCEDURE — 85025 COMPLETE CBC W/AUTO DIFF WBC: CPT | Performed by: NURSE PRACTITIONER

## 2024-08-07 PROCEDURE — 84443 ASSAY THYROID STIM HORMONE: CPT | Performed by: NURSE PRACTITIONER

## 2024-08-07 PROCEDURE — 96160 PT-FOCUSED HLTH RISK ASSMT: CPT | Performed by: NURSE PRACTITIONER

## 2024-08-07 PROCEDURE — 80061 LIPID PANEL: CPT | Performed by: NURSE PRACTITIONER

## 2024-08-07 PROCEDURE — 1170F FXNL STATUS ASSESSED: CPT | Performed by: NURSE PRACTITIONER

## 2024-08-07 RX ORDER — DIPHENHYDRAMINE HYDROCHLORIDE 25 MG/1
1 CAPSULE, LIQUID FILLED ORAL DAILY
Qty: 1 EACH | Refills: 0 | Status: SHIPPED | OUTPATIENT
Start: 2024-08-07

## 2024-08-07 NOTE — PROGRESS NOTES
Subjective   The ABCs of the Annual Wellness Visit  Medicare Wellness Visit      Pearl Elizalde is a 64 y.o. patient who presents for a Medicare Wellness Visit.    The following portions of the patient's history were reviewed and   updated as appropriate: allergies, current medications, past family history, past medical history, past social history, past surgical history, and problem list.    Compared to one year ago, the patient's physical   health is worse.  Compared to one year ago, the patient's mental   health is worse.    Recent Hospitalizations:  This patient has had a [facility] admission record on file within the last 365 days.  Current Medical Providers:  Patient Care Team:  Rosa Hooper APRN as PCP - General (Nurse Practitioner)  Lizette Sigala MD as Consulting Physician (Family Medicine)  CHRIS Roland DPM as Consulting Physician (Podiatry)  Lex Serna MD as Consulting Physician (Endocrinology)  Elvis Park MD as Consulting Physician (General Surgery)  Katrin Zamora DO (Obstetrics and Gynecology)  Gifty Woods PA as Nurse Practitioner (Physician Assistant)  Bertha Rojas MD as Consulting Physician (Hematology and Oncology)  Mandy Thomson APRN as Nurse Practitioner (Nurse Practitioner)  Francis Vogel MD as Consulting Physician (Orthopedic Surgery)  Zack Sevilla MD as Consulting Physician (Pain Medicine)  Anson Roque MD as Surgeon (Plastic Surgery)  Adam Mattson PA-C as Physician Assistant (Orthopedic Surgery)  Brigido Norris MD (Orthopedic Surgery)    Outpatient Medications Prior to Visit   Medication Sig Dispense Refill    albuterol sulfate  (90 Base) MCG/ACT inhaler Inhale 2 puffs Every 4 (Four) Hours As Needed for Wheezing.      baclofen (LIORESAL) 10 MG tablet Take 1 tablet by mouth Every Night. 90 tablet 1    Budeson-Glycopyrrol-Formoterol (Breztri Aerosphere) 160-9-4.8 MCG/ACT aerosol inhaler Inhale 2 puffs 2 (Two)  Times a Day.      celecoxib (CeleBREX) 200 MG capsule Take 1 capsule by mouth Daily. 90 capsule 1    Cetirizine HCl 10 MG capsule Take 1 dose by mouth As Needed (ALLERGIES).      cholecalciferol (VITAMIN D3) 25 MCG (1000 UT) tablet Take 1 tablet by mouth Daily.      colesevelam (WELCHOL) 625 MG tablet TAKE 1 TABLET TWICE A DAY WITH MEALS 180 tablet 3    Cranberry 4200 MG capsule Take 1 Capful by mouth Daily.      ezetimibe (ZETIA) 10 MG tablet TAKE 1 TABLET DAILY 90 tablet 3    glucose blood (FREESTYLE LITE) test strip 1 each by Other route Daily. Use as instructed 100 each 6    Januvia 100 MG tablet Take 1 tablet by mouth Daily. 90 tablet 0    Lancets (FREESTYLE) lancets       metFORMIN (GLUCOPHAGE) 500 MG tablet TAKE 1 TABLET TWICE A DAY WITH MEALS 180 tablet 3    naltrexone-bupropion ER (Contrave) 8-90 MG tablet Take 2 tablets by mouth 2 (Two) Times a Day. 360 tablet 1    Olmesartan-amLODIPine-HCTZ (Tribenzor) 40-10-12.5 MG tablet Take 1 tablet by mouth Every Evening.      ondansetron ODT (ZOFRAN-ODT) 4 MG disintegrating tablet Place 1 tablet on the tongue Every 8 (Eight) Hours As Needed for Nausea or Vomiting. 12 tablet 0    pravastatin (PRAVACHOL) 80 MG tablet Take 1 tablet by mouth Daily. 90 tablet 0    pregabalin (LYRICA) 150 MG capsule Take 1 capsule by mouth 2 (Two) Times a Day. 180 capsule 1    rOPINIRole (REQUIP) 0.25 MG tablet Take 1 tablet by mouth 2 (Two) Times a Day.      Blood Glucose Monitoring Suppl (BLOOD GLUCOSE MONITOR SYSTEM) w/Device kit       calcium carbonate (OS-JERMAIN) 600 MG tablet Take 1 tablet by mouth 2 (Two) Times a Day. (Patient not taking: Reported on 8/7/2024)      ascorbic acid (VITAMIN C) 1000 MG tablet Take 1 tablet by mouth 2 (Two) Times a Day. (Patient not taking: Reported on 8/7/2024)      pantoprazole (PROTONIX) 40 MG EC tablet TAKE 1 TABLET BY MOUTH DAILY (Patient not taking: Reported on 8/7/2024) 90 tablet 0     No facility-administered medications prior to visit.     No  "opioid medication identified on active medication list. I have reviewed chart for other potential  high risk medication/s and harmful drug interactions in the elderly.      Aspirin is not on active medication list.  Aspirin use is not indicated based on review of current medical condition/s. Risk of harm outweighs potential benefits.  .    Patient Active Problem List   Diagnosis    Allergic rhinitis    Anxiety    Asthma    Class 1 obesity due to excess calories with serious comorbidity and body mass index (BMI) of 30.0 to 30.9 in adult    Chronic obstructive pulmonary disease    Diabetic peripheral neuropathy    Disorder associated with type 2 diabetes mellitus    Gastroesophageal reflux disease    Hyperlipidemia    Hypertension    Hypomagnesemia    Onychomycosis of toenail    Palpitations    Vitamin D deficiency    Post-menopausal    Obesity    Female stress incontinence    Nocturia    Rectocele    Uterovaginal prolapse, incomplete    Low back pain    Hip pain, bilateral    Uterovaginal prolapse    Atypical lobular hyperplasia (ALH) of right breast    Artificial lens present    Primary osteoarthritis of left hip    Osteoarthritis of left hip, unspecified osteoarthritis type    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  ACP discussion was held with the patient during this visit. Patient does not have an advance directive, information provided.            Objective   Vitals:    08/07/24 0954   BP: 117/75   BP Location: Right arm   Patient Position: Sitting   Cuff Size: Adult   Pulse: 91   Temp: 97.7 °F (36.5 °C)   TempSrc: Tympanic   SpO2: 96%   Weight: 88.5 kg (195 lb)   Height: 167.6 cm (65.98\")   PainSc: 10-Worst pain ever   PainLoc: Hip       Estimated body mass index is 31.49 kg/m² as calculated from the following:    Height as of this encounter: 167.6 cm (65.98\").    Weight as of this " encounter: 88.5 kg (195 lb).            Does the patient have evidence of cognitive impairment? No                                                                                                Health  Risk Assessment    Smoking Status:  Social History     Tobacco Use   Smoking Status Former    Current packs/day: 0.00    Average packs/day: 0.2 packs/day for 0.5 years (0.1 ttl pk-yrs)    Types: Cigarettes    Start date: 1984    Quit date:     Years since quittin.6    Passive exposure: Past   Smokeless Tobacco Never     Alcohol Consumption:  Social History     Substance and Sexual Activity   Alcohol Use Yes    Comment: Occassional       Fall Risk Screen  STEADI Fall Risk Assessment was completed, and patient is at MODERATE risk for falls. Assessment completed on:2024    Depression Screenin/7/2024    10:03 AM   PHQ-2/PHQ-9 Depression Screening   Little Interest or Pleasure in Doing Things 0-->not at all   Feeling Down, Depressed or Hopeless 0-->not at all   PHQ-9: Brief Depression Severity Measure Score 0     Health Habits and Functional and Cognitive Screenin/7/2024    10:03 AM   Functional & Cognitive Status   Do you have difficulty preparing food and eating? No   Do you have difficulty bathing yourself, getting dressed or grooming yourself? No   Do you have difficulty using the toilet? No   Do you have difficulty moving around from place to place? Yes   Do you have trouble with steps or getting out of a bed or a chair? Yes   Current Diet Well Balanced Diet   Dental Exam Not up to date   Eye Exam Not up to date   Exercise (times per week) 0 times per week   Current Exercises Include No Regular Exercise   Do you need help using the phone?  No   Are you deaf or do you have serious difficulty hearing?  No   Do you need help to go to places out of walking distance? No   Do you need help shopping? No   Do you need help preparing meals?  No   Do you need help with housework?  No   Do you  need help with laundry? No   Do you need help taking your medications? No   Do you need help managing money? No   Do you ever drive or ride in a car without wearing a seat belt? No   Have you felt unusual stress, anger or loneliness in the last month? Yes   Who do you live with? Spouse   If you need help, do you have trouble finding someone available to you? No   Have you been bothered in the last four weeks by sexual problems? No   Do you have difficulty concentrating, remembering or making decisions? No           Age-appropriate Screening Schedule:  Refer to the list below for future screening recommendations based on patient's age, sex and/or medical conditions. Orders for these recommended tests are listed in the plan section. The patient has been provided with a written plan.    Health Maintenance List  Health Maintenance   Topic Date Due    ZOSTER VACCINE (1 of 2) Never done    DIABETIC FOOT EXAM  05/10/2022    URINE MICROALBUMIN  06/14/2022    COVID-19 Vaccine (2 - 2023-24 season) 09/01/2023    MAMMOGRAM  01/27/2024    HEMOGLOBIN A1C  02/16/2024    COLORECTAL CANCER SCREENING  05/01/2024    LIPID PANEL  06/19/2024    DIABETIC EYE EXAM  08/07/2024    INFLUENZA VACCINE  08/01/2024    ANNUAL WELLNESS VISIT  10/02/2024    DXA SCAN  06/05/2025    BMI FOLLOWUP  08/07/2025    TDAP/TD VACCINES (2 - Td or Tdap) 08/30/2029    HEPATITIS C SCREENING  Completed    Pneumococcal Vaccine 65+  Completed    PAP SMEAR  Discontinued                                                                                                                                                CMS Preventative Services Quick Reference  Risk Factors Identified During Encounter  Fall Risk-High or Moderate: Discussed Fall Prevention in the home  Immunizations Discussed/Encouraged: Influenza    The above risks/problems have been discussed with the patient.  Pertinent information has been shared with the patient in the After Visit Summary.  An After  "Visit Summary and PPPS were made available to the patient.    Follow Up:   Next Medicare Wellness visit to be scheduled in 1 year.         Additional E&M Note during same encounter follows:  Patient has additional, significant, and separately identifiable condition(s)/problem(s) that require work above and beyond the Medicare Wellness Visit     Chief Complaint  Medicare Wellness-subsequent    Subjective   HPI  Pearl is also being seen today for additional medical problem/s.  She is being seen today for follow-up of diabetes, hyperlipidemia, and hypertension.  She is taking medications as directed and denies problems or side effects.  She reports failure of her left total hip prosthesis, and states that she has had multiple occasions where it has came out of socket.  She has an upcoming appointment with another surgeon to evaluate her options for further management.  She will have an MRI this afternoon.                Objective   Vital Signs:  /75 (BP Location: Right arm, Patient Position: Sitting, Cuff Size: Adult)   Pulse 91   Temp 97.7 °F (36.5 °C) (Tympanic)   Ht 167.6 cm (65.98\")   Wt 88.5 kg (195 lb)   SpO2 96%   BMI 31.49 kg/m²   Physical Exam  Vitals and nursing note reviewed.   Constitutional:       Appearance: Normal appearance. She is well-developed. She is obese.   HENT:      Head: Normocephalic and atraumatic.      Right Ear: External ear normal.      Left Ear: External ear normal.      Nose: Nose normal.   Eyes:      Extraocular Movements: Extraocular movements intact.      Pupils: Pupils are equal, round, and reactive to light.   Cardiovascular:      Rate and Rhythm: Normal rate and regular rhythm.      Heart sounds: Normal heart sounds.   Pulmonary:      Effort: Pulmonary effort is normal.      Breath sounds: Normal breath sounds.   Abdominal:      General: Bowel sounds are normal.      Palpations: Abdomen is soft.   Genitourinary:     Vagina: Normal.   Musculoskeletal:         " General: Normal range of motion.      Cervical back: Normal range of motion and neck supple.      Right lower leg: Edema (nonpitting) present.      Left lower leg: Edema (nonpitting) present.   Skin:     General: Skin is warm and dry.   Neurological:      General: No focal deficit present.      Mental Status: She is alert and oriented to person, place, and time.      Gait: Gait abnormal.   Psychiatric:         Mood and Affect: Mood normal.         Behavior: Behavior normal.         Judgment: Judgment normal.                 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 Weight is worsening. BMI  is above average; BMI management plan is completed. We discussed low calorie, low carb based diet program, portion control, and increasing exercise.     No orders of the defined types were placed in this encounter.    New Medications Ordered This Visit   Medications    Blood Glucose Monitoring Suppl (Blood Glucose Monitor System) w/Device kit     Sig: Inject 1 each under the skin into the appropriate area as directed Daily.     Dispense:  1 each     Refill:  0          Follow Up   Return in about 3 months (around 11/7/2024).  Patient was given instructions and counseling regarding her condition or for health maintenance advice. Please see specific information pulled into the AVS if appropriate.

## 2024-08-07 NOTE — PATIENT INSTRUCTIONS
Advance Directive       Advance directives are legal documents that let you make choices ahead of time about your health care and medical treatment in case you become unable to communicate for yourself. Advance directives are a way for you to communicate your wishes to family, friends, and health care providers. This can help convey your decisions about end-of-life care if you become unable to communicate.  Discussing and writing advance directives should happen over time rather than all at once. Advance directives can be changed depending on your situation and what you want, even after you have signed the advance directives.  If you do not have an advance directive, some states assign family decision makers to act on your behalf based on how closely you are related to them. Each state has its own laws regarding advance directives. You may want to check with your health care provider, , or state representative about the laws in your state. There are different types of advance directives, such as:  Medical power of .  Living will.  Do not resuscitate (DNR) or do not attempt resuscitation (DNAR) order.  Health care proxy and medical power of   A health care proxy, also called a health care agent, is a person who is appointed to make medical decisions for you in cases in which you are unable to make the decisions yourself. Generally, people choose someone they know well and trust to represent their preferences. Make sure to ask this person for an agreement to act as your proxy. A proxy may have to exercise judgment in the event of a medical decision for which your wishes are not known.  A medical power of  is a legal document that names your health care proxy. Depending on the laws in your state, after the document is written, it may also need to be:  Signed.  Notarized.  Dated.  Copied.  Witnessed.  Incorporated into your medical record.  You may also want to appoint someone to manage  your financial affairs in a situation in which you are unable to do so. This is called a durable power of  for finances. It is a separate legal document from the durable power of  for health care. You may choose the same person or someone different from your health care proxy to act as your agent in financial matters.  If you do not appoint a proxy, or if there is a concern that the proxy is not acting in your best interests, a court-appointed guardian may be designated to act on your behalf.  Living will  A living will is a set of instructions documenting your wishes about medical care when you cannot express them yourself. Health care providers should keep a copy of your living will in your medical record. You may want to give a copy to family members or friends. To alert caregivers in case of an emergency, you can place a card in your wallet to let them know that you have a living will and where they can find it. A living will is used if you become:  Terminally ill.  Incapacitated.  Unable to communicate or make decisions.  Items to consider in your living will include:  The use or non-use of life-sustaining equipment, such as dialysis machines and breathing machines (ventilators).  A DNR or DNAR order, which is the instruction not to use cardiopulmonary resuscitation (CPR) if breathing or heartbeat stops.  The use or non-use of tube feeding.  Withholding of food and fluids.  Comfort (palliative) care when the goal becomes comfort rather than a cure.  Organ and tissue donation.  A living will does not give instructions for distributing your money and property if you should pass away. It is recommended that you seek the advice of a  when writing a will. Decisions about taxes, beneficiaries, and asset distribution will be legally binding. This process can relieve your family and friends of any concerns surrounding disputes or questions that may come up about the distribution of your  assets.  DNR or DNAR  A DNR or DNAR order is a request not to have CPR in the event that your heart stops beating or you stop breathing. If a DNR or DNAR order has not been made and shared, a health care provider will try to help any patient whose heart has stopped or who has stopped breathing. If you plan to have surgery, talk with your health care provider about how your DNR or DNAR order will be followed if problems occur.  Summary  Advance directives are the legal documents that allow you to make choices ahead of time about your health care and medical treatment in case you become unable to communicate for yourself.  The process of discussing and writing advance directives should happen over time. You can change the advance directives, even after you have signed them.  Advance directives include DNR or DNAR orders, living saini, and designating an agent as your medical power of .  This information is not intended to replace advice given to you by your health care provider. Make sure you discuss any questions you have with your health care provider.  Document Released: 03/26/2009 Document Revised: 11/06/2017 Document Reviewed: 11/06/2017  [employer] Patient Education © 2019 [employer]

## 2024-08-07 NOTE — ASSESSMENT & PLAN NOTE
Patient's (Body mass index is 31.49 kg/m².) indicates that they are obese (BMI >30) with health conditions that include diabetes mellitus and dyslipidemias . Weight is worsening. BMI  is above average; BMI management plan is completed. We discussed low calorie, low carb based diet program, portion control, and increasing exercise.

## 2024-08-12 RX ORDER — NALTREXONE HYDROCHLORIDE AND BUPROPION HYDROCHLORIDE 8; 90 MG/1; MG/1
2 TABLET, EXTENDED RELEASE ORAL 2 TIMES DAILY
Qty: 360 TABLET | Refills: 1 | Status: SHIPPED | OUTPATIENT
Start: 2024-08-12

## 2024-08-12 RX ORDER — ROPINIROLE 0.25 MG/1
0.25 TABLET, FILM COATED ORAL 2 TIMES DAILY
Qty: 180 TABLET | Refills: 1 | Status: SHIPPED | OUTPATIENT
Start: 2024-08-12

## 2024-08-12 RX ORDER — PRAVASTATIN SODIUM 80 MG/1
80 TABLET ORAL DAILY
Qty: 90 TABLET | Refills: 1 | Status: SHIPPED | OUTPATIENT
Start: 2024-08-12

## 2024-08-12 RX ORDER — SITAGLIPTIN 100 MG/1
100 TABLET, FILM COATED ORAL DAILY
Qty: 90 TABLET | Refills: 1 | Status: SHIPPED | OUTPATIENT
Start: 2024-08-12

## 2024-08-12 NOTE — TELEPHONE ENCOUNTER
Rx Refill Note  Requested Prescriptions     Pending Prescriptions Disp Refills    pravastatin (PRAVACHOL) 80 MG tablet 90 tablet 1     Sig: Take 1 tablet by mouth Daily.    rOPINIRole (REQUIP) 0.25 MG tablet 180 tablet 1     Sig: Take 1 tablet by mouth 2 (Two) Times a Day.    naltrexone-bupropion ER (Contrave) 8-90 MG tablet 360 tablet 1     Sig: Take 2 tablets by mouth 2 (Two) Times a Day.    Januvia 100 MG tablet 90 tablet 1     Sig: Take 1 tablet by mouth Daily.      Last office visit with prescribing clinician: 8/7/2024   Last telemedicine visit with prescribing clinician: Visit date not found   Next office visit with prescribing clinician: 11/27/2024                         Would you like a call back once the refill request has been completed: [] Yes [] No    If the office needs to give you a call back, can they leave a voicemail: [] Yes [] No    Gala Elizondo MA  08/12/24, 15:46 EDT

## 2024-08-12 NOTE — TELEPHONE ENCOUNTER
Rx Refill Note  Requested Prescriptions     Pending Prescriptions Disp Refills   • pravastatin (PRAVACHOL) 80 MG tablet 90 tablet 1     Sig: Take 1 tablet by mouth Daily.   • rOPINIRole (REQUIP) 0.25 MG tablet 180 tablet 1     Sig: Take 1 tablet by mouth 2 (Two) Times a Day.   • naltrexone-bupropion ER (Contrave) 8-90 MG tablet 360 tablet 1     Sig: Take 2 tablets by mouth 2 (Two) Times a Day.   • Januvia 100 MG tablet 90 tablet 1     Sig: Take 1 tablet by mouth Daily.      Last office visit with prescribing clinician: 8/7/2024   Last telemedicine visit with prescribing clinician: Visit date not found   Next office visit with prescribing clinician: 11/27/2024       Would you like a call back once the refill request has been completed: [] Yes [] No    If the office needs to give you a call back, can they leave a voicemail: [] Yes [] No    Gala Elizondo MA  08/12/24, 15:46 EDT

## 2024-08-14 DIAGNOSIS — K59.1 FUNCTIONAL DIARRHEA: ICD-10-CM

## 2024-08-14 RX ORDER — COLESEVELAM 180 1/1
TABLET ORAL
Qty: 180 TABLET | Refills: 3 | Status: SHIPPED | OUTPATIENT
Start: 2024-08-14

## 2024-08-14 NOTE — TELEPHONE ENCOUNTER
Rx Refill Note  Requested Prescriptions     Pending Prescriptions Disp Refills    colesevelam (WELCHOL) 625 MG tablet [Pharmacy Med Name: COLESEVELAM HCL TABS 625MG] 180 tablet 3     Sig: TAKE 1 TABLET TWICE A DAY WITH MEALS      Last office visit with prescribing clinician: 8/7/2024   Last telemedicine visit with prescribing clinician: Visit date not found   Next office visit with prescribing clinician: 11/27/2024                         Would you like a call back once the refill request has been completed: [] Yes [] No    If the office needs to give you a call back, can they leave a voicemail: [] Yes [] No    Stephenie Cornejo MA  08/14/24, 08:25 EDT

## 2024-08-20 DIAGNOSIS — K59.1 FUNCTIONAL DIARRHEA: ICD-10-CM

## 2024-08-20 RX ORDER — PRAVASTATIN SODIUM 80 MG/1
80 TABLET ORAL DAILY
Qty: 90 TABLET | Refills: 1 | Status: SHIPPED | OUTPATIENT
Start: 2024-08-20

## 2024-08-20 RX ORDER — PROCHLORPERAZINE 25 MG/1
SUPPOSITORY RECTAL
Qty: 9 EACH | Refills: 3 | Status: SHIPPED | OUTPATIENT
Start: 2024-08-20 | End: 2024-08-21 | Stop reason: SDUPTHER

## 2024-08-20 RX ORDER — PROCHLORPERAZINE 25 MG/1
1 SUPPOSITORY RECTAL CONTINUOUS
Qty: 1 EACH | Refills: 3 | Status: SHIPPED | OUTPATIENT
Start: 2024-08-20 | End: 2024-11-18

## 2024-08-20 RX ORDER — ROPINIROLE 0.25 MG/1
0.25 TABLET, FILM COATED ORAL 2 TIMES DAILY
Qty: 180 TABLET | Refills: 1 | Status: SHIPPED | OUTPATIENT
Start: 2024-08-20

## 2024-08-20 RX ORDER — PROCHLORPERAZINE 25 MG/1
1 SUPPOSITORY RECTAL CONTINUOUS
Qty: 1 EACH | Refills: 3 | Status: SHIPPED | OUTPATIENT
Start: 2024-08-20 | End: 2024-08-21 | Stop reason: SDUPTHER

## 2024-08-20 RX ORDER — COLESEVELAM 180 1/1
625 TABLET ORAL 2 TIMES DAILY WITH MEALS
Qty: 180 TABLET | Refills: 3 | Status: SHIPPED | OUTPATIENT
Start: 2024-08-20

## 2024-08-20 RX ORDER — SITAGLIPTIN 100 MG/1
100 TABLET, FILM COATED ORAL DAILY
Qty: 90 TABLET | Refills: 1 | Status: SHIPPED | OUTPATIENT
Start: 2024-08-20

## 2024-08-20 RX ORDER — NALTREXONE HYDROCHLORIDE AND BUPROPION HYDROCHLORIDE 8; 90 MG/1; MG/1
2 TABLET, EXTENDED RELEASE ORAL 2 TIMES DAILY
Qty: 360 TABLET | Refills: 1 | Status: SHIPPED | OUTPATIENT
Start: 2024-08-20

## 2024-08-20 NOTE — TELEPHONE ENCOUNTER
Rx Refill Note  Requested Prescriptions     Pending Prescriptions Disp Refills    Continuous Glucose  (Dexcom G6 ) device 1 each 3     Sig: Use 1 each Continuous for 90 days.    Continuous Glucose Transmitter (Dexcom G6 Transmitter) misc 1 each 3     Sig: Use 1 each Continuous for 90 days.    Continuous Glucose Sensor (Dexcom G6 Sensor) 9 each 3     Sig: Use Every 10 (Ten) Days for 90 days.      Last office visit with prescribing clinician: 8/7/2024   Last telemedicine visit with prescribing clinician: Visit date not found   Next office visit with prescribing clinician: 11/27/2024       Would you like a call back once the refill request has been completed: [x] Yes [] No    If the office needs to give you a call back, can they leave a voicemail: [x] Yes [] No    Tonia Ferrell  08/20/24, 08:39 EDT

## 2024-08-20 NOTE — TELEPHONE ENCOUNTER
Rx Refill Note  Requested Prescriptions     Pending Prescriptions Disp Refills    rOPINIRole (REQUIP) 0.25 MG tablet 180 tablet 1     Sig: Take 1 tablet by mouth 2 (Two) Times a Day.    Januvia 100 MG tablet 90 tablet 1     Sig: Take 1 tablet by mouth Daily.    pravastatin (PRAVACHOL) 80 MG tablet 90 tablet 1     Sig: Take 1 tablet by mouth Daily.    colesevelam (WELCHOL) 625 MG tablet 180 tablet 3     Sig: Take 1 tablet by mouth 2 (Two) Times a Day With Meals.    naltrexone-bupropion ER (Contrave) 8-90 MG tablet 360 tablet 1     Sig: Take 2 tablets by mouth 2 (Two) Times a Day.      Last office visit with prescribing clinician: 8/7/2024   Last telemedicine visit with prescribing clinician: Visit date not found   Next office visit with prescribing clinician: 11/27/2024       Would you like a call back once the refill request has been completed: [x] Yes [] No    If the office needs to give you a call back, can they leave a voicemail: [x] Yes [] No    Tonia Ferrell  08/20/24, 08:32 EDT

## 2024-08-21 PROBLEM — D05.12 INTRADUCTAL CARCINOMA IN SITU OF LEFT BREAST: Status: ACTIVE | Noted: 2023-02-06

## 2024-08-21 PROBLEM — G47.30 SLEEP APNEA: Status: ACTIVE | Noted: 2024-08-21

## 2024-08-21 PROBLEM — H18.593: Status: ACTIVE | Noted: 2024-08-13

## 2024-08-21 RX ORDER — PROCHLORPERAZINE 25 MG/1
SUPPOSITORY RECTAL
Qty: 9 EACH | Refills: 3 | Status: SHIPPED | OUTPATIENT
Start: 2024-08-21 | End: 2024-11-19

## 2024-08-21 RX ORDER — PROCHLORPERAZINE 25 MG/1
1 SUPPOSITORY RECTAL CONTINUOUS
Qty: 1 EACH | Refills: 3 | Status: SHIPPED | OUTPATIENT
Start: 2024-08-21 | End: 2024-11-19

## 2024-08-21 NOTE — PATIENT INSTRUCTIONS
Health Maintenance Due   Topic Date Due    ZOSTER VACCINE (1 of 2) Never done    DIABETIC FOOT EXAM  05/10/2022    URINE MICROALBUMIN  06/14/2022    COVID-19 Vaccine (2 - 2023-24 season) 09/01/2023    MAMMOGRAM  01/27/2024    COLORECTAL CANCER SCREENING  05/01/2024    INFLUENZA VACCINE  08/01/2024    You are due for Shingrix vaccination series ( the newest shingles vaccine).  It is a two shot series spaced 2-6 months apart. Please get this vaccine series started at your earliest convenience at your local pharmacy to help avoid shingles outbreak. It is more effective than the old Zostavax vaccine and is recommended even if you have had the Zostavax vaccine in the past.  Once the Shingrix series is completed, it does not need to be repeated.   For more information, please look at the website below:  https://www.cdc.gov/vaccines/vpd/shingles/public/shingrix/index.html

## 2024-08-21 NOTE — TELEPHONE ENCOUNTER
Caller: Pearl Elizalde    Relationship: Self    Best call back number: [phone]     What medication are you requesting: DEXCOM TRANSMITTER AND SENSOR    If a prescription is needed, what is your preferred pharmacy and phone number: [facility] - [address] [phone] [facility] [phone] FX     Additional notes: PHARMACY ONLY RECEIVED PRESCRIPTION FOR THE MONITOR    PLEASE ADVISE

## 2024-08-22 ENCOUNTER — OFFICE VISIT (OUTPATIENT)
Dept: FAMILY MEDICINE CLINIC | Facility: CLINIC | Age: 64
End: 2024-08-22
Payer: MEDICARE

## 2024-08-22 VITALS
SYSTOLIC BLOOD PRESSURE: 122 MMHG | HEIGHT: 66 IN | HEART RATE: 76 BPM | TEMPERATURE: 98.2 F | DIASTOLIC BLOOD PRESSURE: 78 MMHG | WEIGHT: 196.4 LBS | BODY MASS INDEX: 31.57 KG/M2 | OXYGEN SATURATION: 97 %

## 2024-08-22 DIAGNOSIS — D05.12 INTRADUCTAL CARCINOMA IN SITU OF LEFT BREAST: ICD-10-CM

## 2024-08-22 DIAGNOSIS — Z12.31 ENCOUNTER FOR SCREENING MAMMOGRAM FOR MALIGNANT NEOPLASM OF BREAST: Primary | ICD-10-CM

## 2024-08-22 DIAGNOSIS — R21 RASH: ICD-10-CM

## 2024-08-22 DIAGNOSIS — E66.09 CLASS 1 OBESITY DUE TO EXCESS CALORIES WITH SERIOUS COMORBIDITY AND BODY MASS INDEX (BMI) OF 31.0 TO 31.9 IN ADULT: ICD-10-CM

## 2024-08-22 DIAGNOSIS — Z12.11 SCREENING FOR COLON CANCER: ICD-10-CM

## 2024-08-22 DIAGNOSIS — E11.8 DISORDER ASSOCIATED WITH TYPE 2 DIABETES MELLITUS: ICD-10-CM

## 2024-08-22 DIAGNOSIS — Z12.11 SCREENING FOR MALIGNANT NEOPLASM OF COLON: ICD-10-CM

## 2024-08-22 PROBLEM — Z12.12 ENCOUNTER FOR COLORECTAL CANCER SCREENING USING COLOGUARD TEST: Status: ACTIVE | Noted: 2024-08-22

## 2024-08-22 RX ORDER — BETAMETHASONE DIPROPIONATE 0.5 MG/G
1 CREAM TOPICAL 2 TIMES DAILY
Qty: 45 G | Refills: 1 | Status: SHIPPED | OUTPATIENT
Start: 2024-08-22

## 2024-08-23 NOTE — PROGRESS NOTES
Subjective   Pearl Elizalde is a 64 y.o. female presents for   Chief Complaint   Patient presents with    Rash     On both legs gotten lighter in color since last week. Has been off her feet. Was originially hot to the touch noticed it on Friday and worsened by Saturday.         Health Maintenance Due   Topic Date Due    ZOSTER VACCINE (1 of 2) Never done    DIABETIC FOOT EXAM  05/10/2022    URINE MICROALBUMIN  06/14/2022    COVID-19 Vaccine (2 - 2023-24 season) 09/01/2023    COLORECTAL CANCER SCREENING  05/01/2024    INFLUENZA VACCINE  08/01/2024       Rash       History of Present Illness  The patient is a 64-year-old female presenting today for her annual wellness exam, colon cancer screening, and evaluation of class 1 obesity associated with type 2 diabetes. She also requires screening for malignant neoplasm of the colon, intraductal carcinoma in situ of the left breast, and evaluation of a rash.    She reports a third occurrence of redness and swelling in her legs, which began last Friday. The redness was particularly noticeable on Saturday when her daughter visited. Although the redness has subsided slightly, she is experiencing pain. The redness does not extend to her feet, stopping at the sock line. This is her first visit for this issue due to the pain. The previous two episodes resolved within a week. She has not noticed any changes in her clothing or activities and has not been exposed to the sun. She reports no rashes elsewhere on her body. She notes a difference in leg length, with one leg being 2-3/8 inches shorter than the other. She wears a lift in one shoe to compensate for this. She shaves her legs every other day using the same razor blade and emollient, rarely cutting herself. She uses Skin So Soft shaving cream and shaves up to her thigh. She does not wear anything on her legs at night.    She is preparing for her third surgery on her left hip. Her hip has dislocated three times. The first  "dislocation occurred at home while getting dressed. The second dislocation happened in the middle of the night while getting up to go to the bathroom. The third dislocation was about 2 months ago in a hotel in [place] when she got up to go to the bathroom.     She had a mammogram today at [facility]. Her A1c is currently elevated, with the last reading being 8. She is using a Dexcom device to monitor her glucose levels and is on a strict diet to lower her A1c below 6, as her physician requires this before performing hip surgery.    Vitals:    08/22/24 1552   BP: 122/78   BP Location: Left arm   Patient Position: Sitting   Cuff Size: Adult   Pulse: 76   Temp: 98.2 °F (36.8 °C)   TempSrc: Temporal   SpO2: 97%   Weight: 89.1 kg (196 lb 6.4 oz)   Height: 167.6 cm (65.98\")     Body mass index is 31.72 kg/m².    Current Outpatient Medications on File Prior to Visit   Medication Sig Dispense Refill    albuterol sulfate  (90 Base) MCG/ACT inhaler Inhale 2 puffs Every 4 (Four) Hours As Needed for Wheezing.      baclofen (LIORESAL) 10 MG tablet Take 1 tablet by mouth Every Night. 90 tablet 1    Blood Glucose Monitoring Suppl (Blood Glucose Monitor System) w/Device kit Inject 1 each under the skin into the appropriate area as directed Daily. 1 each 0    Budeson-Glycopyrrol-Formoterol (Breztri Aerosphere) 160-9-4.8 MCG/ACT aerosol inhaler Inhale 2 puffs 2 (Two) Times a Day.      calcium carbonate (OS-JERMAIN) 600 MG tablet Take 1 tablet by mouth 2 (Two) Times a Day.      celecoxib (CeleBREX) 200 MG capsule Take 1 capsule by mouth Daily. 90 capsule 1    Cetirizine HCl 10 MG capsule Take 1 dose by mouth As Needed (ALLERGIES).      cholecalciferol (VITAMIN D3) 25 MCG (1000 UT) tablet Take 1 tablet by mouth Daily.      colesevelam (WELCHOL) 625 MG tablet Take 1 tablet by mouth 2 (Two) Times a Day With Meals. 180 tablet 3    Continuous Glucose  (Dexcom G6 ) device Use 1 each Continuous for 90 days. 1 each 3    " Continuous Glucose Sensor (Dexcom G6 Sensor) Use Every 10 (Ten) Days for 90 days. 9 each 3    Continuous Glucose Transmitter (Dexcom G6 Transmitter) misc Use 1 each Continuous for 90 days. 1 each 3    Cranberry 4200 MG capsule Take 1 Capful by mouth Daily.      ezetimibe (ZETIA) 10 MG tablet TAKE 1 TABLET DAILY 90 tablet 3    glucose blood (FREESTYLE LITE) test strip 1 each by Other route Daily. Use as instructed 100 each 6    Januvia 100 MG tablet Take 1 tablet by mouth Daily. 90 tablet 1    Lancets (FREESTYLE) lancets       metFORMIN (GLUCOPHAGE) 1000 MG tablet Take 1 tablet by mouth 2 (Two) Times a Day With Meals. 180 tablet 1    naltrexone-bupropion ER (Contrave) 8-90 MG tablet Take 2 tablets by mouth 2 (Two) Times a Day. 360 tablet 1    Olmesartan-amLODIPine-HCTZ (Tribenzor) 40-10-12.5 MG tablet Take 1 tablet by mouth Every Evening.      ondansetron ODT (ZOFRAN-ODT) 4 MG disintegrating tablet Place 1 tablet on the tongue Every 8 (Eight) Hours As Needed for Nausea or Vomiting. 12 tablet 0    pravastatin (PRAVACHOL) 80 MG tablet Take 1 tablet by mouth Daily. 90 tablet 1    pregabalin (LYRICA) 150 MG capsule Take 1 capsule by mouth 2 (Two) Times a Day. 180 capsule 1    rOPINIRole (REQUIP) 0.25 MG tablet Take 1 tablet by mouth 2 (Two) Times a Day. 180 tablet 1     No current facility-administered medications on file prior to visit.       The following portions of the patient's history were reviewed and updated as appropriate: allergies, current medications, past family history, past medical history, past social history, past surgical history, and problem list.    Review of Systems   Cardiovascular:  Positive for leg swelling.   Skin:  Positive for rash.       Objective   Physical Exam  Vitals reviewed.   Constitutional:       General: She is not in acute distress.     Appearance: Normal appearance. She is well-developed. She is not ill-appearing or toxic-appearing.   HENT:      Head: Normocephalic and atraumatic.       Nose: Nose normal.   Eyes:      Extraocular Movements: Extraocular movements intact.      Conjunctiva/sclera: Conjunctivae normal.      Pupils: Pupils are equal, round, and reactive to light.   Cardiovascular:      Rate and Rhythm: Normal rate.   Pulmonary:      Effort: Pulmonary effort is normal.   Abdominal:      General: There is no distension.      Palpations: There is no mass.      Tenderness: There is no abdominal tenderness.   Musculoskeletal:         General: Swelling present.      Right lower leg: Edema present.      Left lower leg: Edema present.   Skin:     Findings: Erythema and rash present.      Comments: Erythematous anterior lower leg rash that extended from directly below the knees to the sock line.    Feet were not swollen soft touch sensation to the feet was intact there was no open skin areas present   Neurological:      Mental Status: She is alert and oriented to person, place, and time.   Psychiatric:         Mood and Affect: Mood normal.         Behavior: Behavior normal.       Physical Exam      PHQ-9 Total Score:    Results  Laboratory Studies  Last A1c was 8.         Assessment & Plan   Diagnoses and all orders for this visit:    1. Encounter for screening mammogram for malignant neoplasm of breast (Primary)    2. Screening for colon cancer    3. Class 1 obesity due to excess calories with serious comorbidity and body mass index (BMI) of 31.0 to 31.9 in adult    4. Disorder associated with type 2 diabetes mellitus  -     Microalbumin / Creatinine Urine Ratio - Urine, Clean Catch  -     Ambulatory Referral for Diabetic Eye Exam-Ophthalmology    5. Screening for malignant neoplasm of colon    6. Intraductal carcinoma in situ of left breast    7. Rash  -     Ambulatory Referral to Dermatology    Other orders  -     betamethasone dipropionate (DIPROSONE) 0.05 % cream; Apply 1 Application topically to the appropriate area as directed 2 (Two) Times a Day.  Dispense: 45 g; Refill:  1      Assessment & Plan  1. Bilateral lower extremity rash.  The patient reports recurrent redness and swelling in both legs, with the current episode being the most severe and painful. The rash extends from the sock line upwards and has not been associated with any new exposures or activities. A prescription for a steroid cream has been sent to [facility]. A referral to dermatology has been made for further evaluation, including the possibility of a biopsy. If fever, chills, or worsening of the condition occurs, an immediate follow-up visit is advised.    2. Type 2 Diabetes Mellitus.  The patient's A1c is currently elevated at 8.0. She is using a Dexcom device to monitor glucose levels and is on a strict diet to lower her A1c below 6.0 in preparation for hip surgery. Continued monitoring and dietary adherence are recommended. She should follow up with her endocrinologist as scheduled.    3. Left hip dysfunction.  The patient is experiencing significant issues with her left hip, which has dislocated three times and is scheduled for a third surgery. Surgery is contingent on achieving better glycemic control. She should continue to follow up with her orthopedic surgeon and maintain her current treatment plan.    4. Health Maintenance.  The patient had a mammogram today at [facility]. She is due for an eye exam and a colon cancer screening. She should ensure these screenings are completed as recommended.        Patient Instructions     Health Maintenance Due   Topic Date Due    ZOSTER VACCINE (1 of 2) Never done    DIABETIC FOOT EXAM  05/10/2022    URINE MICROALBUMIN  06/14/2022    COVID-19 Vaccine (2 - 2023-24 season) 09/01/2023    MAMMOGRAM  01/27/2024    COLORECTAL CANCER SCREENING  05/01/2024    INFLUENZA VACCINE  08/01/2024    You are due for Shingrix vaccination series ( the newest shingles vaccine).  It is a two shot series spaced 2-6 months apart. Please get this vaccine series started at your earliest  convenience at your local pharmacy to help avoid shingles outbreak. It is more effective than the old Zostavax vaccine and is recommended even if you have had the Zostavax vaccine in the past.  Once the Shingrix series is completed, it does not need to be repeated.   For more information, please look at the website below:  https://www.cdc.gov/vaccines/vpd/shingles/public/shingrix/index.html         Patient or patient representative verbalized consent for the use of Ambient Listening during the visit with  Lizette Sigala MD for chart documentation. 8/23/2024  18:45 EDT

## 2024-08-26 ENCOUNTER — TELEPHONE (OUTPATIENT)
Dept: FAMILY MEDICINE CLINIC | Facility: CLINIC | Age: 64
End: 2024-08-26
Payer: MEDICARE

## 2024-08-26 NOTE — TELEPHONE ENCOUNTER
Tamoxifen is not listed in her chart.  Please verify with pt if she is taking it and inform her there is an interaction with contrave.  If taking tamoxifen, she should continue that over contrave.

## 2024-08-26 NOTE — TELEPHONE ENCOUNTER
[facility] requesting clarification on Contrave rx that was sent. Their records indicate the patient also recently filled Tamoxifen Citrate 10mg. Please advise which rx the patient should be taking.

## 2024-08-28 NOTE — TELEPHONE ENCOUNTER
Detailed VM left with information regarding 2 of her medicines.. Instructed patient to call with any questions.

## 2024-08-29 ENCOUNTER — TELEPHONE (OUTPATIENT)
Dept: FAMILY MEDICINE CLINIC | Facility: CLINIC | Age: 64
End: 2024-08-29
Payer: MEDICARE

## 2024-08-29 DIAGNOSIS — E11.9 TYPE 2 DIABETES MELLITUS WITHOUT COMPLICATION, UNSPECIFIED WHETHER LONG TERM INSULIN USE: ICD-10-CM

## 2024-08-29 DIAGNOSIS — R40.0 HAS DAYTIME DROWSINESS: Primary | ICD-10-CM

## 2024-08-29 RX ORDER — PROCHLORPERAZINE 25 MG/1
1 SUPPOSITORY RECTAL CONTINUOUS
Qty: 1 EACH | Refills: 3 | Status: SHIPPED | OUTPATIENT
Start: 2024-08-29 | End: 2024-11-27

## 2024-08-29 RX ORDER — PROCHLORPERAZINE 25 MG/1
SUPPOSITORY RECTAL
Qty: 9 EACH | Refills: 3 | Status: SHIPPED | OUTPATIENT
Start: 2024-08-29 | End: 2024-11-27

## 2024-08-29 NOTE — TELEPHONE ENCOUNTER
Patient left a message that she would like all 3 pieces of her Dexcom G6 (, sensor and transmitter) on a paper prescription, due to her insurance not paying for it so she is going to pay out of pocket.   She also stated that she needs a sleep study referral put in. I pended this for your approval and signature.

## 2024-08-29 NOTE — TELEPHONE ENCOUNTER
Referral signed.  I have printed the Dexcom prescriptions to be faxed to [facility] as they may be covered if sent through a DME.

## 2024-08-30 ENCOUNTER — TELEPHONE (OUTPATIENT)
Dept: FAMILY MEDICINE CLINIC | Facility: CLINIC | Age: 64
End: 2024-08-30
Payer: MEDICARE

## 2024-08-30 NOTE — TELEPHONE ENCOUNTER
[facility] called and stated that the order for this patients CGMcannot be filled due to the patient not being on insulin. She can be reconsidered if she has had a level 2 or level 3 hypoglycemic event, it has been charted and chart notes sent to them.

## 2024-09-04 DIAGNOSIS — E11.9 TYPE 2 DIABETES MELLITUS WITHOUT COMPLICATION, UNSPECIFIED WHETHER LONG TERM INSULIN USE: ICD-10-CM

## 2024-09-04 RX ORDER — PROCHLORPERAZINE 25 MG/1
1 SUPPOSITORY RECTAL CONTINUOUS
Qty: 1 EACH | Refills: 3 | Status: SHIPPED | OUTPATIENT
Start: 2024-09-04 | End: 2024-12-03

## 2024-09-04 RX ORDER — PROCHLORPERAZINE 25 MG/1
SUPPOSITORY RECTAL
Qty: 9 EACH | Refills: 3 | Status: SHIPPED | OUTPATIENT
Start: 2024-09-04 | End: 2024-12-03

## 2024-09-09 ENCOUNTER — TELEPHONE (OUTPATIENT)
Dept: FAMILY MEDICINE CLINIC | Facility: CLINIC | Age: 64
End: 2024-09-09
Payer: MEDICARE

## 2024-09-09 NOTE — TELEPHONE ENCOUNTER
PATIENT CALLED AND WOULD LIKE A PRESCRIPTION PRINTED FOR THE DEXCOM G6 AND SHE WILL PAY FOR IT OUTRIGHT.

## 2024-09-25 ENCOUNTER — LAB (OUTPATIENT)
Dept: FAMILY MEDICINE CLINIC | Facility: CLINIC | Age: 64
End: 2024-09-25
Payer: MEDICARE

## 2024-09-25 ENCOUNTER — FLU SHOT (OUTPATIENT)
Dept: FAMILY MEDICINE CLINIC | Facility: CLINIC | Age: 64
End: 2024-09-25
Payer: MEDICARE

## 2024-09-25 DIAGNOSIS — Z23 NEED FOR INFLUENZA VACCINATION: Primary | ICD-10-CM

## 2024-09-25 LAB
ALBUMIN UR-MCNC: 5.8 MG/DL
CREAT UR-MCNC: 205 MG/DL
MICROALBUMIN/CREAT UR: 28.3 MG/G (ref 0–29)

## 2024-09-25 PROCEDURE — G0008 ADMIN INFLUENZA VIRUS VAC: HCPCS | Performed by: PREVENTIVE MEDICINE

## 2024-09-25 PROCEDURE — 90662 IIV NO PRSV INCREASED AG IM: CPT | Performed by: PREVENTIVE MEDICINE

## 2024-09-25 PROCEDURE — 82570 ASSAY OF URINE CREATININE: CPT | Performed by: PREVENTIVE MEDICINE

## 2024-09-25 PROCEDURE — 82043 UR ALBUMIN QUANTITATIVE: CPT | Performed by: PREVENTIVE MEDICINE

## 2024-09-25 RX ORDER — TAMOXIFEN CITRATE 10 MG/1
TABLET ORAL
COMMUNITY
Start: 2024-09-04

## 2024-09-26 ENCOUNTER — TELEPHONE (OUTPATIENT)
Dept: FAMILY MEDICINE CLINIC | Facility: CLINIC | Age: 64
End: 2024-09-26
Payer: MEDICARE

## 2024-10-03 RX ORDER — BETAMETHASONE DIPROPIONATE 0.5 MG/G
CREAM TOPICAL
Qty: 45 G | Refills: 0 | Status: SHIPPED | OUTPATIENT
Start: 2024-10-03

## 2024-10-24 RX ORDER — BETAMETHASONE DIPROPIONATE 0.5 MG/G
CREAM TOPICAL
Qty: 45 G | Refills: 14 | Status: SHIPPED | OUTPATIENT
Start: 2024-10-24

## 2024-12-09 ENCOUNTER — TELEPHONE (OUTPATIENT)
Dept: FAMILY MEDICINE CLINIC | Facility: CLINIC | Age: 64
End: 2024-12-09
Payer: MEDICARE

## 2024-12-09 DIAGNOSIS — E11.9 TYPE 2 DIABETES MELLITUS WITHOUT COMPLICATION, WITHOUT LONG-TERM CURRENT USE OF INSULIN: ICD-10-CM

## 2024-12-09 DIAGNOSIS — I10 ESSENTIAL HYPERTENSION: Primary | ICD-10-CM

## 2024-12-09 RX ORDER — OLMESARTAN MEDOXOMIL / AMLODIPINE BESYLATE / HYDROCHLOROTHIAZIDE 40; 10; 12.5 MG/1; MG/1; MG/1
1 TABLET, FILM COATED ORAL EVERY EVENING
Qty: 90 TABLET | Refills: 1 | Status: SHIPPED | OUTPATIENT
Start: 2024-12-09

## 2024-12-09 RX ORDER — ACYCLOVIR 400 MG/1
1 TABLET ORAL CONTINUOUS
Qty: 3 EACH | Refills: 3 | Status: SHIPPED | OUTPATIENT
Start: 2024-12-09 | End: 2024-12-11

## 2024-12-09 NOTE — TELEPHONE ENCOUNTER
Caller: Pearl Elizalde    Relationship: Self    Best call back number: [phone]    What medication are you requesting: DEXCOM 7 & BP MEDICATION     If a prescription is needed, what is your preferred pharmacy and phone number: [facility] - [address] [phone] [facility] [phone]      Additional notes: PT STATES THAT SHE COULD NEVER GET HER DEXOM 6 TO WORK, BUT SHE TRIED HER 'S DEXCOM 7 AND IT WORKED WITH NO ISSUES. PT STATES SHE ALSO NEEDS TO GET BACK ON HER BP MEDICATION, BUT CANNOT REMEMBER THE NAME. STATES IT HAS BEEN ABOUT 4 MONTHS SINCE SHE HAS TAKEN IT.

## 2024-12-11 ENCOUNTER — TELEPHONE (OUTPATIENT)
Dept: FAMILY MEDICINE CLINIC | Facility: CLINIC | Age: 64
End: 2024-12-11
Payer: MEDICARE

## 2024-12-11 DIAGNOSIS — E11.9 TYPE 2 DIABETES MELLITUS WITHOUT COMPLICATION, WITHOUT LONG-TERM CURRENT USE OF INSULIN: Primary | ICD-10-CM

## 2024-12-11 RX ORDER — PROCHLORPERAZINE 25 MG/1
SUPPOSITORY RECTAL
Qty: 3 EACH | Refills: 3 | Status: SHIPPED | OUTPATIENT
Start: 2024-12-11

## 2024-12-11 RX ORDER — PROCHLORPERAZINE 25 MG/1
1 SUPPOSITORY RECTAL CONTINUOUS
Qty: 1 EACH | Refills: 0 | Status: SHIPPED | OUTPATIENT
Start: 2024-12-11

## 2024-12-11 NOTE — TELEPHONE ENCOUNTER
Caller: Pearl Elizalde    Relationship: Self    Best call back number: [phone]      What medication are you requesting:   DEXCOM 6 AND A TRANSMITTER      Have you had these symptoms before:    [x] Yes  [] No    Have you been treated for these symptoms before:   [x] Yes  [] No    If a prescription is needed, what is your preferred pharmacy and phone number: [id number] - [facility] [address] [facility] - [phone] [facility] [phone]      Additional notes:    PATIENT CALLED IN STATING [facility] CANNOT FILL THE REQUEST FOR DEXCOM 6 OR A TRANSMITTER.    SHE WOULD LIKE IT TO BE SENT TO THE [facility] LISTED ABOVE.

## 2024-12-11 NOTE — TELEPHONE ENCOUNTER
She wants the G7 but  will not cover it. She has lost the transmitter for her G6, so that is all she needs reordered...Dexcom G6 Transmitter

## 2024-12-16 ENCOUNTER — OFFICE VISIT (OUTPATIENT)
Dept: PAIN MEDICINE | Facility: CLINIC | Age: 64
End: 2024-12-16
Payer: MEDICARE

## 2024-12-16 VITALS
SYSTOLIC BLOOD PRESSURE: 132 MMHG | DIASTOLIC BLOOD PRESSURE: 83 MMHG | BODY MASS INDEX: 29.23 KG/M2 | WEIGHT: 181 LBS | RESPIRATION RATE: 16 BRPM | HEART RATE: 88 BPM | OXYGEN SATURATION: 97 %

## 2024-12-16 DIAGNOSIS — G89.4 CHRONIC PAIN SYNDROME: Primary | ICD-10-CM

## 2024-12-16 DIAGNOSIS — M15.0 PRIMARY OSTEOARTHRITIS INVOLVING MULTIPLE JOINTS: ICD-10-CM

## 2024-12-16 PROCEDURE — 99214 OFFICE O/P EST MOD 30 MIN: CPT | Performed by: STUDENT IN AN ORGANIZED HEALTH CARE EDUCATION/TRAINING PROGRAM

## 2024-12-16 PROCEDURE — 1125F AMNT PAIN NOTED PAIN PRSNT: CPT | Performed by: STUDENT IN AN ORGANIZED HEALTH CARE EDUCATION/TRAINING PROGRAM

## 2024-12-16 PROCEDURE — 1160F RVW MEDS BY RX/DR IN RCRD: CPT | Performed by: STUDENT IN AN ORGANIZED HEALTH CARE EDUCATION/TRAINING PROGRAM

## 2024-12-16 PROCEDURE — 1159F MED LIST DOCD IN RCRD: CPT | Performed by: STUDENT IN AN ORGANIZED HEALTH CARE EDUCATION/TRAINING PROGRAM

## 2024-12-16 PROCEDURE — 3079F DIAST BP 80-89 MM HG: CPT | Performed by: STUDENT IN AN ORGANIZED HEALTH CARE EDUCATION/TRAINING PROGRAM

## 2024-12-16 PROCEDURE — 3075F SYST BP GE 130 - 139MM HG: CPT | Performed by: STUDENT IN AN ORGANIZED HEALTH CARE EDUCATION/TRAINING PROGRAM

## 2024-12-16 RX ORDER — HYDROCODONE BITARTRATE AND ACETAMINOPHEN 10; 325 MG/1; MG/1
1 TABLET ORAL EVERY 12 HOURS PRN
Qty: 60 TABLET | Refills: 0 | Status: SHIPPED | OUTPATIENT
Start: 2024-12-16

## 2024-12-16 RX ORDER — HYDROCODONE BITARTRATE AND ACETAMINOPHEN 10; 325 MG/1; MG/1
1 TABLET ORAL EVERY 12 HOURS PRN
Qty: 60 TABLET | Refills: 0 | Status: SHIPPED | OUTPATIENT
Start: 2025-01-15

## 2024-12-16 RX ORDER — BACLOFEN 10 MG/1
10 TABLET ORAL NIGHTLY
Qty: 90 TABLET | Refills: 1 | Status: SHIPPED | OUTPATIENT
Start: 2024-12-16

## 2024-12-16 RX ORDER — PREGABALIN 150 MG/1
150 CAPSULE ORAL 2 TIMES DAILY
Qty: 180 CAPSULE | Refills: 1 | Status: SHIPPED | OUTPATIENT
Start: 2024-12-16

## 2024-12-17 ENCOUNTER — TELEPHONE (OUTPATIENT)
Dept: FAMILY MEDICINE CLINIC | Facility: CLINIC | Age: 64
End: 2024-12-17
Payer: MEDICARE

## 2024-12-17 NOTE — TELEPHONE ENCOUNTER
Caller: Pearl Elizalde    Relationship to patient: Self    Best call back number:   Telephone Information:   Mobile [phone]         Patient is needing: PATIENT WOULD LIKE A CALL BACK TO DISCUSS  MEDICATIONS. SHE IS NEEDING HIGH BLOOD PRESSURE MEDICATION AND 3 PRESCRIPTIONS FROM YESTERDAY WERE SENT TO WRONG PHARMACY. PATIENT REQUESTED CALL BACK FROM OFFICE STAFF TO DISCUSS

## 2024-12-30 ENCOUNTER — OFFICE VISIT (OUTPATIENT)
Dept: FAMILY MEDICINE CLINIC | Facility: CLINIC | Age: 64
End: 2024-12-30
Payer: MEDICARE

## 2024-12-30 VITALS
TEMPERATURE: 99.1 F | DIASTOLIC BLOOD PRESSURE: 86 MMHG | HEIGHT: 66 IN | WEIGHT: 189.4 LBS | SYSTOLIC BLOOD PRESSURE: 136 MMHG | BODY MASS INDEX: 30.44 KG/M2 | HEART RATE: 88 BPM | OXYGEN SATURATION: 97 %

## 2024-12-30 DIAGNOSIS — I10 ESSENTIAL HYPERTENSION: ICD-10-CM

## 2024-12-30 DIAGNOSIS — E11.9 TYPE 2 DIABETES MELLITUS WITHOUT COMPLICATION, WITHOUT LONG-TERM CURRENT USE OF INSULIN: Primary | ICD-10-CM

## 2024-12-30 DIAGNOSIS — E66.811 CLASS 1 OBESITY DUE TO EXCESS CALORIES WITH SERIOUS COMORBIDITY AND BODY MASS INDEX (BMI) OF 31.0 TO 31.9 IN ADULT: ICD-10-CM

## 2024-12-30 DIAGNOSIS — E66.09 CLASS 1 OBESITY DUE TO EXCESS CALORIES WITH SERIOUS COMORBIDITY AND BODY MASS INDEX (BMI) OF 31.0 TO 31.9 IN ADULT: ICD-10-CM

## 2024-12-30 DIAGNOSIS — T84.021D FAILURE OF LEFT TOTAL HIP ARTHROPLASTY WITH DISLOCATION OF HIP, SUBSEQUENT ENCOUNTER: ICD-10-CM

## 2024-12-30 PROCEDURE — 99214 OFFICE O/P EST MOD 30 MIN: CPT | Performed by: NURSE PRACTITIONER

## 2024-12-30 PROCEDURE — 1159F MED LIST DOCD IN RCRD: CPT | Performed by: NURSE PRACTITIONER

## 2024-12-30 PROCEDURE — 3079F DIAST BP 80-89 MM HG: CPT | Performed by: NURSE PRACTITIONER

## 2024-12-30 PROCEDURE — 1125F AMNT PAIN NOTED PAIN PRSNT: CPT | Performed by: NURSE PRACTITIONER

## 2024-12-30 PROCEDURE — 3075F SYST BP GE 130 - 139MM HG: CPT | Performed by: NURSE PRACTITIONER

## 2024-12-30 PROCEDURE — 1160F RVW MEDS BY RX/DR IN RCRD: CPT | Performed by: NURSE PRACTITIONER

## 2024-12-30 PROCEDURE — 3052F HG A1C>EQUAL 8.0%<EQUAL 9.0%: CPT | Performed by: NURSE PRACTITIONER

## 2024-12-30 RX ORDER — PROCHLORPERAZINE 25 MG/1
1 SUPPOSITORY RECTAL
COMMUNITY
Start: 2024-12-15

## 2024-12-30 NOTE — PATIENT INSTRUCTIONS
You are due for Shingrix vaccination series ( the newest shingles vaccine).  It is a two shot series spaced 2-6 months apart. Please get this vaccine series started at your earliest convenience at your local pharmacy to help avoid shingles outbreak. It is more effective than the old Zostavax vaccine and is recommended even if you have had the Zostavax vaccine in the past.  Once the Shingrix series is completed, it does not need to be repeated.   For more information, please look at the website below:  https://www.cdc.gov/vaccines/vpd/shingles/public/shingrix/index.html

## 2024-12-30 NOTE — PROGRESS NOTES
"Subjective   Pearl Elizalde is a 64 y.o. female presents for   Chief Complaint   Patient presents with    Diabetes     3 month follow up  Having hip replacement on 1/13/25       Health Maintenance Due   Topic Date Due    ZOSTER VACCINE (1 of 2) Never done    COLORECTAL CANCER SCREENING  05/01/2024    HEMOGLOBIN A1C  02/07/2025       History of Present Illness  Patient present for follow-up of diabetes.  She is taking medications as directed and denies problems or side effects.  She is currently using a Dexcom G6 to help with blood sugar management.  She prefers the Dexcom G7, however her insurance will not cover it at this time.  She is also scheduled in January to have a revision of her left total hip.  She is experienced multiple dislocations as well as a previous revision surgery.    Vitals:    12/30/24 1124   BP: 136/86   BP Location: Right arm   Patient Position: Sitting   Cuff Size: Large Adult   Pulse: 88   Temp: 99.1 °F (37.3 °C)   TempSrc: Tympanic   SpO2: 97%   Weight: 85.9 kg (189 lb 6.4 oz)   Height: 167.6 cm (65.98\")     Body mass index is 30.58 kg/m².    Current Outpatient Medications on File Prior to Visit   Medication Sig Dispense Refill    albuterol sulfate  (90 Base) MCG/ACT inhaler Inhale 2 puffs Every 4 (Four) Hours As Needed for Wheezing.      baclofen (LIORESAL) 10 MG tablet Take 1 tablet by mouth Every Night. 90 tablet 1    betamethasone dipropionate (DIPROSONE) 0.05 % cream APPLY 1 APPLICATION TO THE APPROPRIATE AREA TOPICALLY AS DIRECTED TWICE A DAY 45 g 14    Blood Glucose Monitoring Suppl (Blood Glucose Monitor System) w/Device kit Inject 1 each under the skin into the appropriate area as directed Daily. 1 each 0    Budeson-Glycopyrrol-Formoterol (Breztri Aerosphere) 160-9-4.8 MCG/ACT aerosol inhaler Inhale 2 puffs 2 (Two) Times a Day.      calcium carbonate (OS-JERMAIN) 600 MG tablet Take 1 tablet by mouth 2 (Two) Times a Day.      celecoxib (CeleBREX) 200 MG capsule Take 1 capsule " by mouth Daily. 90 capsule 1    Cetirizine HCl 10 MG capsule Take 1 dose by mouth As Needed (ALLERGIES).      cholecalciferol (VITAMIN D3) 25 MCG (1000 UT) tablet Take 1 tablet by mouth Daily.      colesevelam (WELCHOL) 625 MG tablet Take 1 tablet by mouth 2 (Two) Times a Day With Meals. 180 tablet 3    Continuous Glucose  (Dexcom G6 ) device Use 1 each Continuous. 1 each 0    Continuous Glucose Sensor (Dexcom G6 Sensor) Use Every 10 (Ten) Days. 3 each 3    Continuous Glucose Transmitter (Dexcom G6 Transmitter) misc Apply 1 each topically Every 3 (Three) Months.      Cranberry 4200 MG capsule Take 1 Capful by mouth Daily.      ezetimibe (ZETIA) 10 MG tablet TAKE 1 TABLET DAILY 90 tablet 3    glucose blood (FREESTYLE LITE) test strip 1 each by Other route Daily. Use as instructed 100 each 6    [START ON 1/15/2025] HYDROcodone-acetaminophen (NORCO)  MG per tablet Take 1 tablet by mouth Every 12 (Twelve) Hours As Needed for Severe Pain. 60 tablet 0    HYDROcodone-acetaminophen (NORCO)  MG per tablet Take 1 tablet by mouth Every 12 (Twelve) Hours As Needed for Severe Pain. 60 tablet 0    Januvia 100 MG tablet Take 1 tablet by mouth Daily. 90 tablet 1    Lancets (FREESTYLE) lancets       metFORMIN (GLUCOPHAGE) 1000 MG tablet Take 1 tablet by mouth 2 (Two) Times a Day With Meals. 180 tablet 1    Olmesartan-amLODIPine-HCTZ (Tribenzor) 40-10-12.5 MG tablet Take 1 tablet by mouth Every Evening. 90 tablet 1    ondansetron ODT (ZOFRAN-ODT) 4 MG disintegrating tablet Place 1 tablet on the tongue Every 8 (Eight) Hours As Needed for Nausea or Vomiting. 12 tablet 0    pravastatin (PRAVACHOL) 80 MG tablet Take 1 tablet by mouth Daily. 90 tablet 1    pregabalin (LYRICA) 150 MG capsule Take 1 capsule by mouth 2 (Two) Times a Day. 180 capsule 1    rOPINIRole (REQUIP) 0.25 MG tablet Take 1 tablet by mouth 2 (Two) Times a Day. 180 tablet 1    tamoxifen (NOLVADEX) 10 MG tablet        No current  facility-administered medications on file prior to visit.       The following portions of the patient's history were reviewed and updated as appropriate: allergies, current medications, past family history, past medical history, past social history, past surgical history, and problem list.    Review of Systems    Objective   Physical Exam  Vitals and nursing note reviewed.   Constitutional:       Appearance: Normal appearance. She is well-developed. She is obese.   HENT:      Head: Normocephalic and atraumatic.      Right Ear: Tympanic membrane, ear canal and external ear normal.      Left Ear: Tympanic membrane, ear canal and external ear normal.      Nose: Nose normal.   Eyes:      Extraocular Movements: Extraocular movements intact.      Conjunctiva/sclera: Conjunctivae normal.      Pupils: Pupils are equal, round, and reactive to light.   Cardiovascular:      Rate and Rhythm: Normal rate and regular rhythm.      Pulses: Normal pulses.      Heart sounds: Normal heart sounds.   Pulmonary:      Effort: Pulmonary effort is normal.      Breath sounds: Normal breath sounds.   Abdominal:      General: Bowel sounds are normal.      Palpations: Abdomen is soft.   Genitourinary:     Vagina: Normal.   Musculoskeletal:         General: Normal range of motion.      Cervical back: Normal range of motion and neck supple.   Skin:     General: Skin is warm and dry.   Neurological:      General: No focal deficit present.      Mental Status: She is alert and oriented to person, place, and time.      Gait: Gait abnormal.   Psychiatric:         Mood and Affect: Mood normal.         Behavior: Behavior normal.         Judgment: Judgment normal.              PHQ-9 Total Score:      Results      Assessment & Plan   Diagnoses and all orders for this visit:    1. Type 2 diabetes mellitus without complication, without long-term current use of insulin (Primary)  -     Cancel: CBC Auto Differential  -     Cancel: Comprehensive Metabolic  Panel  -     Cancel: Hemoglobin A1c  -     Cancel: Lipid Panel  -     Cancel: TSH  -     CBC Auto Differential; Future  -     Comprehensive Metabolic Panel; Future  -     Hemoglobin A1c; Future  -     Lipid Panel; Future  -     TSH; Future    2. Essential hypertension  Comments:  controlled at this time. continue current meds    3. Class 1 obesity due to excess calories with serious comorbidity and body mass index (BMI) of 31.0 to 31.9 in adult    4. 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  It is a two shot series spaced 2-6 months apart. Please get this vaccine series started at your earliest convenience at your local pharmacy to help avoid shingles outbreak. It is more effective than the old Zostavax vaccine and is recommended even if you have had the Zostavax vaccine in the past.  Once the Shingrix series is completed, it does not need to be repeated.   For more information, please look at the website below:  https://www.cdc.gov/vaccines/vpd/shingles/public/shingrix/index.html            Patient or patient representative verbalized consent for the use of Ambient Listening during the visit with  MIKAEL Steele for chart documentation. 12/30/2024  11:43 EST

## 2025-01-10 DIAGNOSIS — G89.4 CHRONIC PAIN SYNDROME: ICD-10-CM

## 2025-01-10 RX ORDER — BACLOFEN 10 MG/1
10 TABLET ORAL NIGHTLY
Qty: 90 TABLET | Refills: 1 | Status: SHIPPED | OUTPATIENT
Start: 2025-01-10

## 2025-01-10 RX ORDER — PREGABALIN 150 MG/1
150 CAPSULE ORAL 2 TIMES DAILY
Qty: 180 CAPSULE | Refills: 1 | Status: SHIPPED | OUTPATIENT
Start: 2025-01-10

## 2025-02-05 NOTE — TELEPHONE ENCOUNTER
Rx Refill Note  Requested Prescriptions     Pending Prescriptions Disp Refills   • metFORMIN (GLUCOPHAGE) 1000 MG tablet [Pharmacy Med Name: METFORMIN HCL TABS 1000MG] 180 tablet 3     Sig: TAKE 1 TABLET TWICE A DAY WITH MEALS      Last office visit with prescribing clinician: 12/30/2024   Last telemedicine visit with prescribing clinician: Visit date not found   Next office visit with prescribing clinician: 5/30/2025       Would you like a call back once the refill request has been completed: [] Yes [] No    If the office needs to give you a call back, can they leave a voicemail: [] Yes [] No    Gala Elizondo MA  02/05/25, 08:14 EST

## 2025-02-14 ENCOUNTER — TELEPHONE (OUTPATIENT)
Dept: FAMILY MEDICINE CLINIC | Facility: CLINIC | Age: 65
End: 2025-02-14
Payer: MEDICARE

## 2025-02-14 RX ORDER — NITROFURANTOIN 25; 75 MG/1; MG/1
100 CAPSULE ORAL 2 TIMES DAILY
Qty: 10 CAPSULE | Refills: 0 | Status: SHIPPED | OUTPATIENT
Start: 2025-02-14

## 2025-02-14 NOTE — TELEPHONE ENCOUNTER
Caller: Pearl Elizalde    Relationship: Self    Best call back number: [phone]     What medication are you requesting: ANTIBIOTICS FOR A UTI     What are your current symptoms: LOWER BACK PAIN/ WINSTON    How long have you been experiencing symptoms: 5 DAYS     Have you had these symptoms before:    [x] Yes  [] No    Have you been treated for these symptoms before:   [x] Yes  [] No    If a prescription is needed, what is your preferred pharmacy and phone number: [id number] - [place], [place] - [address] - [phone]  - [phone]     Additional notes:      PLEASE CALL PATIENT IN REGARDS TO THIS

## 2025-02-14 NOTE — TELEPHONE ENCOUNTER
Macrobid sent.  If symptoms continue, have someone drop a urine specimen off to culture to ensure it is the correct antibiotic.

## 2025-02-14 NOTE — TELEPHONE ENCOUNTER
She is having frequency and back pain. She stated that this is exactly the symptoms she had just before her surgery when she had another UTI

## 2025-02-14 NOTE — TELEPHONE ENCOUNTER
Please verify if she is having any urinary syptoms- burning, itching, foul odor? Also ask if she was discharged home on antibiotics.  She may have back pain from limited mobility and recent hip surgery and want to make sure we are only using antibiotics if absolutely necessary.

## 2025-02-20 ENCOUNTER — TELEPHONE (OUTPATIENT)
Dept: FAMILY MEDICINE CLINIC | Facility: CLINIC | Age: 65
End: 2025-02-20

## 2025-02-20 ENCOUNTER — LAB (OUTPATIENT)
Dept: FAMILY MEDICINE CLINIC | Facility: CLINIC | Age: 65
End: 2025-02-20
Payer: MEDICARE

## 2025-02-20 DIAGNOSIS — R35.0 FREQUENCY OF URINATION: ICD-10-CM

## 2025-02-20 DIAGNOSIS — R35.0 FREQUENCY OF URINATION: Primary | ICD-10-CM

## 2025-02-20 LAB
BILIRUB BLD-MCNC: NEGATIVE MG/DL
CLARITY, POC: CLEAR
COLOR UR: NORMAL
EXPIRATION DATE: NORMAL
GLUCOSE UR STRIP-MCNC: NEGATIVE MG/DL
KETONES UR QL: NEGATIVE
LEUKOCYTE EST, POC: NEGATIVE
Lab: NORMAL
NITRITE UR-MCNC: NEGATIVE MG/ML
PH UR: 5.5 [PH] (ref 5–8)
PROT UR STRIP-MCNC: NEGATIVE MG/DL
RBC # UR STRIP: NEGATIVE /UL
SP GR UR: 1.02 (ref 1–1.03)
UROBILINOGEN UR QL: NORMAL

## 2025-02-20 PROCEDURE — 81003 URINALYSIS AUTO W/O SCOPE: CPT | Performed by: NURSE PRACTITIONER

## 2025-02-20 PROCEDURE — 87086 URINE CULTURE/COLONY COUNT: CPT | Performed by: NURSE PRACTITIONER

## 2025-02-20 NOTE — TELEPHONE ENCOUNTER
I believe that she is asking about the urine specimen that was dropped off today.  I have sent a message in her MyChart, but it was also normal.  The urine has been sent out for culture for further evaluation.

## 2025-02-20 NOTE — TELEPHONE ENCOUNTER
Caller: Patt Elizalde    Relationship: Self    Best call back number: [phone]     Caller requesting test results: PATT    What test was performed: LABS    When was the test performed: 2/20/25    Where was the test performed: IN OFFICE    Additional notes:

## 2025-02-22 LAB — BACTERIA SPEC AEROBE CULT: NO GROWTH

## 2025-03-07 RX ORDER — PRAVASTATIN SODIUM 80 MG/1
80 TABLET ORAL DAILY
Qty: 90 TABLET | Refills: 3 | Status: SHIPPED | OUTPATIENT
Start: 2025-03-07

## 2025-03-10 ENCOUNTER — OFFICE VISIT (OUTPATIENT)
Dept: FAMILY MEDICINE CLINIC | Facility: CLINIC | Age: 65
End: 2025-03-10
Payer: MEDICARE

## 2025-03-10 VITALS
HEIGHT: 66 IN | DIASTOLIC BLOOD PRESSURE: 78 MMHG | HEART RATE: 86 BPM | BODY MASS INDEX: 31.43 KG/M2 | SYSTOLIC BLOOD PRESSURE: 118 MMHG | TEMPERATURE: 98.7 F | WEIGHT: 195.6 LBS | OXYGEN SATURATION: 100 %

## 2025-03-10 DIAGNOSIS — K59.1 FUNCTIONAL DIARRHEA: ICD-10-CM

## 2025-03-10 DIAGNOSIS — M62.89 MUSCLE MASS: Primary | ICD-10-CM

## 2025-03-10 DIAGNOSIS — M79.89 SWELLING OF BOTH LOWER EXTREMITIES: ICD-10-CM

## 2025-03-10 PROCEDURE — 1126F AMNT PAIN NOTED NONE PRSNT: CPT

## 2025-03-10 PROCEDURE — 3078F DIAST BP <80 MM HG: CPT

## 2025-03-10 PROCEDURE — 1160F RVW MEDS BY RX/DR IN RCRD: CPT

## 2025-03-10 PROCEDURE — 99213 OFFICE O/P EST LOW 20 MIN: CPT

## 2025-03-10 PROCEDURE — 1159F MED LIST DOCD IN RCRD: CPT

## 2025-03-10 PROCEDURE — 3074F SYST BP LT 130 MM HG: CPT

## 2025-03-10 RX ORDER — COLESEVELAM 180 1/1
625 TABLET ORAL 2 TIMES DAILY WITH MEALS
Qty: 180 TABLET | Refills: 3 | Status: SHIPPED | OUTPATIENT
Start: 2025-03-10

## 2025-03-10 NOTE — PROGRESS NOTES
"Subjective   Pearl Elizalde is a 64 y.o. female presents for   Chief Complaint   Patient presents with   • lump on back     Left side, lower back       Health Maintenance Due   Topic Date Due   • ZOSTER VACCINE (1 of 2) Never done   • COLORECTAL CANCER SCREENING  05/01/2024   • COVID-19 Vaccine (2 - 2024-25 season) 09/01/2024   • DXA SCAN  06/05/2025       History of Present Illness   Patient presents for lump/mass on left lower back that was noticed by her  last week.  She states there is pain when it is touched.   states it was red when he noticed it but that has since resolved.  He applied \"Tylenol cream\" which helped.   She had a left hip revision approximately 7 weeks ago and had has limited mobility restriction.  Discussed this left lower back lump/mass is likely related to lack of moving and tightening/constriction of that muscle. Offered lower back xray to patient, patient denied at this time.     Vitals:    03/10/25 1013   BP: 118/78   BP Location: Left arm   Patient Position: Sitting   Cuff Size: Large Adult   Pulse: 86   Temp: 98.7 °F (37.1 °C)   TempSrc: Tympanic   SpO2: 100%   Weight: 88.7 kg (195 lb 9.6 oz)   Height: 167.6 cm (65.98\")     Body mass index is 31.59 kg/m².    Current Outpatient Medications on File Prior to Visit   Medication Sig Dispense Refill   • baclofen (LIORESAL) 10 MG tablet Take 1 tablet by mouth Every Night. 90 tablet 1   • Blood Glucose Monitoring Suppl (Blood Glucose Monitor System) w/Device kit Inject 1 each under the skin into the appropriate area as directed Daily. 1 each 0   • Budeson-Glycopyrrol-Formoterol (Breztri Aerosphere) 160-9-4.8 MCG/ACT aerosol inhaler Inhale 2 puffs 2 (Two) Times a Day.     • Cetirizine HCl 10 MG capsule Take 1 dose by mouth As Needed (ALLERGIES).     • cholecalciferol (VITAMIN D3) 25 MCG (1000 UT) tablet Take 1 tablet by mouth Daily.     • Cranberry 4200 MG capsule Take 1 Capful by mouth Daily.     • ezetimibe (ZETIA) 10 MG tablet " TAKE 1 TABLET DAILY 90 tablet 3   • glucose blood (FREESTYLE LITE) test strip 1 each by Other route Daily. Use as instructed 100 each 6   • HYDROcodone-acetaminophen (NORCO)  MG per tablet Take 1 tablet by mouth Every 12 (Twelve) Hours As Needed for Severe Pain. 60 tablet 0   • Januvia 100 MG tablet Take 1 tablet by mouth Daily. 90 tablet 1   • Lancets (FREESTYLE) lancets      • metFORMIN (GLUCOPHAGE) 1000 MG tablet TAKE 1 TABLET TWICE A DAY WITH MEALS 180 tablet 3   • Olmesartan-amLODIPine-HCTZ (Tribenzor) 40-10-12.5 MG tablet Take 1 tablet by mouth Every Evening. 90 tablet 1   • ondansetron ODT (ZOFRAN-ODT) 4 MG disintegrating tablet Place 1 tablet on the tongue Every 8 (Eight) Hours As Needed for Nausea or Vomiting. 12 tablet 0   • pravastatin (PRAVACHOL) 80 MG tablet TAKE 1 TABLET DAILY 90 tablet 3   • pregabalin (LYRICA) 150 MG capsule Take 1 capsule by mouth 2 (Two) Times a Day. 180 capsule 1   • rOPINIRole (REQUIP) 0.25 MG tablet Take 1 tablet by mouth 2 (Two) Times a Day. 180 tablet 1   • tamoxifen (NOLVADEX) 10 MG tablet      • [DISCONTINUED] colesevelam (WELCHOL) 625 MG tablet Take 1 tablet by mouth 2 (Two) Times a Day With Meals. 180 tablet 3   • albuterol sulfate  (90 Base) MCG/ACT inhaler Inhale 2 puffs Every 4 (Four) Hours As Needed for Wheezing. (Patient not taking: Reported on 3/10/2025)     • betamethasone dipropionate (DIPROSONE) 0.05 % cream APPLY 1 APPLICATION TO THE APPROPRIATE AREA TOPICALLY AS DIRECTED TWICE A DAY (Patient not taking: Reported on 3/10/2025) 45 g 14   • calcium carbonate (OS-JERMAIN) 600 MG tablet Take 1 tablet by mouth 2 (Two) Times a Day. (Patient not taking: Reported on 3/10/2025)     • celecoxib (CeleBREX) 200 MG capsule Take 1 capsule by mouth Daily. (Patient not taking: Reported on 3/10/2025) 90 capsule 1   • Continuous Glucose  (Dexcom G6 ) device Use 1 each Continuous. (Patient not taking: Reported on 3/10/2025) 1 each 0   • Continuous Glucose  Sensor (Dexcom G6 Sensor) Use Every 10 (Ten) Days. (Patient not taking: Reported on 3/10/2025) 3 each 3   • Continuous Glucose Transmitter (Dexcom G6 Transmitter) misc Apply 1 each topically Every 3 (Three) Months. (Patient not taking: Reported on 3/10/2025)     • HYDROcodone-acetaminophen (NORCO)  MG per tablet Take 1 tablet by mouth Every 12 (Twelve) Hours As Needed for Severe Pain. (Patient not taking: Reported on 3/10/2025) 60 tablet 0   • nitrofurantoin, macrocrystal-monohydrate, (Macrobid) 100 MG capsule Take 1 capsule by mouth 2 (Two) Times a Day. (Patient not taking: Reported on 3/10/2025) 10 capsule 0     No current facility-administered medications on file prior to visit.       The following portions of the patient's history were reviewed and updated as appropriate: allergies, current medications, past family history, past medical history, past social history, past surgical history, and problem list.    Review of Systems    Objective   Physical Exam  Vitals and nursing note reviewed.   Constitutional:       Appearance: Normal appearance. She is well-developed.   HENT:      Head: Normocephalic and atraumatic.      Right Ear: External ear normal.      Left Ear: External ear normal.      Nose: Nose normal.   Eyes:      Extraocular Movements: Extraocular movements intact.      Pupils: Pupils are equal, round, and reactive to light.   Cardiovascular:      Rate and Rhythm: Normal rate and regular rhythm.      Heart sounds: Normal heart sounds.   Pulmonary:      Effort: Pulmonary effort is normal.      Breath sounds: Normal breath sounds.   Abdominal:      General: Bowel sounds are normal.      Palpations: Abdomen is soft.   Genitourinary:     Vagina: Normal.   Musculoskeletal:      Cervical back: Normal range of motion and neck supple. Swelling and tenderness present. No erythema.        Back:       Right lower leg: Swelling present.      Left lower leg: Swelling present.      Right ankle: Swelling  present.      Left ankle: Swelling present.   Skin:     General: Skin is warm and dry.   Neurological:      General: No focal deficit present.      Mental Status: She is alert and oriented to person, place, and time.      Comments: Using walker   Psychiatric:         Mood and Affect: Mood normal.         Behavior: Behavior normal.         Judgment: Judgment normal.         PHQ-9 Total Score: 13           Assessment & Plan   Diagnoses and all orders for this visit:    1. Muscle mass (Primary)  Comments:  Recommend alternating heat and ice. Apply voltaren cream 2x/day    2. Functional diarrhea  -     colesevelam (WELCHOL) 625 MG tablet; Take 1 tablet by mouth 2 (Two) Times a Day With Meals.  Dispense: 180 tablet; Refill: 3    3. Swelling of both lower extremities  Comments:  Recommend elevating legs higher than heart. Apply compression socks. Continue foot pumps. Discussed when to be concerned for blood clot.        There are no Patient Instructions on file for this visit.

## 2025-03-20 ENCOUNTER — TELEPHONE (OUTPATIENT)
Dept: FAMILY MEDICINE CLINIC | Facility: CLINIC | Age: 65
End: 2025-03-20
Payer: MEDICARE

## 2025-03-20 ENCOUNTER — LAB (OUTPATIENT)
Dept: FAMILY MEDICINE CLINIC | Facility: CLINIC | Age: 65
End: 2025-03-20
Payer: MEDICARE

## 2025-03-20 ENCOUNTER — TELEPHONE (OUTPATIENT)
Dept: PAIN MEDICINE | Facility: CLINIC | Age: 65
End: 2025-03-20
Payer: MEDICARE

## 2025-03-20 DIAGNOSIS — R30.0 DYSURIA: Primary | ICD-10-CM

## 2025-03-20 LAB
BILIRUB BLD-MCNC: NEGATIVE MG/DL
CLARITY, POC: CLEAR
COLOR UR: YELLOW
EXPIRATION DATE: NORMAL
GLUCOSE UR STRIP-MCNC: NEGATIVE MG/DL
KETONES UR QL: NEGATIVE
LEUKOCYTE EST, POC: NEGATIVE
Lab: NORMAL
NITRITE UR-MCNC: NEGATIVE MG/ML
PH UR: 7 [PH] (ref 5–8)
PROT UR STRIP-MCNC: NEGATIVE MG/DL
RBC # UR STRIP: NEGATIVE /UL
SP GR UR: 1.02 (ref 1–1.03)
UROBILINOGEN UR QL: NORMAL

## 2025-03-20 PROCEDURE — 81003 URINALYSIS AUTO W/O SCOPE: CPT | Performed by: NURSE PRACTITIONER

## 2025-03-20 NOTE — TELEPHONE ENCOUNTER
Good morning Tej Pearl has called into the office and said she is having symptoms of a UTI and was wondering if you could put in orders to get her urine tested. Thank you

## 2025-03-20 NOTE — TELEPHONE ENCOUNTER
Caller: Pearl Elizalde    Relationship to patient: Self    Best call back number:     Type of visit: FUP/NEW PROBLEM     Requested date: ASAP     If rescheduling, when is the original appointment: 4/23/25     Additional notes:FORMER PATIENT OF DR MCCARTHY IN [facility] WOULD LIKE TO BE SEEN SOONER THAN 4/23/25

## 2025-03-26 ENCOUNTER — HOSPITAL ENCOUNTER (EMERGENCY)
Facility: HOSPITAL | Age: 65
Discharge: HOME | End: 2025-03-26
Payer: MEDICARE

## 2025-03-26 ENCOUNTER — APPOINTMENT (OUTPATIENT)
Dept: CARDIOLOGY | Facility: HOSPITAL | Age: 65
End: 2025-03-26
Payer: MEDICARE

## 2025-03-26 VITALS
OXYGEN SATURATION: 100 % | HEART RATE: 93 BPM | TEMPERATURE: 98.1 F | HEIGHT: 66 IN | RESPIRATION RATE: 18 BRPM | DIASTOLIC BLOOD PRESSURE: 74 MMHG | SYSTOLIC BLOOD PRESSURE: 133 MMHG | WEIGHT: 195 LBS | BODY MASS INDEX: 31.34 KG/M2

## 2025-03-26 DIAGNOSIS — R60.9 DEPENDENT EDEMA: Primary | ICD-10-CM

## 2025-03-26 LAB
ANION GAP SERPL CALCULATED.3IONS-SCNC: 14.6 MMOL/L (ref 5–15)
BASOPHILS # BLD AUTO: 0.05 10*3/MM3 (ref 0–0.2)
BASOPHILS NFR BLD AUTO: 0.7 % (ref 0–1.5)
BH CV LOWER VASCULAR LEFT COMMON FEMORAL AUGMENT: NORMAL
BH CV LOWER VASCULAR LEFT COMMON FEMORAL COMPETENT: NORMAL
BH CV LOWER VASCULAR LEFT COMMON FEMORAL COMPRESS: NORMAL
BH CV LOWER VASCULAR LEFT COMMON FEMORAL PHASIC: NORMAL
BH CV LOWER VASCULAR LEFT COMMON FEMORAL SPONT: NORMAL
BH CV LOWER VASCULAR LEFT DISTAL FEMORAL COMPRESS: NORMAL
BH CV LOWER VASCULAR LEFT GASTRONEMIUS COMPRESS: NORMAL
BH CV LOWER VASCULAR LEFT GREATER SAPH AK COMPRESS: NORMAL
BH CV LOWER VASCULAR LEFT GREATER SAPH BK COMPRESS: NORMAL
BH CV LOWER VASCULAR LEFT LESSER SAPH COMPRESS: NORMAL
BH CV LOWER VASCULAR LEFT MID FEMORAL AUGMENT: NORMAL
BH CV LOWER VASCULAR LEFT MID FEMORAL COMPETENT: NORMAL
BH CV LOWER VASCULAR LEFT MID FEMORAL COMPRESS: NORMAL
BH CV LOWER VASCULAR LEFT MID FEMORAL PHASIC: NORMAL
BH CV LOWER VASCULAR LEFT MID FEMORAL SPONT: NORMAL
BH CV LOWER VASCULAR LEFT PERONEAL COMPRESS: NORMAL
BH CV LOWER VASCULAR LEFT POPLITEAL AUGMENT: NORMAL
BH CV LOWER VASCULAR LEFT POPLITEAL COMPETENT: NORMAL
BH CV LOWER VASCULAR LEFT POPLITEAL COMPRESS: NORMAL
BH CV LOWER VASCULAR LEFT POPLITEAL PHASIC: NORMAL
BH CV LOWER VASCULAR LEFT POPLITEAL SPONT: NORMAL
BH CV LOWER VASCULAR LEFT POSTERIOR TIBIAL COMPRESS: NORMAL
BH CV LOWER VASCULAR LEFT PROFUNDA FEMORAL COMPRESS: NORMAL
BH CV LOWER VASCULAR LEFT PROXIMAL FEMORAL COMPRESS: NORMAL
BH CV LOWER VASCULAR LEFT SAPHENOFEMORAL JUNCTION COMPRESS: NORMAL
BH CV LOWER VASCULAR RIGHT COMMON FEMORAL AUGMENT: NORMAL
BH CV LOWER VASCULAR RIGHT COMMON FEMORAL COMPETENT: NORMAL
BH CV LOWER VASCULAR RIGHT COMMON FEMORAL COMPRESS: NORMAL
BH CV LOWER VASCULAR RIGHT COMMON FEMORAL PHASIC: NORMAL
BH CV LOWER VASCULAR RIGHT COMMON FEMORAL SPONT: NORMAL
BH CV VAS PRELIMINARY FINDINGS SCRIPTING: 1
BUN SERPL-MCNC: 29 MG/DL (ref 8–23)
BUN/CREAT SERPL: 31.9 (ref 7–25)
CALCIUM SPEC-SCNC: 10.1 MG/DL (ref 8.6–10.5)
CHLORIDE SERPL-SCNC: 102 MMOL/L (ref 98–107)
CO2 SERPL-SCNC: 21.4 MMOL/L (ref 22–29)
CREAT SERPL-MCNC: 0.91 MG/DL (ref 0.57–1)
DEPRECATED RDW RBC AUTO: 44.6 FL (ref 37–54)
EGFRCR SERPLBLD CKD-EPI 2021: 69.7 ML/MIN/1.73
EOSINOPHIL # BLD AUTO: 0.21 10*3/MM3 (ref 0–0.4)
EOSINOPHIL NFR BLD AUTO: 3.1 % (ref 0.3–6.2)
ERYTHROCYTE [DISTWIDTH] IN BLOOD BY AUTOMATED COUNT: 14.6 % (ref 12.3–15.4)
GLUCOSE SERPL-MCNC: 148 MG/DL (ref 65–99)
HCT VFR BLD AUTO: 36.4 % (ref 34–46.6)
HGB BLD-MCNC: 11 G/DL (ref 12–15.9)
HOLD SPECIMEN: NORMAL
IMM GRANULOCYTES # BLD AUTO: 0.04 10*3/MM3 (ref 0–0.05)
IMM GRANULOCYTES NFR BLD AUTO: 0.6 % (ref 0–0.5)
LYMPHOCYTES # BLD AUTO: 1.34 10*3/MM3 (ref 0.7–3.1)
LYMPHOCYTES NFR BLD AUTO: 19.9 % (ref 19.6–45.3)
MCH RBC QN AUTO: 25.5 PG (ref 26.6–33)
MCHC RBC AUTO-ENTMCNC: 30.2 G/DL (ref 31.5–35.7)
MCV RBC AUTO: 84.5 FL (ref 79–97)
MONOCYTES # BLD AUTO: 0.55 10*3/MM3 (ref 0.1–0.9)
MONOCYTES NFR BLD AUTO: 8.2 % (ref 5–12)
NEUTROPHILS NFR BLD AUTO: 4.53 10*3/MM3 (ref 1.7–7)
NEUTROPHILS NFR BLD AUTO: 67.5 % (ref 42.7–76)
NRBC BLD AUTO-RTO: 0 /100 WBC (ref 0–0.2)
NT-PROBNP SERPL-MCNC: 40.6 PG/ML (ref 0–900)
PLATELET # BLD AUTO: 229 10*3/MM3 (ref 140–450)
PMV BLD AUTO: 9.9 FL (ref 6–12)
POTASSIUM SERPL-SCNC: 4.7 MMOL/L (ref 3.5–5.2)
RBC # BLD AUTO: 4.31 10*6/MM3 (ref 3.77–5.28)
SODIUM SERPL-SCNC: 138 MMOL/L (ref 136–145)
WBC NRBC COR # BLD AUTO: 6.72 10*3/MM3 (ref 3.4–10.8)
WHOLE BLOOD HOLD COAG: NORMAL

## 2025-03-26 PROCEDURE — 99284 EMERGENCY DEPT VISIT MOD MDM: CPT

## 2025-03-26 PROCEDURE — 85025 COMPLETE CBC W/AUTO DIFF WBC: CPT | Performed by: NURSE PRACTITIONER

## 2025-03-26 PROCEDURE — 36415 COLL VENOUS BLD VENIPUNCTURE: CPT

## 2025-03-26 PROCEDURE — 93005 ELECTROCARDIOGRAM TRACING: CPT | Performed by: NURSE PRACTITIONER

## 2025-03-26 PROCEDURE — 93971 EXTREMITY STUDY: CPT

## 2025-03-26 PROCEDURE — 93971 EXTREMITY STUDY: CPT | Performed by: SURGERY

## 2025-03-26 PROCEDURE — 80048 BASIC METABOLIC PNL TOTAL CA: CPT | Performed by: NURSE PRACTITIONER

## 2025-03-26 PROCEDURE — 83880 ASSAY OF NATRIURETIC PEPTIDE: CPT | Performed by: NURSE PRACTITIONER

## 2025-03-26 RX ORDER — SODIUM CHLORIDE 0.9 % (FLUSH) 0.9 %
10 SYRINGE (ML) INJECTION AS NEEDED
Status: DISCONTINUED | OUTPATIENT
Start: 2025-03-26 | End: 2025-03-26 | Stop reason: HOSPADM

## 2025-03-26 NOTE — DISCHARGE INSTRUCTIONS
Continue to wrap your legs at home and try to obtain a large pressure stockings you may also try lymphatic drainage massage and/or lymphatic brushing to help with the movement of the fluids since you are unable to elevate your legs    Today you are found not to have a blood clot in your leg you were found to not be in congestive heart failure you need to follow-up with primary care for further evaluation and treatment of dependent edema

## 2025-03-26 NOTE — ED PROVIDER NOTES
Subjective   History of Present Illness  Patient is a 64-year-old obese female who had a left hip replacement with Dr. Norris at [facility] in Jan.  Comes in today because she has had increased swelling in her lower legs and today during physical therapy she had pain in her calf and he wanted her to be evaluated for DVT.  She states that she is unable to get compression socks on due to the swelling and she is wrapping her legs at night and is still not able to get the compression socks on in the morning.    She states that she is sitting in a recliner most of the day with her legs hanging she is able to get them at about level but not above her heart she has bilateral knee replacements and states it is very difficult for her to get her legs above her heart because it hurts the knees      Review of Systems    Past Medical History:   Diagnosis Date    Asthma 06/03/2019    At risk for falls     Body mass index (BMI) of 30.0-30.9 in adult 04/11/2019    Breast cancer     NO CHEMO, NO RADIATION; LEFT    Chronic obstructive pulmonary disease 06/03/2019    Diabetic peripheral neuropathy 02/06/2019    Gastroesophageal reflux disease 06/03/2019    History of COVID-19     2019, 2021,2022    Hyperlipidemia     Hypertension     Low back pain     Obesity     Osteoarthritis     PONV (postoperative nausea and vomiting)     SEVERE    Slow to wake up after anesthesia     Under care of pain management specialist        Allergies   Allergen Reactions    Oxycodone Hives and Nausea And Vomiting     Other reaction(s): Nausea And Vomiting    Penicillin G Unknown (See Comments)     Patient states hives as a child and as an adult     Penicillins Hives     As child. Unsure if can take keflex    Patient unsure of reaction.    Oxycodone Hcl Unknown - High Severity       Past Surgical History:   Procedure Laterality Date    ADENOIDECTOMY      CHOLECYSTECTOMY      COLONOSCOPY      ENDOSCOPY      EYE SURGERY      CATARACTS    INNER EAR SURGERY       STAPENDECTOMY    KNEE SURGERY Bilateral     MULTIPLE    MASTECTOMY Left     REPLACEMENT TOTAL KNEE Left     REPLACEMENT TOTAL KNEE Right     TONSILLECTOMY      TOTAL HIP ARTHROPLASTY Left 2023    Procedure: TOTAL HIP ARTHROPLASTY ANTERIOR WITH HANA TABLE;  Surgeon: Francis Vogel MD;  Location: [facility];  Service: Orthopedics;  Laterality: Left;    TOTAL HIP ARTHROPLASTY REVISION Left 2023    Procedure: TOTAL HIP ARTHROPLASTY ANTERIOR REVISION WITH HANA TABLE;  Surgeon: Francis Vogel MD;  Location: [facility];  Service: Orthopedics;  Laterality: Left;       Family History   Adopted: Yes   Problem Relation Age of Onset    Malig Hyperthermia Neg Hx        Social History     Socioeconomic History    Marital status:    Tobacco Use    Smoking status: Former     Current packs/day: 0.00     Average packs/day: 0.2 packs/day for 0.5 years (0.1 ttl pk-yrs)     Types: Cigarettes     Start date: 1984     Quit date:      Years since quittin.2     Passive exposure: Past    Smokeless tobacco: Never   Vaping Use    Vaping status: Never Used   Substance and Sexual Activity    Alcohol use: Yes     Comment: Occassional    Drug use: No    Sexual activity: Yes     Partners: Male     Birth control/protection: None           Objective   Physical Exam  Vitals reviewed.   Constitutional:       Appearance: She is obese.   Cardiovascular:      Pulses: Normal pulses.      Heart sounds: Normal heart sounds.   Pulmonary:      Effort: Pulmonary effort is normal.      Breath sounds: Normal breath sounds.   Musculoskeletal:         General: Swelling and tenderness present.      Comments: Patient has 1+ pitting edema bilateral lower extremities with a positive Homans on the left   Skin:     General: Skin is warm.      Capillary Refill: Capillary refill takes 2 to 3 seconds.   Neurological:      General: No focal deficit present.         Procedures           ED Course  ED Course as of 03/26/25 2012   Wed Mar  "26, 2025   1154 Knee with the Doppler technician states that the patient has no DVT no SVT [KW]      ED Course User Index  [KW] Gregoria Jackson, APRN                                         /74   Pulse 93   Temp 98.1 °F (36.7 °C) (Oral)   Resp 18   Ht 167.6 cm (66\")   Wt 88.5 kg (195 lb)   LMP  (LMP Unknown)   SpO2 100%   BMI 31.47 kg/m²   Labs Reviewed   CBC WITH AUTO DIFFERENTIAL - Abnormal; Notable for the following components:       Result Value    Hemoglobin 11.0 (*)     MCH 25.5 (*)     MCHC 30.2 (*)     Immature Grans % 0.6 (*)     All other components within normal limits   BASIC METABOLIC PANEL - Abnormal; Notable for the following components:    Glucose 148 (*)     BUN 29 (*)     CO2 21.4 (*)     BUN/Creatinine Ratio 31.9 (*)     All other components within normal limits    Narrative:     GFR Categories in Chronic Kidney Disease (CKD)      GFR Category          GFR (mL/min/1.73)    Interpretation  G1                     90 or greater         Normal or high (1)  G2                      60-89                Mild decrease (1)  G3a                   45-59                Mild to moderate decrease  G3b                   30-44                Moderate to severe decrease  G4                    15-29                Severe decrease  G5                    14 or less           Kidney failure          (1)In the absence of evidence of kidney disease, neither GFR category G1 or G2 fulfill the criteria for CKD.    eGFR calculation 2021 CKD-EPI creatinine equation, which does not include race as a factor   BNP (IN-HOUSE) - Normal    Narrative:     This assay is used as an aid in the diagnosis of individuals suspected of having heart failure. It can be used as an aid in the diagnosis of acute decompensated heart failure (ADHF) in patients presenting with signs and symptoms of ADHF to the emergency department (ED). In addition, NT-proBNP of <300 pg/mL indicates ADHF is not likely.    Age Range Result " Interpretation  NT-proBNP Concentration (pg/mL:      <50             Positive            >450                   Gray                 300-450                    Negative             <300    50-75           Positive            >900                  Gray                300-900                  Negative            <300      >75             Positive            >1800                  Gray                300-1800                  Negative            <300   CBC AND DIFFERENTIAL    Narrative:     The following orders were created for panel order CBC & Differential.  Procedure                               Abnormality         Status                     ---------                               -----------         ------                     CBC Auto Differential[[id number]]        Abnormal            Final result                 Please view results for these tests on the individual orders.   EXTRA TUBES    Narrative:     The following orders were created for panel order Extra Tubes.  Procedure                               Abnormality         Status                     ---------                               -----------         ------                     Gold Top - SST[[id number]]                                   Final result               Light Blue Top[[id number]]                                   Final result                 Please view results for these tests on the individual orders.   GOLD TOP - SST   LIGHT BLUE TOP     Medications - No data to display  No radiology results for the last day                Medical Decision Making  Patient is an obese 64-year-old female who has left leg pain and swelling she really has swelling in both of her legs but she comes in concern for DVT in the left leg because she had pain with manipulation of it during her physical therapy and today was found to have a negative Doppler of the left lower extremity she is not in congestive heart failure her heart rhythm is within normal limit we  talked about wrapping her leg-we talked about lymphatic drainage lymphatic massage and the need to decrease the salt intake and to follow-up with primary care for further management of the edema she has no shortness of breath no fear for pulmonary emboli-she is not tachycardic and her oxygen saturation is 100%    Should be discharged home to follow-up with primary care to return if worse she verbalized understood discharge instructions taken home by her  at bedside    Problems Addressed:  Dependent edema: complicated acute illness or injury    Amount and/or Complexity of Data Reviewed  Independent Historian: spouse     Details: Bedside  External Data Reviewed: labs and ECG.  Labs: ordered. Decision-making details documented in ED Course.  Radiology: ordered and independent interpretation performed. Decision-making details documented in ED Course.  ECG/medicine tests: ordered and independent interpretation performed. Decision-making details documented in ED Course.    Risk  OTC drugs.        Final diagnoses:   Dependent edema       ED Disposition  ED Disposition       ED Disposition   Discharge    Condition   Stable    Comment   --               Rosa Hooper, APRN  [address] [facility] [place] [address]  [phone]    In 3 days  If symptoms worsen, As needed         Medication List      No changes were made to your prescriptions during this visit.            Gregoria Jackson, APRN  03/26/25 2012

## 2025-03-27 LAB
QT INTERVAL: 356 MS
QTC INTERVAL: 440 MS

## 2025-03-31 ENCOUNTER — TELEPHONE (OUTPATIENT)
Dept: PAIN MEDICINE | Facility: CLINIC | Age: 65
End: 2025-03-31
Payer: MEDICARE

## 2025-03-31 ENCOUNTER — OFFICE VISIT (OUTPATIENT)
Dept: FAMILY MEDICINE CLINIC | Facility: CLINIC | Age: 65
End: 2025-03-31
Payer: MEDICARE

## 2025-03-31 ENCOUNTER — TELEPHONE (OUTPATIENT)
Dept: FAMILY MEDICINE CLINIC | Facility: CLINIC | Age: 65
End: 2025-03-31
Payer: MEDICARE

## 2025-03-31 VITALS
DIASTOLIC BLOOD PRESSURE: 85 MMHG | BODY MASS INDEX: 33.14 KG/M2 | SYSTOLIC BLOOD PRESSURE: 150 MMHG | TEMPERATURE: 98.4 F | HEIGHT: 66 IN | OXYGEN SATURATION: 100 % | WEIGHT: 206.2 LBS | HEART RATE: 93 BPM

## 2025-03-31 DIAGNOSIS — G89.4 CHRONIC PAIN SYNDROME: ICD-10-CM

## 2025-03-31 DIAGNOSIS — M15.0 PRIMARY OSTEOARTHRITIS INVOLVING MULTIPLE JOINTS: ICD-10-CM

## 2025-03-31 DIAGNOSIS — G25.81 RESTLESS LEGS: ICD-10-CM

## 2025-03-31 DIAGNOSIS — J44.9 CHRONIC OBSTRUCTIVE PULMONARY DISEASE, UNSPECIFIED COPD TYPE: ICD-10-CM

## 2025-03-31 DIAGNOSIS — R60.0 BILATERAL LOWER EXTREMITY EDEMA: ICD-10-CM

## 2025-03-31 DIAGNOSIS — R11.0 NAUSEA: ICD-10-CM

## 2025-03-31 DIAGNOSIS — R60.0 BILATERAL LOWER EXTREMITY EDEMA: Primary | ICD-10-CM

## 2025-03-31 DIAGNOSIS — G47.9 DIFFICULTY SLEEPING: ICD-10-CM

## 2025-03-31 PROCEDURE — 99214 OFFICE O/P EST MOD 30 MIN: CPT

## 2025-03-31 PROCEDURE — 1126F AMNT PAIN NOTED NONE PRSNT: CPT

## 2025-03-31 PROCEDURE — 3079F DIAST BP 80-89 MM HG: CPT

## 2025-03-31 PROCEDURE — G2211 COMPLEX E/M VISIT ADD ON: HCPCS

## 2025-03-31 PROCEDURE — 3077F SYST BP >= 140 MM HG: CPT

## 2025-03-31 RX ORDER — ONDANSETRON 4 MG/1
4 TABLET, ORALLY DISINTEGRATING ORAL EVERY 8 HOURS PRN
Qty: 12 TABLET | Refills: 0 | Status: SHIPPED | OUTPATIENT
Start: 2025-03-31

## 2025-03-31 RX ORDER — ROPINIROLE 0.25 MG/1
0.25 TABLET, FILM COATED ORAL 2 TIMES DAILY
Qty: 180 TABLET | Refills: 1 | Status: SHIPPED | OUTPATIENT
Start: 2025-03-31

## 2025-03-31 RX ORDER — FUROSEMIDE 20 MG/1
10 TABLET ORAL DAILY
Qty: 45 TABLET | OUTPATIENT
Start: 2025-03-31

## 2025-03-31 RX ORDER — ALBUTEROL SULFATE 90 UG/1
2 INHALANT RESPIRATORY (INHALATION) EVERY 4 HOURS PRN
Qty: 18 G | Refills: 1 | Status: SHIPPED | OUTPATIENT
Start: 2025-03-31

## 2025-03-31 RX ORDER — TRAZODONE HYDROCHLORIDE 50 MG/1
25 TABLET ORAL NIGHTLY
Qty: 30 TABLET | Refills: 0 | Status: SHIPPED | OUTPATIENT
Start: 2025-03-31

## 2025-03-31 RX ORDER — FUROSEMIDE 20 MG/1
10 TABLET ORAL DAILY
Qty: 30 TABLET | Refills: 1 | Status: SHIPPED | OUTPATIENT
Start: 2025-03-31

## 2025-03-31 NOTE — PROGRESS NOTES
Subjective   Pearl Elizalde is a 64 y.o. female presents for   Chief Complaint   Patient presents with    Hospital Follow Up Visit     Swollen legs. Patient was told to come her from the ED to get lasix  Also needs something for pain and sleep    Med Refill     Requip, albuterol inhaler and zofran       Health Maintenance Due   Topic Date Due    ZOSTER VACCINE (1 of 2) Never done    COLORECTAL CANCER SCREENING  05/01/2024    COVID-19 Vaccine (2 - 2024-25 season) 09/01/2024    DXA SCAN  06/05/2025       History of Present Illness  The patient is a 64-year-old female here for a hospital follow-up for swollen legs.    She was referred to the emergency room last Wednesday due to severe leg swelling, which has since decreased but remains painful. She has been utilizing a handheld brush with small balls, as recommended by her ER physician, and finds it beneficial. She also practices lymphatic massage 3 to 4 times daily while seated and uses leg wraps. She elevates her legs during the day but not at night due to discomfort from her bilateral knee replacements. She is scheduled to return to physical therapy on Wednesday. She has been ambulating within her home and notes an improvement in her gait this morning. She is not currently on any anticoagulant therapy. She is not experiencing any glaucoma-related issues. She has a treadmill and bike in her basement. She is scheduled to attend physical therapy 3 times a week. She has a follow-up appointment with Dr. Norris on 05/19/2025. She has been advised to take diuretics to manage the edema.    She experiences daily pain episodes, which she attributes to her back condition. Her sleep is disrupted due to discomfort and pain, necessitating her to sleep on her back for the past 11 weeks. She has a body pillow that pushes this up and then she has another pillow that holds it in place and that is how she sleeps. She has a limited supply of Norco, which provides pain relief but does  "not aid in sleep. She has not tried melatonin or trazodone for sleep. She has previously used Ambien during a CT scan due to claustrophobia but did not find it beneficial. She has signed a contract with her pain management specialist, Dr. Riddle, regarding the prescription of pain medications. She has an upcoming appointment with pain management on 04/23/2025.    She is currently on a nightly half-dose medication regimen for breast cancer, which she believes is effective. She underwent a breast examination last week due to soreness and has a scheduled appointment with Dr. Morel on 04/29/2025, preceded by a mammogram.    She is interested in receiving the shingles vaccine.    She requires refills for Requip, albuterol, and Zofran.    MEDICATIONS  Current: Requip, albuterol, Zofran, Norco       Vitals:    03/31/25 1118   BP: 150/85   BP Location: Right arm   Patient Position: Sitting   Cuff Size: Large Adult   Pulse: 93   Temp: 98.4 °F (36.9 °C)   TempSrc: Tympanic   SpO2: 100%   Weight: 93.5 kg (206 lb 3.2 oz)   Height: 167.6 cm (65.98\")     Body mass index is 33.3 kg/m².    Current Outpatient Medications on File Prior to Visit   Medication Sig Dispense Refill    baclofen (LIORESAL) 10 MG tablet Take 1 tablet by mouth Every Night. 90 tablet 1    betamethasone dipropionate (DIPROSONE) 0.05 % cream APPLY 1 APPLICATION TO THE APPROPRIATE AREA TOPICALLY AS DIRECTED TWICE A DAY 45 g 14    Blood Glucose Monitoring Suppl (Blood Glucose Monitor System) w/Device kit Inject 1 each under the skin into the appropriate area as directed Daily. 1 each 0    Budeson-Glycopyrrol-Formoterol (Breztri Aerosphere) 160-9-4.8 MCG/ACT aerosol inhaler Inhale 2 puffs 2 (Two) Times a Day.      celecoxib (CeleBREX) 200 MG capsule Take 1 capsule by mouth Daily. (Patient taking differently: Take 1 capsule by mouth As Needed.) 90 capsule 1    Cetirizine HCl 10 MG capsule Take 1 dose by mouth As Needed (ALLERGIES).      cholecalciferol (VITAMIN D3) 25 " MCG (1000 UT) tablet Take 1 tablet by mouth Daily.      colesevelam (WELCHOL) 625 MG tablet Take 1 tablet by mouth 2 (Two) Times a Day With Meals. 180 tablet 3    Continuous Glucose  (Dexcom G6 ) device Use 1 each Continuous. 1 each 0    Continuous Glucose Sensor (Dexcom G6 Sensor) Use Every 10 (Ten) Days. 3 each 3    Continuous Glucose Transmitter (Dexcom G6 Transmitter) misc Apply 1 each topically Every 3 (Three) Months.      Cranberry 4200 MG capsule Take 1 Capful by mouth Daily.      ezetimibe (ZETIA) 10 MG tablet TAKE 1 TABLET DAILY 90 tablet 3    glucose blood (FREESTYLE LITE) test strip 1 each by Other route Daily. Use as instructed 100 each 6    HYDROcodone-acetaminophen (NORCO)  MG per tablet Take 1 tablet by mouth Every 12 (Twelve) Hours As Needed for Severe Pain. 60 tablet 0    Januvia 100 MG tablet Take 1 tablet by mouth Daily. 90 tablet 1    Lancets (FREESTYLE) lancets       metFORMIN (GLUCOPHAGE) 1000 MG tablet TAKE 1 TABLET TWICE A DAY WITH MEALS 180 tablet 3    Olmesartan-amLODIPine-HCTZ (Tribenzor) 40-10-12.5 MG tablet Take 1 tablet by mouth Every Evening. 90 tablet 1    pravastatin (PRAVACHOL) 80 MG tablet TAKE 1 TABLET DAILY 90 tablet 3    pregabalin (LYRICA) 150 MG capsule Take 1 capsule by mouth 2 (Two) Times a Day. 180 capsule 1    tamoxifen (NOLVADEX) 10 MG tablet       [DISCONTINUED] albuterol sulfate  (90 Base) MCG/ACT inhaler Inhale 2 puffs Every 4 (Four) Hours As Needed for Wheezing.      [DISCONTINUED] ondansetron ODT (ZOFRAN-ODT) 4 MG disintegrating tablet Place 1 tablet on the tongue Every 8 (Eight) Hours As Needed for Nausea or Vomiting. 12 tablet 0    [DISCONTINUED] rOPINIRole (REQUIP) 0.25 MG tablet Take 1 tablet by mouth 2 (Two) Times a Day. 180 tablet 1    [DISCONTINUED] calcium carbonate (OS-JERMAIN) 600 MG tablet Take 1 tablet by mouth 2 (Two) Times a Day. (Patient not taking: Reported on 3/31/2025)      [DISCONTINUED] HYDROcodone-acetaminophen (NORCO)   MG per tablet Take 1 tablet by mouth Every 12 (Twelve) Hours As Needed for Severe Pain. (Patient not taking: Reported on 3/31/2025) 60 tablet 0    [DISCONTINUED] nitrofurantoin, macrocrystal-monohydrate, (Macrobid) 100 MG capsule Take 1 capsule by mouth 2 (Two) Times a Day. (Patient not taking: Reported on 3/31/2025) 10 capsule 0     No current facility-administered medications on file prior to visit.       The following portions of the patient's history were reviewed and updated as appropriate: allergies, current medications, past family history, past medical history, past social history, past surgical history, and problem list.    Review of Systems   Cardiovascular:  Positive for leg swelling.       Objective   Physical Exam  Vitals and nursing note reviewed.   Constitutional:       Appearance: Normal appearance. She is well-developed.   HENT:      Head: Normocephalic and atraumatic.      Right Ear: External ear normal.      Left Ear: External ear normal.      Nose: Nose normal.   Eyes:      Extraocular Movements: Extraocular movements intact.      Pupils: Pupils are equal, round, and reactive to light.   Cardiovascular:      Rate and Rhythm: Normal rate and regular rhythm.      Heart sounds: Normal heart sounds.   Pulmonary:      Effort: Pulmonary effort is normal.      Breath sounds: Normal breath sounds.   Abdominal:      General: Bowel sounds are normal.      Palpations: Abdomen is soft.   Musculoskeletal:         General: Normal range of motion.      Cervical back: Normal range of motion and neck supple.      Right lower leg: Tenderness present. 1+ Pitting Edema present.      Left lower leg: No tenderness. 1+ Pitting Edema present.      Right ankle: Swelling present.      Left ankle: Swelling present.   Skin:     General: Skin is warm and dry.   Neurological:      General: No focal deficit present.      Mental Status: She is alert and oriented to person, place, and time.   Psychiatric:         Mood and  Affect: Mood normal.         Behavior: Behavior normal.         Judgment: Judgment normal.          Lungs are clear.  Heart sounds normal.  PHQ-9 Total Score:      Results  Laboratory Studies  Kidney function is good. Potassium levels are good.       Assessment & Plan   Diagnoses and all orders for this visit:    1. Bilateral lower extremity edema (Primary)  -     furosemide (Lasix) 20 MG tablet; Take 0.5 tablets by mouth Daily.  Dispense: 30 tablet; Refill: 1    2. Chronic obstructive pulmonary disease, unspecified COPD type  -     albuterol sulfate  (90 Base) MCG/ACT inhaler; Inhale 2 puffs Every 4 (Four) Hours As Needed for Wheezing.  Dispense: 18 g; Refill: 1    3. Nausea  -     ondansetron ODT (ZOFRAN-ODT) 4 MG disintegrating tablet; Place 1 tablet on the tongue Every 8 (Eight) Hours As Needed for Nausea or Vomiting.  Dispense: 12 tablet; Refill: 0    4. Restless legs  -     rOPINIRole (REQUIP) 0.25 MG tablet; Take 1 tablet by mouth 2 (Two) Times a Day.  Dispense: 180 tablet; Refill: 1    5. Difficulty sleeping  -     traZODone (DESYREL) 50 MG tablet; Take 0.5 tablets by mouth Every Night. Take 1/2 tablet nightly  Dispense: 30 tablet; Refill: 0             Assessment & Plan  1. Lower extremity edema.  Her blood pressure is within normal limits, and her renal function is satisfactory. The etiology of the edema could be lymphatic in nature. She is advised to continue with lymphedema massages and leg wrapping. Elevation of the legs above the level of the heart while lying down is recommended to alleviate some of the swelling. A low dose of Lasix will be initiated to manage the swelling. If the current dosage proves insufficient, an increase may be considered.    2. Insomnia.  She is experiencing difficulty sleeping due to pain and discomfort. Trazodone will be prescribed to aid sleep, and she is instructed to discontinue its use if it results in adverse effects the following day.    3. Pain management.  She  has a limited supply of Norco, which provides pain relief but does not aid in sleep. She has an upcoming appointment with pain management on 04/23/2025. She is advised to contact her pain management specialist to seek approval for additional pain medication until her next appointment. If approval is granted, a prescription for Norco will be provided.    4. Breast cancer.  She is currently taking medication as per her drug list to maintain her condition. She had a breast exam last week due to soreness and will see Dr. ORONA on 04/29/2025, with a mammogram scheduled before that appointment.    5. Health maintenance.  She is advised to receive the shingles vaccine, pending confirmation of coverage by Medicare.    6. Medication management.  Refills for Requip, albuterol, and Zofran will be processed through [facility].    There are no Patient Instructions on file for this visit.         Patient or patient representative verbalized consent for the use of Ambient Listening during the visit with  MIKAEL Macias for chart documentation. 3/31/2025  14:38 EDT

## 2025-03-31 NOTE — TELEPHONE ENCOUNTER
Caller: Pearl Elizalde    Relationship: Self    Best call back number:     What is the best time to reach you: ANY     Who are you requesting to speak with (clinical staff, provider,  specific staff member): CLINICAL    What was the call regarding: PATIENT NEEDS TO KNOW IF SHE SIGNED PAPERWORK ABOUT MEDICATION SHE IS ABLE TO TAKE    Is it okay if the provider responds through MyChart: PLEASE CALL

## 2025-03-31 NOTE — TELEPHONE ENCOUNTER
Caller: Pearl Elizalde    Relationship: Self    Best call back number: [phone]     What medication are you requesting: PAIN MEDICATION     If a prescription is needed, what is your preferred pharmacy and phone number: [id number] - [place], [place] - [address] - [phone]  - [phone] FX     Additional notes:   IS NOT CURRENTLY UNDER A CONTRACT WITH AN OTHER PROVIDER AND TATY CAN WRITE HER A PRESCRIPTION FOR PAIN MEDICATION UNTIL SHE CAN SEE THE NEW DOCTOR DR. CESAR FOR PAIN MANAGEMENT

## 2025-03-31 NOTE — TELEPHONE ENCOUNTER
Returned pt's call to clarify what paperwork she is referring to. No Answer- LVM to return our call

## 2025-04-01 RX ORDER — HYDROCODONE BITARTRATE AND ACETAMINOPHEN 10; 325 MG/1; MG/1
1 TABLET ORAL EVERY 12 HOURS PRN
Qty: 12 TABLET | Refills: 0 | Status: SHIPPED | OUTPATIENT
Start: 2025-04-01

## 2025-04-04 RX ORDER — SITAGLIPTIN 100 MG/1
100 TABLET, FILM COATED ORAL DAILY
Qty: 90 TABLET | Refills: 3 | Status: SHIPPED | OUTPATIENT
Start: 2025-04-04

## 2025-04-04 NOTE — TELEPHONE ENCOUNTER
Rx Refill Note  Requested Prescriptions     Pending Prescriptions Disp Refills   • Januvia 100 MG tablet [Pharmacy Med Name: JANUVIA TABS 100MG] 90 tablet 3     Sig: TAKE 1 TABLET DAILY      Last office visit with prescribing clinician: 12/30/2024   Last telemedicine visit with prescribing clinician: Visit date not found   Next office visit with prescribing clinician: 5/30/2025       Would you like a call back once the refill request has been completed: [] Yes [] No    If the office needs to give you a call back, can they leave a voicemail: [] Yes [] No    Gala Elizondo MA  04/04/25, 08:01 EDT

## 2025-04-22 DIAGNOSIS — R11.0 NAUSEA: ICD-10-CM

## 2025-04-22 RX ORDER — ONDANSETRON 4 MG/1
4 TABLET, ORALLY DISINTEGRATING ORAL EVERY 8 HOURS PRN
Qty: 12 TABLET | Refills: 0 | Status: SHIPPED | OUTPATIENT
Start: 2025-04-22

## 2025-04-22 NOTE — TELEPHONE ENCOUNTER
HAS BEEN SICK LAST COUPLE DAYS AND THIS MED IS THE ONLY THING THAT HAS STOPPED IT  Caller: Pearl Elizalde    Relationship: Self    Best call back number:   Telephone Information:   Mobile [phone]         Requested Prescriptions:   Requested Prescriptions     Pending Prescriptions Disp Refills    ondansetron ODT (ZOFRAN-ODT) 4 MG disintegrating tablet 12 tablet 0     Sig: Place 1 tablet on the tongue Every 8 (Eight) Hours As Needed for Nausea or Vomiting.        Pharmacy where request should be sent: [id number] - [place], [place] - [address] [facility] [address] [facility] [address] - [phone] PH - [phone] FX     Last office visit with prescribing clinician: 12/30/2024   Last telemedicine visit with prescribing clinician: Visit date not found   Next office visit with prescribing clinician: 5/30/2025     Additional details provided by patient:     Does the patient have less than a 3 day supply:  [x] Yes  [] No    Would you like a call back once the refill request has been completed: [] Yes [] No    If the office needs to give you a call back, can they leave a voicemail: [] Yes [] No    Connor Mayo Rep   04/22/25 15:09 EDT

## 2025-04-22 NOTE — PROGRESS NOTES
Subjective   Pearl Elizalde is a 64 y.o. female is here for follow up for generalized joint pain. Previously patient of Dr. Sevilla transferring care to me due to Dr. Ventura's moving. New patient to me.  Left hip pain is getting better. Increased lower back pain due to sleeping certain way.  She has been working with PT. Some increased hip pain with certain movement.     Last seen by Dr. Sevilla on 12/16/2024.  S/p left hip replacement with Dr. Norris at [facility]-1/2025 ED visit on 3/26/2025 for concern regarding edema and DVT but Doppler was negative.  Hx-previous patient of Dr. Sevilla seen for chronic pain syndrome and osteoarthritis.  Initially she was referred for left foot pain but on visit she complained of pain throughout her body at multiple sites including bilateral shoulders, left foot, left ankle and lower back.  Patient has tried Celebrex with some pain relief.  Patient had been taking Celebrex Lyrica and baclofen for her pain control.  She was also started on Norco on 12/16/2024 by Dr. Sevilla.  No follow-up since then.  Celebrex was discontinued.  She was supposed to have third hip surgery as per his last visit per note.    Left hip pain is 0/10 on VAS.    Chronic lower back, hip, generalized joint-bilateral shoulders, left ankle, left foot pain is 4/10 on VAS, at maximum 5/10.  Pain is stabbing in nature.  Worse with movement, physical activities.  Slightly improved with rest and anti-inflammatories.  Pain makes it difficult for her to perform her daily activities of living.    PHQ-9-     SOAPP-   [place] back disability scale -     Previous Injections:     PMH:   DM-2, hypertension, stress incontinence, multiple left hip surgeries x 3- most recent- 3/26/25, multiple knee surgeries with bilateral TKR breast cancer history, COPD, morbid obesity, diabetic peripheral neuropathy    Current Medications:   Norco  mg BID PRN  Baclofen 10 mg  Lyrica 150 mg  Requip  Trazodone      Past  Medications:    Past Modalities:  TENS:       no          Physical Therapy Within The Last 6 Months     yes  Psychotherapy     no  Massage Therapy      no    Patient Complains Of:  Uro-Fecal Incontinence no  Weight Gain/Loss  no  Fever/Chills   no  Weakness   no      PEG Assessment   What number best describes your pain on average in the past week?2  What number best describes how, during the past week, pain has interfered with your enjoyment of life?2  What number best describes how, during the past week, pain has interfered with your general activity?  3    PHQ-9 Depression Screening  Little interest or pleasure in doing things? Not at all   Feeling down, depressed, or hopeless? Not at all   PHQ-2 Total Score 0   Trouble falling or staying asleep, or sleeping too much?     Feeling tired or having little energy?     Poor appetite or overeating?     Feeling bad about yourself - or that you are a failure or have let yourself or your family down?     Trouble concentrating on things, such as reading the newspaper or watching television?     Moving or speaking so slowly that other people could have noticed? Or the opposite - being so fidgety or restless that you have been moving around a lot more than usual?     Thoughts that you would be better off dead, or of hurting yourself in some way?     PHQ-9 Total Score     If you checked off any problems, how difficult have these problems made it for you to do your work, take care of things at home, or get along with other people? Not difficult at all        Patient screened positive for depression based on a PHQ-9 score of 13 on 3/10/2025. Follow-up recommendations include: Suicide Risk Assessment performed.        Current Outpatient Medications:     albuterol sulfate  (90 Base) MCG/ACT inhaler, Inhale 2 puffs Every 4 (Four) Hours As Needed for Wheezing., Disp: 18 g, Rfl: 1    baclofen (LIORESAL) 10 MG tablet, Take 1 tablet by mouth Every Night., Disp: 90 tablet, Rfl:  1    betamethasone dipropionate (DIPROSONE) 0.05 % cream, APPLY 1 APPLICATION TO THE APPROPRIATE AREA TOPICALLY AS DIRECTED TWICE A DAY, Disp: 45 g, Rfl: 14    Blood Glucose Monitoring Suppl (Blood Glucose Monitor System) w/Device kit, Inject 1 each under the skin into the appropriate area as directed Daily., Disp: 1 each, Rfl: 0    Budeson-Glycopyrrol-Formoterol (Breztri Aerosphere) 160-9-4.8 MCG/ACT aerosol inhaler, Inhale 2 puffs 2 (Two) Times a Day., Disp: , Rfl:     celecoxib (CeleBREX) 200 MG capsule, Take 1 capsule by mouth Daily. (Patient taking differently: Take 1 capsule by mouth As Needed.), Disp: 90 capsule, Rfl: 1    Cetirizine HCl 10 MG capsule, Take 1 dose by mouth As Needed (ALLERGIES)., Disp: , Rfl:     cholecalciferol (VITAMIN D3) 25 MCG (1000 UT) tablet, Take 1 tablet by mouth Daily., Disp: , Rfl:     colesevelam (WELCHOL) 625 MG tablet, Take 1 tablet by mouth 2 (Two) Times a Day With Meals., Disp: 180 tablet, Rfl: 3    Continuous Glucose  (Dexcom G6 ) device, Use 1 each Continuous., Disp: 1 each, Rfl: 0    Continuous Glucose Sensor (Dexcom G6 Sensor), Use Every 10 (Ten) Days., Disp: 3 each, Rfl: 3    Continuous Glucose Transmitter (Dexcom G6 Transmitter) misc, Apply 1 each topically Every 3 (Three) Months., Disp: , Rfl:     Cranberry 4200 MG capsule, Take 1 Capful by mouth Daily., Disp: , Rfl:     ezetimibe (ZETIA) 10 MG tablet, TAKE 1 TABLET DAILY, Disp: 90 tablet, Rfl: 3    furosemide (Lasix) 20 MG tablet, Take 0.5 tablets by mouth Daily., Disp: 30 tablet, Rfl: 1    glucose blood (FREESTYLE LITE) test strip, 1 each by Other route Daily. Use as instructed, Disp: 100 each, Rfl: 6    Januvia 100 MG tablet, TAKE 1 TABLET DAILY, Disp: 90 tablet, Rfl: 3    Lancets (FREESTYLE) lancets, , Disp: , Rfl:     metFORMIN (GLUCOPHAGE) 1000 MG tablet, TAKE 1 TABLET TWICE A DAY WITH MEALS, Disp: 180 tablet, Rfl: 3    Olmesartan-amLODIPine-HCTZ (Tribenzor) 40-10-12.5 MG tablet, Take 1 tablet by  mouth Every Evening., Disp: 90 tablet, Rfl: 1    ondansetron ODT (ZOFRAN-ODT) 4 MG disintegrating tablet, Place 1 tablet on the tongue Every 8 (Eight) Hours As Needed for Nausea or Vomiting., Disp: 12 tablet, Rfl: 0    pravastatin (PRAVACHOL) 80 MG tablet, TAKE 1 TABLET DAILY, Disp: 90 tablet, Rfl: 3    pregabalin (LYRICA) 150 MG capsule, Take 1 capsule by mouth 2 (Two) Times a Day for 180 days., Disp: 180 capsule, Rfl: 1    rOPINIRole (REQUIP) 0.25 MG tablet, Take 1 tablet by mouth 2 (Two) Times a Day., Disp: 180 tablet, Rfl: 1    tamoxifen (NOLVADEX) 10 MG tablet, , Disp: , Rfl:     traZODone (DESYREL) 50 MG tablet, Take 0.5 tablets by mouth Every Night. Take 1/2 tablet nightly, Disp: 30 tablet, Rfl: 0    VITAMIN E 400 UNIT capsule, Take 1 capsule by mouth., Disp: , Rfl:     HYDROcodone-acetaminophen (NORCO)  MG per tablet, Take 1 tablet by mouth 2 (Two) Times a Day As Needed for Moderate Pain., Disp: 60 tablet, Rfl: 0    The following portions of the patient's history were reviewed and updated as appropriate: allergies, current medications, past family history, past medical history, past social history, past surgical history, and problem list.      REVIEW OF PERTINENT MEDICAL DATA    Past Medical History:   Diagnosis Date    Allergic     Asthma 06/03/2019    At risk for falls     Body mass index (BMI) of 30.0-30.9 in adult 04/11/2019    Breast cancer     NO CHEMO, NO RADIATION; LEFT    Chronic obstructive pulmonary disease 06/03/2019    Diabetic peripheral neuropathy 02/06/2019    Gastroesophageal reflux disease 06/03/2019    History of COVID-19     2019, 2021,2022    Hyperlipidemia     Hypertension     Low back pain     Obesity     Osteoarthritis     PONV (postoperative nausea and vomiting)     SEVERE    Slow to wake up after anesthesia     Under care of pain management specialist      Past Surgical History:   Procedure Laterality Date    ADENOIDECTOMY      CHOLECYSTECTOMY      COLONOSCOPY      ENDOSCOPY       EYE SURGERY      CATARACTS    INNER EAR SURGERY      STAPENDECTOMY    JOINT REPLACEMENT      KNEE SURGERY Bilateral     MULTIPLE    MASTECTOMY Left     REPLACEMENT TOTAL KNEE Left     REPLACEMENT TOTAL KNEE Right     TONSILLECTOMY      TOTAL HIP ARTHROPLASTY Left 2023    Procedure: TOTAL HIP ARTHROPLASTY ANTERIOR WITH HANA TABLE;  Surgeon: Francis Vogel MD;  Location: [facility];  Service: Orthopedics;  Laterality: Left;    TOTAL HIP ARTHROPLASTY REVISION Left 2023    Procedure: TOTAL HIP ARTHROPLASTY ANTERIOR REVISION WITH HANA TABLE;  Surgeon: Francis Vogel MD;  Location: [facility];  Service: Orthopedics;  Laterality: Left;     Family History   Adopted: Yes   Problem Relation Age of Onset    Malig Hyperthermia Neg Hx      Social History     Socioeconomic History    Marital status:    Tobacco Use    Smoking status: Former     Current packs/day: 0.00     Average packs/day: 0.2 packs/day for 0.5 years (0.1 ttl pk-yrs)     Types: Cigarettes     Start date: 1984     Quit date:      Years since quittin.3     Passive exposure: Past    Smokeless tobacco: Never   Vaping Use    Vaping status: Never Used   Substance and Sexual Activity    Alcohol use: Yes     Comment: Occassional    Drug use: No    Sexual activity: Yes     Partners: Male     Birth control/protection: None         Review of Systems   Musculoskeletal:  Positive for arthralgias and back pain.         Vitals:    25 1354   BP: 161/89   Pulse: 93   Resp: 16   SpO2: 97%   Weight: 93 kg (205 lb)   PainSc: 0-No pain         Objective   Physical Exam  Musculoskeletal:         General: Tenderness present.        Legs:            Imaging Reviewed:  DEXA--normal bone density    2022-right shoulder x-ray  - Mild degenerative changes of right shoulder    2022-left foot x-ray  - Lucency at the navicular again noted however is less conspicuous than on the prior study and this may be partially related to  superimposition of structures versus nondisplaced fracture.    Left ankle x-ray-2022  - Degenerative changes of calcaneal spur    Assessment:    1. Primary osteoarthritis involving multiple joints    2. Chronic pain syndrome    3. Left hip pain    4. High risk medication use         Plan:   1. New patient visit, urine drug screen per office policy. UDS today to monitor for compliance with medication use. The UDS is medically necessary to monitor for compliance due to the potential side effects and complications of misuse of opioids. UDS done today will review on next visit.   UDS done on 4/23/2025.  Narcotic contract signed.  2. We discussed trying a course of formal physical therapy.  Physical therapy can help strengthen and stretch the muscles around the joints. Continue to be as active as possible. Patient has done PT in past.   3. Reviewed Dr. Ventura's notes, imaging and other physician's notes.   4. Continue Lyrica 150 mg BID. (10/20/25). Side effects discussed with the patient including but not limited to dizziness, blurry vision, weight gain, sleepiness, trouble concentrating, swelling of your hands and feet, dry mouth, feeling high and suicidal thoughts. Patient understands and agrees with the plan.  5. Norco  mg BID PRN-60 tablets sent on 4/23/2025.  Patient takes it sparingly. Discussed with the patient regarding long-term side effects of opioids including but not limited to opioid induced hormonal suppression, hyperalgesia, fatigue, weight gain, possible opioid induced altered immune system, addiction, tolerance, dependence, risk of hearing loss and elevated risk of myocardial infarction. Proper use and potential life threatening side effects of over use discussed with patient. Patient states understanding of their use and risks.  Opioid Education for patient's receiving narcotics for pain: Patient was instructed regarding the risks, benefits, alternative forms of treatment, as well as potential  development of tolerance and/or addiction. Patient was instructed to take the medication strictly as ordered, not to share the medication with others, not to drive while taking opioids, and to take no other sedatives/pain medications or alcohol while taking this prescription. The patient was instructed to wean off of the pain medication as healing occurs and pain resolves.     RTC in 3 months.    Logan Riddle DO  Pain Management   [facility]     Greater than 40 minutes was spent with the patient, reviewing records, reviewing images, performing the exam, discussing diagnosis and further treatment options, etc., and greater than 50% was spent on education      INSPECT REPORT    As part of the patient's treatment plan, I may be prescribing controlled substances. The patient has been made aware of appropriate use of such medications, including potential risk of somnolence, limited ability to drive and/or work safely, and the potential for dependence or overdose. It has also been made clear that these medications are for use by this patient only, without concomitant use of alcohol or other substances unless prescribed.     Patient has completed prescribing agreement detailing terms of continued prescribing of controlled substances, including monitoring INSPECT reports, urine drug screening, and pill counts if necessary. The patient is aware that inappropriate use will results in cessation of prescribing such medications.    INSPECT report has been reviewed and scanned into the patient's chart.      EMR Dragon/Transcription Disclaimer:   Much of this encounter note is an electronic transcription/translation of spoken language to printed text. The electronic translation of spoken language may permit erroneous, or at times, nonsensical words or phrases to be inadvertently transcribed; Although I have reviewed the note for such errors, some may still exist.

## 2025-04-23 ENCOUNTER — OFFICE VISIT (OUTPATIENT)
Dept: PAIN MEDICINE | Facility: CLINIC | Age: 65
End: 2025-04-23
Payer: MEDICARE

## 2025-04-23 VITALS
RESPIRATION RATE: 16 BRPM | HEART RATE: 93 BPM | BODY MASS INDEX: 33.1 KG/M2 | SYSTOLIC BLOOD PRESSURE: 161 MMHG | DIASTOLIC BLOOD PRESSURE: 89 MMHG | OXYGEN SATURATION: 97 % | WEIGHT: 205 LBS

## 2025-04-23 DIAGNOSIS — G89.4 CHRONIC PAIN SYNDROME: ICD-10-CM

## 2025-04-23 DIAGNOSIS — M15.0 PRIMARY OSTEOARTHRITIS INVOLVING MULTIPLE JOINTS: Primary | ICD-10-CM

## 2025-04-23 DIAGNOSIS — M25.552 LEFT HIP PAIN: ICD-10-CM

## 2025-04-23 DIAGNOSIS — Z79.899 HIGH RISK MEDICATION USE: ICD-10-CM

## 2025-04-23 RX ORDER — PREGABALIN 150 MG/1
150 CAPSULE ORAL 2 TIMES DAILY
Qty: 180 CAPSULE | Refills: 1 | Status: SHIPPED | OUTPATIENT
Start: 2025-04-23 | End: 2025-10-20

## 2025-04-23 RX ORDER — MULTIVIT WITH MINERALS/LUTEIN
400 TABLET ORAL
COMMUNITY
Start: 2025-03-31 | End: 2026-03-31

## 2025-04-23 RX ORDER — HYDROCODONE BITARTRATE AND ACETAMINOPHEN 10; 325 MG/1; MG/1
1 TABLET ORAL 2 TIMES DAILY PRN
Qty: 60 TABLET | Refills: 0 | Status: SHIPPED | OUTPATIENT
Start: 2025-04-23

## 2025-05-28 DIAGNOSIS — G47.9 DIFFICULTY SLEEPING: ICD-10-CM

## 2025-05-28 RX ORDER — TRAZODONE HYDROCHLORIDE 50 MG/1
25 TABLET ORAL
Qty: 30 TABLET | Refills: 0 | Status: SHIPPED | OUTPATIENT
Start: 2025-05-28

## 2025-05-29 NOTE — PATIENT INSTRUCTIONS
Advance Directive       Advance directives are legal documents that let you make choices ahead of time about your health care and medical treatment in case you become unable to communicate for yourself. Advance directives are a way for you to communicate your wishes to family, friends, and health care providers. This can help convey your decisions about end-of-life care if you become unable to communicate.  Discussing and writing advance directives should happen over time rather than all at once. Advance directives can be changed depending on your situation and what you want, even after you have signed the advance directives.  If you do not have an advance directive, some states assign family decision makers to act on your behalf based on how closely you are related to them. Each state has its own laws regarding advance directives. You may want to check with your health care provider, , or state representative about the laws in your state. There are different types of advance directives, such as:  Medical power of .  Living will.  Do not resuscitate (DNR) or do not attempt resuscitation (DNAR) order.  Health care proxy and medical power of   A health care proxy, also called a health care agent, is a person who is appointed to make medical decisions for you in cases in which you are unable to make the decisions yourself. Generally, people choose someone they know well and trust to represent their preferences. Make sure to ask this person for an agreement to act as your proxy. A proxy may have to exercise judgment in the event of a medical decision for which your wishes are not known.  A medical power of  is a legal document that names your health care proxy. Depending on the laws in your state, after the document is written, it may also need to be:  Signed.  Notarized.  Dated.  Copied.  Witnessed.  Incorporated into your medical record.  You may also want to appoint someone to manage  your financial affairs in a situation in which you are unable to do so. This is called a durable power of  for finances. It is a separate legal document from the durable power of  for health care. You may choose the same person or someone different from your health care proxy to act as your agent in financial matters.  If you do not appoint a proxy, or if there is a concern that the proxy is not acting in your best interests, a court-appointed guardian may be designated to act on your behalf.  Living will  A living will is a set of instructions documenting your wishes about medical care when you cannot express them yourself. Health care providers should keep a copy of your living will in your medical record. You may want to give a copy to family members or friends. To alert caregivers in case of an emergency, you can place a card in your wallet to let them know that you have a living will and where they can find it. A living will is used if you become:  Terminally ill.  Incapacitated.  Unable to communicate or make decisions.  Items to consider in your living will include:  The use or non-use of life-sustaining equipment, such as dialysis machines and breathing machines (ventilators).  A DNR or DNAR order, which is the instruction not to use cardiopulmonary resuscitation (CPR) if breathing or heartbeat stops.  The use or non-use of tube feeding.  Withholding of food and fluids.  Comfort (palliative) care when the goal becomes comfort rather than a cure.  Organ and tissue donation.  A living will does not give instructions for distributing your money and property if you should pass away. It is recommended that you seek the advice of a  when writing a will. Decisions about taxes, beneficiaries, and asset distribution will be legally binding. This process can relieve your family and friends of any concerns surrounding disputes or questions that may come up about the distribution of your  assets.  DNR or DNAR  A DNR or DNAR order is a request not to have CPR in the event that your heart stops beating or you stop breathing. If a DNR or DNAR order has not been made and shared, a health care provider will try to help any patient whose heart has stopped or who has stopped breathing. If you plan to have surgery, talk with your health care provider about how your DNR or DNAR order will be followed if problems occur.  Summary  Advance directives are the legal documents that allow you to make choices ahead of time about your health care and medical treatment in case you become unable to communicate for yourself.  The process of discussing and writing advance directives should happen over time. You can change the advance directives, even after you have signed them.  Advance directives include DNR or DNAR orders, living saini, and designating an agent as your medical power of .  This information is not intended to replace advice given to you by your health care provider. Make sure you discuss any questions you have with your health care provider.  Document Released: 03/26/2009 Document Revised: 11/06/2017 Document Reviewed: 11/06/2017  [employer] Interactive Patient Education © 2019 [employer]    You are due for Shingrix vaccination series ( the newest shingles vaccine).  It is a two shot series spaced 2-6 months apart. Please get this vaccine series started at your earliest convenience at your local pharmacy to help avoid shingles outbreak. It is more effective than the old Zostavax vaccine and is recommended even if you have had the Zostavax vaccine in the past.  Once the Shingrix series is completed, it does not need to be repeated.   For more information, please look at the website below:  https://www.cdc.gov/vaccines/vpd/shingles/public/shingrix/index.html

## 2025-05-30 ENCOUNTER — OFFICE VISIT (OUTPATIENT)
Dept: FAMILY MEDICINE CLINIC | Facility: CLINIC | Age: 65
End: 2025-05-30
Payer: MEDICARE

## 2025-05-30 VITALS
WEIGHT: 200.8 LBS | BODY MASS INDEX: 32.27 KG/M2 | HEIGHT: 66 IN | SYSTOLIC BLOOD PRESSURE: 134 MMHG | TEMPERATURE: 97.7 F | HEART RATE: 97 BPM | OXYGEN SATURATION: 95 % | DIASTOLIC BLOOD PRESSURE: 68 MMHG

## 2025-05-30 DIAGNOSIS — E11.65 TYPE 2 DIABETES MELLITUS WITH HYPERGLYCEMIA, WITHOUT LONG-TERM CURRENT USE OF INSULIN: ICD-10-CM

## 2025-05-30 DIAGNOSIS — E78.5 HYPERLIPIDEMIA, UNSPECIFIED HYPERLIPIDEMIA TYPE: ICD-10-CM

## 2025-05-30 DIAGNOSIS — G89.4 CHRONIC PAIN SYNDROME: ICD-10-CM

## 2025-05-30 DIAGNOSIS — T84.021D FAILURE OF LEFT TOTAL HIP ARTHROPLASTY WITH DISLOCATION OF HIP, SUBSEQUENT ENCOUNTER: ICD-10-CM

## 2025-05-30 DIAGNOSIS — I10 ESSENTIAL HYPERTENSION: ICD-10-CM

## 2025-05-30 DIAGNOSIS — R22.43 LOCALIZED SWELLING OF BOTH LOWER LEGS: ICD-10-CM

## 2025-05-30 DIAGNOSIS — Z00.00 MEDICARE ANNUAL WELLNESS VISIT, SUBSEQUENT: Primary | ICD-10-CM

## 2025-05-30 DIAGNOSIS — J45.40 MODERATE PERSISTENT ASTHMA WITHOUT COMPLICATION: ICD-10-CM

## 2025-05-30 DIAGNOSIS — G47.9 DIFFICULTY SLEEPING: ICD-10-CM

## 2025-05-30 RX ORDER — MONTELUKAST SODIUM 10 MG/1
10 TABLET ORAL NIGHTLY
Qty: 30 TABLET | Refills: 6 | Status: SHIPPED | OUTPATIENT
Start: 2025-05-30

## 2025-05-30 RX ORDER — FEXOFENADINE HCL 180 MG/1
180 TABLET ORAL DAILY
Qty: 90 TABLET | Refills: 1 | Status: SHIPPED | OUTPATIENT
Start: 2025-05-30

## 2025-05-30 RX ORDER — DICLOFENAC SODIUM 75 MG/1
75 TABLET, DELAYED RELEASE ORAL 2 TIMES DAILY
Qty: 60 TABLET | Refills: 6 | Status: SHIPPED | OUTPATIENT
Start: 2025-05-30

## 2025-05-30 NOTE — PROGRESS NOTES
Subjective   The ABCs of the Annual Wellness Visit  Medicare Wellness Visit      Pearl Elizalde is a 65 y.o. patient who presents for a Medicare Wellness Visit.    The following portions of the patient's history were reviewed and   updated as appropriate: allergies, current medications, past family history, past medical history, past social history, past surgical history, and problem list.    Compared to one year ago, the patient's physical   health is the same.  Compared to one year ago, the patient's mental   health is better.    Recent Hospitalizations:  This patient has had a [facility] admission record on file within the last 365 days.  Current Medical Providers:  Patient Care Team:  Rosa Hooper APRN as PCP - General (Nurse Practitioner)  Lizette Sigala MD as Consulting Physician (Family Medicine)  CHRIS Roland DPM as Consulting Physician (Podiatry)  Lex Serna MD as Consulting Physician (Endocrinology)  Elvis Park MD as Consulting Physician (General Surgery)  Katrin Zamora DO (Obstetrics and Gynecology)  Gifty Woods PA as Nurse Practitioner (Physician Assistant)  Bertha Rojas MD as Consulting Physician (Hematology and Oncology)  Mandy Thomson APRN as Nurse Practitioner (Nurse Practitioner)  Francis Vogel MD as Consulting Physician (Orthopedic Surgery)  Zack Sevilla MD as Consulting Physician (Pain Medicine)  Anson Roque MD as Surgeon (Plastic Surgery)  Adam Mattson PA-C as Physician Assistant (Orthopedic Surgery)  Brigido Norris MD (Orthopedic Surgery)    Outpatient Medications Prior to Visit   Medication Sig Dispense Refill    albuterol sulfate  (90 Base) MCG/ACT inhaler Inhale 2 puffs Every 4 (Four) Hours As Needed for Wheezing. 18 g 1    baclofen (LIORESAL) 10 MG tablet Take 1 tablet by mouth Every Night. 90 tablet 1    betamethasone dipropionate (DIPROSONE) 0.05 % cream APPLY 1 APPLICATION TO THE APPROPRIATE AREA  TOPICALLY AS DIRECTED TWICE A DAY 45 g 14    Blood Glucose Monitoring Suppl (Blood Glucose Monitor System) w/Device kit Inject 1 each under the skin into the appropriate area as directed Daily. 1 each 0    Budeson-Glycopyrrol-Formoterol (Breztri Aerosphere) 160-9-4.8 MCG/ACT aerosol inhaler Inhale 2 puffs 2 (Two) Times a Day.      Cetirizine HCl 10 MG capsule Take 1 dose by mouth As Needed (ALLERGIES).      cholecalciferol (VITAMIN D3) 25 MCG (1000 UT) tablet Take 1 tablet by mouth Daily.      colesevelam (WELCHOL) 625 MG tablet Take 1 tablet by mouth 2 (Two) Times a Day With Meals. 180 tablet 3    Continuous Glucose  (Dexcom G6 ) device Use 1 each Continuous. 1 each 0    Continuous Glucose Sensor (Dexcom G6 Sensor) Use Every 10 (Ten) Days. 3 each 3    Continuous Glucose Transmitter (Dexcom G6 Transmitter) misc Apply 1 each topically Every 3 (Three) Months.      Cranberry 4200 MG capsule Take 1 Capful by mouth Daily.      ezetimibe (ZETIA) 10 MG tablet TAKE 1 TABLET DAILY 90 tablet 3    furosemide (Lasix) 20 MG tablet Take 0.5 tablets by mouth Daily. 30 tablet 1    glucose blood (FREESTYLE LITE) test strip 1 each by Other route Daily. Use as instructed 100 each 6    HYDROcodone-acetaminophen (NORCO)  MG per tablet Take 1 tablet by mouth 2 (Two) Times a Day As Needed for Moderate Pain. 60 tablet 0    Januvia 100 MG tablet TAKE 1 TABLET DAILY 90 tablet 3    Lancets (FREESTYLE) lancets       metFORMIN (GLUCOPHAGE) 1000 MG tablet TAKE 1 TABLET TWICE A DAY WITH MEALS 180 tablet 3    Olmesartan-amLODIPine-HCTZ (Tribenzor) 40-10-12.5 MG tablet Take 1 tablet by mouth Every Evening. 90 tablet 1    ondansetron ODT (ZOFRAN-ODT) 4 MG disintegrating tablet Place 1 tablet on the tongue Every 8 (Eight) Hours As Needed for Nausea or Vomiting. 12 tablet 0    pravastatin (PRAVACHOL) 80 MG tablet TAKE 1 TABLET DAILY 90 tablet 3    pregabalin (LYRICA) 150 MG capsule Take 1 capsule by mouth 2 (Two) Times a Day for  180 days. 180 capsule 1    rOPINIRole (REQUIP) 0.25 MG tablet Take 1 tablet by mouth 2 (Two) Times a Day. 180 tablet 1    tamoxifen (NOLVADEX) 10 MG tablet       traZODone (DESYREL) 50 MG tablet TAKE 1/2 TABLET BY MOUTH EVERY NIGHT 30 tablet 0    VITAMIN E 400 UNIT capsule Take 1 capsule by mouth.      celecoxib (CeleBREX) 200 MG capsule Take 1 capsule by mouth Daily. (Patient taking differently: Take 1 capsule by mouth As Needed.) 90 capsule 1     No facility-administered medications prior to visit.     Opioid medication/s are on active medication list.  and I have evaluated her active treatment plan and pain score trends (see table).  Vitals:    05/30/25 0901   PainSc: 0-No pain   PainLoc: Hip     I have reviewed the chart for potential of high risk medication and harmful drug interactions in the elderly.        Aspirin is not on active medication list.  Aspirin use is not indicated based on review of current medical condition/s. Risk of harm outweighs potential benefits.  .    Patient Active Problem List   Diagnosis    Allergic rhinitis    Anxiety    Asthma    Class 1 obesity due to excess calories with serious comorbidity and body mass index (BMI) of 31.0 to 31.9 in adult    Chronic obstructive pulmonary disease    Diabetic peripheral neuropathy    Disorder associated with type 2 diabetes mellitus    Gastroesophageal reflux disease    Hyperlipidemia    Hypertension    Hypomagnesemia    Onychomycosis of toenail    Palpitations    Vitamin D deficiency    Post-menopausal    Obesity    Female stress incontinence    Nocturia    Rectocele    Uterovaginal prolapse, incomplete    Low back pain    Hip pain, bilateral    Uterovaginal prolapse    Atypical lobular hyperplasia (ALH) of right breast    Artificial lens present    Primary osteoarthritis of left hip    Osteoarthritis of left hip, unspecified osteoarthritis type    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  ACP discussion was held with the patient during this visit. Patient does not have an advance directive, information provided.            Objective   Vitals:    25 0901 25 0945   BP: 159/86 134/68   BP Location: Right arm Right arm   Patient Position: Sitting Sitting   Cuff Size: Adult    Pulse: 97    Temp: 97.7 °F (36.5 °C)    TempSrc: Infrared    SpO2: 95%    Weight: 91.1 kg (200 lb 12.8 oz)    Height: 167.6 cm (65.98\")    PainSc: 0-No pain    PainLoc: Hip        Estimated body mass index is 32.43 kg/m² as calculated from the following:    Height as of this encounter: 167.6 cm (65.98\").    Weight as of this encounter: 91.1 kg (200 lb 12.8 oz).                Does the patient have evidence of cognitive impairment? No                                                                                                Health  Risk Assessment    Smoking Status:  Social History     Tobacco Use   Smoking Status Former    Current packs/day: 0.00    Average packs/day: 0.2 packs/day for 0.5 years (0.1 ttl pk-yrs)    Types: Cigarettes    Start date: 1984    Quit date:     Years since quittin.4    Passive exposure: Past   Smokeless Tobacco Never     Alcohol Consumption:  Social History     Substance and Sexual Activity   Alcohol Use Yes    Comment: Occassional       Fall Risk Screen  STEADI Fall Risk Assessment was completed, and patient is at MODERATE risk for falls. Assessment completed on:2025    Depression Screening   Little interest or pleasure in doing things? Not at all   Feeling down, depressed, or hopeless? Not at all   PHQ-2 Total Score 0      Health Habits and Functional and Cognitive Screenin/30/2025     9:07 AM   Functional & " Cognitive Status   Do you have difficulty preparing food and eating? No   Do you have difficulty bathing yourself, getting dressed or grooming yourself? No   Do you have difficulty using the toilet? No   Do you have difficulty moving around from place to place? Yes   Do you have trouble with steps or getting out of a bed or a chair? Yes   Current Diet Unhealthy Diet   Dental Exam Up to date   Eye Exam Up to date   Exercise (times per week) 0 times per week   Current Exercises Include No Regular Exercise   Do you need help using the phone?  No   Are you deaf or do you have serious difficulty hearing?  No   Do you need help to go to places out of walking distance? No   Do you need help shopping? No   Do you need help preparing meals?  No   Do you need help with housework?  No   Do you need help with laundry? No   Do you need help taking your medications? No   Do you need help managing money? No   Do you ever drive or ride in a car without wearing a seat belt? No   Have you felt unusual stress, anger or loneliness in the last month? Yes   Who do you live with? Spouse   If you need help, do you have trouble finding someone available to you? No   Have you been bothered in the last four weeks by sexual problems? No   Do you have difficulty concentrating, remembering or making decisions? No           Age-appropriate Screening Schedule:  Refer to the list below for future screening recommendations based on patient's age, sex and/or medical conditions. Orders for these recommended tests are listed in the plan section. The patient has been provided with a written plan.    Health Maintenance List  Health Maintenance   Topic Date Due    ZOSTER VACCINE (1 of 2) Never done    DIABETIC FOOT EXAM  05/10/2022    COLORECTAL CANCER SCREENING  05/01/2024    DXA SCAN  06/05/2025    HEMOGLOBIN A1C  07/03/2025    COVID-19 Vaccine (2 - 2024-25 season) 05/28/2026 (Originally 9/1/2024)    INFLUENZA VACCINE  07/01/2025    LIPID PANEL   08/07/2025    DIABETIC EYE EXAM  08/13/2025    URINE MICROALBUMIN-CREATININE RATIO (uACR)  09/25/2025    ANNUAL WELLNESS VISIT  05/30/2026    MAMMOGRAM  04/18/2027    TDAP/TD VACCINES (2 - Td or Tdap) 08/30/2029    HEPATITIS C SCREENING  Completed    Pneumococcal Vaccine 50+  Completed                                                                                                                                                CMS Preventative Services Quick Reference  Risk Factors Identified During Encounter  Fall Risk-High or Moderate: Discussed Fall Prevention in the home  Immunizations Discussed/Encouraged: Alfgrix    The above risks/problems have been discussed with the patient.  Pertinent information has been shared with the patient in the After Visit Summary.  An After Visit Summary and PPPS were made available to the patient.    Follow Up:   Next Medicare Wellness visit to be scheduled in 1 year.         Additional E&M Note during same encounter follows:  Patient has additional, significant, and separately identifiable condition(s)/problem(s) that require work above and beyond the Medicare Wellness Visit     Chief Complaint  Medicare Wellness-subsequent, Shortness of Breath (Asthma hasn't been this bad in 20 years), Med Refill (tribenzor), Leg Swelling (Since January. Lasix is not working), and Back Pain    Subjective    Shortness of Breath  Leg Swelling  Back Pain    Pearl is also being seen today for additional medical problem/s.       The patient presents for a Medicare wellness visit as well as follow up DM, HTN, BLE swelling and uncontrolled asthma.    She reports persistent bilateral leg swelling, which she attributes to a period of immobility following femur surgery in January 2025. Despite the use of diuretics, there has been no significant improvement. She was unable to walk for approximately 8 weeks, relying on a chair for mobility and assistance for bathroom visits. This is the first instance  "of such symptoms in her 65 years. The swelling does not completely subside overnight. Prior to this, she was able to walk 10,000 steps daily, but her current mobility is limited to around 4,000 steps. She has undergone an ultrasound and typically wears compression stockings, although she did not wear them today. She has not sought cardiology consultation.    Her asthma symptoms have worsened, with increased difficulty in breathing. She uses a fan for relief and continues to use Breztri twice daily. She also takes cetirizine nightly for allergies, having discontinued allergy injections due to cost.    She has been prescribed sleep medication but has not yet started taking it. She has been sleeping in an upright position since 01/17/2025 due to leg discomfort.    She experiences back pain, which is managed by a physical therapist. She uses a back brace for support and has a TENS unit at home, which she uses when her  is present. She also finds relief in a hot tub. She has seen Dr. Riddle from pain management once but did not like him as much as she did Dr. Norris. She has tried Celebrex without success and currently uses Tylenol for pain management. She is open to trying other anti-inflammatories.    She has an upcoming appointment with her oncologist in 06/2025. She is interested in receiving the shingles vaccine but wishes to postpone it until her health improves.    PAST SURGICAL HISTORY:  Femur surgery in January 2025  Hip revision  Review of Systems   Respiratory:  Positive for shortness of breath.    Musculoskeletal:  Positive for back pain.   Psychiatric/Behavioral:  Positive for sleep disturbance.           Objective   Vital Signs:  /68 (BP Location: Right arm, Patient Position: Sitting)   Pulse 97   Temp 97.7 °F (36.5 °C) (Infrared)   Ht 167.6 cm (65.98\")   Wt 91.1 kg (200 lb 12.8 oz)   SpO2 95%   BMI 32.43 kg/m²   Physical Exam  Vitals and nursing note reviewed.   Constitutional:       " Appearance: Normal appearance. She is well-developed. She is obese.   HENT:      Head: Normocephalic and atraumatic.      Right Ear: External ear normal.      Left Ear: External ear normal.      Nose: Nose normal.   Eyes:      Extraocular Movements: Extraocular movements intact.      Pupils: Pupils are equal, round, and reactive to light.   Cardiovascular:      Rate and Rhythm: Normal rate and regular rhythm.      Heart sounds: Normal heart sounds.   Pulmonary:      Effort: Pulmonary effort is normal.      Breath sounds: Normal breath sounds.   Abdominal:      General: Bowel sounds are normal.      Palpations: Abdomen is soft.   Genitourinary:     Vagina: Normal.   Musculoskeletal:         General: Normal range of motion.      Cervical back: Normal range of motion and neck supple.      Right lower leg: Edema (nonpitting to midcalf) present.      Left lower leg: Edema (nonpitting to midcalf) present.   Skin:     General: Skin is warm and dry.   Neurological:      General: No focal deficit present.      Mental Status: She is alert and oriented to person, place, and time.      Gait: Gait abnormal.   Psychiatric:         Mood and Affect: Mood normal.         Behavior: Behavior normal.         Judgment: Judgment normal.           Ears: Fluid present            Results             Assessment and Plan      Medicare annual wellness visit, subsequent         Type 2 diabetes mellitus with hyperglycemia, without long-term current use of insulin      Orders:    CBC Auto Differential    Comprehensive Metabolic Panel    Hemoglobin A1c    Lipid Panel    Microalbumin / Creatinine Urine Ratio - Urine, Clean Catch    TSH    Doppler Ankle Brachial Index Single Level CAR; Future    Moderate persistent asthma without complication            Orders:    montelukast (Singulair) 10 MG tablet; Take 1 tablet by mouth Every Night.    fexofenadine (ALLEGRA) 180 MG tablet; Take 1 tablet by mouth Daily.    Difficulty sleeping         Essential  hypertension      Orders:    CBC Auto Differential    Comprehensive Metabolic Panel    Hyperlipidemia, unspecified hyperlipidemia type       Orders:    Lipid Panel    Chronic pain syndrome    Orders:    diclofenac (VOLTAREN) 75 MG EC tablet; Take 1 tablet by mouth 2 (Two) Times a Day.    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 Bilateral lower extremity edema.  - The patient's EKG results are within normal limits. The edema could be attributed to her recent surgery and subsequent period of immobility. It is also plausible that the edema is a manifestation of peripheral vascular disease.  - She is advised to persist with the use of compression stockings and continue with her exercises, including foot circles, for an additional few weeks.  - If there is no significant improvement in the edema, an Ankle-Brachial Index (MANASA) test will be considered.    2. Asthma.  - She will be initiated on Singulair (montelukast) to be taken at bedtime.  - A prescription for Allegra will also be provided to ascertain if it offers better symptom control than Zyrtec.  - She is advised to continue using Breztri twice a day.    3. Insomnia.  - A prescription for trazodone will be reissued to aid in sleep.    4. Back pain.  - She will be started on diclofenac, to be taken twice daily, as a more potent anti-inflammatory agent compared to Celebrex.    5. Health maintenance.  - She is due for a DEXA scan, but it will be deferred for the time being.  - Information regarding advanced directives and living saini will be provided.  - Lab orders have been placed today.    Follow-up  - The patient is scheduled for a follow-up visit in 3 months.         Follow Up   Return in about 3 months (around 8/30/2025).  Patient was given instructions and counseling regarding her condition or  for health maintenance advice. Please see specific information pulled into the AVS if appropriate.  Patient or patient representative verbalized consent for the use of Ambient Listening during the visit with  MIKAEL Steele for chart documentation. 5/30/2025  09:38 EDT

## 2025-05-30 NOTE — ASSESSMENT & PLAN NOTE
Orders:    montelukast (Singulair) 10 MG tablet; Take 1 tablet by mouth Every Night.    fexofenadine (ALLEGRA) 180 MG tablet; Take 1 tablet by mouth Daily.

## 2025-06-11 ENCOUNTER — TELEPHONE (OUTPATIENT)
Dept: FAMILY MEDICINE CLINIC | Facility: CLINIC | Age: 65
End: 2025-06-11
Payer: MEDICARE

## 2025-06-11 NOTE — TELEPHONE ENCOUNTER
Call placed to patient letting them know the paperwork from [place] [facility] was completed. She stated she would come today to pick this paperwork up. Put up front in the patient  file.

## 2025-06-23 ENCOUNTER — LAB (OUTPATIENT)
Dept: FAMILY MEDICINE CLINIC | Facility: CLINIC | Age: 65
End: 2025-06-23
Payer: MEDICARE

## 2025-06-23 ENCOUNTER — TELEPHONE (OUTPATIENT)
Dept: FAMILY MEDICINE CLINIC | Facility: CLINIC | Age: 65
End: 2025-06-23
Payer: MEDICARE

## 2025-06-23 DIAGNOSIS — E11.9 TYPE 2 DIABETES MELLITUS WITHOUT COMPLICATION, WITHOUT LONG-TERM CURRENT USE OF INSULIN: ICD-10-CM

## 2025-06-23 DIAGNOSIS — R35.0 FREQUENCY OF URINATION: Primary | ICD-10-CM

## 2025-06-23 LAB
ALBUMIN SERPL-MCNC: 3.9 G/DL (ref 3.5–5.2)
ALBUMIN/GLOB SERPL: 1.3 G/DL
ALP SERPL-CCNC: 119 U/L (ref 39–117)
ALT SERPL W P-5'-P-CCNC: 16 U/L (ref 1–33)
ANION GAP SERPL CALCULATED.3IONS-SCNC: 15.4 MMOL/L (ref 5–15)
AST SERPL-CCNC: 22 U/L (ref 1–32)
BASOPHILS # BLD AUTO: 0.07 10*3/MM3 (ref 0–0.2)
BASOPHILS NFR BLD AUTO: 1.2 % (ref 0–1.5)
BILIRUB BLD-MCNC: NEGATIVE MG/DL
BILIRUB SERPL-MCNC: 0.5 MG/DL (ref 0–1.2)
BUN SERPL-MCNC: 14 MG/DL (ref 8–23)
BUN/CREAT SERPL: 16.7 (ref 7–25)
CALCIUM SPEC-SCNC: 9.8 MG/DL (ref 8.6–10.5)
CHLORIDE SERPL-SCNC: 102 MMOL/L (ref 98–107)
CHOLEST SERPL-MCNC: 151 MG/DL (ref 0–200)
CLARITY, POC: CLEAR
CO2 SERPL-SCNC: 22.6 MMOL/L (ref 22–29)
COLOR UR: YELLOW
CREAT SERPL-MCNC: 0.84 MG/DL (ref 0.57–1)
DEPRECATED RDW RBC AUTO: 47.3 FL (ref 37–54)
EGFRCR SERPLBLD CKD-EPI 2021: 76.3 ML/MIN/1.73
EOSINOPHIL # BLD AUTO: 0.22 10*3/MM3 (ref 0–0.4)
EOSINOPHIL NFR BLD AUTO: 3.7 % (ref 0.3–6.2)
ERYTHROCYTE [DISTWIDTH] IN BLOOD BY AUTOMATED COUNT: 15.7 % (ref 12.3–15.4)
EXPIRATION DATE: ABNORMAL
GLOBULIN UR ELPH-MCNC: 3 GM/DL
GLUCOSE SERPL-MCNC: 169 MG/DL (ref 65–99)
GLUCOSE UR STRIP-MCNC: NEGATIVE MG/DL
HBA1C MFR BLD: 7.5 % (ref 4.8–5.6)
HCT VFR BLD AUTO: 38.4 % (ref 34–46.6)
HDLC SERPL-MCNC: 34 MG/DL (ref 40–60)
HGB BLD-MCNC: 12 G/DL (ref 12–15.9)
IMM GRANULOCYTES # BLD AUTO: 0.05 10*3/MM3 (ref 0–0.05)
IMM GRANULOCYTES NFR BLD AUTO: 0.8 % (ref 0–0.5)
KETONES UR QL: NEGATIVE
LDLC SERPL CALC-MCNC: 53 MG/DL (ref 0–100)
LDLC/HDLC SERPL: 0.92 {RATIO}
LEUKOCYTE EST, POC: NEGATIVE
LYMPHOCYTES # BLD AUTO: 1.81 10*3/MM3 (ref 0.7–3.1)
LYMPHOCYTES NFR BLD AUTO: 30.2 % (ref 19.6–45.3)
Lab: ABNORMAL
MCH RBC QN AUTO: 26.1 PG (ref 26.6–33)
MCHC RBC AUTO-ENTMCNC: 31.3 G/DL (ref 31.5–35.7)
MCV RBC AUTO: 83.7 FL (ref 79–97)
MONOCYTES # BLD AUTO: 0.53 10*3/MM3 (ref 0.1–0.9)
MONOCYTES NFR BLD AUTO: 8.8 % (ref 5–12)
NEUTROPHILS NFR BLD AUTO: 3.31 10*3/MM3 (ref 1.7–7)
NEUTROPHILS NFR BLD AUTO: 55.3 % (ref 42.7–76)
NITRITE UR-MCNC: POSITIVE MG/ML
NRBC BLD AUTO-RTO: 0 /100 WBC (ref 0–0.2)
PH UR: 5.5 [PH] (ref 5–8)
PLATELET # BLD AUTO: 241 10*3/MM3 (ref 140–450)
PMV BLD AUTO: 10.8 FL (ref 6–12)
POTASSIUM SERPL-SCNC: 4.2 MMOL/L (ref 3.5–5.2)
PROT SERPL-MCNC: 6.9 G/DL (ref 6–8.5)
PROT UR STRIP-MCNC: NEGATIVE MG/DL
RBC # BLD AUTO: 4.59 10*6/MM3 (ref 3.77–5.28)
RBC # UR STRIP: NEGATIVE /UL
SODIUM SERPL-SCNC: 140 MMOL/L (ref 136–145)
SP GR UR: 1.03 (ref 1–1.03)
TRIGL SERPL-MCNC: 428 MG/DL (ref 0–150)
TSH SERPL DL<=0.05 MIU/L-ACNC: 1.79 UIU/ML (ref 0.27–4.2)
UROBILINOGEN UR QL: ABNORMAL
VLDLC SERPL-MCNC: 64 MG/DL (ref 5–40)
WBC NRBC COR # BLD AUTO: 5.99 10*3/MM3 (ref 3.4–10.8)

## 2025-06-23 PROCEDURE — 83036 HEMOGLOBIN GLYCOSYLATED A1C: CPT | Performed by: NURSE PRACTITIONER

## 2025-06-23 PROCEDURE — 87086 URINE CULTURE/COLONY COUNT: CPT | Performed by: NURSE PRACTITIONER

## 2025-06-23 PROCEDURE — 84443 ASSAY THYROID STIM HORMONE: CPT | Performed by: NURSE PRACTITIONER

## 2025-06-23 PROCEDURE — 36415 COLL VENOUS BLD VENIPUNCTURE: CPT

## 2025-06-23 PROCEDURE — 82570 ASSAY OF URINE CREATININE: CPT | Performed by: NURSE PRACTITIONER

## 2025-06-23 PROCEDURE — 85025 COMPLETE CBC W/AUTO DIFF WBC: CPT | Performed by: NURSE PRACTITIONER

## 2025-06-23 PROCEDURE — 87186 SC STD MICRODIL/AGAR DIL: CPT | Performed by: NURSE PRACTITIONER

## 2025-06-23 PROCEDURE — 80061 LIPID PANEL: CPT | Performed by: NURSE PRACTITIONER

## 2025-06-23 PROCEDURE — 82043 UR ALBUMIN QUANTITATIVE: CPT | Performed by: NURSE PRACTITIONER

## 2025-06-23 PROCEDURE — 80053 COMPREHEN METABOLIC PANEL: CPT | Performed by: NURSE PRACTITIONER

## 2025-06-23 PROCEDURE — 87088 URINE BACTERIA CULTURE: CPT | Performed by: NURSE PRACTITIONER

## 2025-06-23 NOTE — TELEPHONE ENCOUNTER
PATIENT WAS WONDERING IF IT WOULD BE OKAY FOR HER TO TAKE TWO OF HER CELEBREX WHILE SHE'S ON VACATION. SHE TAKEN 400MG BEFORE BUT CURRENTLY ONLY HAS 200MG. THANK YOU

## 2025-06-23 NOTE — TELEPHONE ENCOUNTER
Per  verbal release authorization, detailed VM left with Providers instructions. Instructed patient to call with any questions or concerns.    Gala Elizondo MA  06/23/25

## 2025-06-24 LAB
ALBUMIN UR-MCNC: 3.9 MG/DL
CREAT UR-MCNC: 62.9 MG/DL
MICROALBUMIN/CREAT UR: 62 MG/G (ref 0–29)

## 2025-06-26 ENCOUNTER — TELEPHONE (OUTPATIENT)
Dept: FAMILY MEDICINE CLINIC | Facility: CLINIC | Age: 65
End: 2025-06-26
Payer: MEDICARE

## 2025-06-26 LAB — BACTERIA SPEC AEROBE CULT: ABNORMAL

## 2025-07-01 ENCOUNTER — TELEPHONE (OUTPATIENT)
Dept: FAMILY MEDICINE CLINIC | Facility: CLINIC | Age: 65
End: 2025-07-01
Payer: MEDICARE

## 2025-07-01 DIAGNOSIS — J44.9 CHRONIC OBSTRUCTIVE PULMONARY DISEASE, UNSPECIFIED COPD TYPE: Primary | ICD-10-CM

## 2025-07-01 RX ORDER — BUDESONIDE, GLYCOPYRROLATE, AND FORMOTEROL FUMARATE 160; 9; 4.8 UG/1; UG/1; UG/1
2 AEROSOL, METERED RESPIRATORY (INHALATION) 2 TIMES DAILY
Qty: 3 EACH | Refills: 3 | Status: SHIPPED | OUTPATIENT
Start: 2025-07-01

## 2025-07-01 RX ORDER — CELECOXIB 200 MG/1
200 CAPSULE ORAL DAILY
Qty: 90 CAPSULE | Refills: 1 | Status: SHIPPED | OUTPATIENT
Start: 2025-07-01

## 2025-07-01 RX ORDER — CELECOXIB 200 MG/1
200 CAPSULE ORAL AS NEEDED
Qty: 90 CAPSULE | Refills: 1 | Status: SHIPPED | OUTPATIENT
Start: 2025-07-01 | End: 2025-07-01 | Stop reason: SDUPTHER

## 2025-07-01 NOTE — TELEPHONE ENCOUNTER
Per  verbal release authorization, detailed VM left with new prescription information. Instructed patient to call with any questions or concerns.    Gala Elizondo MA  07/01/25

## 2025-07-01 NOTE — TELEPHONE ENCOUNTER
Caller: Pearl Elizalde    Relationship: Self    Best call back number: [phone]    What medication are you requesting:   Budeson-Glycopyrrol-Formoterol (Breztri Aerosphere) 160-9-4.8 MCG/ACT aerosol inhaler     Have you had these symptoms before:    [x] Yes  [] No    Have you been treated for these symptoms before:   [x] Yes  [] No    If a prescription is needed, what is your preferred pharmacy and phone number: [facility] [address] [phone] [facility] [phone]      Additional notes:  PLEASE CALL TO CONFIRM/DENY.

## 2025-07-01 NOTE — TELEPHONE ENCOUNTER
Caller: Pearl Elizalde    Relationship: Self    Best call back number: [phone]    What medication are you requesting:   CELEBRIX 200 MG CAPSULE    Have you had these symptoms before:    [x] Yes  [] No    Have you been treated for these symptoms before:   [x] Yes  [] No    If a prescription is needed, what is your preferred pharmacy and phone number: [id number] [place] [place] [facility] [address] AT [facility] [address] [facility] - [phone] [facility] [phone] FX     Additional notes:  PLEASE CALL TO CONFIRM/DENY.

## 2025-07-22 NOTE — PROGRESS NOTES
Subjective   Pearl Elizalde is a 65 y.o. female is here for follow up for generalized joint pain.  Patient was last seen on 4/23/2025.She was told that left hip still healing. She has been taking celebrex. She has stopped taking Norco.    On last visit:     Left hip pain is 2/10 on VAS.    Chronic lower back, hip, generalized joint-bilateral shoulders, left ankle, left foot pain is 4/10 on VAS, at maximum 5/10.  Pain is stabbing in nature.  Worse with movement, physical activities.  Slightly improved with rest and anti-inflammatories.  Pain makes it difficult for her to perform her daily activities of living.    PHQ-9-     SOAPP-   [place] back disability scale -     Previous Injections:     Hx-previous patient of Dr. Sevilla seen for chronic pain syndrome and osteoarthritis.  Initially she was referred for left foot pain but on visit she complained of pain throughout her body at multiple sites including bilateral shoulders, left foot, left ankle and lower back.  Patient has tried Celebrex with some pain relief.  Patient had been taking Celebrex Lyrica and baclofen for her pain control.  She was also started on Norco on 12/16/2024 by Dr. Sevilla.  No follow-up since then.  Celebrex was discontinued.  She was supposed to have third hip surgery as per his last visit per note.Last seen by Dr. Sevilla on 12/16/2024.  S/p left hip replacement with Dr. Norris at [facility]-1/2025 ED visit on 3/26/2025 for concern regarding edema and DVT but Doppler was negative.    PMH:   DM-2, hypertension, stress incontinence, multiple left hip surgeries x 3- most recent- 3/26/25, multiple knee surgeries with bilateral TKR breast cancer history, COPD, morbid obesity, diabetic peripheral neuropathy    Current Medications:   Lyrica 150 mg BID  Requip  Trazodone      Past Medications:  Norco  mg BID PRN  Baclofen 10 mg  Flexeril    Past Modalities:  TENS:       no          Physical Therapy Within The Last 6 Months      yes  Psychotherapy     no  Massage Therapy      no    Patient Complains Of:  Uro-Fecal Incontinence no  Weight Gain/Loss  no  Fever/Chills   no  Weakness   no      PEG Assessment   What number best describes your pain on average in the past week?2  What number best describes how, during the past week, pain has interfered with your enjoyment of life?2  What number best describes how, during the past week, pain has interfered with your general activity?  3    PHQ-9 Depression Screening  Little interest or pleasure in doing things? Not at all   Feeling down, depressed, or hopeless? Not at all   PHQ-2 Total Score 0   Trouble falling or staying asleep, or sleeping too much?     Feeling tired or having little energy?     Poor appetite or overeating?     Feeling bad about yourself - or that you are a failure or have let yourself or your family down?     Trouble concentrating on things, such as reading the newspaper or watching television?     Moving or speaking so slowly that other people could have noticed? Or the opposite - being so fidgety or restless that you have been moving around a lot more than usual?     Thoughts that you would be better off dead, or of hurting yourself in some way?     PHQ-9 Total Score     If you checked off any problems, how difficult have these problems made it for you to do your work, take care of things at home, or get along with other people? Somewhat difficult        Patient screened positive for depression based on a PHQ-9 score of 13 on 3/10/2025. Follow-up recommendations include: Suicide Risk Assessment performed.        Current Outpatient Medications:     albuterol sulfate  (90 Base) MCG/ACT inhaler, Inhale 2 puffs Every 4 (Four) Hours As Needed for Wheezing., Disp: 18 g, Rfl: 1    baclofen (LIORESAL) 10 MG tablet, Take 1 tablet by mouth Every Night., Disp: 90 tablet, Rfl: 1    betamethasone dipropionate (DIPROSONE) 0.05 % cream, APPLY 1 APPLICATION TO THE APPROPRIATE AREA  TOPICALLY AS DIRECTED TWICE A DAY, Disp: 45 g, Rfl: 14    Blood Glucose Monitoring Suppl (Blood Glucose Monitor System) w/Device kit, Inject 1 each under the skin into the appropriate area as directed Daily., Disp: 1 each, Rfl: 0    Budeson-Glycopyrrol-Formoterol (Breztri Aerosphere) 160-9-4.8 MCG/ACT aerosol inhaler, Inhale 2 puffs 2 (Two) Times a Day., Disp: 3 each, Rfl: 3    celecoxib (CeleBREX) 200 MG capsule, Take 1 capsule by mouth Daily., Disp: 90 capsule, Rfl: 1    Cetirizine HCl 10 MG capsule, Take 1 dose by mouth As Needed (ALLERGIES)., Disp: , Rfl:     cholecalciferol (VITAMIN D3) 25 MCG (1000 UT) tablet, Take 1 tablet by mouth Daily., Disp: , Rfl:     colesevelam (WELCHOL) 625 MG tablet, Take 1 tablet by mouth 2 (Two) Times a Day With Meals., Disp: 180 tablet, Rfl: 3    Continuous Glucose  (Dexcom G6 ) device, Use 1 each Continuous., Disp: 1 each, Rfl: 0    Continuous Glucose Sensor (Dexcom G6 Sensor), Use Every 10 (Ten) Days., Disp: 3 each, Rfl: 3    Continuous Glucose Transmitter (Dexcom G6 Transmitter) misc, Apply 1 each topically Every 3 (Three) Months., Disp: , Rfl:     Cranberry 4200 MG capsule, Take 1 Capful by mouth Daily., Disp: , Rfl:     ezetimibe (ZETIA) 10 MG tablet, TAKE 1 TABLET DAILY, Disp: 90 tablet, Rfl: 3    fexofenadine (ALLEGRA) 180 MG tablet, Take 1 tablet by mouth Daily., Disp: 90 tablet, Rfl: 1    furosemide (Lasix) 20 MG tablet, Take 0.5 tablets by mouth Daily., Disp: 30 tablet, Rfl: 1    glucose blood (FREESTYLE LITE) test strip, 1 each by Other route Daily. Use as instructed, Disp: 100 each, Rfl: 6    HYDROcodone-acetaminophen (NORCO)  MG per tablet, Take 1 tablet by mouth 2 (Two) Times a Day As Needed for Moderate Pain., Disp: 60 tablet, Rfl: 0    Januvia 100 MG tablet, TAKE 1 TABLET DAILY, Disp: 90 tablet, Rfl: 3    Lancets (FREESTYLE) lancets, , Disp: , Rfl:     metFORMIN (GLUCOPHAGE) 1000 MG tablet, TAKE 1 TABLET TWICE A DAY WITH MEALS, Disp: 180  tablet, Rfl: 3    methocarbamol (ROBAXIN) 750 MG tablet, Take 1 tablet by mouth., Disp: , Rfl:     montelukast (Singulair) 10 MG tablet, Take 1 tablet by mouth Every Night., Disp: 30 tablet, Rfl: 6    nitrofurantoin, macrocrystal-monohydrate, (Macrobid) 100 MG capsule, Take 1 capsule by mouth 2 (Two) Times a Day., Disp: 10 capsule, Rfl: 0    Olmesartan-amLODIPine-HCTZ (Tribenzor) 40-10-12.5 MG tablet, Take 1 tablet by mouth Every Evening., Disp: 90 tablet, Rfl: 1    ondansetron ODT (ZOFRAN-ODT) 4 MG disintegrating tablet, Place 1 tablet on the tongue Every 8 (Eight) Hours As Needed for Nausea or Vomiting., Disp: 12 tablet, Rfl: 0    pravastatin (PRAVACHOL) 80 MG tablet, TAKE 1 TABLET DAILY, Disp: 90 tablet, Rfl: 3    pregabalin (LYRICA) 150 MG capsule, Take 1 capsule by mouth 2 (Two) Times a Day for 180 days., Disp: 180 capsule, Rfl: 1    rOPINIRole (REQUIP) 0.25 MG tablet, Take 1 tablet by mouth 2 (Two) Times a Day., Disp: 180 tablet, Rfl: 1    tamoxifen (NOLVADEX) 10 MG tablet, , Disp: , Rfl:     traZODone (DESYREL) 50 MG tablet, TAKE 1/2 TABLET BY MOUTH EVERY NIGHT, Disp: 30 tablet, Rfl: 0    VITAMIN E 400 UNIT capsule, Take 1 capsule by mouth., Disp: , Rfl:     The following portions of the patient's history were reviewed and updated as appropriate: allergies, current medications, past family history, past medical history, past social history, past surgical history, and problem list.      REVIEW OF PERTINENT MEDICAL DATA    Past Medical History:   Diagnosis Date    Allergic     Asthma 06/03/2019    At risk for falls     Body mass index (BMI) of 30.0-30.9 in adult 04/11/2019    Breast cancer     NO CHEMO, NO RADIATION; LEFT    Chronic obstructive pulmonary disease 06/03/2019    Diabetic peripheral neuropathy 02/06/2019    Gastroesophageal reflux disease 06/03/2019    History of COVID-19     2019, 2021,2022    Hyperlipidemia     Hypertension     Low back pain     Obesity     Osteoarthritis     PONV (postoperative  nausea and vomiting)     SEVERE    Slow to wake up after anesthesia     Under care of pain management specialist      Past Surgical History:   Procedure Laterality Date    ADENOIDECTOMY      CHOLECYSTECTOMY      COLONOSCOPY      ENDOSCOPY      EYE SURGERY      CATARACTS    INNER EAR SURGERY      STAPENDECTOMY    JOINT REPLACEMENT      KNEE SURGERY Bilateral     MULTIPLE    MASTECTOMY Left     REPLACEMENT TOTAL KNEE Left     REPLACEMENT TOTAL KNEE Right     TONSILLECTOMY      TOTAL HIP ARTHROPLASTY Left 2023    Procedure: TOTAL HIP ARTHROPLASTY ANTERIOR WITH HANA TABLE;  Surgeon: Francis Vogel MD;  Location: [facility] [place];  Service: Orthopedics;  Laterality: Left;    TOTAL HIP ARTHROPLASTY REVISION Left 2023    Procedure: TOTAL HIP ARTHROPLASTY ANTERIOR REVISION WITH HANA TABLE;  Surgeon: Francis Vogel MD;  Location: [facility] [place];  Service: Orthopedics;  Laterality: Left;     Family History   Adopted: Yes   Problem Relation Age of Onset    Malig Hyperthermia Neg Hx      Social History     Socioeconomic History    Marital status:    Tobacco Use    Smoking status: Former     Current packs/day: 0.00     Average packs/day: 0.2 packs/day for 0.5 years (0.1 ttl pk-yrs)     Types: Cigarettes     Start date: 1984     Quit date:      Years since quittin.5     Passive exposure: Past    Smokeless tobacco: Never   Vaping Use    Vaping status: Never Used   Substance and Sexual Activity    Alcohol use: Yes     Comment: Occassional    Drug use: No    Sexual activity: Yes     Partners: Male     Birth control/protection: None         Review of Systems   Musculoskeletal:  Positive for arthralgias and back pain.         Vitals:    25 1308   BP: 149/78   Pulse: 90   Resp: 16   SpO2: 98%   Weight: 90.7 kg (200 lb)   PainSc: 7            Objective   Physical Exam  Musculoskeletal:         General: Tenderness present.        Legs:            Imaging Reviewed:  DEXA--normal bone  density    7/29/2022-right shoulder x-ray  - Mild degenerative changes of right shoulder    7/25/2022-left foot x-ray  - Lucency at the navicular again noted however is less conspicuous than on the prior study and this may be partially related to superimposition of structures versus nondisplaced fracture.    Left ankle x-ray-2022  - Degenerative changes of calcaneal spur    Assessment:    1. Chronic pain syndrome    2. Primary osteoarthritis involving multiple joints    3. Left hip pain           Plan:   1.  UDS consistent with patient's interview on 4/23/2025 narcotic contract signed.  2. We discussed trying a course of formal physical therapy.  Physical therapy can help strengthen and stretch the muscles around the joints. Continue to be as active as possible. Patient has done PT in past.   3. Reviewed Dr. Ventura's notes, imaging and other physician's notes.   4. Continue Lyrica 150 mg BID. (10/20/25). Side effects discussed with the patient including but not limited to dizziness, blurry vision, weight gain, sleepiness, trouble concentrating, swelling of your hands and feet, dry mouth, feeling high and suicidal thoughts. Patient understands and agrees with the plan.  5. She has been off of Norco  mg.    6. Continue taking Celebrex 200 mg PRN - from PCP    RTC in 6 months.     Logan Riddle DO  Pain Management   [facility]         INSPECT REPORT    As part of the patient's treatment plan, I may be prescribing controlled substances. The patient has been made aware of appropriate use of such medications, including potential risk of somnolence, limited ability to drive and/or work safely, and the potential for dependence or overdose. It has also been made clear that these medications are for use by this patient only, without concomitant use of alcohol or other substances unless prescribed.     Patient has completed prescribing agreement detailing terms of continued prescribing of controlled substances,  including monitoring INSPECT reports, urine drug screening, and pill counts if necessary. The patient is aware that inappropriate use will results in cessation of prescribing such medications.    INSPECT report has been reviewed and scanned into the patient's chart.      EMR Dragon/Transcription Disclaimer:   Much of this encounter note is an electronic transcription/translation of spoken language to printed text. The electronic translation of spoken language may permit erroneous, or at times, nonsensical words or phrases to be inadvertently transcribed; Although I have reviewed the note for such errors, some may still exist.

## 2025-07-23 ENCOUNTER — OFFICE VISIT (OUTPATIENT)
Dept: PAIN MEDICINE | Facility: CLINIC | Age: 65
End: 2025-07-23
Payer: MEDICARE

## 2025-07-23 VITALS
DIASTOLIC BLOOD PRESSURE: 78 MMHG | HEART RATE: 90 BPM | BODY MASS INDEX: 32.3 KG/M2 | WEIGHT: 200 LBS | SYSTOLIC BLOOD PRESSURE: 149 MMHG | RESPIRATION RATE: 16 BRPM | OXYGEN SATURATION: 98 %

## 2025-07-23 DIAGNOSIS — M15.0 PRIMARY OSTEOARTHRITIS INVOLVING MULTIPLE JOINTS: ICD-10-CM

## 2025-07-23 DIAGNOSIS — G89.4 CHRONIC PAIN SYNDROME: ICD-10-CM

## 2025-07-23 DIAGNOSIS — M25.552 LEFT HIP PAIN: ICD-10-CM

## 2025-07-23 RX ORDER — HYDROCODONE BITARTRATE AND ACETAMINOPHEN 10; 325 MG/1; MG/1
1 TABLET ORAL 2 TIMES DAILY PRN
Qty: 60 TABLET | Refills: 0 | Status: CANCELLED | OUTPATIENT
Start: 2025-07-23

## 2025-07-23 RX ORDER — METHOCARBAMOL 750 MG/1
750 TABLET, FILM COATED ORAL
COMMUNITY
Start: 2025-03-18

## 2025-07-25 ENCOUNTER — TELEPHONE (OUTPATIENT)
Dept: FAMILY MEDICINE CLINIC | Facility: CLINIC | Age: 65
End: 2025-07-25
Payer: MEDICARE

## 2025-07-25 NOTE — TELEPHONE ENCOUNTER
Pearl Elizalde notified and voiced comprehension and understanding.    Scheduled for Monday, 7/28/25.

## 2025-07-25 NOTE — TELEPHONE ENCOUNTER
Patients with possible UTIs need to be seen here if possible, or go to urgent care if we do not have any availability.  I only like for her urine specimens to be dropped off if they have recently been treated with antibiotics and continue to experience symptoms.  Review of the chart shows that it is been a month since she was last treated with antibiotics.  If she is getting them this frequently, we also may need to look at getting into urology to determine underlying cause.

## 2025-07-25 NOTE — TELEPHONE ENCOUNTER
Patient called in and thinks that she has a UTI. She said she gets them frequent and that she has horrible back pain and that's usually what it is. She said she can bring us in a sample but told her Id reach out to you first.

## 2025-07-27 DIAGNOSIS — G47.9 DIFFICULTY SLEEPING: ICD-10-CM

## 2025-07-27 DIAGNOSIS — R60.0 BILATERAL LOWER EXTREMITY EDEMA: ICD-10-CM

## 2025-07-28 ENCOUNTER — OFFICE VISIT (OUTPATIENT)
Dept: FAMILY MEDICINE CLINIC | Facility: CLINIC | Age: 65
End: 2025-07-28
Payer: MEDICARE

## 2025-07-28 VITALS
WEIGHT: 194.4 LBS | SYSTOLIC BLOOD PRESSURE: 131 MMHG | DIASTOLIC BLOOD PRESSURE: 81 MMHG | BODY MASS INDEX: 31.24 KG/M2 | OXYGEN SATURATION: 96 % | TEMPERATURE: 97.3 F | HEART RATE: 94 BPM | HEIGHT: 66 IN

## 2025-07-28 DIAGNOSIS — R39.15 URGENCY OF URINATION: ICD-10-CM

## 2025-07-28 DIAGNOSIS — R35.1 NOCTURIA: ICD-10-CM

## 2025-07-28 DIAGNOSIS — R35.0 FREQUENCY OF URINATION: Primary | ICD-10-CM

## 2025-07-28 LAB
BILIRUB BLD-MCNC: NEGATIVE MG/DL
CLARITY, POC: CLEAR
COLOR UR: YELLOW
EXPIRATION DATE: NORMAL
GLUCOSE UR STRIP-MCNC: NEGATIVE MG/DL
KETONES UR QL: NEGATIVE
LEUKOCYTE EST, POC: NEGATIVE
Lab: NORMAL
NITRITE UR-MCNC: NEGATIVE MG/ML
PH UR: 5.5 [PH] (ref 5–8)
PROT UR STRIP-MCNC: NEGATIVE MG/DL
RBC # UR STRIP: NEGATIVE /UL
SP GR UR: 1.02 (ref 1–1.03)
UROBILINOGEN UR QL: NORMAL

## 2025-07-28 PROCEDURE — 99213 OFFICE O/P EST LOW 20 MIN: CPT

## 2025-07-28 PROCEDURE — 1126F AMNT PAIN NOTED NONE PRSNT: CPT

## 2025-07-28 PROCEDURE — 81003 URINALYSIS AUTO W/O SCOPE: CPT

## 2025-07-28 PROCEDURE — 1159F MED LIST DOCD IN RCRD: CPT

## 2025-07-28 PROCEDURE — 3051F HG A1C>EQUAL 7.0%<8.0%: CPT

## 2025-07-28 PROCEDURE — 3075F SYST BP GE 130 - 139MM HG: CPT

## 2025-07-28 PROCEDURE — 1160F RVW MEDS BY RX/DR IN RCRD: CPT

## 2025-07-28 PROCEDURE — 3079F DIAST BP 80-89 MM HG: CPT

## 2025-07-28 RX ORDER — TRAZODONE HYDROCHLORIDE 50 MG/1
25 TABLET ORAL
Qty: 30 TABLET | Refills: 0 | Status: SHIPPED | OUTPATIENT
Start: 2025-07-28

## 2025-07-28 RX ORDER — FUROSEMIDE 20 MG/1
10 TABLET ORAL DAILY
Qty: 30 TABLET | Refills: 1 | Status: SHIPPED | OUTPATIENT
Start: 2025-07-28

## 2025-07-28 NOTE — PROGRESS NOTES
Subjective   Pearl Elizalde is a 65 y.o. female presents for   Chief Complaint   Patient presents with    Urinary Tract Infection     States her last UTI was 6-8 weeks ago. She hasn't had UTI her whole life until about 6-8 months ago. Wanting to know why she is getting so many now.    Obesity     On the salt diet. Lost 15 lbs.    diabetic foot exam      Due today but she states it is very hard to get her shoes and socks off. She would rather do another day.       Health Maintenance Due   Topic Date Due    ZOSTER VACCINE (1 of 2) Never done    DIABETIC FOOT EXAM  05/10/2022    COLORECTAL CANCER SCREENING  05/01/2024    DXA SCAN  06/05/2025    DIABETIC EYE EXAM  08/13/2025       History of Present Illness  The patient presents today with concerns about recurring urinary tract infections (UTIs). She had one 6 to 8 weeks ago, has not had a UTI her entire life, and wants to know why she is getting so many now. Additionally, she has been actively trying to lose weight and has successfully shed 15 pounds.    She reports experiencing symptoms of a UTI for the past few weeks, which she initially disregarded. Her symptoms include frequent urination, particularly at night, and severe back pain, necessitating the use of a back brace. To note she has chronic back pain. She does not experience any burning sensation during urination. Despite making some lifestyle changes, she continues to experience these symptoms. She brought in a urine sample this morning. Her fluid intake primarily consists of water, lemon water, unsweetened tea, and V8 juice. She consumes decaffeinated tea and avoids carbonated water. She takes cranberry pills at night and drinks water throughout the night. She had a bladder sling procedure several years ago.  She has a history of back pain due to hip issues. She has an upcoming appointment on 08/18/2025 to discuss the results of her recent back MRI with a neurosurgeon.    She has an appointment with her eye  "doctor in 2 weeks. She has not had a colonoscopy recently due to her hip condition. Her last colonoscopy was several years ago, and she believes she may have had polyps.    Social History:  Diet: Her fluid intake primarily consists of water, lemon water, unsweetened tea, and V8 juice.  Coffee/Tea/Caffeine-containing Drinks: She consumes decaffeinated tea.  Sleep: She reports frequent urination at night, which disrupts her sleep.    PAST SURGICAL HISTORY:  Bladder sling procedure several years ago.       Vitals:    07/28/25 0932   BP: 131/81   BP Location: Right arm   Patient Position: Sitting   Cuff Size: Large Adult   Pulse: 94   Temp: 97.3 °F (36.3 °C)   TempSrc: Infrared   SpO2: 96%   Weight: 88.2 kg (194 lb 6.4 oz)   Height: 167.6 cm (65.98\")     Body mass index is 31.39 kg/m².    Current Outpatient Medications on File Prior to Visit   Medication Sig Dispense Refill    albuterol sulfate  (90 Base) MCG/ACT inhaler Inhale 2 puffs Every 4 (Four) Hours As Needed for Wheezing. 18 g 1    betamethasone dipropionate (DIPROSONE) 0.05 % cream APPLY 1 APPLICATION TO THE APPROPRIATE AREA TOPICALLY AS DIRECTED TWICE A DAY 45 g 14    Blood Glucose Monitoring Suppl (Blood Glucose Monitor System) w/Device kit Inject 1 each under the skin into the appropriate area as directed Daily. 1 each 0    Budeson-Glycopyrrol-Formoterol (Breztri Aerosphere) 160-9-4.8 MCG/ACT aerosol inhaler Inhale 2 puffs 2 (Two) Times a Day. 3 each 3    celecoxib (CeleBREX) 200 MG capsule Take 1 capsule by mouth Daily. (Patient taking differently: Take 1 capsule by mouth As Needed.) 90 capsule 1    cholecalciferol (VITAMIN D3) 25 MCG (1000 UT) tablet Take 1 tablet by mouth Daily.      colesevelam (WELCHOL) 625 MG tablet Take 1 tablet by mouth 2 (Two) Times a Day With Meals. 180 tablet 3    Cranberry 4200 MG capsule Take 1 Capful by mouth Daily.      ezetimibe (ZETIA) 10 MG tablet TAKE 1 TABLET DAILY 90 tablet 3    fexofenadine (ALLEGRA) 180 MG " tablet Take 1 tablet by mouth Daily. 90 tablet 1    furosemide (LASIX) 20 MG tablet TAKE 1/2 TABLET BY MOUTH DAILY 30 tablet 1    glucose blood (FREESTYLE LITE) test strip 1 each by Other route Daily. Use as instructed 100 each 6    Januvia 100 MG tablet TAKE 1 TABLET DAILY 90 tablet 3    Lancets (FREESTYLE) lancets       metFORMIN (GLUCOPHAGE) 1000 MG tablet TAKE 1 TABLET TWICE A DAY WITH MEALS 180 tablet 3    methocarbamol (ROBAXIN) 750 MG tablet Take 1 tablet by mouth. (Patient taking differently: Take 1 tablet by mouth As Needed.)      Olmesartan-amLODIPine-HCTZ (Tribenzor) 40-10-12.5 MG tablet Take 1 tablet by mouth Every Evening. 90 tablet 1    ondansetron ODT (ZOFRAN-ODT) 4 MG disintegrating tablet Place 1 tablet on the tongue Every 8 (Eight) Hours As Needed for Nausea or Vomiting. 12 tablet 0    pravastatin (PRAVACHOL) 80 MG tablet TAKE 1 TABLET DAILY 90 tablet 3    pregabalin (LYRICA) 150 MG capsule Take 1 capsule by mouth 2 (Two) Times a Day for 180 days. 180 capsule 1    rOPINIRole (REQUIP) 0.25 MG tablet Take 1 tablet by mouth 2 (Two) Times a Day. 180 tablet 1    tamoxifen (NOLVADEX) 10 MG tablet       VITAMIN E 400 UNIT capsule Take 1 capsule by mouth.      baclofen (LIORESAL) 10 MG tablet Take 1 tablet by mouth Every Night. (Patient not taking: Reported on 7/28/2025) 90 tablet 1    Cetirizine HCl 10 MG capsule Take 1 dose by mouth As Needed (ALLERGIES). (Patient not taking: Reported on 7/28/2025)      Continuous Glucose  (Dexcom G6 ) device Use 1 each Continuous. (Patient not taking: Reported on 7/28/2025) 1 each 0    Continuous Glucose Sensor (Dexcom G6 Sensor) Use Every 10 (Ten) Days. (Patient not taking: Reported on 7/28/2025) 3 each 3    Continuous Glucose Transmitter (Dexcom G6 Transmitter) misc Apply 1 each topically Every 3 (Three) Months. (Patient not taking: Reported on 7/28/2025)      HYDROcodone-acetaminophen (NORCO)  MG per tablet Take 1 tablet by mouth 2 (Two) Times a  Day As Needed for Moderate Pain. (Patient not taking: Reported on 7/28/2025) 60 tablet 0    montelukast (Singulair) 10 MG tablet Take 1 tablet by mouth Every Night. 30 tablet 6    nitrofurantoin, macrocrystal-monohydrate, (Macrobid) 100 MG capsule Take 1 capsule by mouth 2 (Two) Times a Day. (Patient not taking: Reported on 7/28/2025) 10 capsule 0    traZODone (DESYREL) 50 MG tablet TAKE 1/2 TABLET BY MOUTH EVERY NIGHT (Patient not taking: Reported on 7/28/2025) 30 tablet 0    [DISCONTINUED] furosemide (Lasix) 20 MG tablet Take 0.5 tablets by mouth Daily. 30 tablet 1    [DISCONTINUED] traZODone (DESYREL) 50 MG tablet TAKE 1/2 TABLET BY MOUTH EVERY NIGHT 30 tablet 0     No current facility-administered medications on file prior to visit.       The following portions of the patient's history were reviewed and updated as appropriate: allergies, current medications, past family history, past medical history, past social history, past surgical history, and problem list.    Review of Systems   Genitourinary:  Positive for frequency and urgency.       Objective   Physical Exam  Vitals and nursing note reviewed.   Constitutional:       Appearance: Normal appearance. She is well-developed. She is obese.   HENT:      Head: Normocephalic and atraumatic.      Right Ear: External ear normal.      Left Ear: External ear normal.      Nose: Nose normal.   Eyes:      Extraocular Movements: Extraocular movements intact.      Pupils: Pupils are equal, round, and reactive to light.   Cardiovascular:      Rate and Rhythm: Normal rate and regular rhythm.      Heart sounds: Normal heart sounds.   Pulmonary:      Effort: Pulmonary effort is normal.      Breath sounds: Normal breath sounds.   Abdominal:      General: Bowel sounds are normal.      Palpations: Abdomen is soft.   Genitourinary:     Vagina: Normal.   Musculoskeletal:         General: Normal range of motion.      Cervical back: Normal range of motion and neck supple.   Skin:      General: Skin is warm and dry.   Neurological:      General: No focal deficit present.      Mental Status: She is alert and oriented to person, place, and time.      Gait: Gait abnormal.   Psychiatric:         Mood and Affect: Mood normal.         Behavior: Behavior normal.         Judgment: Judgment normal.            PHQ-9 Total Score:        Results  Labs   - Urine test: Negative for UTI       Assessment & Plan   Diagnoses and all orders for this visit:    1. Frequency of urination (Primary)  -     POC Urinalysis Dipstick, Automated         1. Recurrent urinary tract infections (UTIs).  - Her urine test returned negative for UTI.  - The possibility of bladder irritation due to citrus or tomato-based foods, chocolate, or concentrated urine was discussed.  - The potential for incomplete bladder emptying leading to recurrent UTIs was also considered.  - She was advised to increase her water intake, avoid chocolate, and take over-the-counter probiotics daily. D-mannose was recommended for UTI prevention. She was instructed to cease liquid intake 2 to 3 hours before bedtime. A referral to urology was discussed but patient declined and wants to try other suggestions first.     2. Weight loss.  - She has lost 15 pounds intentionally.    3. Health maintenance.  - She is due for a diabetic eye exam in August 2025 and has an appointment scheduled in 2 weeks.  - A DEXA scan and colon cancer screening were discussed, and she will follow up with these screenings as appropriate.    Follow-up: 09/2025.      There are no Patient Instructions on file for this visit.         Patient or patient representative verbalized consent for the use of Ambient Listening during the visit with  MIKAEL Macias for chart documentation. 7/28/2025  10:29 EDT

## 2025-08-25 ENCOUNTER — OFFICE VISIT (OUTPATIENT)
Dept: FAMILY MEDICINE CLINIC | Facility: CLINIC | Age: 65
End: 2025-08-25
Payer: MEDICARE

## 2025-08-25 VITALS
DIASTOLIC BLOOD PRESSURE: 80 MMHG | BODY MASS INDEX: 31.02 KG/M2 | HEART RATE: 78 BPM | TEMPERATURE: 98.2 F | SYSTOLIC BLOOD PRESSURE: 117 MMHG | HEIGHT: 66 IN | OXYGEN SATURATION: 97 % | WEIGHT: 193 LBS

## 2025-08-25 DIAGNOSIS — Z78.0 POSTMENOPAUSE: ICD-10-CM

## 2025-08-25 DIAGNOSIS — B07.0 PLANTAR WART OF LEFT FOOT: Primary | ICD-10-CM

## 2025-08-25 DIAGNOSIS — E11.42 DIABETIC PERIPHERAL NEUROPATHY: ICD-10-CM

## 2025-08-25 PROCEDURE — 99213 OFFICE O/P EST LOW 20 MIN: CPT

## 2025-08-25 PROCEDURE — 1126F AMNT PAIN NOTED NONE PRSNT: CPT

## 2025-08-25 PROCEDURE — 1159F MED LIST DOCD IN RCRD: CPT

## 2025-08-25 PROCEDURE — 1160F RVW MEDS BY RX/DR IN RCRD: CPT

## 2025-08-25 PROCEDURE — 3074F SYST BP LT 130 MM HG: CPT

## 2025-08-25 PROCEDURE — 3051F HG A1C>EQUAL 7.0%<8.0%: CPT

## 2025-08-25 PROCEDURE — 3079F DIAST BP 80-89 MM HG: CPT

## 2025-08-25 PROCEDURE — G2211 COMPLEX E/M VISIT ADD ON: HCPCS

## (undated) DEVICE — ANTIBACTERIAL UNDYED BRAIDED (POLYGLACTIN 910), SYNTHETIC ABSORBABLE SUTURE: Brand: COATED VICRYL

## (undated) DEVICE — MAT FLR ABSORBENT LG 4FT 10 2.5FT

## (undated) DEVICE — DRP C ARM W/TP TAB 103X230CM

## (undated) DEVICE — TBG PENCL TELESCP MEGADYNE SMOKE EVAC 10FT

## (undated) DEVICE — ELECTRD BLD EZ CLN STD 6.5IN

## (undated) DEVICE — APPL CHLORAPREP HI/LITE 26ML ORNG

## (undated) DEVICE — PK ANT HIP 40

## (undated) DEVICE — 6617 IOBAN II PATIENT ISOLATION DRAPE 5/BX,4BX/CS: Brand: STERI-DRAPE™ IOBAN™ 2

## (undated) DEVICE — RECIPROCATING BLADE HEAVY DUTY LONG, OFFSET  (77.6 X 0.77 X 11.2MM)

## (undated) DEVICE — TRAP FLD MINIVAC MEGADYNE 100ML

## (undated) DEVICE — KT DRN EVAC WND PVC PCH WTROC RND 10F400

## (undated) DEVICE — PREP SOL POVIDONE/IODINE BT 4OZ

## (undated) DEVICE — GLV SURG BIOGEL LTX PF 8

## (undated) DEVICE — SOL ISO/ALC 70PCT 4OZ

## (undated) DEVICE — SYRINGE, LUER LOCK, 60ML: Brand: MEDLINE

## (undated) DEVICE — PREMIUM DRY TRAY LF: Brand: MEDLINE INDUSTRIES, INC.

## (undated) DEVICE — DRAPE,U/ SHT,SPLIT,PLAS,STERIL: Brand: MEDLINE

## (undated) DEVICE — PATIENT RETURN ELECTRODE, SINGLE-USE, CONTACT QUALITY MONITORING, ADULT, WITH 9FT CORD, FOR PATIENTS WEIGING OVER 33LBS. (15KG): Brand: MEGADYNE

## (undated) DEVICE — CONTAINER,SPECIMEN,OR STERILE,4OZ: Brand: MEDLINE

## (undated) DEVICE — NEEDLE, QUINCKE, 18GX3.5": Brand: MEDLINE

## (undated) DEVICE — GLV SURG BIOGEL LTX PF 8 1/2

## (undated) DEVICE — ADHS SKIN SURG TISS VISC PREMIERPRO EXOFIN HI/VISC FAST/DRY

## (undated) DEVICE — SCRW ACET CORT TRILOGY S/TAP 6.5X40: Type: IMPLANTABLE DEVICE | Status: NON-FUNCTIONAL

## (undated) DEVICE — OPTIFOAM GENTLE SA, POSTOP, 4X8: Brand: MEDLINE

## (undated) DEVICE — SUT MNCRYL 3/0 PS2 18IN MCP497G

## (undated) DEVICE — 3M™ IOBAN™ 2 ANTIMICROBIAL INCISE DRAPE 6640EZ: Brand: IOBAN™ 2

## (undated) DEVICE — SUT VIC 0 CT1 CR8 27IN JJ41G

## (undated) DEVICE — OPTIFOAM GENTLE SA, POSTOP, 4X12: Brand: MEDLINE

## (undated) DEVICE — BIT DRL SCRW CENTRL 3.2MM